# Patient Record
Sex: FEMALE | Race: WHITE | HISPANIC OR LATINO | Employment: OTHER | URBAN - METROPOLITAN AREA
[De-identification: names, ages, dates, MRNs, and addresses within clinical notes are randomized per-mention and may not be internally consistent; named-entity substitution may affect disease eponyms.]

---

## 2018-05-06 ENCOUNTER — HOSPITAL ENCOUNTER (EMERGENCY)
Facility: HOSPITAL | Age: 83
Discharge: HOME | End: 2018-05-06
Attending: EMERGENCY MEDICINE
Payer: MEDICARE

## 2018-05-06 ENCOUNTER — APPOINTMENT (EMERGENCY)
Dept: RADIOLOGY | Facility: HOSPITAL | Age: 83
End: 2018-05-06
Payer: MEDICARE

## 2018-05-06 VITALS
TEMPERATURE: 97.2 F | RESPIRATION RATE: 16 BRPM | HEART RATE: 66 BPM | SYSTOLIC BLOOD PRESSURE: 176 MMHG | WEIGHT: 134 LBS | DIASTOLIC BLOOD PRESSURE: 84 MMHG | OXYGEN SATURATION: 98 %

## 2018-05-06 DIAGNOSIS — J40 BRONCHITIS: Primary | ICD-10-CM

## 2018-05-06 PROCEDURE — 71045 X-RAY EXAM CHEST 1 VIEW: CPT

## 2018-05-06 PROCEDURE — 99283 EMERGENCY DEPT VISIT LOW MDM: CPT

## 2018-05-06 RX ORDER — AZITHROMYCIN 250 MG/1
500 TABLET, FILM COATED ORAL ONCE
Status: COMPLETED | OUTPATIENT
Start: 2018-05-06 | End: 2018-05-06

## 2018-05-06 RX ORDER — GUAIFENESIN/DEXTROMETHORPHAN 100-10MG/5
5 SYRUP ORAL ONCE
Status: COMPLETED | OUTPATIENT
Start: 2018-05-06 | End: 2018-05-06

## 2018-05-06 RX ORDER — AZITHROMYCIN 250 MG/1
250 TABLET, FILM COATED ORAL DAILY
Qty: 4 TABLET | Refills: 0 | Status: SHIPPED | OUTPATIENT
Start: 2018-05-06 | End: 2018-05-10

## 2018-05-06 RX ORDER — GUAIFENESIN/DEXTROMETHORPHAN 100-10MG/5
5 SYRUP ORAL 3 TIMES DAILY PRN
Qty: 118 ML | Refills: 0 | Status: SHIPPED | OUTPATIENT
Start: 2018-05-06 | End: 2019-05-20

## 2018-05-06 RX ADMIN — GUAIFENESIN AND DEXTROMETHORPHAN 5 ML: 100; 10 SYRUP ORAL at 20:28

## 2018-05-06 RX ADMIN — AZITHROMYCIN 500 MG: 250 TABLET, FILM COATED ORAL at 20:28

## 2018-05-06 NOTE — ED PROVIDER NOTES
History  Chief Complaint   Patient presents with    Cough     daughter c/o produtive cough for 5 days, has had clear phlegm, daughter noticed coughing inbetween every other sentence     81 yo female with worsening cough x 5 days  + white phlegm production  No fever, no chest pain  Mild sob  No vomiting or diarrhea  Has not tried any over the counter meds  History provided by:  Patient and relative   used: Yes        None       Past Medical History:   Diagnosis Date    CAD (coronary artery disease)     Disease of thyroid gland     hypo    GERD (gastroesophageal reflux disease)     Hyperlipidemia     Hypertension     Osteoarthritis (arthritis due to wear and tear of joints)        Past Surgical History:   Procedure Laterality Date    CORONARY ARTERY BYPASS GRAFT      HIATAL HERNIA REPAIR      JOINT REPLACEMENT Left     knee 2001    ROTATOR CUFF REPAIR         History reviewed  No pertinent family history  I have reviewed and agree with the history as documented  Social History   Substance Use Topics    Smoking status: Never Smoker    Smokeless tobacco: Never Used    Alcohol use No        Review of Systems   Constitutional: Negative  Negative for fever  HENT: Negative  Eyes: Negative  Respiratory: Positive for cough  Negative for shortness of breath  Cardiovascular: Negative  Negative for chest pain  Gastrointestinal: Negative  Negative for abdominal pain, diarrhea, nausea and vomiting  Genitourinary: Negative  Negative for dysuria and flank pain  Musculoskeletal: Negative  Negative for back pain and myalgias  Skin: Negative  Negative for rash and wound  Neurological: Negative  Negative for dizziness and headaches  Hematological: Does not bruise/bleed easily  Psychiatric/Behavioral: Negative  All other systems reviewed and are negative        Physical Exam  ED Triage Vitals [05/06/18 1925]   Temperature Pulse Respirations Blood Pressure SpO2   (!) 95 9 °F (35 5 °C) 69 18 164/96 98 %      Temp Source Heart Rate Source Patient Position - Orthostatic VS BP Location FiO2 (%)   Tympanic Monitor Sitting Right arm --      Pain Score       No Pain           Orthostatic Vital Signs  Vitals:    05/06/18 1925   BP: 164/96   Pulse: 69   Patient Position - Orthostatic VS: Sitting       Physical Exam   Constitutional: She appears well-developed and well-nourished  No distress  Not ill or toxic appearing   HENT:   Head: Normocephalic and atraumatic  Right Ear: Tympanic membrane normal    Left Ear: Tympanic membrane normal    Mouth/Throat: Oropharynx is clear and moist    Eyes: Conjunctivae are normal  Pupils are equal, round, and reactive to light  Neck: Normal range of motion  Neck supple  Cardiovascular: Normal rate, regular rhythm and normal heart sounds  No murmur heard  Pulmonary/Chest: Effort normal and breath sounds normal  No respiratory distress  Abdominal: Soft  Bowel sounds are normal  She exhibits no distension  There is no tenderness  Musculoskeletal: Normal range of motion  She exhibits no edema or deformity  Neurological: She is alert  No cranial nerve deficit  She exhibits normal muscle tone  Skin: Skin is warm and dry  No rash noted  She is not diaphoretic  No pallor  Psychiatric: She has a normal mood and affect  Her behavior is normal    Nursing note and vitals reviewed  ED Medications  Medications   azithromycin (ZITHROMAX) tablet 500 mg (not administered)   dextromethorphan-guaiFENesin (ROBITUSSIN DM)  mg/5 mL oral syrup 5 mL (not administered)       Diagnostic Studies  Results Reviewed     None                 XR chest 2 views    (Results Pending)              Procedures  Procedures       Phone Contacts  ED Phone Contact    ED Course                               MDM  Number of Diagnoses or Management Options  Bronchitis:   Diagnosis management comments: Exam normal and pt  Looks good/comfortable  No coughing while I was in the room during exam   Will tx for possible bronchitis and prescribed cough medicine  Advised follow up if any worsening or distress  Pt  And daughter seem agreeable with plan  CritCare Time    Disposition  Final diagnoses:   Bronchitis     Time reflects when diagnosis was documented in both MDM as applicable and the Disposition within this note     Time User Action Codes Description Comment    4/9/5479  1:51 PM Beto, Jose Boateng Bronchitis       ED Disposition     ED Disposition Condition Comment    Discharge  Storm Reasoner discharge to home/self care  Condition at discharge: Stable        Follow-up Information     Follow up With Specialties Details Why Anat Whitney MD  In 3 days for [facility] [address]  [phone]          Patient's Medications   Discharge Prescriptions    AZITHROMYCIN (ZITHROMAX) 250 MG TABLET    Take 1 tablet (250 mg total) by mouth daily for 4 days       Start Date: 5/6/2018  End Date: 5/10/2018       Order Dose: 250 mg       Quantity: 4 tablet    Refills: 0    DEXTROMETHORPHAN-GUAIFENESIN (ROBITUSSIN DM)  MG/5 ML SYRUP    Take 5 mL by mouth 3 (three) times a day as needed for cough       Start Date: 5/6/2018  End Date: --       Order Dose: 5 mL       Quantity: 118 mL    Refills: 0     No discharge procedures on file      ED Provider  Electronically Signed by           Nupur Ashby MD  12/89/64 8661

## 2018-05-07 NOTE — DISCHARGE INSTRUCTIONS
Bronquitis aguda   LO QUE NECESITA SABER:   La bronquitis aguda es la inflamación e irritación de bhumika vías respiratorias  Esta irritación puede provocar que tosa o que tenga otros problemas de respiración  La bronquitis aguda suele comenzar debido a otra enfermedad, edwige un resfriado o la gripe  La enfermedad se propaga de cerda nariz y garganta a cerda tráquea y Kasey Hidden  La bronquitis a menudo es conocida edwige resfriado de pecho  La bronquitis aguda generalmente dura alrededor de 3 a 6 semanas y no es haley enfermedad grave  La tos podría durar por varias semanas  INSTRUCCIONES SOBRE EL HAO HOSPITALARIA:   Regrese a la juan de emergencias si:   · Usted expectora deon  · Bhumika labios o uñas de los dedos se ponen azules  · Usted siente que no está recibiendo suficiente aire cuando respira  Pregúntele a cerda Lea Punch vitaminas y minerales son adecuados para usted  · Usted tiene fiebre  · Bhumika problemas respiratorios no desaparecen o empeoran  · Cerda tos no mejora dentro de 4 semanas  · Usted tiene preguntas o inquietudes acerca de cerda condición o cuidado  Cuidados personales:   · Descanse más  El descanso ayuda a cerda cuerpo a sanar  Empiece a hacer un poco más día a día  Descanse cuando usted sienta que es necesario  · Evite irritantes en el aire  Evite productos químicos, gases y polvo  Use haley mascarilla si tiene que trabajar alrededor de polvo o gases  Permanezca dentro cuando los niveles de contaminación tiffany altos  Si tiene alergias, permanezca adentro cuando el conteo de polen sea CROSSCANONBY  No use productos en aerosol, edwige desodorante, aerosol contra los insectos y aerosol para el aria  · No fume o esté alrededor de personas que fuman  La nicotina y otros químicos en los cigarrillos y cigarros dañan la cilia que saca la mucosidad de bhumika pulmones  Pida información a cerda médico si usted actualmente fuma y necesita ayuda para dejar de fumar   Los cigarrillos electrónicos o tabaco sin humo todavía contienen nicotina  Consulte con cerda médico antes de [employer]  · [address] se le haya indicado  Los líquidos ayudan a mantener humectadas christel vías respiratorias y ayudarle a eliminar las flemas por medio de la tos  Es posible que usted necesite kahlil más líquidos si tiene bronquitis [place]  Pregunte cuánto líquido debe kahlil cada día y cuáles líquidos son los más adecuados para usted  · Use un humidificador o vaporizador  Use un humidificador de stacey frío o un vaporizador para elevar la humedad en cerda casa  [address] podría ayudarle a respirar más fácilmente y al mismo tiempo disminuir la tos  Disminuya cerda riesgo para la bronquitis aguda:   · Vaya a que le pongan las vacunas necesarias  Pregúntele a cerda médico si usted debería ser vacunado contra la gripe o la neumonía  · Evite la propagación de gérmenes  Usted puede disminuir cerda riesgo de bronquitis [place] y otras enfermedades de la siguiente manera:     ¨ Bishop frecuentemente con agua y Pat Kiah  Lleve haley loción o gel antiséptico para las coco  Usted puede usar la loción o el gel para limpiar christel coco cuando no haya agua disponible  ¨ No se toque los ojos, la nariz o la boca a menos que se haya lavado las coco mary jo  ¨ Siempre cubra cerda boca al toser para evitar la propagación de gérmenes  Lo mejor es toser en un pañuelo desechable o en la manga de cerda camisa, en lugar de en cerda mano  P[place]dale a los que le rodean que cubran christel bocas al toser  ¨ Trate de evitar a las personas que están resfriadas o tienen gripe  Si usted está enfermo, manténgase alejado de otras personas lo más que pueda  Medicamentos:  Cerda médico podría  darle cualquiera de lo siguiente:  · El ibuprofeno o el acetaminofeno  son medicamentos que pueden ayudar a bajar cerda fiebre  Están disponibles sin receta médica  Consulte con cerda médico cuál medicamento es el adecuado para usted  Pregunte la cantidad y la frecuencia con que debe tomarlos  [address]  Estos medicamentos pueden provocar sangrado estomacal si no se dano correctamente  El ibuprofeno puede provocar daño al Mordecai Herder  No tome ibuprofeno si usted tiene enfermedad de los riñones, Giovanna o si es alérgico a la aspirina  El acetaminofeno puede dañar el hígado  No tome más de 4,000 miligramos en 24 horas  · Los descongestionantes  ayudan a despejar la mucosidad en christel pulmones y que sea más fácil expectorarla  [place] puede ayudarle a respirar mejor  · Los jarabes para la tos  disminuyen cerda necesidad de toser  Si cerda tos produce mucosidad, no tome un supresor de la tos a menos que cerda médico se lo indique  Cerda médico podría sugerirle que tome un supresor de la tos en la noche para que pueda descansar  · [place] christel medicamentos edwige se le haya indicado  Consulte con cerda médico si usted micha que cerda medicamento no le está ayudando o si presenta efectos secundarios  Infórmele si es alérgico a algún medicamento  Mantenga haley lista actualizada de los [place], las vitaminas y los productos herbales que cindy  Incluya los siguientes datos de los medicamentos: cantidad, frecuencia y motivo de administración  Traiga con usted la lista o los envases de la píldoras a christel citas de seguimiento  Lleve la lista de los medicamentos con usted en girma de haley emergencia  Acuda a christel consultas de control con cerda médico según le indicaron  Escriba las preguntas que tenga para que recuerde hacerlas patricia christel citas de seguimiento  © 2017 [address] [employer] Information is for End User's use only and may not be sold, redistributed or otherwise used for commercial purposes  All illustrations and images included in CareNotes® are the copyrighted property of A D A M , Inc  or Olivier Susanne  Esta información es sólo para uso en educación  Cerda intención no es darle un consejo médico sobre enfermedades o tratamientos   Colsulte con cerda Ladean Artist farmacéutico antes de seguir cualquier régimen médico para saber si es seguro y efectivo para usted

## 2019-01-06 ENCOUNTER — APPOINTMENT (EMERGENCY)
Dept: RADIOLOGY | Facility: HOSPITAL | Age: 84
End: 2019-01-06
Payer: MEDICARE

## 2019-01-06 ENCOUNTER — HOSPITAL ENCOUNTER (EMERGENCY)
Facility: HOSPITAL | Age: 84
Discharge: HOME | End: 2019-01-06
Attending: EMERGENCY MEDICINE
Payer: MEDICARE

## 2019-01-06 VITALS
RESPIRATION RATE: 20 BRPM | SYSTOLIC BLOOD PRESSURE: 172 MMHG | TEMPERATURE: 96.9 F | OXYGEN SATURATION: 98 % | DIASTOLIC BLOOD PRESSURE: 82 MMHG | HEART RATE: 66 BPM

## 2019-01-06 DIAGNOSIS — J32.9 SINUSITIS: Primary | ICD-10-CM

## 2019-01-06 PROCEDURE — 71046 X-RAY EXAM CHEST 2 VIEWS: CPT

## 2019-01-06 PROCEDURE — 99283 EMERGENCY DEPT VISIT LOW MDM: CPT

## 2019-01-06 RX ORDER — AMOXICILLIN AND CLAVULANATE POTASSIUM 875; 125 MG/1; MG/1
1 TABLET, FILM COATED ORAL EVERY 12 HOURS SCHEDULED
Qty: 20 TABLET | Refills: 0 | Status: SHIPPED | OUTPATIENT
Start: 2019-01-06 | End: 2019-01-13

## 2019-01-06 RX ORDER — DOXYCYCLINE HYCLATE 100 MG/1
100 CAPSULE ORAL 2 TIMES DAILY
Qty: 14 CAPSULE | Refills: 0 | Status: SHIPPED | OUTPATIENT
Start: 2019-01-06 | End: 2019-01-13

## 2019-01-06 RX ORDER — ONDANSETRON 4 MG/1
4 TABLET, ORALLY DISINTEGRATING ORAL EVERY 6 HOURS PRN
Qty: 20 TABLET | Refills: 0 | Status: SHIPPED | OUTPATIENT
Start: 2019-01-06 | End: 2019-05-20

## 2019-01-06 RX ORDER — FLUTICASONE PROPIONATE 50 MCG
2 SPRAY, SUSPENSION (ML) NASAL 2 TIMES DAILY
Qty: 16 G | Refills: 0 | Status: SHIPPED | OUTPATIENT
Start: 2019-01-06 | End: 2019-05-20

## 2019-01-06 RX ORDER — AMOXICILLIN AND CLAVULANATE POTASSIUM 875; 125 MG/1; MG/1
1 TABLET, FILM COATED ORAL ONCE
Status: COMPLETED | OUTPATIENT
Start: 2019-01-06 | End: 2019-01-06

## 2019-01-06 RX ADMIN — AMOXICILLIN AND CLAVULANATE POTASSIUM 1 TABLET: 875; 125 TABLET, FILM COATED ORAL at 17:50

## 2019-01-06 NOTE — ED PROVIDER NOTES
History  Chief Complaint   Patient presents with    Nasal Congestion     per daughter patient has had cold symptoms x one week that she states are getting progressively worse  79-year-old female presenting today with a mild nonproductive cough over the past week accompanied with nasal congestion and sinus pain and tenderness  Patient has not had any fevers  No difficulty breathing  Main concern at this point is sinus congestion  Pain worsens with leaning forward as well as with looking at the light  Otherwise offers no complaints at this point time  Eating and drinking well going to the bathroom regularly  Denies fevers, nausea, vomiting, shortness of breath, chest pain, abdominal pain or rash  Prior to Admission Medications   Prescriptions Last Dose Informant Patient Reported? Taking?   dextromethorphan-guaiFENesin (ROBITUSSIN DM)  mg/5 mL syrup   No No   Sig: Take 5 mL by mouth 3 (three) times a day as needed for cough      Facility-Administered Medications: None       Past Medical History:   Diagnosis Date    CAD (coronary artery disease)     Disease of thyroid gland     hypo    GERD (gastroesophageal reflux disease)     Hyperlipidemia     Hypertension     Osteoarthritis (arthritis due to wear and tear of joints)        Past Surgical History:   Procedure Laterality Date    CORONARY ARTERY BYPASS GRAFT      HIATAL HERNIA REPAIR      JOINT REPLACEMENT Left     knee 2001    ROTATOR CUFF REPAIR         History reviewed  No pertinent family history  I have reviewed and agree with the history as documented  Social History   Substance Use Topics    Smoking status: Never Smoker    Smokeless tobacco: Never Used    Alcohol use No        Review of Systems   Constitutional: Negative  Negative for appetite change, chills and fever  HENT: Positive for congestion, sinus pain and sinus pressure   Negative for dental problem, drooling, ear discharge, ear pain, facial swelling, hearing loss, mouth sores, nosebleeds, postnasal drip, rhinorrhea, sneezing, sore throat, tinnitus, trouble swallowing and voice change  Eyes: Negative  Respiratory: Positive for cough  Negative for chest tightness, shortness of breath and wheezing  Cardiovascular: Negative  Negative for chest pain  Gastrointestinal: Negative  Negative for abdominal distention, abdominal pain, blood in stool, constipation, diarrhea, nausea and vomiting  Genitourinary: Negative  Musculoskeletal: Negative  Negative for arthralgias, neck pain and neck stiffness  Skin: Negative  Negative for rash  Neurological: Negative  Negative for facial asymmetry and headaches  All other systems reviewed and are negative  Physical Exam  Physical Exam   Constitutional: She is oriented to person, place, and time  She appears well-developed and well-nourished  No distress  HENT:   Head: Normocephalic and atraumatic  Right Ear: External ear normal    Left Ear: External ear normal    Nose: Nose normal    Mouth/Throat: Oropharynx is clear and moist  No oropharyngeal exudate  No erythema noted  No swelling, exudate, or uvula deviation noted of mouth  No discoloration, asymmetries or swelling of the face  No ulcerations, fluctuance, induration or drainage noted  No petechiae noted on palate  Able to swallow without difficulty  Full ROM of jaw  Eyes: Pupils are equal, round, and reactive to light  Conjunctivae are normal  Right eye exhibits no discharge  Left eye exhibits no discharge  No scleral icterus  Neck: Normal range of motion  Neck supple  No tracheal deviation present  Cardiovascular: Normal rate, regular rhythm, normal heart sounds and intact distal pulses  Exam reveals no friction rub  No murmur heard  Pulmonary/Chest: Effort normal and breath sounds normal  No stridor  No respiratory distress  She has no wheezes  She has no rales  She exhibits no tenderness     SpO2 is 98%, within normal limits, resting comfortably  No cyanosis or pallor  Abdominal: Soft  Bowel sounds are normal  She exhibits no distension  There is no tenderness  There is no rebound and no guarding  Lymphadenopathy:     She has no cervical adenopathy  Neurological: She is alert and oriented to person, place, and time  Skin: Skin is warm and dry  Capillary refill takes less than 2 seconds  No rash noted  She is not diaphoretic  No erythema  No pallor  No sandpaper rash noted  No other rashes noted  Good coloration of skin  Nursing note and vitals reviewed  Vital Signs  ED Triage Vitals [01/06/19 1716]   Temperature Pulse Respirations Blood Pressure SpO2   (!) 96 9 °F (36 1 °C) 66 20 (!) 172/82 98 %      Temp Source Heart Rate Source Patient Position - Orthostatic VS BP Location FiO2 (%)   Tympanic Monitor Sitting Left arm --      Pain Score       No Pain           Vitals:    01/06/19 1716   BP: (!) 172/82   Pulse: 66   Patient Position - Orthostatic VS: Sitting       Visual Acuity      ED Medications  Medications   amoxicillin-clavulanate (AUGMENTIN) 875-125 mg per tablet 1 tablet (1 tablet Oral Given 1/6/19 1750)       Diagnostic Studies  Results Reviewed     None                 XR chest 2 views    (Results Pending)              Procedures  Procedures       Phone Contacts  ED Phone Contact    ED Course                               MDM  Number of Diagnoses or Management Options  Sinusitis:   Diagnosis management comments: Patient appears very well  Will treat for sinusitis and have patient follow up with her family doctor for re-evaluation with strict return precautions for any worsening  Patient and daughter verbalized understanding and agree with the above assessment and plan         Amount and/or Complexity of Data Reviewed  Tests in the radiology section of CPT®: reviewed and ordered  Review and summarize past medical records: yes  Independent visualization of images, tracings, or specimens: yes      CritCare Time    Disposition  Final diagnoses:   Sinusitis     Time reflects when diagnosis was documented in both MDM as applicable and the Disposition within this note     Time User Action Codes Description Comment    1/6/2019  5:45 PM Blayne Mccoy Add [J32 9] Sinusitis       ED Disposition     ED Disposition Condition Comment    Discharge  Ryan Thakur discharge to home/self care  Condition at discharge: Good        Follow-up Information     Follow up With Specialties Details Why [address] Emergency Department Emergency Medicine Go to If symptoms worsen [facility]  [phone] [facility] ED, [facility], [address], MD  Schedule an appointment as soon as possible for a visit As needed [address]  [address]             Discharge Medication List as of 1/6/2019  5:46 PM      START taking these medications    Details   amoxicillin-clavulanate (AUGMENTIN) 875-125 mg per tablet Take 1 tablet by mouth every 12 (twelve) hours for 7 days, Starting Sun 1/6/2019, Until Sun 1/13/2019, Print      fluticasone (FLONASE) 50 mcg/act nasal spray 2 sprays into each nostril 2 (two) times a day for 3 days, Starting Sun 1/6/2019, Until Wed 1/9/2019, Print         CONTINUE these medications which have NOT CHANGED    Details   dextromethorphan-guaiFENesin (ROBITUSSIN DM)  mg/5 mL syrup Take 5 mL by mouth 3 (three) times a day as needed for cough, Starting Sun 5/6/2018, Print           No discharge procedures on file      ED Provider  Electronically Signed by           Daisy Barrera PA-C  01/06/19 5279

## 2019-01-06 NOTE — DISCHARGE INSTRUCTIONS
Sinusitis, cuidados ambulatorios   INFORMACIÓN GENERAL:   La sinusitis  es haley inflamación o infección de los senos paranasales  Sujata siempre es causada por un virus  La sinusitis aguda podría durar hasta 12 semanas  La sinusitis crónica dura más de 12 semanas  La sinusitis es recurrente cuando la persona tiene 3 o más episodios de sinusitis en 1 año  Los siguientes son los síntomas más comunes:   · Deatra Christians     · Dolor, presión, enrojecimiento o inflamación alrededor de la frente, las mejillas o los ojos     · Secreción espesa de color amarillo o sherry que sale de la nariz     · Sensibilidad al tocarse el herman encima de los senos paranasales     · Tos seca que ocurre mayormente por la noche o al [employer]     · Dolor de trupti y en el herman que empeora al inclinarse hacia adelante     · Dolor en los dientes o al masticar  Busque atención médica de inmediato al presentar los siguientes síntomas:   · Cambios en la visión, edwige visión doble    · Está confundido o no puede pensar con claridad    · Dolor de trupti o tiene el jennifer rígido    · Tiene dificultad para respirar  Tratamiento para la sinusitis  puede incluir medicamentos para aliviar la congestión nasal o de los senos paranasales o para disminuir el dolor y la fiebre  Consulte con cerda proveedor de [employer] cuáles son los medicamentos y cuál es la cantidad ramos para kahlil  Controle la sinusitis:   · Consuma líquidos según le indiquen  Pregunte a cerda proveedor de sarah cuánto líquido debe kahlil a diario y qué líquidos le recomienda  Los líquidos Leticia Moses a aflojar y a drenar la mucosidad de christel senos paranasales  · Realice kirstin de vapor  Egan agua en haley vasija hasta que suelte vapor  Inclínese sobre el tazón y forme haley especie de techo sobre cerda trupti con haley toalla kvng  Respire profundo patricia unos 20 minutos  Cuide de no acercarse demasiado al agua y [place]  Repita 3 veces al día   También puede respirar hondo cuando cindy Renettaal & [place] caliente  · Enjuáguese los senos paranasales  Use un aparato especial para enjuagar christel vías nasales con haley solución de agua salina  [place] ayudará a aflojar la mucosidad y limpiar el polen y la suciedad de lee nariz  También contribuirá a reducir la inflamación para que pueda respirar normalmente  Pregunte con qué frecuencia debe hacerlo  · Use haley compresa de calor en los senos paranasales  para aliviar el dolor  Aplíquese haley compresa de calor patricia 15 a 20 minutos cada hora patricia la cantidad de [employer] indiquen  · Duerma con la Alexia Sluder  Coloque haley almohada adicional debajo de lee trupti antes de acostarse a dormir para ayudar a que christel senos paranasales drenen  · No fume y evite el humo de [place]  Si usted fuma, nunca es demasiado tarde para dejar de hacerlo  Solicite información si necesita ayuda para dejar de fumar  Evite el contagio de gérmenes que causan la sinusitis:  [employer] las coco frecuentemente con agua y Sean  [employer] las coco después de usar el baño, cambiar el pañal de un bebé o estornudar  Lávese las coco antes de preparar o consumir alimentos  Programe haley timo con lee proveedor de [employer] se le haya indicado: Anote christel preguntas para que se acuerde de hacerlas patricia christel visitas  ACUERDOS SOBRE LEE CUIDADO:   Usted tiene el derecho de participar en la planificación de lee cuidado  Aprenda todo lo que pueda sobre lee condición y edwige darle tratamiento  Discuta con christel médicos christel opciones de tratamiento para juntos decidir el cuidado que usted quiere recibir  Usted siempre tiene el derecho a rechazar lee tratamiento  Esta información es sólo para uso en educación  Lee intención no es darle un consejo médico sobre enfermedades o tratamientos  Colsulte con lee [place] farmacéutico antes de seguir cualquier régimen médico para saber si es seguro y efectivo para usted    © 2014 [address] is for End User's use only and may not be sold, redistributed or otherwise used for commercial purposes  All illustrations and images included in CareNotes® are the copyrighted property of A D A M , Inc  or Olivier Skinner

## 2019-04-17 ENCOUNTER — APPOINTMENT (EMERGENCY)
Dept: RADIOLOGY | Facility: HOSPITAL | Age: 84
End: 2019-04-17
Payer: MEDICARE

## 2019-04-17 ENCOUNTER — HOSPITAL ENCOUNTER (EMERGENCY)
Facility: HOSPITAL | Age: 84
Discharge: HOME | End: 2019-04-17
Attending: EMERGENCY MEDICINE
Payer: MEDICARE

## 2019-04-17 VITALS
HEART RATE: 69 BPM | DIASTOLIC BLOOD PRESSURE: 95 MMHG | TEMPERATURE: 99.4 F | SYSTOLIC BLOOD PRESSURE: 198 MMHG | OXYGEN SATURATION: 97 % | RESPIRATION RATE: 18 BRPM | WEIGHT: 135 LBS

## 2019-04-17 DIAGNOSIS — M25.511 RIGHT SHOULDER PAIN: Primary | ICD-10-CM

## 2019-04-17 DIAGNOSIS — M11.219: ICD-10-CM

## 2019-04-17 LAB
ANION GAP SERPL CALCULATED.3IONS-SCNC: 10 MMOL/L (ref 4–13)
ATRIAL RATE: 78 BPM
BASOPHILS # BLD AUTO: 0.02 THOUSANDS/ΜL (ref 0–0.1)
BASOPHILS NFR BLD AUTO: 0 % (ref 0–1)
BUN SERPL-MCNC: 16 MG/DL (ref 5–25)
CALCIUM SERPL-MCNC: 10 MG/DL (ref 8.3–10.1)
CHLORIDE SERPL-SCNC: 98 MMOL/L (ref 100–108)
CO2 SERPL-SCNC: 26 MMOL/L (ref 21–32)
CREAT SERPL-MCNC: 1.14 MG/DL (ref 0.6–1.3)
EOSINOPHIL # BLD AUTO: 0.04 THOUSAND/ΜL (ref 0–0.61)
EOSINOPHIL NFR BLD AUTO: 1 % (ref 0–6)
ERYTHROCYTE [DISTWIDTH] IN BLOOD BY AUTOMATED COUNT: 16.9 % (ref 11.6–15.1)
GFR SERPL CREATININE-BSD FRML MDRD: 41 ML/MIN/1.73SQ M
GLUCOSE SERPL-MCNC: 98 MG/DL (ref 65–140)
HCT VFR BLD AUTO: 38.2 % (ref 34.8–46.1)
HGB BLD-MCNC: 12.2 G/DL (ref 11.5–15.4)
IMM GRANULOCYTES # BLD AUTO: 0.03 THOUSAND/UL (ref 0–0.2)
IMM GRANULOCYTES NFR BLD AUTO: 0 % (ref 0–2)
LYMPHOCYTES # BLD AUTO: 1.18 THOUSANDS/ΜL (ref 0.6–4.47)
LYMPHOCYTES NFR BLD AUTO: 13 % (ref 14–44)
MCH RBC QN AUTO: 25.2 PG (ref 26.8–34.3)
MCHC RBC AUTO-ENTMCNC: 31.9 G/DL (ref 31.4–37.4)
MCV RBC AUTO: 79 FL (ref 82–98)
MONOCYTES # BLD AUTO: 0.92 THOUSAND/ΜL (ref 0.17–1.22)
MONOCYTES NFR BLD AUTO: 11 % (ref 4–12)
NEUTROPHILS # BLD AUTO: 6.59 THOUSANDS/ΜL (ref 1.85–7.62)
NEUTS SEG NFR BLD AUTO: 75 % (ref 43–75)
NRBC BLD AUTO-RTO: 0 /100 WBCS
P AXIS: 65 DEGREES
PLATELET # BLD AUTO: 191 THOUSANDS/UL (ref 149–390)
PMV BLD AUTO: 10.1 FL (ref 8.9–12.7)
POTASSIUM SERPL-SCNC: 3.8 MMOL/L (ref 3.5–5.3)
PR INTERVAL: 156 MS
QRS AXIS: -39 DEGREES
QRSD INTERVAL: 82 MS
QT INTERVAL: 374 MS
QTC INTERVAL: 426 MS
RBC # BLD AUTO: 4.85 MILLION/UL (ref 3.81–5.12)
SODIUM SERPL-SCNC: 134 MMOL/L (ref 136–145)
T WAVE AXIS: 97 DEGREES
TROPONIN I SERPL-MCNC: <0.02 NG/ML
VENTRICULAR RATE: 78 BPM
WBC # BLD AUTO: 8.78 THOUSAND/UL (ref 4.31–10.16)

## 2019-04-17 PROCEDURE — 84484 ASSAY OF TROPONIN QUANT: CPT | Performed by: EMERGENCY MEDICINE

## 2019-04-17 PROCEDURE — 93010 ELECTROCARDIOGRAM REPORT: CPT | Performed by: INTERNAL MEDICINE

## 2019-04-17 PROCEDURE — 96374 THER/PROPH/DIAG INJ IV PUSH: CPT

## 2019-04-17 PROCEDURE — 99284 EMERGENCY DEPT VISIT MOD MDM: CPT

## 2019-04-17 PROCEDURE — 85025 COMPLETE CBC W/AUTO DIFF WBC: CPT | Performed by: EMERGENCY MEDICINE

## 2019-04-17 PROCEDURE — 93005 ELECTROCARDIOGRAM TRACING: CPT

## 2019-04-17 PROCEDURE — 80048 BASIC METABOLIC PNL TOTAL CA: CPT | Performed by: EMERGENCY MEDICINE

## 2019-04-17 PROCEDURE — 73030 X-RAY EXAM OF SHOULDER: CPT

## 2019-04-17 PROCEDURE — 36415 COLL VENOUS BLD VENIPUNCTURE: CPT | Performed by: EMERGENCY MEDICINE

## 2019-04-17 PROCEDURE — 73060 X-RAY EXAM OF HUMERUS: CPT

## 2019-04-17 PROCEDURE — 73200 CT UPPER EXTREMITY W/O DYE: CPT

## 2019-04-17 RX ORDER — PREDNISONE 20 MG/1
40 TABLET ORAL ONCE
Status: COMPLETED | OUTPATIENT
Start: 2019-04-17 | End: 2019-04-17

## 2019-04-17 RX ORDER — PREDNISONE 20 MG/1
40 TABLET ORAL DAILY
Qty: 10 TABLET | Refills: 0 | Status: SHIPPED | OUTPATIENT
Start: 2019-04-17 | End: 2019-04-22

## 2019-04-17 RX ORDER — CELECOXIB 200 MG/1
200 CAPSULE ORAL DAILY
COMMUNITY
End: 2022-06-30

## 2019-04-17 RX ORDER — LEVOTHYROXINE SODIUM 0.07 MG/1
100 TABLET ORAL DAILY
COMMUNITY

## 2019-04-17 RX ORDER — COLCHICINE 0.6 MG/1
1.2 TABLET ORAL ONCE
Status: DISCONTINUED | OUTPATIENT
Start: 2019-04-17 | End: 2019-04-17

## 2019-04-17 RX ORDER — OXYCODONE HYDROCHLORIDE AND ACETAMINOPHEN 5; 325 MG/1; MG/1
1 TABLET ORAL EVERY 4 HOURS PRN
Qty: 10 TABLET | Refills: 0 | Status: SHIPPED | OUTPATIENT
Start: 2019-04-17 | End: 2019-05-21 | Stop reason: HOSPADM

## 2019-04-17 RX ORDER — OXYCODONE HYDROCHLORIDE AND ACETAMINOPHEN 5; 325 MG/1; MG/1
1 TABLET ORAL ONCE
Status: COMPLETED | OUTPATIENT
Start: 2019-04-17 | End: 2019-04-17

## 2019-04-17 RX ORDER — COLCHICINE 0.6 MG/1
1.2 TABLET ORAL ONCE
Status: COMPLETED | OUTPATIENT
Start: 2019-04-17 | End: 2019-04-17

## 2019-04-17 RX ORDER — PREGABALIN 75 MG/1
75 CAPSULE ORAL DAILY
COMMUNITY
End: 2022-06-30

## 2019-04-17 RX ORDER — ATORVASTATIN CALCIUM 10 MG/1
10 TABLET, FILM COATED ORAL DAILY
COMMUNITY

## 2019-04-17 RX ORDER — AMLODIPINE BESYLATE 5 MG/1
10 TABLET ORAL DAILY
COMMUNITY

## 2019-04-17 RX ORDER — COLCHICINE 0.6 MG/1
0.6 TABLET ORAL DAILY
Qty: 3 TABLET | Refills: 0 | Status: SHIPPED | OUTPATIENT
Start: 2019-04-17 | End: 2019-07-14

## 2019-04-17 RX ORDER — MORPHINE SULFATE 4 MG/ML
4 INJECTION, SOLUTION INTRAMUSCULAR; INTRAVENOUS ONCE
Status: COMPLETED | OUTPATIENT
Start: 2019-04-17 | End: 2019-04-17

## 2019-04-17 RX ADMIN — PREDNISONE 40 MG: 20 TABLET ORAL at 15:41

## 2019-04-17 RX ADMIN — COLCHICINE 1.2 MG: 0.6 TABLET, FILM COATED ORAL at 15:41

## 2019-04-17 RX ADMIN — MORPHINE SULFATE 4 MG: 4 INJECTION INTRAVENOUS at 13:57

## 2019-04-17 RX ADMIN — OXYCODONE HYDROCHLORIDE AND ACETAMINOPHEN 1 TABLET: 5; 325 TABLET ORAL at 13:02

## 2019-04-29 ENCOUNTER — APPOINTMENT (EMERGENCY)
Dept: RADIOLOGY | Facility: HOSPITAL | Age: 84
End: 2019-04-29
Payer: MEDICARE

## 2019-04-29 ENCOUNTER — HOSPITAL ENCOUNTER (EMERGENCY)
Facility: HOSPITAL | Age: 84
Discharge: HOME | End: 2019-04-29
Attending: EMERGENCY MEDICINE | Admitting: EMERGENCY MEDICINE
Payer: MEDICARE

## 2019-04-29 VITALS
SYSTOLIC BLOOD PRESSURE: 172 MMHG | DIASTOLIC BLOOD PRESSURE: 89 MMHG | BODY MASS INDEX: 31.24 KG/M2 | RESPIRATION RATE: 16 BRPM | OXYGEN SATURATION: 96 % | TEMPERATURE: 98.1 F | HEIGHT: 55 IN | WEIGHT: 135 LBS | HEART RATE: 76 BPM

## 2019-04-29 DIAGNOSIS — R10.9 ABDOMINAL PAIN: Primary | ICD-10-CM

## 2019-04-29 LAB
ALBUMIN SERPL BCP-MCNC: 3 G/DL (ref 3.5–5)
ALP SERPL-CCNC: 101 U/L (ref 46–116)
ALT SERPL W P-5'-P-CCNC: 18 U/L (ref 12–78)
ANION GAP SERPL CALCULATED.3IONS-SCNC: 8 MMOL/L (ref 4–13)
AST SERPL W P-5'-P-CCNC: 23 U/L (ref 5–45)
BASOPHILS # BLD AUTO: 0.02 THOUSANDS/ΜL (ref 0–0.1)
BASOPHILS NFR BLD AUTO: 0 % (ref 0–1)
BILIRUB SERPL-MCNC: 0.3 MG/DL (ref 0.2–1)
BILIRUB UR QL STRIP: NEGATIVE
BUN SERPL-MCNC: 14 MG/DL (ref 5–25)
CALCIUM SERPL-MCNC: 9.1 MG/DL (ref 8.3–10.1)
CHLORIDE SERPL-SCNC: 98 MMOL/L (ref 100–108)
CLARITY UR: CLEAR
CO2 SERPL-SCNC: 29 MMOL/L (ref 21–32)
COLOR UR: NORMAL
CREAT SERPL-MCNC: 1.24 MG/DL (ref 0.6–1.3)
EOSINOPHIL # BLD AUTO: 0.07 THOUSAND/ΜL (ref 0–0.61)
EOSINOPHIL NFR BLD AUTO: 1 % (ref 0–6)
ERYTHROCYTE [DISTWIDTH] IN BLOOD BY AUTOMATED COUNT: 16.8 % (ref 11.6–15.1)
GFR SERPL CREATININE-BSD FRML MDRD: 37 ML/MIN/1.73SQ M
GLUCOSE SERPL-MCNC: 107 MG/DL (ref 65–140)
GLUCOSE UR STRIP-MCNC: NEGATIVE MG/DL
HCT VFR BLD AUTO: 39.6 % (ref 34.8–46.1)
HGB BLD-MCNC: 12.5 G/DL (ref 11.5–15.4)
HGB UR QL STRIP.AUTO: NEGATIVE
IMM GRANULOCYTES # BLD AUTO: 0.05 THOUSAND/UL (ref 0–0.2)
IMM GRANULOCYTES NFR BLD AUTO: 0 % (ref 0–2)
KETONES UR STRIP-MCNC: NEGATIVE MG/DL
LACTATE SERPL-SCNC: 1.4 MMOL/L (ref 0.5–2)
LEUKOCYTE ESTERASE UR QL STRIP: NEGATIVE
LIPASE SERPL-CCNC: 131 U/L (ref 73–393)
LYMPHOCYTES # BLD AUTO: 1.31 THOUSANDS/ΜL (ref 0.6–4.47)
LYMPHOCYTES NFR BLD AUTO: 11 % (ref 14–44)
MCH RBC QN AUTO: 25 PG (ref 26.8–34.3)
MCHC RBC AUTO-ENTMCNC: 31.6 G/DL (ref 31.4–37.4)
MCV RBC AUTO: 79 FL (ref 82–98)
MONOCYTES # BLD AUTO: 0.64 THOUSAND/ΜL (ref 0.17–1.22)
MONOCYTES NFR BLD AUTO: 5 % (ref 4–12)
NEUTROPHILS # BLD AUTO: 9.77 THOUSANDS/ΜL (ref 1.85–7.62)
NEUTS SEG NFR BLD AUTO: 83 % (ref 43–75)
NITRITE UR QL STRIP: NEGATIVE
NRBC BLD AUTO-RTO: 0 /100 WBCS
PH UR STRIP.AUTO: 7.5 [PH]
PLATELET # BLD AUTO: 260 THOUSANDS/UL (ref 149–390)
PMV BLD AUTO: 10.5 FL (ref 8.9–12.7)
POTASSIUM SERPL-SCNC: 4.1 MMOL/L (ref 3.5–5.3)
PROT SERPL-MCNC: 6.9 G/DL (ref 6.4–8.2)
PROT UR STRIP-MCNC: NEGATIVE MG/DL
RBC # BLD AUTO: 5 MILLION/UL (ref 3.81–5.12)
SODIUM SERPL-SCNC: 135 MMOL/L (ref 136–145)
SP GR UR STRIP.AUTO: 1.01 (ref 1–1.03)
UROBILINOGEN UR QL STRIP.AUTO: 0.2 E.U./DL
WBC # BLD AUTO: 11.86 THOUSAND/UL (ref 4.31–10.16)

## 2019-04-29 PROCEDURE — 74176 CT ABD & PELVIS W/O CONTRAST: CPT

## 2019-04-29 PROCEDURE — 80053 COMPREHEN METABOLIC PANEL: CPT | Performed by: EMERGENCY MEDICINE

## 2019-04-29 PROCEDURE — 96374 THER/PROPH/DIAG INJ IV PUSH: CPT

## 2019-04-29 PROCEDURE — 96361 HYDRATE IV INFUSION ADD-ON: CPT

## 2019-04-29 PROCEDURE — 99284 EMERGENCY DEPT VISIT MOD MDM: CPT

## 2019-04-29 PROCEDURE — 81003 URINALYSIS AUTO W/O SCOPE: CPT | Performed by: EMERGENCY MEDICINE

## 2019-04-29 PROCEDURE — 85025 COMPLETE CBC W/AUTO DIFF WBC: CPT | Performed by: EMERGENCY MEDICINE

## 2019-04-29 PROCEDURE — 83605 ASSAY OF LACTIC ACID: CPT | Performed by: EMERGENCY MEDICINE

## 2019-04-29 PROCEDURE — 36415 COLL VENOUS BLD VENIPUNCTURE: CPT | Performed by: EMERGENCY MEDICINE

## 2019-04-29 PROCEDURE — 83690 ASSAY OF LIPASE: CPT | Performed by: EMERGENCY MEDICINE

## 2019-04-29 RX ORDER — ONDANSETRON 2 MG/ML
4 INJECTION INTRAMUSCULAR; INTRAVENOUS ONCE
Status: COMPLETED | OUTPATIENT
Start: 2019-04-29 | End: 2019-04-29

## 2019-04-29 RX ADMIN — SODIUM CHLORIDE 1000 ML: 0.9 INJECTION, SOLUTION INTRAVENOUS at 16:46

## 2019-04-29 RX ADMIN — ONDANSETRON 4 MG: 2 INJECTION INTRAMUSCULAR; INTRAVENOUS at 16:46

## 2019-05-20 ENCOUNTER — HOSPITAL ENCOUNTER (OUTPATIENT)
Facility: HOSPITAL | Age: 84
Setting detail: OBSERVATION
Discharge: HOME HEALTH | End: 2019-05-21
Attending: EMERGENCY MEDICINE | Admitting: FAMILY MEDICINE
Payer: MEDICARE

## 2019-05-20 ENCOUNTER — APPOINTMENT (EMERGENCY)
Dept: RADIOLOGY | Facility: HOSPITAL | Age: 84
End: 2019-05-20
Payer: MEDICARE

## 2019-05-20 DIAGNOSIS — Z74.09 NEED FOR ASSISTANCE DUE TO REDUCED MOBILITY: ICD-10-CM

## 2019-05-20 DIAGNOSIS — R10.32 ABDOMINAL PAIN, ACUTE, LEFT LOWER QUADRANT: ICD-10-CM

## 2019-05-20 DIAGNOSIS — K56.7 ILEUS (HCC): ICD-10-CM

## 2019-05-20 DIAGNOSIS — R10.9 ABDOMINAL PAIN: Primary | ICD-10-CM

## 2019-05-20 LAB
ALBUMIN SERPL BCP-MCNC: 3.4 G/DL (ref 3.5–5)
ALP SERPL-CCNC: 110 U/L (ref 46–116)
ALT SERPL W P-5'-P-CCNC: 13 U/L (ref 12–78)
ANION GAP SERPL CALCULATED.3IONS-SCNC: 8 MMOL/L (ref 4–13)
AST SERPL W P-5'-P-CCNC: 14 U/L (ref 5–45)
BASOPHILS # BLD AUTO: 0.02 THOUSANDS/ΜL (ref 0–0.1)
BASOPHILS NFR BLD AUTO: 0 % (ref 0–1)
BILIRUB SERPL-MCNC: 0.6 MG/DL (ref 0.2–1)
BILIRUB UR QL STRIP: NEGATIVE
BUN SERPL-MCNC: 19 MG/DL (ref 5–25)
CALCIUM SERPL-MCNC: 9.2 MG/DL (ref 8.3–10.1)
CHLORIDE SERPL-SCNC: 101 MMOL/L (ref 100–108)
CLARITY UR: CLEAR
CO2 SERPL-SCNC: 26 MMOL/L (ref 21–32)
COLOR UR: YELLOW
CREAT SERPL-MCNC: 1.07 MG/DL (ref 0.6–1.3)
EOSINOPHIL # BLD AUTO: 0.07 THOUSAND/ΜL (ref 0–0.61)
EOSINOPHIL NFR BLD AUTO: 1 % (ref 0–6)
ERYTHROCYTE [DISTWIDTH] IN BLOOD BY AUTOMATED COUNT: 16.7 % (ref 11.6–15.1)
GFR SERPL CREATININE-BSD FRML MDRD: 44 ML/MIN/1.73SQ M
GLUCOSE SERPL-MCNC: 112 MG/DL (ref 65–140)
GLUCOSE UR STRIP-MCNC: NEGATIVE MG/DL
HCT VFR BLD AUTO: 35.9 % (ref 34.8–46.1)
HGB BLD-MCNC: 12.3 G/DL (ref 11.5–15.4)
HGB UR QL STRIP.AUTO: NEGATIVE
KETONES UR STRIP-MCNC: NEGATIVE MG/DL
LEUKOCYTE ESTERASE UR QL STRIP: NEGATIVE
LYMPHOCYTES # BLD AUTO: 1.24 THOUSANDS/ΜL (ref 0.6–4.47)
LYMPHOCYTES NFR BLD AUTO: 16 % (ref 14–44)
MCH RBC QN AUTO: 25.8 PG (ref 26.8–34.3)
MCHC RBC AUTO-ENTMCNC: 34.3 G/DL (ref 31.4–37.4)
MCV RBC AUTO: 75 FL (ref 82–98)
MONOCYTES # BLD AUTO: 1.07 THOUSAND/ΜL (ref 0.17–1.22)
MONOCYTES NFR BLD AUTO: 14 % (ref 4–12)
NEUTROPHILS # BLD AUTO: 5.32 THOUSANDS/ΜL (ref 1.85–7.62)
NEUTS SEG NFR BLD AUTO: 69 % (ref 43–75)
NITRITE UR QL STRIP: NEGATIVE
PH UR STRIP.AUTO: 7 [PH]
PLATELET # BLD AUTO: 203 THOUSANDS/UL (ref 149–390)
PMV BLD AUTO: 9.2 FL (ref 8.9–12.7)
POTASSIUM SERPL-SCNC: 3.9 MMOL/L (ref 3.5–5.3)
PROT SERPL-MCNC: 7 G/DL (ref 6.4–8.2)
PROT UR STRIP-MCNC: NEGATIVE MG/DL
RBC # BLD AUTO: 4.77 MILLION/UL (ref 3.81–5.12)
SODIUM SERPL-SCNC: 135 MMOL/L (ref 136–145)
SP GR UR STRIP.AUTO: <=1.005 (ref 1–1.03)
UROBILINOGEN UR QL STRIP.AUTO: 0.2 E.U./DL
WBC # BLD AUTO: 7.72 THOUSAND/UL (ref 4.31–10.16)

## 2019-05-20 PROCEDURE — 74177 CT ABD & PELVIS W/CONTRAST: CPT

## 2019-05-20 PROCEDURE — 36415 COLL VENOUS BLD VENIPUNCTURE: CPT | Performed by: EMERGENCY MEDICINE

## 2019-05-20 PROCEDURE — 99285 EMERGENCY DEPT VISIT HI MDM: CPT

## 2019-05-20 PROCEDURE — 80053 COMPREHEN METABOLIC PANEL: CPT | Performed by: EMERGENCY MEDICINE

## 2019-05-20 PROCEDURE — 85025 COMPLETE CBC W/AUTO DIFF WBC: CPT | Performed by: EMERGENCY MEDICINE

## 2019-05-20 PROCEDURE — 81003 URINALYSIS AUTO W/O SCOPE: CPT | Performed by: EMERGENCY MEDICINE

## 2019-05-20 PROCEDURE — 1123F ACP DISCUSS/DSCN MKR DOCD: CPT | Performed by: FAMILY MEDICINE

## 2019-05-20 RX ORDER — KETOROLAC TROMETHAMINE 30 MG/ML
15 INJECTION, SOLUTION INTRAMUSCULAR; INTRAVENOUS ONCE
Status: COMPLETED | OUTPATIENT
Start: 2019-05-20 | End: 2019-05-21

## 2019-05-20 RX ORDER — MELATONIN
1000 DAILY
COMMUNITY
End: 2022-06-30

## 2019-05-20 RX ORDER — HYDROCODONE BITARTRATE AND ACETAMINOPHEN 5; 325 MG/1; MG/1
1 TABLET ORAL ONCE
Status: COMPLETED | OUTPATIENT
Start: 2019-05-20 | End: 2019-05-21

## 2019-05-20 RX ORDER — HYDROCODONE BITARTRATE AND ACETAMINOPHEN 5; 325 MG/1; MG/1
1 TABLET ORAL ONCE
Status: COMPLETED | OUTPATIENT
Start: 2019-05-20 | End: 2019-05-20

## 2019-05-20 RX ADMIN — IODIXANOL 100 ML: 320 INJECTION, SOLUTION INTRAVASCULAR at 22:40

## 2019-05-20 RX ADMIN — HYDROCODONE BITARTRATE AND ACETAMINOPHEN 1 TABLET: 5; 325 TABLET ORAL at 21:24

## 2019-05-21 VITALS
TEMPERATURE: 98 F | BODY MASS INDEX: 31.28 KG/M2 | WEIGHT: 135.14 LBS | OXYGEN SATURATION: 95 % | RESPIRATION RATE: 16 BRPM | HEART RATE: 54 BPM | HEIGHT: 55 IN | DIASTOLIC BLOOD PRESSURE: 57 MMHG | SYSTOLIC BLOOD PRESSURE: 115 MMHG

## 2019-05-21 PROBLEM — E03.9 ACQUIRED HYPOTHYROIDISM: Status: ACTIVE | Noted: 2019-05-21

## 2019-05-21 PROBLEM — I10 HYPERTENSION: Status: ACTIVE | Noted: 2019-05-21

## 2019-05-21 PROBLEM — R10.32 ABDOMINAL PAIN, ACUTE, LEFT LOWER QUADRANT: Status: ACTIVE | Noted: 2019-05-21

## 2019-05-21 PROBLEM — E78.5 HYPERLIPIDEMIA: Status: ACTIVE | Noted: 2019-05-21

## 2019-05-21 PROBLEM — M19.90 OSTEOARTHRITIS (ARTHRITIS DUE TO WEAR AND TEAR OF JOINTS): Status: ACTIVE | Noted: 2019-05-21

## 2019-05-21 LAB
ANION GAP SERPL CALCULATED.3IONS-SCNC: 7 MMOL/L (ref 4–13)
BUN SERPL-MCNC: 20 MG/DL (ref 5–25)
CALCIUM SERPL-MCNC: 8.5 MG/DL (ref 8.3–10.1)
CHLORIDE SERPL-SCNC: 102 MMOL/L (ref 100–108)
CO2 SERPL-SCNC: 27 MMOL/L (ref 21–32)
CREAT SERPL-MCNC: 1.07 MG/DL (ref 0.6–1.3)
ERYTHROCYTE [DISTWIDTH] IN BLOOD BY AUTOMATED COUNT: 16.6 % (ref 11.6–15.1)
GFR SERPL CREATININE-BSD FRML MDRD: 44 ML/MIN/1.73SQ M
GLUCOSE P FAST SERPL-MCNC: 81 MG/DL (ref 65–99)
GLUCOSE SERPL-MCNC: 81 MG/DL (ref 65–140)
HCT VFR BLD AUTO: 35.6 % (ref 34.8–46.1)
HGB BLD-MCNC: 11.1 G/DL (ref 11.5–15.4)
MAGNESIUM SERPL-MCNC: 1.7 MG/DL (ref 1.6–2.6)
MCH RBC QN AUTO: 25.3 PG (ref 26.8–34.3)
MCHC RBC AUTO-ENTMCNC: 31.2 G/DL (ref 31.4–37.4)
MCV RBC AUTO: 81 FL (ref 82–98)
PHOSPHATE SERPL-MCNC: 3.5 MG/DL (ref 2.3–4.1)
PLATELET # BLD AUTO: 185 THOUSANDS/UL (ref 149–390)
PMV BLD AUTO: 10.7 FL (ref 8.9–12.7)
POTASSIUM SERPL-SCNC: 3.9 MMOL/L (ref 3.5–5.3)
RBC # BLD AUTO: 4.39 MILLION/UL (ref 3.81–5.12)
SODIUM SERPL-SCNC: 136 MMOL/L (ref 136–145)
WBC # BLD AUTO: 6.35 THOUSAND/UL (ref 4.31–10.16)

## 2019-05-21 PROCEDURE — 97163 PT EVAL HIGH COMPLEX 45 MIN: CPT

## 2019-05-21 PROCEDURE — 84100 ASSAY OF PHOSPHORUS: CPT | Performed by: STUDENT IN AN ORGANIZED HEALTH CARE EDUCATION/TRAINING PROGRAM

## 2019-05-21 PROCEDURE — G8978 MOBILITY CURRENT STATUS: HCPCS

## 2019-05-21 PROCEDURE — G8979 MOBILITY GOAL STATUS: HCPCS

## 2019-05-21 PROCEDURE — 85027 COMPLETE CBC AUTOMATED: CPT | Performed by: STUDENT IN AN ORGANIZED HEALTH CARE EDUCATION/TRAINING PROGRAM

## 2019-05-21 PROCEDURE — 99220 PR INITIAL OBSERVATION CARE/DAY 70 MINUTES: CPT | Performed by: FAMILY MEDICINE

## 2019-05-21 PROCEDURE — 80048 BASIC METABOLIC PNL TOTAL CA: CPT | Performed by: STUDENT IN AN ORGANIZED HEALTH CARE EDUCATION/TRAINING PROGRAM

## 2019-05-21 PROCEDURE — 83735 ASSAY OF MAGNESIUM: CPT | Performed by: STUDENT IN AN ORGANIZED HEALTH CARE EDUCATION/TRAINING PROGRAM

## 2019-05-21 RX ORDER — SODIUM CHLORIDE 9 MG/ML
75 INJECTION, SOLUTION INTRAVENOUS CONTINUOUS
Status: DISCONTINUED | OUTPATIENT
Start: 2019-05-21 | End: 2019-05-21 | Stop reason: HOSPADM

## 2019-05-21 RX ORDER — ONDANSETRON 2 MG/ML
4 INJECTION INTRAMUSCULAR; INTRAVENOUS EVERY 6 HOURS PRN
Status: DISCONTINUED | OUTPATIENT
Start: 2019-05-21 | End: 2019-05-21 | Stop reason: HOSPADM

## 2019-05-21 RX ORDER — PREGABALIN 50 MG/1
50 CAPSULE ORAL DAILY
Status: DISCONTINUED | OUTPATIENT
Start: 2019-05-21 | End: 2019-05-21 | Stop reason: HOSPADM

## 2019-05-21 RX ORDER — LEVOTHYROXINE SODIUM 0.07 MG/1
75 TABLET ORAL
Status: DISCONTINUED | OUTPATIENT
Start: 2019-05-21 | End: 2019-05-21 | Stop reason: HOSPADM

## 2019-05-21 RX ORDER — DOCUSATE SODIUM 100 MG/1
100 CAPSULE, LIQUID FILLED ORAL 2 TIMES DAILY
Qty: 10 CAPSULE | Refills: 0 | Status: SHIPPED | OUTPATIENT
Start: 2019-05-21

## 2019-05-21 RX ORDER — ATORVASTATIN CALCIUM 10 MG/1
10 TABLET, FILM COATED ORAL DAILY
Status: DISCONTINUED | OUTPATIENT
Start: 2019-05-21 | End: 2019-05-21 | Stop reason: HOSPADM

## 2019-05-21 RX ORDER — CELECOXIB 100 MG/1
200 CAPSULE ORAL DAILY
Status: DISCONTINUED | OUTPATIENT
Start: 2019-05-21 | End: 2019-05-21 | Stop reason: HOSPADM

## 2019-05-21 RX ORDER — DOCUSATE SODIUM 100 MG/1
100 CAPSULE, LIQUID FILLED ORAL 2 TIMES DAILY
Status: DISCONTINUED | OUTPATIENT
Start: 2019-05-21 | End: 2019-05-21 | Stop reason: HOSPADM

## 2019-05-21 RX ORDER — MAGNESIUM SULFATE HEPTAHYDRATE 40 MG/ML
2 INJECTION, SOLUTION INTRAVENOUS ONCE
Status: COMPLETED | OUTPATIENT
Start: 2019-05-21 | End: 2019-05-21

## 2019-05-21 RX ORDER — MELATONIN
1000 DAILY
Status: DISCONTINUED | OUTPATIENT
Start: 2019-05-21 | End: 2019-05-21 | Stop reason: HOSPADM

## 2019-05-21 RX ORDER — AMLODIPINE BESYLATE 5 MG/1
5 TABLET ORAL DAILY
Status: DISCONTINUED | OUTPATIENT
Start: 2019-05-21 | End: 2019-05-21 | Stop reason: HOSPADM

## 2019-05-21 RX ADMIN — ATORVASTATIN CALCIUM 10 MG: 10 TABLET, FILM COATED ORAL at 08:36

## 2019-05-21 RX ADMIN — DOCUSATE SODIUM 100 MG: 100 CAPSULE, LIQUID FILLED ORAL at 08:36

## 2019-05-21 RX ADMIN — PREGABALIN 50 MG: 50 CAPSULE ORAL at 08:36

## 2019-05-21 RX ADMIN — VITAMIN D, TAB 1000IU (100/BT) 1000 UNITS: 25 TAB at 08:36

## 2019-05-21 RX ADMIN — ENOXAPARIN SODIUM 30 MG: 30 INJECTION SUBCUTANEOUS at 08:36

## 2019-05-21 RX ADMIN — KETOROLAC TROMETHAMINE 15 MG: 30 INJECTION, SOLUTION INTRAMUSCULAR at 00:09

## 2019-05-21 RX ADMIN — CELECOXIB 200 MG: 100 CAPSULE ORAL at 08:40

## 2019-05-21 RX ADMIN — LEVOTHYROXINE SODIUM 75 MCG: 75 TABLET ORAL at 06:14

## 2019-05-21 RX ADMIN — SODIUM CHLORIDE 75 ML/HR: 0.9 INJECTION, SOLUTION INTRAVENOUS at 01:46

## 2019-05-21 RX ADMIN — HYDROCODONE BITARTRATE AND ACETAMINOPHEN 1 TABLET: 5; 325 TABLET ORAL at 00:09

## 2019-05-21 RX ADMIN — MAGNESIUM SULFATE HEPTAHYDRATE 2 G: 40 INJECTION, SOLUTION INTRAVENOUS at 08:54

## 2019-05-21 RX ADMIN — AMLODIPINE BESYLATE 5 MG: 5 TABLET ORAL at 08:36

## 2019-05-29 ENCOUNTER — TELEPHONE (OUTPATIENT)
Dept: FAMILY MEDICINE CLINIC | Facility: CLINIC | Age: 84
End: 2019-05-29

## 2019-07-14 ENCOUNTER — HOSPITAL ENCOUNTER (INPATIENT)
Facility: HOSPITAL | Age: 84
LOS: 2 days | Discharge: HOME HEALTH | DRG: 153 | End: 2019-07-17
Attending: EMERGENCY MEDICINE | Admitting: FAMILY MEDICINE
Payer: MEDICARE

## 2019-07-14 ENCOUNTER — APPOINTMENT (EMERGENCY)
Dept: RADIOLOGY | Facility: HOSPITAL | Age: 84
DRG: 153 | End: 2019-07-14
Attending: EMERGENCY MEDICINE
Payer: MEDICARE

## 2019-07-14 DIAGNOSIS — J06.9 URI (UPPER RESPIRATORY INFECTION): ICD-10-CM

## 2019-07-14 DIAGNOSIS — E53.8 VITAMIN B12 DEFICIENCY: ICD-10-CM

## 2019-07-14 DIAGNOSIS — E87.1 HYPONATREMIA: ICD-10-CM

## 2019-07-14 DIAGNOSIS — R53.1 GENERALIZED WEAKNESS: ICD-10-CM

## 2019-07-14 DIAGNOSIS — I25.10 CAD (CORONARY ARTERY DISEASE): ICD-10-CM

## 2019-07-14 DIAGNOSIS — E78.5 HYPERLIPIDEMIA: ICD-10-CM

## 2019-07-14 DIAGNOSIS — R53.1 WEAKNESS: ICD-10-CM

## 2019-07-14 DIAGNOSIS — R05.9 COUGH: ICD-10-CM

## 2019-07-14 DIAGNOSIS — D72.829 LEUKOCYTOSIS: Primary | ICD-10-CM

## 2019-07-14 LAB
ALBUMIN SERPL BCP-MCNC: 3.2 G/DL (ref 3.5–5)
ALP SERPL-CCNC: 124 U/L (ref 46–116)
ALT SERPL W P-5'-P-CCNC: 20 U/L (ref 12–78)
ANION GAP SERPL CALCULATED.3IONS-SCNC: 5 MMOL/L (ref 4–13)
APTT PPP: 54 SECONDS (ref 23–37)
AST SERPL W P-5'-P-CCNC: 25 U/L (ref 5–45)
BASOPHILS # BLD AUTO: 0.04 THOUSANDS/ΜL (ref 0–0.1)
BASOPHILS NFR BLD AUTO: 0 % (ref 0–1)
BILIRUB SERPL-MCNC: 1.2 MG/DL (ref 0.2–1)
BUN SERPL-MCNC: 16 MG/DL (ref 5–25)
CALCIUM SERPL-MCNC: 8.9 MG/DL (ref 8.3–10.1)
CHLORIDE SERPL-SCNC: 101 MMOL/L (ref 100–108)
CO2 SERPL-SCNC: 29 MMOL/L (ref 21–32)
CREAT SERPL-MCNC: 0.92 MG/DL (ref 0.6–1.3)
EOSINOPHIL # BLD AUTO: 0.09 THOUSAND/ΜL (ref 0–0.61)
EOSINOPHIL NFR BLD AUTO: 1 % (ref 0–6)
ERYTHROCYTE [DISTWIDTH] IN BLOOD BY AUTOMATED COUNT: 16.8 % (ref 11.6–15.1)
GFR SERPL CREATININE-BSD FRML MDRD: 53 ML/MIN/1.73SQ M
GLUCOSE SERPL-MCNC: 95 MG/DL (ref 65–140)
HCT VFR BLD AUTO: 38.2 % (ref 34.8–46.1)
HGB BLD-MCNC: 12 G/DL (ref 11.5–15.4)
IMM GRANULOCYTES # BLD AUTO: 0.04 THOUSAND/UL (ref 0–0.2)
IMM GRANULOCYTES NFR BLD AUTO: 0 % (ref 0–2)
INR PPP: 1.05 (ref 0.86–1.16)
LACTATE SERPL-SCNC: 1.2 MMOL/L (ref 0.5–2)
LYMPHOCYTES # BLD AUTO: 1.64 THOUSANDS/ΜL (ref 0.6–4.47)
LYMPHOCYTES NFR BLD AUTO: 13 % (ref 14–44)
MCH RBC QN AUTO: 24.7 PG (ref 26.8–34.3)
MCHC RBC AUTO-ENTMCNC: 31.4 G/DL (ref 31.4–37.4)
MCV RBC AUTO: 79 FL (ref 82–98)
MONOCYTES # BLD AUTO: 1 THOUSAND/ΜL (ref 0.17–1.22)
MONOCYTES NFR BLD AUTO: 8 % (ref 4–12)
NEUTROPHILS # BLD AUTO: 10.2 THOUSANDS/ΜL (ref 1.85–7.62)
NEUTS SEG NFR BLD AUTO: 78 % (ref 43–75)
NRBC BLD AUTO-RTO: 0 /100 WBCS
PLATELET # BLD AUTO: 237 THOUSANDS/UL (ref 149–390)
PMV BLD AUTO: 10.5 FL (ref 8.9–12.7)
POTASSIUM SERPL-SCNC: 5.1 MMOL/L (ref 3.5–5.3)
PROT SERPL-MCNC: 6.9 G/DL (ref 6.4–8.2)
PROTHROMBIN TIME: 11 SECONDS (ref 9.4–11.7)
RBC # BLD AUTO: 4.86 MILLION/UL (ref 3.81–5.12)
SODIUM SERPL-SCNC: 135 MMOL/L (ref 136–145)
WBC # BLD AUTO: 13.01 THOUSAND/UL (ref 4.31–10.16)

## 2019-07-14 PROCEDURE — 85610 PROTHROMBIN TIME: CPT | Performed by: EMERGENCY MEDICINE

## 2019-07-14 PROCEDURE — 84145 PROCALCITONIN (PCT): CPT | Performed by: EMERGENCY MEDICINE

## 2019-07-14 PROCEDURE — 99285 EMERGENCY DEPT VISIT HI MDM: CPT

## 2019-07-14 PROCEDURE — 93005 ELECTROCARDIOGRAM TRACING: CPT

## 2019-07-14 PROCEDURE — 87147 CULTURE TYPE IMMUNOLOGIC: CPT | Performed by: EMERGENCY MEDICINE

## 2019-07-14 PROCEDURE — 71045 X-RAY EXAM CHEST 1 VIEW: CPT

## 2019-07-14 PROCEDURE — 83605 ASSAY OF LACTIC ACID: CPT | Performed by: EMERGENCY MEDICINE

## 2019-07-14 PROCEDURE — 87040 BLOOD CULTURE FOR BACTERIA: CPT | Performed by: EMERGENCY MEDICINE

## 2019-07-14 PROCEDURE — 85730 THROMBOPLASTIN TIME PARTIAL: CPT | Performed by: EMERGENCY MEDICINE

## 2019-07-14 PROCEDURE — 80053 COMPREHEN METABOLIC PANEL: CPT | Performed by: EMERGENCY MEDICINE

## 2019-07-14 PROCEDURE — 85025 COMPLETE CBC W/AUTO DIFF WBC: CPT | Performed by: EMERGENCY MEDICINE

## 2019-07-14 PROCEDURE — 96361 HYDRATE IV INFUSION ADD-ON: CPT

## 2019-07-14 PROCEDURE — 96360 HYDRATION IV INFUSION INIT: CPT

## 2019-07-14 PROCEDURE — 36415 COLL VENOUS BLD VENIPUNCTURE: CPT | Performed by: EMERGENCY MEDICINE

## 2019-07-14 RX ORDER — ACETAMINOPHEN 325 MG/1
975 TABLET ORAL ONCE
Status: COMPLETED | OUTPATIENT
Start: 2019-07-14 | End: 2019-07-14

## 2019-07-14 RX ADMIN — SODIUM CHLORIDE 1000 ML: 0.9 INJECTION, SOLUTION INTRAVENOUS at 21:29

## 2019-07-14 RX ADMIN — SODIUM CHLORIDE 500 ML: 0.9 INJECTION, SOLUTION INTRAVENOUS at 23:03

## 2019-07-14 RX ADMIN — ACETAMINOPHEN 975 MG: 325 TABLET, FILM COATED ORAL at 21:28

## 2019-07-15 PROBLEM — R53.1 GENERALIZED WEAKNESS: Status: ACTIVE | Noted: 2019-07-15

## 2019-07-15 PROBLEM — D72.829 LEUKOCYTOSIS: Status: ACTIVE | Noted: 2019-07-15

## 2019-07-15 PROBLEM — J06.9 URI (UPPER RESPIRATORY INFECTION): Status: ACTIVE | Noted: 2019-07-15

## 2019-07-15 PROBLEM — I25.10 CAD (CORONARY ARTERY DISEASE): Status: ACTIVE | Noted: 2019-07-15

## 2019-07-15 PROBLEM — E03.9 HYPOTHYROIDISM: Status: ACTIVE | Noted: 2019-07-15

## 2019-07-15 PROBLEM — E87.1 HYPONATREMIA: Status: ACTIVE | Noted: 2019-07-15

## 2019-07-15 PROBLEM — R17 SERUM TOTAL BILIRUBIN ELEVATED: Status: ACTIVE | Noted: 2019-07-15

## 2019-07-15 LAB
ALBUMIN SERPL BCP-MCNC: 2.7 G/DL (ref 3.5–5)
ALP SERPL-CCNC: 108 U/L (ref 46–116)
ALT SERPL W P-5'-P-CCNC: 14 U/L (ref 12–78)
ANION GAP SERPL CALCULATED.3IONS-SCNC: 9 MMOL/L (ref 4–13)
AST SERPL W P-5'-P-CCNC: 10 U/L (ref 5–45)
ATRIAL RATE: 71 BPM
BASOPHILS # BLD AUTO: 0.04 THOUSANDS/ΜL (ref 0–0.1)
BASOPHILS NFR BLD AUTO: 0 % (ref 0–1)
BILIRUB SERPL-MCNC: 1 MG/DL (ref 0.2–1)
BILIRUB UR QL STRIP: NEGATIVE
BUN SERPL-MCNC: 15 MG/DL (ref 5–25)
CALCIUM SERPL-MCNC: 8.7 MG/DL (ref 8.3–10.1)
CHLORIDE SERPL-SCNC: 105 MMOL/L (ref 100–108)
CLARITY UR: CLEAR
CO2 SERPL-SCNC: 26 MMOL/L (ref 21–32)
COLOR UR: YELLOW
CREAT SERPL-MCNC: 0.89 MG/DL (ref 0.6–1.3)
EOSINOPHIL # BLD AUTO: 0.1 THOUSAND/ΜL (ref 0–0.61)
EOSINOPHIL NFR BLD AUTO: 1 % (ref 0–6)
ERYTHROCYTE [DISTWIDTH] IN BLOOD BY AUTOMATED COUNT: 16.9 % (ref 11.6–15.1)
GFR SERPL CREATININE-BSD FRML MDRD: 55 ML/MIN/1.73SQ M
GLUCOSE SERPL-MCNC: 82 MG/DL (ref 65–140)
GLUCOSE UR STRIP-MCNC: NEGATIVE MG/DL
HCT VFR BLD AUTO: 34.7 % (ref 34.8–46.1)
HGB BLD-MCNC: 10.9 G/DL (ref 11.5–15.4)
HGB UR QL STRIP.AUTO: NEGATIVE
IMM GRANULOCYTES # BLD AUTO: 0.06 THOUSAND/UL (ref 0–0.2)
IMM GRANULOCYTES NFR BLD AUTO: 1 % (ref 0–2)
KETONES UR STRIP-MCNC: NEGATIVE MG/DL
LEUKOCYTE ESTERASE UR QL STRIP: NEGATIVE
LYMPHOCYTES # BLD AUTO: 1.6 THOUSANDS/ΜL (ref 0.6–4.47)
LYMPHOCYTES NFR BLD AUTO: 15 % (ref 14–44)
MCH RBC QN AUTO: 24.8 PG (ref 26.8–34.3)
MCHC RBC AUTO-ENTMCNC: 31.4 G/DL (ref 31.4–37.4)
MCV RBC AUTO: 79 FL (ref 82–98)
MONOCYTES # BLD AUTO: 0.95 THOUSAND/ΜL (ref 0.17–1.22)
MONOCYTES NFR BLD AUTO: 9 % (ref 4–12)
NEUTROPHILS # BLD AUTO: 7.96 THOUSANDS/ΜL (ref 1.85–7.62)
NEUTS SEG NFR BLD AUTO: 74 % (ref 43–75)
NITRITE UR QL STRIP: NEGATIVE
NRBC BLD AUTO-RTO: 0 /100 WBCS
P AXIS: 51 DEGREES
PH UR STRIP.AUTO: 7.5 [PH]
PLATELET # BLD AUTO: 203 THOUSANDS/UL (ref 149–390)
PMV BLD AUTO: 11 FL (ref 8.9–12.7)
POTASSIUM SERPL-SCNC: 3.7 MMOL/L (ref 3.5–5.3)
PR INTERVAL: 152 MS
PROCALCITONIN SERPL-MCNC: <0.05 NG/ML
PROT SERPL-MCNC: 6 G/DL (ref 6.4–8.2)
PROT UR STRIP-MCNC: NEGATIVE MG/DL
QRS AXIS: -33 DEGREES
QRSD INTERVAL: 76 MS
QT INTERVAL: 370 MS
QTC INTERVAL: 402 MS
RBC # BLD AUTO: 4.4 MILLION/UL (ref 3.81–5.12)
SODIUM SERPL-SCNC: 140 MMOL/L (ref 136–145)
SP GR UR STRIP.AUTO: 1.01 (ref 1–1.03)
T WAVE AXIS: 86 DEGREES
UROBILINOGEN UR QL STRIP.AUTO: 0.2 E.U./DL
VENTRICULAR RATE: 71 BPM
WBC # BLD AUTO: 10.71 THOUSAND/UL (ref 4.31–10.16)

## 2019-07-15 PROCEDURE — 97110 THERAPEUTIC EXERCISES: CPT

## 2019-07-15 PROCEDURE — G8987 SELF CARE CURRENT STATUS: HCPCS

## 2019-07-15 PROCEDURE — G8978 MOBILITY CURRENT STATUS: HCPCS

## 2019-07-15 PROCEDURE — 80053 COMPREHEN METABOLIC PANEL: CPT | Performed by: STUDENT IN AN ORGANIZED HEALTH CARE EDUCATION/TRAINING PROGRAM

## 2019-07-15 PROCEDURE — G8988 SELF CARE GOAL STATUS: HCPCS

## 2019-07-15 PROCEDURE — 85025 COMPLETE CBC W/AUTO DIFF WBC: CPT | Performed by: STUDENT IN AN ORGANIZED HEALTH CARE EDUCATION/TRAINING PROGRAM

## 2019-07-15 PROCEDURE — 93010 ELECTROCARDIOGRAM REPORT: CPT | Performed by: INTERNAL MEDICINE

## 2019-07-15 PROCEDURE — 99222 1ST HOSP IP/OBS MODERATE 55: CPT | Performed by: STUDENT IN AN ORGANIZED HEALTH CARE EDUCATION/TRAINING PROGRAM

## 2019-07-15 PROCEDURE — 97167 OT EVAL HIGH COMPLEX 60 MIN: CPT

## 2019-07-15 PROCEDURE — G8979 MOBILITY GOAL STATUS: HCPCS

## 2019-07-15 PROCEDURE — 97162 PT EVAL MOD COMPLEX 30 MIN: CPT

## 2019-07-15 PROCEDURE — 87081 CULTURE SCREEN ONLY: CPT | Performed by: STUDENT IN AN ORGANIZED HEALTH CARE EDUCATION/TRAINING PROGRAM

## 2019-07-15 PROCEDURE — 81003 URINALYSIS AUTO W/O SCOPE: CPT | Performed by: EMERGENCY MEDICINE

## 2019-07-15 RX ORDER — HEPARIN SODIUM 5000 [USP'U]/ML
5000 INJECTION, SOLUTION INTRAVENOUS; SUBCUTANEOUS EVERY 8 HOURS SCHEDULED
Status: DISCONTINUED | OUTPATIENT
Start: 2019-07-15 | End: 2019-07-17 | Stop reason: HOSPADM

## 2019-07-15 RX ORDER — SODIUM CHLORIDE 9 MG/ML
50 INJECTION, SOLUTION INTRAVENOUS CONTINUOUS
Status: DISCONTINUED | OUTPATIENT
Start: 2019-07-15 | End: 2019-07-16

## 2019-07-15 RX ORDER — ACETAMINOPHEN 325 MG/1
650 TABLET ORAL EVERY 6 HOURS PRN
Status: DISCONTINUED | OUTPATIENT
Start: 2019-07-15 | End: 2019-07-17 | Stop reason: HOSPADM

## 2019-07-15 RX ORDER — CEFTRIAXONE 1 G/50ML
1000 INJECTION, SOLUTION INTRAVENOUS EVERY 24 HOURS
Status: DISCONTINUED | OUTPATIENT
Start: 2019-07-16 | End: 2019-07-15

## 2019-07-15 RX ORDER — CEFTRIAXONE 1 G/50ML
1000 INJECTION, SOLUTION INTRAVENOUS ONCE
Status: COMPLETED | OUTPATIENT
Start: 2019-07-15 | End: 2019-07-15

## 2019-07-15 RX ADMIN — HEPARIN SODIUM 5000 UNITS: 5000 INJECTION INTRAVENOUS; SUBCUTANEOUS at 13:47

## 2019-07-15 RX ADMIN — CEFTRIAXONE 1000 MG: 1 INJECTION, SOLUTION INTRAVENOUS at 00:59

## 2019-07-15 RX ADMIN — SODIUM CHLORIDE 50 ML/HR: 0.9 INJECTION, SOLUTION INTRAVENOUS at 11:11

## 2019-07-15 RX ADMIN — HEPARIN SODIUM 5000 UNITS: 5000 INJECTION INTRAVENOUS; SUBCUTANEOUS at 21:58

## 2019-07-15 RX ADMIN — HEPARIN SODIUM 5000 UNITS: 5000 INJECTION INTRAVENOUS; SUBCUTANEOUS at 05:43

## 2019-07-15 NOTE — ASSESSMENT & PLAN NOTE
Likely secondary to chronic deconditioning, acute illness and decreased po intake  Weakness did improve while here   PT/OT eval and treatment while here

## 2019-07-15 NOTE — ASSESSMENT & PLAN NOTE
Likely secondary to URI  U/A with no evidence of UTI  LA is WNL  Leukocytosis resolved on repeat lab work   Ceftriaxone was discontinued

## 2019-07-15 NOTE — PROGRESS NOTES
07/15/19 1634   Referral Data   Obs Notice Signed 07/15/19   [facility]   Patient Information   Mental Status Alert   Primary Caregiver Self   Accompanied by/Relationship dtr   Support System Immediate family   Legal Information   Advance Directives Living will;Power of  for health care   Advance Directives Status Second request   Activities of Daily Living Prior to Admission   Functional Status Minimum assistance   Assistive Device Walker   Living Arrangement Apartment   Ambulation Minimum assistance   Means of Transportation   Means of Transport to Appts: Family     LOS/Readmit/MBP: LOS is 2 days today is not a readmit or a MBP pt  Lives alone  in 1 level sr community home and has no EVY  BR on same floor  Pt has a cane and walker in the home for DME and has no oxygen and no neb  Pt has a private pay HHA 2 days a week and the dtrs alternate days to care for her  Enrique Spears for therapy was recommended, will refer out to  [facility]  PCP: denise Neumann  Pt has  advanced directives, requested copy to the chart, dtr will bring next time she comes in  Pt does not drive, family drives her  Pharmacy is [facility] on [address] in [place], [place], pt dtr notes no issues with prescription plan  Per Dtr  Pt was initiated thru the [employer] program and is still in the application approval phase, once active they will be able to provide more in home services  Referral out to  [facility] for service in the home for gait strengthening

## 2019-07-15 NOTE — OCCUPATIONAL THERAPY NOTE
OT EVALUATION       07/15/19 6630   Note Type   Note type Eval only   Restrictions/Precautions   Other Precautions Chair Alarm; Bed Alarm; Fall Risk   Pain Assessment   Pain Assessment Cisse-Baker FACES   Cisse-Baker FACES Pain Rating 4   Pain Location Knee; Shoulder   Pain Orientation Right   Home Living   Type of Home Apartment   Home Layout One level  (no steps)   Bathroom Shower/Tub Tub/shower unit   Bathroom Equipment Shower chair   Home Equipment Walker;Cane   Additional Comments 2 daughters assist pateint daily    Prior Function   Level of [place] Independent with ADLs and functional mobility; Needs assistance with IADLs   Lives With Alone   Receives Help From Family   ADL Assistance Independent   IADLs Needs assistance   ADL   Eating Assistance 5  Supervision/Setup   Grooming Assistance 5  Supervision/Setup   UB Bathing Assistance 5  Supervision/Setup   LB Bathing Assistance 4  Minimal Assistance   UB Dressing Assistance 5  Supervision/Setup   LB Dressing Assistance 4  Minimal Assistance   Toileting Assistance  4  Minimal Assistance   Bed Mobility   Supine to Sit 5  Supervision   Transfers   Sit to Stand 4  Minimal assistance   Additional items Assist x 1   Stand to Sit 4  Minimal assistance   Additional items Assist x 1   Toilet transfer 4  Minimal assistance   Additional items Assist x 1   Functional Mobility   Functional Mobility 4  Minimal assistance   Additional Comments 15 feet    Additional items Rolling walker   Balance   Static Sitting Fair +   Dynamic Sitting Fair   Static Standing Fair   Dynamic Standing Fair -   Activity Tolerance   Activity Tolerance Patient limited by fatigue   RUE Assessment   RUE Assessment WFL  (grossly 3+/4-/5, appears to have L 3rd digit trigger finger)   LUE Assessment   LUE Assessment WFL  (grossly 3+/4-/5)   Cognition   Overall Cognitive Status WFL   Arousal/Participation Cooperative   Attention Within functional limits   Orientation Level Oriented to person;Oriented to place   Following Commands Follows one step commands with increased time or repetition   Assessment   Limitation Decreased ADL status; Decreased UE strength;Decreased Safe judgement during ADL;Decreased endurance;Decreased self-care trans;Decreased high-level ADLs  (decreased balance and mobility )   Prognosis Good   Assessment Patient evaluated by Occupational Therapy  Patient admitted with Generalized weakness  The patients occupational profile, medical and therapy history includes a extensive additional review of physical, cognitive, or psychosocial history related to current functional performance  Comorbidities affecting functional mobility and ADLS include: CABG, arthritis, CAD, gout and hypertension  Prior to admission, patient was independent with functional mobility with walker, independent with ADLS and requiring assist for IADLS  The evaluation identifies the following performance deficits: weakness, impaired balance, decreased endurance, increased fall risk, new onset of impairment of functional mobility, decreased ADLS, decreased IADLS, decreased activity tolerance, decreased safety awareness, impaired judgement and decreased strength, that result in activity limitations and/or participation restrictions  This evaluation requires clinical decision making of high complexity, because the patient presents with comorbidites that affect occupational performance and required significant modification of tasks or assistance with consideration of multiple treatment options  The Barthel Index was used as a functional outcome tool presenting with a score of 50, indicating marked limitations of functional mobility and ADLS  Patient will benefit from skilled Occupational Therapy services to address above deficits and facilitate a safe return to prior level of function     Goals   Patient Goals go home   STG Time Frame   (1-7 days)   Short Term Goal  Goals established to promote patient goal of going home: Patient will increase standing tolerance to 3 minutes during ADL task to decrease assistance level and decrease fall risk; Patient will increase bed mobility to independent in preparation for ADLS and transfers; Patient will increase functional mobility to and from bathroom with rolling walker with supervision to increase performance with ADLS and to use a toilet; Patient will tolerate 10 minutes of UE ROM/strengthening to increase general activity tolerance and performance in ADLS/IADLS; Patient will improve functional activity tolerance to 10 minutes of sustained functional tasks to increase participation in basic self-care and decrease assistance level;   Patient will increase dynamic sitting balance to fair+ to improve the ability to sit at edge of bed or on a chair for ADLS;  Patient will increase dynamic standing balance to fair to improve postural stability and decrease fall risk during standing ADLS and transfers  LTG Time Frame   (8-14days)   Long Term Goal Goals established to promote patient goal of going home:  Patient will increase standing tolerance to 6 minutes during ADL task to decrease assistance level and decrease fall risk;  Patient will increase functional mobility to and from bathroom with rolling walker independently to increase performance with ADLS and to use a toilet; Patient will tolerate 20 minutes of UE ROM/strengthening to increase general activity tolerance and performance in ADLS/IADLS; Patient will improve functional activity tolerance to 20 minutes of sustained functional tasks to increase participation in basic self-care and decrease assistance level;   Patient will increase dynamic sitting balance to good to improve the ability to sit at edge of bed or on a chair for ADLS;  Patient will increase dynamic standing balance to fair+ to improve postural stability and decrease fall risk during standing ADLS and transfers       Functional Transfer Goals   Pt Will Perform All Functional Transfers   (STG supervision LTG independent )   ADL Goals   Pt Will Perform Grooming Standing at sink  (STG supervision LTG independent )   Pt Will Perform Bathing   (STG supervision LTG independent )   Pt Will Perform LE Dressing   (STG supervision LTG independent )   Pt Will Perform Toileting   (STG supervision LTG independent )   Plan   Treatment Interventions ADL retraining;Functional transfer training;UE strengthening/ROM; Endurance training;Patient/family training;Equipment evaluation/education; Activityengagement; Compensatory technique education   Goal Expiration Date 07/29/19   OT Frequency 3-5x/wk   Recommendation   OT Discharge Recommendation Home OT   Barthel Index   Feeding 10   Bathing 0   Grooming Score 0   Dressing Score 5   Bladder Score 10   Bowels Score 10   Toilet Use Score 5   Transfers (Bed/Chair) Score 10   Mobility (Level Surface) Score 0   Stairs Score 0   Barthel Index Score 50   Licensure   [place] License Number  Rodney Kohler OTR/L [id number]

## 2019-07-15 NOTE — PHYSICAL THERAPY NOTE
PT EVALUATION       07/15/19 0850   Note Type   Note type Eval/Treat   Pain Assessment   Pain Assessment Cisse-Baker FACES   Cisse-Baker FACES Pain Rating 4   Pain Location   (R knee, shoulder)   Home Living   Type of Home Apartment   Home Layout One level  (no steps)   Bathroom Shower/Tub Tub/shower unit   Bathroom Equipment Shower chair   Home Equipment Walker;Cane   Prior Function   Level of [place] Independent with ADLs and functional mobility  (ambulates with a rolling walker)   Lives With Alone   Receives Help From Family  (2 daughters help daily)   ADL Assistance Independent   Restrictions/Precautions   Other Precautions Chair Alarm; Bed Alarm; Fall Risk   General   Additional Pertinent History Pt admitted with generalized weakness, now feeling better  Family/Caregiver Present No   Cognition   Overall Cognitive Status WFL   Arousal/Participation Cooperative   Attention Within functional limits   Orientation Level Oriented to person;Oriented to place   Following Commands Follows one step commands without difficulty   RLE Assessment   RLE Assessment   (R knee painful/WFL, MMT 4/5)   LLE Assessment   LLE Assessment   (ROM WFL, MMT 4/5)   Bed Mobility   Supine to Sit 5  Supervision   Transfers   Sit to Stand 4  Minimal assistance   Stand to Sit 4  Minimal assistance   Ambulation/Elevation   Gait pattern   (slow martina)   Gait Assistance 4  Minimal assist   Assistive Device Rolling walker   Distance 20 feet   Balance   Static Sitting Fair +   Dynamic Sitting Fair   Static Standing Fair   Dynamic Standing Fair -   Activity Tolerance   Activity Tolerance Patient tolerated treatment well;Patient limited by fatigue   Nurse Made Aware Pt OOB in chair   Assessment   Prognosis Good   Problem List Decreased strength;Decreased endurance; Impaired balance;Decreased mobility   Assessment Patient seen for Physical Therapy evaluation  Patient admitted with Generalized weakness    Comorbidities affecting patient's physical performance include: thn, CAD, OA  Personal factors affecting patient at time of initial evaluation include: ambulating with assistive device, inability to navigate community distances and inability to navigate level surfaces without external assistance  Prior to admission, patient was independent with functional mobility with walker  Please find objective findings from Physical Therapy assessment regarding body systems outlined above with impairments and limitations including weakness, impaired balance, decreased endurance, decreased activity tolerance, decreased functional mobility tolerance and fall risk  The Barthel Index was used as a functional outcome tool presenting with a score of 50 today indicating marked limitations of functional mobility and ADLS  Patient's clinical presentation is currently evolving as seen in patient's presentation of increased fall risk, new onset of impairment of functional mobility and decreased endurance  Pt would benefit from continued Physical Therapy treatment to address deficits as defined above and maximize level of functional mobility  As demonstrated by objective findings, the assigned level of complexity for this evaluation is moderate  Goals   Patient Goals go home   STG Expiration Date 07/22/19   Short Term Goal #1 supervision transfers , supervision ambulation with walker indoor surfaces 100 feet   LTG Expiration Date 07/29/19   Long Term Goal #1 independent ambulation with walker 150 feet, no falls, pt will tolerate 10 minutes of standing activity so pt can prepare a snack at the kitchen counter  Plan   Treatment/Interventions ADL retraining;Functional transfer training;LE strengthening/ROM; Therapeutic exercise; Endurance training;Gait training;Bed mobility; Equipment eval/education;Patient/family training   PT Frequency 5x/wk   Recommendation   Recommendation Home PT; Home with family support   Equipment Recommended   (pt has a cane and a walker)   Barthel Index Feeding 10   Bathing 0   Grooming Score 0   Dressing Score 5   Bladder Score 10   Bowels Score 10   Toilet Use Score 5   Transfers (Bed/Chair) Score 10   Mobility (Level Surface) Score 0   Stairs Score 0   Barthel Index Score 48   Licensure   [place] License Number  [place] PT [id number]     Time AM:5460  Time SDP:5492  Total Time: 10      S:  Agreeable to activity  O:  X 10 each ankle pumps, LAQ on L, hip flexion;  LAQ x4 only on R due to pain  Pt ambulated with a walker 100 feet with min assist/supervision  A:  Pt tolerated activity well    P:  Continue PT    Edwardo Benoit, PT

## 2019-07-15 NOTE — ED NOTES
Patient was just incontinent of urine  Patient refusing straight cath at this time  Would like to try and urinate again on her own        Mark Munoz RN  07/14/19 2263

## 2019-07-15 NOTE — ASSESSMENT & PLAN NOTE
CXR with no obvious infiltrate noted   off IV Ceftriaxone  LA is WNL  Procalcitonin level X 2 negative  The sputum culture is pending at time of discharge and will be followed up on  Informed daughter that if sputum culture shows normal respiratory suhas or another organism that does not normally require treatment, she will not be called Back but if sputum culture does grow something that needs treatment, she would get a call from me and antibiotics will be prescribed    Daughter showed understanding and was in agreement with the plan

## 2019-07-15 NOTE — ED PROVIDER NOTES
History  Chief Complaint   Patient presents with    Fever - 76 years or older     Per daughter states that she started with " Laryngitis" 3 days ago but it hasn't gotten better  Started with a fever either yesterday or this am    Increase in weakness  History provided by:  Patient and relative   used: No    Fatigue   Severity:  Moderate  Onset quality:  Gradual  Duration:  3 days  Timing:  Constant  Progression:  Worsening  Chronicity:  New  Context: dehydration    Relieved by:  None tried  Worsened by:  Nothing  Ineffective treatments:  None tried  Associated symptoms: cough and fever    Associated symptoms: no abdominal pain, no diarrhea, no dizziness, no dysuria, no frequency, no nausea, no shortness of breath and no vomiting        Prior to Admission Medications   Prescriptions Last Dose Informant Patient Reported? Taking?    amLODIPine (NORVASC) 5 mg tablet   Yes No   Sig: Take 5 mg by mouth daily   atorvastatin (LIPITOR) 10 mg tablet   Yes No   Sig: Take 10 mg by mouth daily   celecoxib (CeleBREX) 200 mg capsule   Yes No   Sig: Take 200 mg by mouth daily   cholecalciferol (VITAMIN D3) 1,000 units tablet  Self Yes No   Sig: Take 1,000 Units by mouth daily   docusate sodium (COLACE) 100 mg capsule   No No   Sig: Take 1 capsule (100 mg total) by mouth 2 (two) times a day   levothyroxine 75 mcg tablet   Yes No   Sig: Take 75 mcg by mouth daily   pregabalin (LYRICA) 75 mg capsule   Yes No   Sig: Take 75 mg by mouth daily       Facility-Administered Medications: None       Past Medical History:   Diagnosis Date    CAD (coronary artery disease)     Disease of thyroid gland     hypo    GERD (gastroesophageal reflux disease)     Gout     right shoulder    Hyperlipidemia     Hypertension     Osteoarthritis (arthritis due to wear and tear of joints)        Past Surgical History:   Procedure Laterality Date    CORONARY ARTERY BYPASS GRAFT      ENDOSCOPIC STENT PLACEMENT W/ MLB Left 2012    HIATAL HERNIA REPAIR      JOINT REPLACEMENT Left     knee 2001    ROTATOR CUFF REPAIR      TUBAL LIGATION  2013       History reviewed  No pertinent family history  I have reviewed and agree with the history as documented  Social History     Tobacco Use    Smoking status: Never Smoker    Smokeless tobacco: Never Used   Substance Use Topics    Alcohol use: Never     Frequency: Never    Drug use: Never        Review of Systems   Constitutional: Positive for activity change, appetite change, fatigue and fever  Negative for chills and diaphoresis  HENT: Positive for congestion  Negative for dental problem, ear discharge, facial swelling, rhinorrhea, sinus pressure and trouble swallowing  Eyes: Negative for photophobia, discharge, itching and visual disturbance  Respiratory: Positive for cough  Negative for choking, chest tightness and shortness of breath  Cardiovascular: Negative for palpitations and leg swelling  Gastrointestinal: Negative for abdominal distention, abdominal pain, constipation, diarrhea, nausea and vomiting  Endocrine: Negative for polydipsia and polyphagia  Genitourinary: Negative for decreased urine volume, difficulty urinating, dysuria, flank pain, frequency and hematuria  Musculoskeletal: Positive for gait problem  Negative for back pain, joint swelling, neck pain and neck stiffness  Skin: Negative for color change and rash  Neurological: Negative for dizziness, facial asymmetry, speech difficulty, weakness and light-headedness  Psychiatric/Behavioral: Negative for agitation and behavioral problems  The patient is not nervous/anxious and is not hyperactive  All other systems reviewed and are negative  Physical Exam  Physical Exam   Constitutional:   Elderly female, no acute distress   HENT:   Head: Normocephalic and atraumatic  Eyes: Pupils are equal, round, and reactive to light  EOM are normal    Neck: Normal range of motion     Cardiovascular: Normal rate and regular rhythm  Pulmonary/Chest: Effort normal    Abdominal: Soft  Bowel sounds are normal  She exhibits no distension  There is no tenderness  Abdomen soft, nontender, nondistended   Musculoskeletal: Normal range of motion  Neurological: She is alert  Somnolent but arou   Skin: Capillary refill takes 2 to 3 seconds  Psychiatric: She has a normal mood and affect  Nursing note and vitals reviewed        Vital Signs  ED Triage Vitals   Temperature Pulse Respirations Blood Pressure SpO2   07/14/19 2053 07/14/19 2049 07/14/19 2049 07/14/19 2049 07/14/19 2049   100 3 °F (37 9 °C) 79 18 (!) 186/82 98 %      Temp Source Heart Rate Source Patient Position - Orthostatic VS BP Location FiO2 (%)   07/14/19 2053 07/14/19 2120 07/14/19 2120 07/14/19 2120 --   Oral Monitor Lying Left arm       Pain Score       07/14/19 2049       5           Vitals:    07/14/19 2120 07/14/19 2303 07/14/19 2354 07/15/19 0035   BP: (!) 178/84 (!) 148/116 117/57 126/60   Pulse: 82 76 65 65   Patient Position - Orthostatic VS: Lying Lying Lying Lying         Visual Acuity      ED Medications  Medications   cefTRIAXone (ROCEPHIN) IVPB (premix) 1,000 mg (1,000 mg Intravenous New Bag 7/15/19 0059)   cefTRIAXone (ROCEPHIN) IVPB (premix) 1,000 mg (has no administration in time range)   sodium chloride 0 9 % bolus 1,000 mL (0 mL Intravenous Stopped 7/14/19 2303)   acetaminophen (TYLENOL) tablet 975 mg (975 mg Oral Given 7/14/19 2128)   sodium chloride 0 9 % bolus 500 mL (0 mL Intravenous Stopped 7/15/19 0059)       Diagnostic Studies  Results Reviewed     Procedure Component Value Units Date/Time    UA w Reflex to Microscopic w Reflex to Culture [[id number]] Collected:  07/15/19 0013    Lab Status:  Final result Specimen:  Urine, Straight Cath Updated:  07/15/19 0019     Color, UA Yellow     Clarity, UA Clear     Specific Gravity, UA 1 010     pH, UA 7 5     Leukocytes, UA Negative     Nitrite, UA Negative     Protein, UA Negative mg/dl      Glucose, UA Negative mg/dl      Ketones, UA Negative mg/dl      Urobilinogen, UA 0 2 E U /dl      Bilirubin, UA Negative     Blood, UA Negative    Procalcitonin [[id number]]  (Normal) Collected:  07/14/19 2131    Lab Status:  Final result Specimen:  Blood from Arm, Right Updated:  07/15/19 0008     Procalcitonin <0 05 ng/ml     Comprehensive metabolic panel [[id number]]  (Abnormal) Collected:  07/14/19 2110    Lab Status:  Final result Specimen:  Blood from Arm, Left Updated:  07/14/19 2201     Sodium 135 mmol/L      Potassium 5 1 mmol/L      Chloride 101 mmol/L      CO2 29 mmol/L      ANION GAP 5 mmol/L      BUN 16 mg/dL      Creatinine 0 92 mg/dL      Glucose 95 mg/dL      Calcium 8 9 mg/dL      AST 25 U/L      ALT 20 U/L      Alkaline Phosphatase 124 U/L      Total Protein 6 9 g/dL      Albumin 3 2 g/dL      Total Bilirubin 1 20 mg/dL      eGFR 53 ml/min/1 73sq m     Narrative:       [facility] guidelines for Chronic Kidney Disease (CKD):     Stage 1 with normal or high GFR (GFR > 90 mL/min/1 73 square meters)    Stage 2 Mild CKD (GFR = 60-89 mL/min/1 73 square meters)    Stage 3A Moderate CKD (GFR = 45-59 mL/min/1 73 square meters)    Stage 3B Moderate CKD (GFR = 30-44 mL/min/1 73 square meters)    Stage 4 Severe CKD (GFR = 15-29 mL/min/1 73 square meters)    Stage 5 End Stage CKD (GFR <15 mL/min/1 73 square meters)  Note: GFR calculation is accurate only with a steady state creatinine    Blood culture #2 [[id number]] Collected:  07/14/19 2100    Lab Status: In process Specimen:  Blood from Arm, Right Updated:  07/14/19 2144    Lactic acid x2 [[id number]]  (Normal) Collected:  07/14/19 2110    Lab Status:  Final result Specimen:  Blood from Arm, Left Updated:  07/14/19 2139     LACTIC ACID 1 2 mmol/L     Narrative:       Result may be elevated if tourniquet was used during collection      APTT [[id number]]  (Abnormal) Collected:  07/14/19 2110    Lab Status:  Final result Specimen:  Blood from Arm, Left Updated:  07/14/19 2134     PTT 54 seconds     Protime-INR [[id number]]  (Normal) Collected:  07/14/19 2110    Lab Status:  Final result Specimen:  Blood from Arm, Left Updated:  07/14/19 2134     Protime 11 0 seconds      INR 1 05    CBC and differential [[id number]]  (Abnormal) Collected:  07/14/19 2110    Lab Status:  Final result Specimen:  Blood from Arm, Left Updated:  07/14/19 2119     WBC 13 01 Thousand/uL      RBC 4 86 Million/uL      Hemoglobin 12 0 g/dL      Hematocrit 38 2 %      MCV 79 fL      MCH 24 7 pg      MCHC 31 4 g/dL      RDW 16 8 %      MPV 10 5 fL      Platelets 643 Thousands/uL      nRBC 0 /100 WBCs      Neutrophils Relative 78 %      Immat GRANS % 0 %      Lymphocytes Relative 13 %      Monocytes Relative 8 %      Eosinophils Relative 1 %      Basophils Relative 0 %      Neutrophils Absolute 10 20 Thousands/µL      Immature Grans Absolute 0 04 Thousand/uL      Lymphocytes Absolute 1 64 Thousands/µL      Monocytes Absolute 1 00 Thousand/µL      Eosinophils Absolute 0 09 Thousand/µL      Basophils Absolute 0 04 Thousands/µL     Blood culture #1 [[id number]] Collected:  07/14/19 2110    Lab Status:   In process Specimen:  Blood from Arm, Left Updated:  07/14/19 2115                 XR chest portable    (Results Pending)              Procedures  ECG 12 Lead Documentation Only  Date/Time: 7/14/2019 9:16 PM  Performed by: Jim Garcia MD  Authorized by: Jim Garcia MD     Indications / Diagnosis:  General Weakness  Patient location:  ED  Interpretation:     Interpretation: non-specific    Rate:     ECG rate:  71    ECG rate assessment: normal    Rhythm:     Rhythm: sinus rhythm    Ectopy:     Ectopy: none    QRS:     QRS axis:  Left    QRS intervals:  Normal  Conduction:     Conduction: normal    ST segments:     ST segments:  Normal  T waves:     T waves: normal             ED Course                               MDM  Number of Diagnoses or Management Options  Cough: new and requires workup  Leukocytosis: new and requires workup  Weakness: new and requires workup  Diagnosis management comments:   Patient with cough, fever  Worsening over the past 3 days  Also with decreased appetite, no oral intake over the past 24 hours  Lives in assisted living facility  febrile here  Not tachycardic  X-ray reviewed by myself, no clear signs of pneumonia  White blood cell count elevated, prolactin normal   Patient with generalized weakness  No clear signs of dehydration  Will give Rocephin and admit to hospitalist for  Monitoring in setting of cough, fever, poor oral intake, leukocytosis  Discussed case with hospitalist, Dr Mirtha Ruff who agreed to admit patient  Amount and/or Complexity of Data Reviewed  Clinical lab tests: ordered and reviewed  Tests in the radiology section of CPT®: ordered and reviewed  Tests in the medicine section of CPT®: reviewed and ordered  Discuss the patient with other providers: yes  Independent visualization of images, tracings, or specimens: yes    Risk of Complications, Morbidity, and/or Mortality  Presenting problems: moderate  Diagnostic procedures: moderate  Management options: moderate    Patient Progress  Patient progress: stable      Disposition  Final diagnoses:   Leukocytosis   Cough   Weakness     Time reflects when diagnosis was documented in both MDM as applicable and the Disposition within this note     Time User Action Codes Description Comment    7/15/2019 12:54 AM Thomas Mata [D72 829] Leukocytosis     7/15/2019 12:54 AM Thomas Mata [R05] Cough     7/15/2019 12:54 AM Thomas Mata [R53 1] Weakness       ED Disposition     ED Disposition Condition Date/Time Comment    Admit Stable Mon Jul 15, 2019 12:54 AM Case was discussed with Dr Mirtha Ruff and the patient's admission status was agreed to be Admission Status: inpatient status to the service of Dr Mirtha Ruff           Follow-up Information    None Patient's Medications   Discharge Prescriptions    No medications on file     No discharge procedures on file      ED Provider  Electronically Signed by           Rosette Johnson MD  07/15/19 6569

## 2019-07-15 NOTE — PHYSICIAN ADVISOR
Current patient class: Observation  The patient is currently on Hospital Day: 2 at [address] [place] [address]        The patient was admitted to the hospital  on 7/15/19 at [phone] for the following diagnosis:  Cough [R05]  Leukocytosis [D72 829]  Weakness [R53 1]  Fever [R50 9]     After review of the relevant documentation, labs, vital signs and test results, the patient is most appropriate for OBSERVATION STATUS- see below  Rationale is as follows: The patient is a 19-year-old female who presented to the hospital on July 15, 2019 at 8:42 p m  Reji Cruz She had a clear cough for two days  There was some associated fatigue and shortness of breath with exertion  The H&P was done on July 15th  The primary diagnosis was generalized weakness  Secondary was upper respiratory infection, chest x-ray was without obvious infiltrate  She received a dose of ceftriaxone  The plan was to check procalcitonin  Procalcitonin was normal   She is afebrile and not hypoxic  She was evaluated by PT  The recommendation was home physical therapy and home with family support once cleared medically  At present the patient remains appropriate for observation  I will follow up later today for any clinical updates that would warrant change inpatient class  6:40 pm- no updates, remains stable      The patients vitals on arrival were ED Triage Vitals   Temperature Pulse Respirations Blood Pressure SpO2   07/14/19 2053 07/14/19 2049 07/14/19 2049 07/14/19 2049 07/14/19 2049   100 3 °F (37 9 °C) 79 18 (!) 186/82 98 %      Temp Source Heart Rate Source Patient Position - Orthostatic VS BP Location FiO2 (%)   07/14/19 2053 07/14/19 2120 07/14/19 2120 07/14/19 2120 --   Oral Monitor Lying Left arm       Pain Score       07/14/19 2049       5           Past Medical History:   Diagnosis Date    CAD (coronary artery disease)     Disease of thyroid gland     hypo    GERD (gastroesophageal reflux disease)     Gout     right shoulder    Hyperlipidemia     Hypertension     Osteoarthritis (arthritis due to wear and tear of joints)      Past Surgical History:   Procedure Laterality Date    CORONARY ARTERY BYPASS GRAFT      ENDOSCOPIC STENT PLACEMENT W/ MLB Left 2012    HIATAL HERNIA REPAIR      JOINT REPLACEMENT Left     knee 2001    ROTATOR CUFF REPAIR      TUBAL LIGATION  2013           Consults have been placed to:   None    Vitals:    07/15/19 0739 07/15/19 0740 07/15/19 1344 07/15/19 1345   BP:       BP Location:       Pulse: 57  63    Resp:  18  18   Temp:  98 9 °F (37 2 °C)  98 9 °F (37 2 °C)   TempSrc:  Oral  Oral   SpO2: 96%  99%    Weight:       Height:           Most recent labs:    Recent Labs     07/14/19  2110 07/15/19  0520   WBC 13 01* 10 71*   HGB 12 0 10 9*   HCT 38 2 34 7*    203   K 5 1 3 7   CALCIUM 8 9 8 7   BUN 16 15   CREATININE 0 92 0 89   INR 1 05  --    AST 25 10   ALT 20 14   ALKPHOS 124* 108       Scheduled Meds:  Current Facility-Administered Medications:  acetaminophen 650 mg Oral Q6H PRN Inga Madera MD    heparin (porcine) 5,000 Units Subcutaneous Q8H [address] Inga Madera MD    sodium chloride 50 mL/hr Intravenous Continuous Triston Cantu MD Last Rate: 50 mL/hr (07/15/19 1111)     Continuous Infusions:  sodium chloride 50 mL/hr Last Rate: 50 mL/hr (07/15/19 1111)     PRN Meds:   acetaminophen

## 2019-07-15 NOTE — UTILIZATION REVIEW
Initial Clinical Review    Admission: Date/Time/Statement:   Admission Orders (From admission, onward)    Ordered        07/15/19 0105  Place in Observation  Once          Orders Placed This Encounter   Procedures    Place in Observation     Standing Status:   Standing     Number of Occurrences:   1     Order Specific Question:   Admitting Physician     Answer:   Shanique Spangler [[id number]]     Order Specific Question:   Level of Care     Answer:   Med Surg [[id number]]     ED Arrival Information     Expected Arrival Acuity Means of Arrival Escorted By Service Admission Type    - 7/14/2019 20:39 Emergent [facility] Medicine Emergency    Arrival Complaint    -        Chief Complaint   Patient presents with    Fever - 76 years or older     Per daughter states that she started with " Laryngitis" 3 days ago but it hasn't gotten better  Started with a fever either yesterday or this am    Increase in weakness  Assessment/Plan:   80 y o  female with a PMH of HTN, HLD, CAD and Hypothyroidism who presents with cough and fever  She is here with her daughter who provides the history  The pt was in her usual state of health until 2 days ago when she began to have a clear productive cough  She was also noted to have subjective fevers and shortness of breath with exertion  As time progressed she was noted to be very fatigued  She has also not been eating much but there has been no report of abdominal pain or vomiting  Currently the pt complains of generalized weakness  She denies any chest pain, shortness of breath or abdominal pain  No other complaints or concerns  Generalized weakness  Assessment & Plan  Likely secondary to chronic deconditioning, acute illness and decreased po intake   Will admit for Observation, treat URI, check Vitamin D and Vitamin B 12 level and have PT/OT evaluate the pt   URI (upper respiratory infection)  Assessment & Plan  CXR with no obvious infiltrate noted (official read pending)  Family report a 2 day hx of productive cough and shortness of breath  She was given a dose of Ceftriaxone in the ED  LA is WNL  Will check Procalcitonin level and resume Ceftriaxone   Serum total bilirubin elevated  Assessment & Plan  Pt denies any abdominal or nausea/vomiting  Will repeat CMP tomorrow   Leukocytosis  Assessment & Plan  Likely secondary to URI  U/A with no evidence of UTI  LA is WNL  Procalcitonin level ordered    Will resume Ceftriaxone and repeat CBC later today     ED Triage Vitals   Temperature Pulse Respirations Blood Pressure SpO2   07/14/19 2053 07/14/19 2049 07/14/19 2049 07/14/19 2049 07/14/19 2049   100 3 °F (37 9 °C) 79 18 (!) 186/82 98 %      Temp Source Heart Rate Source Patient Position - Orthostatic VS BP Location FiO2 (%)   07/14/19 2053 07/14/19 2120 07/14/19 2120 07/14/19 2120 --   Oral Monitor Lying Left arm       Pain Score       07/14/19 2049       5        Wt Readings from Last 1 Encounters:   07/15/19 59 7 kg (131 lb 9 8 oz)     Additional Vital Signs:   07/14/19 2303  99 1 °F (37 3 °C)  76  16  148/116Abnormal   94 %  None (Room air)  Lying   07/14/19 2133  --  --  --  --  --  None (Room air)  --   07/14/19 2120  --  82  16  178/84Abnormal   96 %  None (Room air)  Lying   07/14/19 2053  100 3 °F (37 9 °C)  --  --  --  --  --  --   07/14/19 2049  --  79  18  186/82Abnormal   98 %  --  --   Pertinent Labs/Diagnostic Test Results:   Results from last 7 days   Lab Units 07/14/19 2110   WBC Thousand/uL 13 01*   HEMOGLOBIN g/dL 12 0   HEMATOCRIT % 38 2   PLATELETS Thousands/uL 237   NEUTROS ABS Thousands/µL 10 20*     Results from last 7 days   Lab Units 07/14/19 2110   SODIUM mmol/L 135*   POTASSIUM mmol/L 5 1   CHLORIDE mmol/L 101   CO2 mmol/L 29   ANION GAP mmol/L 5   BUN mg/dL 16   CREATININE mg/dL 0 92   EGFR ml/min/1 73sq m 53   CALCIUM mg/dL 8 9     Results from last 7 days   Lab Units 07/14/19  2110   AST U/L 25   ALT U/L 20   ALK PHOS U/L 124*   TOTAL PROTEIN g/dL 6 9   ALBUMIN g/dL 3 2*   TOTAL BILIRUBIN mg/dL 1 20*     Results from last 7 days   Lab Units 07/14/19  2110   GLUCOSE RANDOM mg/dL 95     Results from last 7 days   Lab Units 07/14/19  2110   PROTIME seconds 11 0   INR  1 05   PTT seconds 54*     Results from last 7 days   Lab Units 07/14/19  2131   PROCALCITONIN ng/ml <0 05     Results from last 7 days   Lab Units 07/14/19 2110   LACTIC ACID mmol/L 1 2     Results from last 7 days   Lab Units 07/15/19  0013   CLARITY UA  Clear   COLOR UA  Yellow   SPEC GRAV UA  1 010   PH UA  7 5   GLUCOSE UA mg/dl Negative   KETONES UA mg/dl Negative   BLOOD UA  Negative   PROTEIN UA mg/dl Negative   NITRITE UA  Negative   BILIRUBIN UA  Negative   UROBILINOGEN UA E U /dl 0 2   LEUKOCYTES UA  Negative     EKG=Rhythm: sinus rhythm    Ectopy:     Ectopy: none    QRS:     QRS axis:  Left    QRS intervals:  Normal  Conduction:     Conduction: normal    ST segments:     ST segments:  Normal  T waves:     T waves: normal    CXR=No acute cardiopulmonary disease    ED Treatment:   Medication Administration from 07/14/2019 2039 to 07/15/2019 0149       Date/Time Order Dose Route     07/14/2019 2303 sodium chloride 0 9 % bolus 1,000 mL 0 mL Intravenous     07/14/2019 2129 sodium chloride 0 9 % bolus 1,000 mL 1,000 mL Intravenous     07/14/2019 2128 acetaminophen (TYLENOL) tablet 975 mg 975 mg Oral     07/15/2019 0059 sodium chloride 0 9 % bolus 500 mL 0 mL Intravenous     07/14/2019 2303 sodium chloride 0 9 % bolus 500 mL 500 mL Intravenous     07/15/2019 0140 cefTRIAXone (ROCEPHIN) IVPB (premix) 1,000 mg 0 mg Intravenous     07/15/2019 0059 cefTRIAXone (ROCEPHIN) IVPB (premix) 1,000 mg 1,000 mg Intravenous        Past Medical History:   Diagnosis Date    CAD (coronary artery disease)     Disease of thyroid gland     hypo    GERD (gastroesophageal reflux disease)     Gout     right shoulder    Hyperlipidemia     Hypertension     Osteoarthritis (arthritis due to wear and tear of joints)      Present on Admission:   Hyperlipidemia   Hypertension  Admitting Diagnosis: Cough [R05]  Leukocytosis [D72 829]  Weakness [R53 1]  Fever [R50 9]  Age/Sex: 80 y o  female  Admission Orders:  MED SURG  PT/OT EVAL & TX  Current Facility-Administered Medications:  acetaminophen 650 mg Oral Q6H PRN   [START ON 7/16/2019] cefTRIAXone 1,000 mg Intravenous Q24H   heparin (porcine) 5,000 Units Subcutaneous [address] Utilization Review Department  Phone: [phone]; Fax [phone]  [email or web address]  ATTENTION: Please call with any questions or concerns to [phone]  and carefully listen to the prompts so that you are directed to the right person  Send all requests for admission clinical reviews, approved or denied determinations and any other requests to fax [phone]   All voicemails are confidential

## 2019-07-15 NOTE — H&P
History and Physical - [facility] Internal Medicine    Patient Information: Amber Hardy 80 y o  female MRN: [id number]  Unit/Bed#: ED 09 Encounter: [id number]  Admitting Physician: Ana Paula Hawkins MD  PCP: Demian Pack MD  Date of Admission:  07/15/19    Chief Complaint:     Cough, fever   of Present Illness:    Amber Hardy is a 80 y o  female with a PMH of HTN, HLD, CAD and Hypothyroidism who presents with cough and fever  She is here with her daughter who provides the history  The pt was in her usual state of health until 2 days ago when she began to have a clear productive cough  She was also noted to have subjective fevers and shortness of breath with exertion  As time progressed she was noted to be very fatigued  She has also not been eating much but there has been no report of abdominal pain or vomiting  Currently the pt complains of generalized weakness  She denies any chest pain, shortness of breath or abdominal pain  No other complaints or concerns  Review of Systems:    Review of Systems   Constitutional: Positive for chills, fatigue and fever  HENT: Positive for congestion and sore throat  Respiratory: Positive for cough  Cardiovascular: Negative for chest pain and leg swelling  Gastrointestinal: Negative for abdominal pain, blood in stool, nausea and vomiting  Genitourinary: Negative for dysuria and hematuria  Musculoskeletal: Positive for arthralgias  Neurological: Positive for weakness  Negative for syncope  Psychiatric/Behavioral: Negative for confusion         Past Medical and Surgical History:     Past Medical History:   Diagnosis Date    CAD (coronary artery disease)     Disease of thyroid gland     hypo    GERD (gastroesophageal reflux disease)     Gout     right shoulder    Hyperlipidemia     Hypertension     Osteoarthritis (arthritis due to wear and tear of joints)        Past Surgical History:   Procedure Laterality Date    CORONARY ARTERY BYPASS GRAFT      ENDOSCOPIC STENT PLACEMENT W/ MLB Left 2012    HIATAL HERNIA REPAIR      JOINT REPLACEMENT Left     knee 2001    ROTATOR CUFF REPAIR      TUBAL LIGATION  2013       Meds/Allergies:    PTA meds:   Prior to Admission Medications   Prescriptions Last Dose Informant Patient Reported? Taking? amLODIPine (NORVASC) 5 mg tablet   Yes No   Sig: Take 5 mg by mouth daily   atorvastatin (LIPITOR) 10 mg tablet   Yes No   Sig: Take 10 mg by mouth daily   celecoxib (CeleBREX) 200 mg capsule   Yes No   Sig: Take 200 mg by mouth daily   cholecalciferol (VITAMIN D3) 1,000 units tablet  Self Yes No   Sig: Take 1,000 Units by mouth daily   docusate sodium (COLACE) 100 mg capsule   No No   Sig: Take 1 capsule (100 mg total) by mouth 2 (two) times a day   levothyroxine 75 mcg tablet   Yes No   Sig: Take 75 mcg by mouth daily   pregabalin (LYRICA) 75 mg capsule   Yes No   Sig: Take 75 mg by mouth daily       Facility-Administered Medications: None       Allergies: Allergies   Allergen Reactions    Codeine Hives    Percocet [Oxycodone-Acetaminophen] Vomiting     History:     Marital Status:    Social History     Substance and Sexual Activity   Alcohol Use Never    Frequency: Never     Social History     Tobacco Use   Smoking Status Never Smoker   Smokeless Tobacco Never Used     Social History     Substance and Sexual Activity   Drug Use Never       Family History:    History reviewed  No pertinent family history        Physical Exam:     Vitals:   Blood Pressure: 126/60 (07/15/19 0035)  Pulse: 65 (07/15/19 0035)  Temperature: 99 1 °F (37 3 °C) (07/14/19 2303)  Temp Source: Oral (07/14/19 2053)  Respirations: 16 (07/15/19 0035)  Weight - Scale: 60 8 kg (134 lb) (07/14/19 2049)  SpO2: 94 % (07/15/19 0035)    Physical Exam:   General: in no acute distress, fatigued  HEENT: atraumatic, normocephalic  Skin: no jaundice, not diaphoretic  CVS: RRR, no murmurs appreciated  Lungs: CTAL, no wheezing or rales appreciated  Abdomen: soft, nondistended, bowel sounds normal, nontender upon palpation, no guarding or rebound tenderness  Extremities: no edema, no calf swelling or tenderness  Neuro: alert and oriented x3, normal handgrip strength bilaterally, sensation intact in all extremities, equal motor strength in all extremities  Psych: calm, cooperative      Lab Results: I have personally reviewed pertinent reports  Results from last 7 days   Lab Units 07/14/19  2110   WBC Thousand/uL 13 01*   HEMOGLOBIN g/dL 12 0   HEMATOCRIT % 38 2   PLATELETS Thousands/uL 237   NEUTROS PCT % 78*   LYMPHS PCT % 13*   MONOS PCT % 8   EOS PCT % 1     Results from last 7 days   Lab Units 07/14/19  2110   POTASSIUM mmol/L 5 1   CHLORIDE mmol/L 101   CO2 mmol/L 29   BUN mg/dL 16   CREATININE mg/dL 0 92   CALCIUM mg/dL 8 9   ALK PHOS U/L 124*   ALT U/L 20   AST U/L 25     Results from last 7 days   Lab Units 07/14/19  2110   INR  1 05       Imaging: I have personally reviewed pertinent reports  No results found  Assessment/Plan    * Generalized weakness  Assessment & Plan  Likely secondary to chronic deconditioning, acute illness and decreased po intake  Will admit for Observation, treat URI, check Vitamin D and Vitamin B 12 level and have PT/OT evaluate the pt     URI (upper respiratory infection)  Assessment & Plan  CXR with no obvious infiltrate noted (official read pending)  Family report a 2 day hx of productive cough and shortness of breath  She was given a dose of Ceftriaxone in the ED  LA is WNL  Will check Procalcitonin level and resume Ceftriaxone     Serum total bilirubin elevated  Assessment & Plan  Pt denies any abdominal or nausea/vomiting  Will repeat CMP tomorrow     Leukocytosis  Assessment & Plan  Likely secondary to URI  U/A with no evidence of UTI  LA is WNL  Procalcitonin level ordered    Will resume Ceftriaxone and repeat CBC later today     CAD (coronary artery disease)  Assessment & Plan  Continue Norvasc and Statin Hyponatremia  Assessment & Plan  Very mild and chronic in nature  Will follow BMP    Hypothyroidism  Assessment & Plan  Continue Synthroid    Hyperlipidemia  Assessment & Plan  Continue Statin     Hypertension  Assessment & Plan  Continue [facility] Problem List:     Principal Problem:    Generalized weakness  Active Problems:    URI (upper respiratory infection)    Serum total bilirubin elevated    Hypertension    Hyperlipidemia    Hypothyroidism    Hyponatremia    CAD (coronary artery disease)    Leukocytosis        VTE Prophylaxis: Heparin   Code Status: Prior    Anticipated Length of Stay:  Patient will be admitted on an Observation basis with an anticipated length of stay of atleast 1 midnights  Total Time for Visit, including Counseling / Coordination of Care: 30 minutes  Greater than 50% of this total time spent on direct patient counseling and coordination of care

## 2019-07-16 PROBLEM — R78.81 BACTEREMIA: Status: ACTIVE | Noted: 2019-07-16

## 2019-07-16 LAB
ANION GAP SERPL CALCULATED.3IONS-SCNC: 10 MMOL/L (ref 4–13)
BUN SERPL-MCNC: 12 MG/DL (ref 5–25)
CALCIUM SERPL-MCNC: 8.9 MG/DL (ref 8.3–10.1)
CHLORIDE SERPL-SCNC: 104 MMOL/L (ref 100–108)
CO2 SERPL-SCNC: 25 MMOL/L (ref 21–32)
CREAT SERPL-MCNC: 0.89 MG/DL (ref 0.6–1.3)
ERYTHROCYTE [DISTWIDTH] IN BLOOD BY AUTOMATED COUNT: 16.8 % (ref 11.6–15.1)
GFR SERPL CREATININE-BSD FRML MDRD: 55 ML/MIN/1.73SQ M
GLUCOSE P FAST SERPL-MCNC: 94 MG/DL (ref 65–99)
GLUCOSE SERPL-MCNC: 94 MG/DL (ref 65–140)
HCT VFR BLD AUTO: 34.5 % (ref 34.8–46.1)
HGB BLD-MCNC: 10.8 G/DL (ref 11.5–15.4)
MAGNESIUM SERPL-MCNC: 1.7 MG/DL (ref 1.6–2.6)
MCH RBC QN AUTO: 24.5 PG (ref 26.8–34.3)
MCHC RBC AUTO-ENTMCNC: 31.3 G/DL (ref 31.4–37.4)
MCV RBC AUTO: 78 FL (ref 82–98)
MRSA NOSE QL CULT: NORMAL
PLATELET # BLD AUTO: 226 THOUSANDS/UL (ref 149–390)
PMV BLD AUTO: 10.5 FL (ref 8.9–12.7)
POTASSIUM SERPL-SCNC: 3.9 MMOL/L (ref 3.5–5.3)
RBC # BLD AUTO: 4.4 MILLION/UL (ref 3.81–5.12)
SODIUM SERPL-SCNC: 139 MMOL/L (ref 136–145)
VIT B12 SERPL-MCNC: 276 PG/ML (ref 100–900)
WBC # BLD AUTO: 10.39 THOUSAND/UL (ref 4.31–10.16)

## 2019-07-16 PROCEDURE — 80048 BASIC METABOLIC PNL TOTAL CA: CPT | Performed by: STUDENT IN AN ORGANIZED HEALTH CARE EDUCATION/TRAINING PROGRAM

## 2019-07-16 PROCEDURE — 82607 VITAMIN B-12: CPT | Performed by: STUDENT IN AN ORGANIZED HEALTH CARE EDUCATION/TRAINING PROGRAM

## 2019-07-16 PROCEDURE — 87070 CULTURE OTHR SPECIMN AEROBIC: CPT | Performed by: FAMILY MEDICINE

## 2019-07-16 PROCEDURE — 85027 COMPLETE CBC AUTOMATED: CPT | Performed by: STUDENT IN AN ORGANIZED HEALTH CARE EDUCATION/TRAINING PROGRAM

## 2019-07-16 PROCEDURE — 87205 SMEAR GRAM STAIN: CPT | Performed by: FAMILY MEDICINE

## 2019-07-16 PROCEDURE — 82652 VIT D 1 25-DIHYDROXY: CPT | Performed by: STUDENT IN AN ORGANIZED HEALTH CARE EDUCATION/TRAINING PROGRAM

## 2019-07-16 PROCEDURE — 83735 ASSAY OF MAGNESIUM: CPT | Performed by: STUDENT IN AN ORGANIZED HEALTH CARE EDUCATION/TRAINING PROGRAM

## 2019-07-16 PROCEDURE — 99232 SBSQ HOSP IP/OBS MODERATE 35: CPT | Performed by: FAMILY MEDICINE

## 2019-07-16 RX ORDER — MAGNESIUM SULFATE HEPTAHYDRATE 40 MG/ML
2 INJECTION, SOLUTION INTRAVENOUS ONCE
Status: COMPLETED | OUTPATIENT
Start: 2019-07-16 | End: 2019-07-16

## 2019-07-16 RX ORDER — AMLODIPINE BESYLATE 5 MG/1
5 TABLET ORAL DAILY
Status: DISCONTINUED | OUTPATIENT
Start: 2019-07-16 | End: 2019-07-17 | Stop reason: HOSPADM

## 2019-07-16 RX ORDER — VANCOMYCIN HYDROCHLORIDE 1 G/200ML
15 INJECTION, SOLUTION INTRAVENOUS ONCE
Status: COMPLETED | OUTPATIENT
Start: 2019-07-16 | End: 2019-07-16

## 2019-07-16 RX ORDER — ATORVASTATIN CALCIUM 10 MG/1
10 TABLET, FILM COATED ORAL
Status: DISCONTINUED | OUTPATIENT
Start: 2019-07-16 | End: 2019-07-17 | Stop reason: HOSPADM

## 2019-07-16 RX ORDER — PREGABALIN 75 MG/1
75 CAPSULE ORAL DAILY
Status: DISCONTINUED | OUTPATIENT
Start: 2019-07-16 | End: 2019-07-17 | Stop reason: HOSPADM

## 2019-07-16 RX ORDER — MELATONIN
1000 DAILY
Status: DISCONTINUED | OUTPATIENT
Start: 2019-07-16 | End: 2019-07-17 | Stop reason: HOSPADM

## 2019-07-16 RX ORDER — LEVOTHYROXINE SODIUM 0.07 MG/1
75 TABLET ORAL
Status: DISCONTINUED | OUTPATIENT
Start: 2019-07-16 | End: 2019-07-17 | Stop reason: HOSPADM

## 2019-07-16 RX ORDER — PANTOPRAZOLE SODIUM 20 MG/1
20 TABLET, DELAYED RELEASE ORAL
Status: DISCONTINUED | OUTPATIENT
Start: 2019-07-16 | End: 2019-07-17 | Stop reason: HOSPADM

## 2019-07-16 RX ORDER — DOCUSATE SODIUM 100 MG/1
100 CAPSULE, LIQUID FILLED ORAL 2 TIMES DAILY
Status: DISCONTINUED | OUTPATIENT
Start: 2019-07-16 | End: 2019-07-17 | Stop reason: HOSPADM

## 2019-07-16 RX ADMIN — ACETAMINOPHEN 650 MG: 325 TABLET, FILM COATED ORAL at 22:16

## 2019-07-16 RX ADMIN — ATORVASTATIN CALCIUM 10 MG: 10 TABLET, FILM COATED ORAL at 15:48

## 2019-07-16 RX ADMIN — VANCOMYCIN HYDROCHLORIDE 1000 MG: 1 INJECTION, SOLUTION INTRAVENOUS at 17:23

## 2019-07-16 RX ADMIN — AMLODIPINE BESYLATE 5 MG: 5 TABLET ORAL at 08:13

## 2019-07-16 RX ADMIN — VITAMIN D, TAB 1000IU (100/BT) 1000 UNITS: 25 TAB at 08:13

## 2019-07-16 RX ADMIN — HEPARIN SODIUM 5000 UNITS: 5000 INJECTION INTRAVENOUS; SUBCUTANEOUS at 22:16

## 2019-07-16 RX ADMIN — PREGABALIN 75 MG: 75 CAPSULE ORAL at 08:13

## 2019-07-16 RX ADMIN — PANTOPRAZOLE SODIUM 20 MG: 20 TABLET, DELAYED RELEASE ORAL at 11:25

## 2019-07-16 RX ADMIN — DOCUSATE SODIUM 100 MG: 100 CAPSULE, LIQUID FILLED ORAL at 08:13

## 2019-07-16 RX ADMIN — MAGNESIUM SULFATE HEPTAHYDRATE 2 G: 40 INJECTION, SOLUTION INTRAVENOUS at 10:39

## 2019-07-16 RX ADMIN — SODIUM CHLORIDE 50 ML/HR: 0.9 INJECTION, SOLUTION INTRAVENOUS at 05:36

## 2019-07-16 RX ADMIN — HEPARIN SODIUM 5000 UNITS: 5000 INJECTION INTRAVENOUS; SUBCUTANEOUS at 05:37

## 2019-07-16 RX ADMIN — ACETAMINOPHEN 650 MG: 325 TABLET, FILM COATED ORAL at 05:49

## 2019-07-16 RX ADMIN — HEPARIN SODIUM 5000 UNITS: 5000 INJECTION INTRAVENOUS; SUBCUTANEOUS at 13:21

## 2019-07-16 RX ADMIN — LEVOTHYROXINE SODIUM 75 MCG: 75 TABLET ORAL at 08:13

## 2019-07-16 NOTE — PHYSICIAN ADVISOR
Current patient class: Observation  The patient is currently on Hospital Day: 3 at [address] [place] [address]      This patient was originally admitted to the hospital under observation status  After admission the patient was reevaluated and determined to require further hospitalization  The patient is now documented to require at least a 2nd midnight in the hospital  As such the patient is now expected to satisfy the 2 midnight benchmark and is therefore eligible for inpatient admission  After review of the relevant documentation, labs, vital signs and test results, the patient is appropriate for INPATIENT ADMISSION  Admission to the hospital as an inpatient is a complex decision making process which requires the practitioner to consider the patients presenting complaint, history and physical examination and all relevant testing  With this in mind, in this case, the patient was deemed appropriate for INPATIENT ADMISSION  After review of the documentation and testing available at the time of the admission I concur with this clinical determination of medical necessity  Rationale is as follows: The patient is a 72-year-old female who has been hospitalized under observation status for almost 48 hours  Please refer to my initial physician advisor note  Follow-up progress notes from the provider are pending  UR contacted the provider earlier today  The patient is expected to remain in the hospital to verify that follow-up blood cultures are negative  1/2 cultures grew gram-positive cocci  She has very mild leukocytosis  Initial temperature was low grade  She received a dose of vancomycin today pending additional cultures  The patient remains on intravenous fluids at 50 cc/hour  Additional medical necessity needs to be documented in today's provider note  The patient was not considered stable for discharge and required at least a two midnight hospitalization    At this point it is reasonable to change to inpatient class      The patients vitals on arrival were ED Triage Vitals   Temperature Pulse Respirations Blood Pressure SpO2   07/14/19 2053 07/14/19 2049 07/14/19 2049 07/14/19 2049 07/14/19 2049   100 3 °F (37 9 °C) 79 18 (!) 186/82 98 %      Temp Source Heart Rate Source Patient Position - Orthostatic VS BP Location FiO2 (%)   07/14/19 2053 07/14/19 2120 07/14/19 2120 07/14/19 2120 --   Oral Monitor Lying Left arm       Pain Score       07/14/19 2049       5           Past Medical History:   Diagnosis Date    CAD (coronary artery disease)     Disease of thyroid gland     hypo    GERD (gastroesophageal reflux disease)     Gout     right shoulder    Hyperlipidemia     Hypertension     Osteoarthritis (arthritis due to wear and tear of joints)      Past Surgical History:   Procedure Laterality Date    CORONARY ARTERY BYPASS GRAFT      ENDOSCOPIC STENT PLACEMENT W/ MLB Left 2012    HIATAL HERNIA REPAIR      JOINT REPLACEMENT Left     knee 2001    ROTATOR CUFF REPAIR      TUBAL LIGATION  2013       The patient was admitted to the hospital at 0055 on 7/15/19 for the following diagnosis:  Cough [R05]  Leukocytosis [D72 829]  Weakness [R53 1]  Fever [R50 9]    Consults have been placed to:   None    Vitals:    07/16/19 0551 07/16/19 0554 07/16/19 0740 07/16/19 1333   BP: 167/78 167/78 136/68 147/68   BP Location:   Left arm Right arm   Pulse: 66 73 63 64   Resp: 17  16 16   Temp: 97 5 °F (36 4 °C) 97 5 °F (36 4 °C) 98 6 °F (37 °C) 98 °F (36 7 °C)   TempSrc:   Oral Oral   SpO2: 98% 98% 97% 96%   Weight:       Height:           Most recent labs:    Recent Labs     07/14/19  2110 07/15/19  0520 07/16/19  0543   WBC 13 01* 10 71* 10 39*   HGB 12 0 10 9* 10 8*   HCT 38 2 34 7* 34 5*    203 226   K 5 1 3 7 3 9   CALCIUM 8 9 8 7 8 9   BUN 16 15 12   CREATININE 0 92 0 89 0 89   INR 1 05  --   --    AST 25 10  --    ALT 20 14  --    ALKPHOS 124* 108  --        Scheduled Meds:  Current Facility-Administered Medications:  acetaminophen 650 mg Oral Q6H PRN Anusha Cabrera MD    amLODIPine 5 mg Oral Daily Anusha Cabrera MD    atorvastatin 10 mg Oral Daily With Clarice Garcia MD    cholecalciferol 1,000 Units Oral Daily Anusha Cabrera MD    docusate sodium 100 mg Oral BID Anusha Cabrera MD    heparin (porcine) 5,000 Units Subcutaneous Q8H [facility] Anusha Cabrera MD    levothyroxine 75 mcg Oral Early Morning Anusha Cabrera MD    pantoprazole 20 mg Oral Early Morning Landy Vora DO    pregabalin 75 mg Oral Daily Anusha Carbera MD    sodium chloride 50 mL/hr Intravenous Continuous Clovis Whitney MD Last Rate: 50 mL/hr (07/16/19 0536)     Continuous Infusions:  sodium chloride 50 mL/hr Last Rate: 50 mL/hr (07/16/19 0536)     PRN Meds:   acetaminophen    Surgical procedures (if appropriate):

## 2019-07-16 NOTE — UTILIZATION REVIEW
Continued Stay Review    Date: 7-16-19                           Current Patient Class observation 7-15-19 0055  Changed to inpatient 7-16-19 1940 for follow up on blood cultures  Admitting Physician JOAQUIM Tobin    Level of Care Med Surg    Estimated length of stay More than 2 Midnights    Certification I certify that inpatient services are medically necessary for this patient for a duration of greater than two midnights  See H&P and MD Progress Notes for additional information about the patient's course of treatment  Current Level of Care: medical     HPI:89 y o  female initially admitted on 7-14-19     Assessment/Plan:    First of 2 blood cultures are positive   Question if contaminant          Pertinent Labs/Diagnostic Results:   Results from last 7 days   Lab Units 07/16/19 0543 07/15/19  0520 07/14/19  2110   WBC Thousand/uL 10 39* 10 71* 13 01*   HEMOGLOBIN g/dL 10 8* 10 9* 12 0   HEMATOCRIT % 34 5* 34 7* 38 2   PLATELETS Thousands/uL 226 203 237   NEUTROS ABS Thousands/µL  --  7 96* 10 20*         Results from last 7 days   Lab Units 07/16/19 0543 07/15/19  0520 07/14/19  2110   SODIUM mmol/L 139 140 135*   POTASSIUM mmol/L 3 9 3 7 5 1   CHLORIDE mmol/L 104 105 101   CO2 mmol/L 25 26 29   ANION GAP mmol/L 10 9 5   BUN mg/dL 12 15 16   CREATININE mg/dL 0 89 0 89 0 92   EGFR ml/min/1 73sq m 55 55 53   CALCIUM mg/dL 8 9 8 7 8 9   MAGNESIUM mg/dL 1 7  --   --      Results from last 7 days   Lab Units 07/15/19  0520 07/14/19  2110   AST U/L 10 25   ALT U/L 14 20   ALK PHOS U/L 108 124*   TOTAL PROTEIN g/dL 6 0* 6 9   ALBUMIN g/dL 2 7* 3 2*   TOTAL BILIRUBIN mg/dL 1 00 1 20*         Results from last 7 days   Lab Units 07/16/19  0543 07/15/19  0520 07/14/19  2110   GLUCOSE RANDOM mg/dL 94 82 95     Results from last 7 days   Lab Units 07/14/19  2110   PROTIME seconds 11 0   INR  1 05   PTT seconds 54*     Results from last 7 days   Lab Units 07/14/19  2131   PROCALCITONIN ng/ml <0 05 Results from last 7 days   Lab Units 07/14/19  2110   LACTIC ACID mmol/L 1 2       Results from last 7 days   Lab Units 07/15/19  0013   CLARITY UA  Clear   COLOR UA  Yellow   SPEC GRAV UA  1 010   PH UA  7 5   GLUCOSE UA mg/dl Negative   KETONES UA mg/dl Negative   BLOOD UA  Negative   PROTEIN UA mg/dl Negative   NITRITE UA  Negative   BILIRUBIN UA  Negative   UROBILINOGEN UA E U /dl 0 2   LEUKOCYTES UA  Negative     Results from last 7 days   Lab Units 07/14/19  2110 07/14/19  2100   BLOOD CULTURE  No Growth at 24 hrs   --    GRAM STAIN RESULT   --  Gram positive cocci in clusters*       Vital Signs:    Date/Time  Temp  Pulse  Resp  BP  SpO2  O2 Device  Patient Position - Orthostatic VS   07/16/19 1333  98 °F (36 7 °C)  64  16  147/68  96 %  None (Room air)  Lying   07/16/19 0740  98 6 °F (37 °C)  63  16  136/68  97 %  None (Room air)  Lying   07/16/19 0554  97 5 °F (36 4 °C)  73  --  167/78  98 %  --  --   07/16/19 0551  97 5 °F (36 4 °C)  66  17  167/78  98 %  --           Medications:   Scheduled Meds:   Current Facility-Administered Medications:  acetaminophen 650 mg Oral Q6H PRN    amLODIPine 5 mg Oral Daily    atorvastatin 10 mg Oral Daily With Dinner    cholecalciferol 1,000 Units Oral Daily    docusate sodium 100 mg Oral BID    heparin (porcine) 5,000 Units Subcutaneous Q8H [address]    levothyroxine 75 mcg Oral Early Morning    pantoprazole 20 mg Oral Early Morning    pregabalin 75 mg Oral Daily    sodium chloride 50 mL/hr Intravenous Continuous Last Rate: 50 mL/hr (07/16/19 0536)   vancomycin 15 mg/kg Intravenous Once        Discharge Plan: to be determined     Network Utilization Review Department  Phone: [phone]; Fax [phone]  [email or web address]  ATTENTION: Please call with any questions or concerns to [phone]  and carefully listen to the prompts so that you are directed to the right person     Send all requests for admission clinical reviews, approved or denied determinations and any other requests to fax [phone]   All voicemails are confidential

## 2019-07-17 VITALS
HEIGHT: 55 IN | TEMPERATURE: 97.9 F | DIASTOLIC BLOOD PRESSURE: 68 MMHG | RESPIRATION RATE: 18 BRPM | BODY MASS INDEX: 30.82 KG/M2 | WEIGHT: 133.16 LBS | OXYGEN SATURATION: 96 % | SYSTOLIC BLOOD PRESSURE: 125 MMHG | HEART RATE: 65 BPM

## 2019-07-17 PROBLEM — D72.829 LEUKOCYTOSIS: Status: RESOLVED | Noted: 2019-07-15 | Resolved: 2019-07-17

## 2019-07-17 PROBLEM — R78.81 BACTEREMIA: Status: RESOLVED | Noted: 2019-07-16 | Resolved: 2019-07-17

## 2019-07-17 PROBLEM — E87.1 HYPONATREMIA: Status: RESOLVED | Noted: 2019-07-15 | Resolved: 2019-07-17

## 2019-07-17 PROBLEM — R17 SERUM TOTAL BILIRUBIN ELEVATED: Status: RESOLVED | Noted: 2019-07-15 | Resolved: 2019-07-17

## 2019-07-17 LAB
1,25(OH)2D3 SERPL-MCNC: 61.2 PG/ML (ref 19.9–79.3)
ERYTHROCYTE [DISTWIDTH] IN BLOOD BY AUTOMATED COUNT: 16.7 % (ref 11.6–15.1)
HCT VFR BLD AUTO: 33.8 % (ref 34.8–46.1)
HGB BLD-MCNC: 10.6 G/DL (ref 11.5–15.4)
MCH RBC QN AUTO: 24.5 PG (ref 26.8–34.3)
MCHC RBC AUTO-ENTMCNC: 31.4 G/DL (ref 31.4–37.4)
MCV RBC AUTO: 78 FL (ref 82–98)
PLATELET # BLD AUTO: 235 THOUSANDS/UL (ref 149–390)
PMV BLD AUTO: 10.7 FL (ref 8.9–12.7)
PROCALCITONIN SERPL-MCNC: 0.13 NG/ML
RBC # BLD AUTO: 4.32 MILLION/UL (ref 3.81–5.12)
WBC # BLD AUTO: 7.57 THOUSAND/UL (ref 4.31–10.16)

## 2019-07-17 PROCEDURE — 85027 COMPLETE CBC AUTOMATED: CPT | Performed by: FAMILY MEDICINE

## 2019-07-17 PROCEDURE — 84145 PROCALCITONIN (PCT): CPT | Performed by: FAMILY MEDICINE

## 2019-07-17 PROCEDURE — 99239 HOSP IP/OBS DSCHRG MGMT >30: CPT | Performed by: FAMILY MEDICINE

## 2019-07-17 RX ORDER — LANOLIN ALCOHOL/MO/W.PET/CERES
1000 CREAM (GRAM) TOPICAL DAILY
Qty: 30 TABLET | Refills: 0 | Status: SHIPPED | OUTPATIENT
Start: 2019-07-17 | End: 2021-06-25 | Stop reason: HOSPADM

## 2019-07-17 RX ADMIN — AMLODIPINE BESYLATE 5 MG: 5 TABLET ORAL at 09:05

## 2019-07-17 RX ADMIN — HEPARIN SODIUM 5000 UNITS: 5000 INJECTION INTRAVENOUS; SUBCUTANEOUS at 06:41

## 2019-07-17 RX ADMIN — PANTOPRAZOLE SODIUM 20 MG: 20 TABLET, DELAYED RELEASE ORAL at 06:41

## 2019-07-17 RX ADMIN — PREGABALIN 75 MG: 75 CAPSULE ORAL at 09:04

## 2019-07-17 RX ADMIN — LEVOTHYROXINE SODIUM 75 MCG: 75 TABLET ORAL at 06:41

## 2019-07-17 RX ADMIN — VITAMIN D, TAB 1000IU (100/BT) 1000 UNITS: 25 TAB at 09:04

## 2019-07-17 NOTE — PLAN OF CARE
Problem: Prexisting or High Potential for Compromised Skin Integrity  Goal: Skin integrity is maintained or improved  Description  INTERVENTIONS:  - Identify patients at risk for skin breakdown  - Assess and monitor skin integrity  - Assess and monitor nutrition and hydration status  - Monitor labs (i e  albumin)  - Assess for incontinence   - Turn and reposition patient  - Assist with mobility/ambulation  - Relieve pressure over bony prominences  - Avoid friction and shearing  - Provide appropriate hygiene as needed including keeping skin clean and dry  - Evaluate need for skin moisturizer/barrier cream  - Collaborate with interdisciplinary team (i e  Nutrition, Rehabilitation, etc )   - Patient/family teaching  Outcome: Progressing     Problem: Potential for Falls  Goal: Patient will remain free of falls  Description  INTERVENTIONS:  - Assess patient frequently for physical needs  -  Identify cognitive and physical deficits and behaviors that affect risk of falls    -  [place] fall precautions as indicated by assessment   - Educate patient/family on patient safety including physical limitations  - Instruct patient to call for assistance with activity based on assessment  - Modify environment to reduce risk of injury  - Consider OT/PT consult to assist with strengthening/mobility  Outcome: Progressing     Problem: SAFETY ADULT  Goal: Maintain or return to baseline ADL function  Description  INTERVENTIONS:  -  Assess patient's ability to carry out ADLs; assess patient's baseline for ADL function and identify physical deficits which impact ability to perform ADLs (bathing, care of mouth/teeth, toileting, grooming, dressing, etc )  - Assess/evaluate cause of self-care deficits   - Assess range of motion  - Assess patient's mobility; develop plan if impaired  - Assess patient's need for assistive devices and provide as appropriate  - Encourage maximum independence but intervene and supervise when necessary  ¯ Involve family in performance of ADLs  ¯ Assess for home care needs following discharge   ¯ Request OT consult to assist with ADL evaluation and planning for discharge  ¯ Provide patient education as appropriate  Outcome: Progressing  Goal: Maintain or return mobility status to optimal level  Description  INTERVENTIONS:  - Assess patient's baseline mobility status (ambulation, transfers, stairs, etc )    - Identify cognitive and physical deficits and behaviors that affect mobility  - Identify mobility aids required to assist with transfers and/or ambulation (gait belt, sit-to-stand, lift, walker, cane, etc )  - [place] fall precautions as indicated by assessment  - Record patient progress and toleration of activity level on Mobility SBAR; progress patient to next Phase/Stage  - Instruct patient to call for assistance with activity based on assessment  - Request Rehabilitation consult to assist with strengthening/weightbearing, etc   Outcome: Progressing

## 2019-07-17 NOTE — NURSING NOTE
Discharge instructions and med recon reviewed with pt and her daughter  Pt given a prescription for Vitamin B12 at home  Pt to have Home Health and this was explained  Pt's vaccines were up to date and/or patient refused  Pt took all belongings home with her  No further needs  Pt taken to the discharge are by Renaldo Quinones in a wheelchair

## 2019-07-17 NOTE — DISCHARGE SUMMARY
Discharge Summary - [facility]    Patient Information: Fani Castillo 80 y o  female MRN: [id number]  Unit/Bed#: [address] Encounter: [id number]    Discharging Physician / Practitioner: Jorge L Galaviz DO  PCP: Henrik Suarez MD  Admission Date: 7/14/2019  Discharge Date: 07/17/19    Reason for Admission: Fever - 76 years or older (Per daughter states that she started with " Laryngitis" 3 days ago but it hasn't gotten better  Started with a fever either yesterday or this am    Increase in weakness  )      Discharge Diagnoses:     Principal Problem:    Generalized weakness  Active Problems:    URI (upper respiratory infection)    Hypertension    Hyperlipidemia    Hypothyroidism    CAD (coronary artery disease)  Resolved Problems:    Bacteremia    Hyponatremia    Serum total bilirubin elevated    Leukocytosis        * Generalized weakness  Assessment & Plan  Likely secondary to chronic deconditioning, acute illness and decreased po intake  Weakness did improve while here  PT/OT eval and treatment while here    Bacteremia-resolved as of 7/17/2019  Assessment & Plan  One of 2 showed gram-positive cocci in clusters - coag-negative staph which is most likely contamination  One time dose of IV vancomycin was given  URI (upper respiratory infection)  Assessment & Plan  CXR with no obvious infiltrate noted   off IV Ceftriaxone  LA is WNL  Procalcitonin level X 2 negative  The sputum culture is pending at time of discharge and will be followed up on  Informed daughter that if sputum culture shows normal respiratory suhsa or another organism that does not normally require treatment, she will not be called Back but if sputum culture does grow something that needs treatment, she would get a call from me and antibiotics will be prescribed    Daughter showed understanding and was in agreement with the plan    CAD (coronary artery disease)  Assessment & Plan  Continue Statin Hypothyroidism  Assessment & Plan  Continue Synthroid    Hyperlipidemia  Assessment & Plan  Continue Statin    Hypertension  Assessment & Plan  Continue Norvasc    Leukocytosis-resolved as of 7/17/2019  Assessment & Plan  Likely secondary to URI  U/A with no evidence of UTI  LA is WNL  Leukocytosis resolved on repeat lab work  Ceftriaxone was discontinued     Serum total bilirubin elevated-resolved as of 7/17/2019  Assessment & Plan  Pt denies any abdominal or nausea/vomiting    Hyponatremia-resolved as of 7/17/2019  Assessment & Plan  Resolved on repeat lab work      Consultations During Hospital Stay:  None    Procedures Performed:     · none    Significant Findings:     · See hospital course and above    Imaging while in hospital:    Xr Chest Portable    Result Date: 7/15/2019  Narrative: CHEST INDICATION:   Cough, weakness  COMPARISON:  01/06/2019 EXAM PERFORMED/VIEWS:  XR CHEST PORTABLE 1 image FINDINGS: Cardiomediastinal silhouette appears unremarkable  A median sternotomy has been performed  The pulmonary vessels are normal   There is mild elevation of the left hemidiaphragm  The lungs are clear  No pneumothorax or pleural effusion  Osseous structures appear within normal limits for patient age  Impression: No acute cardiopulmonary disease  Workstation performed: [id number]       Incidental Findings:   · none    Test Results Pending at Discharge (will require follow up):   · As per After Visit Summary     Outpatient Tests Requested:  · none    Complications:  See hospital course and above    Hospital Course:     Fazal Espinosa is a 80 y o  female patient who originally presented to the hospital on 7/14/2019 due to cough, fever  Patient was also noted to be very fatigued and had not been eating much  Patient was admitted initially started on IV antibiotics which was later discontinued    One of 2 blood culture came back growing gram-positive cocci and she did receive a dose of IV vancomycin until final cultures available  Symptoms improved and patient was discharged home  Discharge plan was discussed with patient and her daughter who was present at bedside    Please see above list of diagnoses and related plan for additional information  Condition at Discharge: stable     Discharge Day Visit / Exam:     Subjective:  Patient states that she feels much better  Daughter also reports that patient looks much better than admission  States appetite is improved    Vitals: Blood Pressure: 125/68 (07/17/19 0710)  Pulse: 65 (07/17/19 0710)  Temperature: 97 9 °F (36 6 °C) (07/17/19 0710)  Temp Source: Oral (07/17/19 0710)  Respirations: 18 (07/17/19 0710)  Height: 4' 7" (139 7 cm) (07/15/19 0230)  Weight - Scale: 60 4 kg (133 lb 2 5 oz) (07/17/19 0551)  SpO2: 96 % (07/17/19 0710)  Exam:   Physical Exam   Constitutional: No distress  HENT:   Head: Normocephalic and atraumatic  Mouth/Throat: Oropharynx is clear and moist    Eyes: EOM are normal  Right eye exhibits no discharge  Left eye exhibits no discharge  No scleral icterus  Neck: Neck supple  Cardiovascular: Normal rate and regular rhythm  Pulmonary/Chest: Effort normal and breath sounds normal  No respiratory distress  She has no wheezes  She has no rales  Abdominal: Soft  Bowel sounds are normal  She exhibits no distension  There is no tenderness  Musculoskeletal: She exhibits no edema  Neurological: She is alert  No cranial nerve deficit  Skin: Skin is dry  She is not diaphoretic  Discharge instructions/Information to patient and family:(Discharge Medications and Follow up):   See after visit summary for information provided to patient and family  Provisions for Follow-Up Care:  See after visit summary for information related to follow-up care and any pertinent home health orders  Disposition: Home    Planned Readmission:  No     Discharge Statement:  I spent > 30 minutes discharging the patient   This time was spent on the day of discharge  I had direct contact with the patient on the day of discharge  Greater than 50% of the total time was spent examining patient, answering all patient questions, arranging and discussing plan of care with patient as well as directly providing post-discharge instructions  Additional time then spent on discharge activities  Discharge Medications:  See after visit summary for reconciled discharge medications provided to patient and family  ** Please Note: "This note has been constructed using a voice recognition system  Therefore there may be syntax, spelling, and/or grammatical errors   Please call if you have any questions  "**

## 2019-07-17 NOTE — PROGRESS NOTES
[facility] Progress Note  Patient: Tyrone Ray 80 y o  female   MRN: [id number]  PCP: Michael Clancy MD  Unit/Bed#: [address] [place] [address] Encounter: [id number]  Date Of Visit: 07/16/19    Problem List:    Principal Problem:    Generalized weakness  Active Problems:    Bacteremia    URI (upper respiratory infection)    Hypertension    Hyperlipidemia    Hypothyroidism    Hyponatremia    Serum total bilirubin elevated    CAD (coronary artery disease)    Leukocytosis      Assessment & Plan:    * Generalized weakness  Assessment & Plan  Likely secondary to chronic deconditioning, acute illness and decreased po intake  PT/OT eval and treatment while here    Bacteremia  Assessment & Plan  One of 2 showing gram-positive cocci in clusters  Questionable contamination  One time dose of IV vancomycin given  Follow-up on final cultures    URI (upper respiratory infection)  Assessment & Plan  CXR with no obvious infiltrate noted   off IV Ceftriaxone     LA is WNL  Procalcitonin level negative x1  Due to bacteremia, check procalcitonin level in a m  Leukocytosis  Assessment & Plan  Likely secondary to URI  U/A with no evidence of UTI  LA is WNL  WBC trending down and is only mildly elevated  Procalcitonin level X 1 negative  Since patient reports productive cough, sputum culture ordered  ordered  Ceftriaxone was discontinued     CAD (coronary artery disease)  Assessment & Plan  Continue Norvasc, Statin     Serum total bilirubin elevated  Assessment & Plan  Pt denies any abdominal or nausea/vomiting  Hyponatremia  Assessment & Plan  Resolved on repeat lab work    Hypothyroidism  Assessment & Plan  Continue Synthroid  Hyperlipidemia  Assessment & Plan  Continue Statin  Hypertension  Assessment & Plan  Continue Norvasc          VTE Pharmacologic Prophylaxis:   Pharmacologic: Heparin  Mechanical VTE Prophylaxis in Place: No    Patient Centered Rounds: I have performed bedside rounds with nursing staff today  Discussions with Specialists or Other Care Team Provider: yes     Education and Discussions with Family / Patient: yes    Time Spent for Care: 30 minutes  More than 50% of total time spent on counseling and coordination of care as described above  Current Length of Stay: 1 day(s)    Current Patient Status: Inpatient   Certification Statement: The patient will continue to require additional inpatient hospital stay due to Bacteremia, generalized weakness, URI    Discharge Plan: home    Code Status: Level 1 - Full Code      Subjective:   Reports productive cough, indigestion    Objective:     Vitals:   Temp (24hrs), Av 2 °F (36 8 °C), Min:97 5 °F (36 4 °C), Max:99 3 °F (37 4 °C)    Temp:  [97 5 °F (36 4 °C)-99 3 °F (37 4 °C)] 99 3 °F (37 4 °C)  HR:  [63-77] 77  Resp:  [16-18] 18  BP: (136-174)/(68-84) 174/84  SpO2:  [95 %-98 %] 95 %  Body mass index is 31 03 kg/m²  Input and Output Summary (last 24 hours): Intake/Output Summary (Last 24 hours) at 2019 2323  Last data filed at 2019 1018  Gross per 24 hour   Intake 920 83 ml   Output 500 ml   Net 420 83 ml       Physical Exam:     Physical Exam   Constitutional: She is oriented to person, place, and time  No distress  HENT:   Head: Normocephalic and atraumatic  Mouth/Throat: Oropharynx is clear and moist    Eyes: EOM are normal  Right eye exhibits no discharge  Left eye exhibits no discharge  No scleral icterus  Neck: Neck supple  Cardiovascular: Normal rate and regular rhythm  Pulmonary/Chest: Effort normal  No respiratory distress  She has no wheezes  She has no rales  Decreased breath sounds bilaterally   Abdominal: Soft  Bowel sounds are normal  She exhibits no distension  There is no tenderness  Musculoskeletal: She exhibits no edema  Neurological: She is alert and oriented to person, place, and time  No cranial nerve deficit  Skin: Skin is dry  She is not diaphoretic         Additional Data: Labs:    Results from last 7 days   Lab Units 07/16/19  0543 07/15/19  0520   WBC Thousand/uL 10 39* 10 71*   HEMOGLOBIN g/dL 10 8* 10 9*   HEMATOCRIT % 34 5* 34 7*   PLATELETS Thousands/uL 226 203   NEUTROS PCT %  --  74   LYMPHS PCT %  --  15   MONOS PCT %  --  9   EOS PCT %  --  1     Results from last 7 days   Lab Units 07/16/19  0543 07/15/19  0520   POTASSIUM mmol/L 3 9 3 7   CHLORIDE mmol/L 104 105   CO2 mmol/L 25 26   BUN mg/dL 12 15   CREATININE mg/dL 0 89 0 89   CALCIUM mg/dL 8 9 8 7   ALK PHOS U/L  --  108   ALT U/L  --  14   AST U/L  --  10     Results from last 7 days   Lab Units 07/14/19  2110   INR  1 05       * I Have Reviewed All Lab Data Listed Above  * Additional Pertinent Lab Tests Reviewed:  [facility] Admission Reviewed      Imaging:  Imaging Reports Reviewed Today Include: CXR  Imaging Personally Reviewed by Myself Includes:  none    Recent Cultures (last 7 days):     Results from last 7 days   Lab Units 07/14/19 2110 07/14/19  2100   BLOOD CULTURE  No Growth at 24 hrs   --    GRAM STAIN RESULT   --  Gram positive cocci in clusters*       Last 24 Hours Medication List:     Current Facility-Administered Medications:  acetaminophen 650 mg Oral Q6H PRN Slick Youngblood MD    amLODIPine 5 mg Oral Daily Slick Youngblood MD    atorvastatin 10 mg Oral Daily With Hadassah Nissen, MD    cholecalciferol 1,000 Units Oral Daily Slick Youngblood MD    docusate sodium 100 mg Oral BID Slick Youngblood MD    heparin (porcine) 5,000 Units Subcutaneous Q8H [address] Slick Youngblood MD    levothyroxine 75 mcg Oral Early Morning Slick Youngblood MD    pantoprazole 20 mg Oral Early Morning Landy Vora DO    pregabalin 75 mg Oral Daily Slick Youngblood MD    sodium chloride 50 mL/hr Intravenous Continuous Khoi Rosales MD Last Rate: 50 mL/hr (07/16/19 0536)          Today, Patient Was Seen By: Buster Blake DO    ** Please Note: "This note has been constructed using a voice recognition system  Therefore there may be syntax, spelling, and/or grammatical errors   Please call if you have any questions  "**

## 2019-07-17 NOTE — SOCIAL WORK
Per PCP, pt stable for DC  Spoke with pt's Dtr, aware of her DC right, IMM provided  Dtr aware that [facility]NA services were referred and will follow up with them for appointment to be scheduled

## 2019-07-17 NOTE — ASSESSMENT & PLAN NOTE
One of 2 showed gram-positive cocci in clusters - coag-negative staph which is most likely contamination  One time dose of IV vancomycin was given

## 2019-07-18 LAB
BACTERIA BLD CULT: ABNORMAL
GRAM STN SPEC: ABNORMAL

## 2019-07-18 NOTE — RESULT ENCOUNTER NOTE
Pt was an inpatient, issue was addressed while inpatient  Likely a contaminant as one BC came back positive  Was given Vancomycin

## 2019-07-19 LAB
BACTERIA SPT RESP CULT: ABNORMAL
GRAM STN SPEC: ABNORMAL

## 2019-07-20 LAB — BACTERIA BLD CULT: NORMAL

## 2019-07-30 ENCOUNTER — APPOINTMENT (EMERGENCY)
Dept: RADIOLOGY | Facility: HOSPITAL | Age: 84
DRG: 537 | End: 2019-07-30
Payer: MEDICARE

## 2019-07-30 ENCOUNTER — APPOINTMENT (INPATIENT)
Dept: RADIOLOGY | Facility: HOSPITAL | Age: 84
DRG: 537 | End: 2019-07-30
Payer: MEDICARE

## 2019-07-30 ENCOUNTER — HOSPITAL ENCOUNTER (INPATIENT)
Facility: HOSPITAL | Age: 84
LOS: 3 days | Discharge: NURSING, REHAB OR LONG-TERM CARE | DRG: 537 | End: 2019-08-02
Attending: EMERGENCY MEDICINE | Admitting: STUDENT IN AN ORGANIZED HEALTH CARE EDUCATION/TRAINING PROGRAM
Payer: MEDICARE
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
## 2019-07-30 DIAGNOSIS — Z01.810 PREOP CARDIOVASCULAR EXAM: ICD-10-CM

## 2019-07-30 DIAGNOSIS — S72.009A FEMORAL NECK FRACTURE (HCC): ICD-10-CM

## 2019-07-30 DIAGNOSIS — T14.8XXA TENDON TEAR: ICD-10-CM

## 2019-07-30 DIAGNOSIS — K21.9 GERD (GASTROESOPHAGEAL REFLUX DISEASE): ICD-10-CM

## 2019-07-30 DIAGNOSIS — S72.001A CLOSED FRACTURE OF RIGHT HIP, INITIAL ENCOUNTER (HCC): Primary | ICD-10-CM

## 2019-07-30 PROBLEM — E87.1 HYPONATREMIA: Status: ACTIVE | Noted: 2019-07-30

## 2019-07-30 LAB
ANION GAP SERPL CALCULATED.3IONS-SCNC: 8 MMOL/L (ref 4–13)
BASOPHILS # BLD AUTO: 0.02 THOUSANDS/ΜL (ref 0–0.1)
BASOPHILS NFR BLD AUTO: 0 % (ref 0–1)
BUN SERPL-MCNC: 21 MG/DL (ref 5–25)
CALCIUM SERPL-MCNC: 9 MG/DL (ref 8.3–10.1)
CHLORIDE SERPL-SCNC: 101 MMOL/L (ref 100–108)
CO2 SERPL-SCNC: 26 MMOL/L (ref 21–32)
CREAT SERPL-MCNC: 0.82 MG/DL (ref 0.6–1.3)
EOSINOPHIL # BLD AUTO: 0.13 THOUSAND/ΜL (ref 0–0.61)
EOSINOPHIL NFR BLD AUTO: 2 % (ref 0–6)
ERYTHROCYTE [DISTWIDTH] IN BLOOD BY AUTOMATED COUNT: 17.2 % (ref 11.6–15.1)
GFR SERPL CREATININE-BSD FRML MDRD: 61 ML/MIN/1.73SQ M
GLUCOSE SERPL-MCNC: 88 MG/DL (ref 65–140)
HCT VFR BLD AUTO: 35.7 % (ref 34.8–46.1)
HGB BLD-MCNC: 11.1 G/DL (ref 11.5–15.4)
IMM GRANULOCYTES # BLD AUTO: 0.02 THOUSAND/UL (ref 0–0.2)
IMM GRANULOCYTES NFR BLD AUTO: 0 % (ref 0–2)
LYMPHOCYTES # BLD AUTO: 1.93 THOUSANDS/ΜL (ref 0.6–4.47)
LYMPHOCYTES NFR BLD AUTO: 25 % (ref 14–44)
MCH RBC QN AUTO: 24.6 PG (ref 26.8–34.3)
MCHC RBC AUTO-ENTMCNC: 31.1 G/DL (ref 31.4–37.4)
MCV RBC AUTO: 79 FL (ref 82–98)
MONOCYTES # BLD AUTO: 0.68 THOUSAND/ΜL (ref 0.17–1.22)
MONOCYTES NFR BLD AUTO: 9 % (ref 4–12)
NEUTROPHILS # BLD AUTO: 4.92 THOUSANDS/ΜL (ref 1.85–7.62)
NEUTS SEG NFR BLD AUTO: 64 % (ref 43–75)
NRBC BLD AUTO-RTO: 0 /100 WBCS
PLATELET # BLD AUTO: 319 THOUSANDS/UL (ref 149–390)
PMV BLD AUTO: 10.3 FL (ref 8.9–12.7)
POTASSIUM SERPL-SCNC: 5.3 MMOL/L (ref 3.5–5.3)
RBC # BLD AUTO: 4.52 MILLION/UL (ref 3.81–5.12)
SODIUM SERPL-SCNC: 135 MMOL/L (ref 136–145)
TROPONIN I SERPL-MCNC: <0.02 NG/ML
WBC # BLD AUTO: 7.7 THOUSAND/UL (ref 4.31–10.16)

## 2019-07-30 PROCEDURE — 73502 X-RAY EXAM HIP UNI 2-3 VIEWS: CPT

## 2019-07-30 PROCEDURE — 84484 ASSAY OF TROPONIN QUANT: CPT | Performed by: STUDENT IN AN ORGANIZED HEALTH CARE EDUCATION/TRAINING PROGRAM

## 2019-07-30 PROCEDURE — 73700 CT LOWER EXTREMITY W/O DYE: CPT

## 2019-07-30 PROCEDURE — 36415 COLL VENOUS BLD VENIPUNCTURE: CPT | Performed by: EMERGENCY MEDICINE

## 2019-07-30 PROCEDURE — 93005 ELECTROCARDIOGRAM TRACING: CPT

## 2019-07-30 PROCEDURE — 80048 BASIC METABOLIC PNL TOTAL CA: CPT | Performed by: EMERGENCY MEDICINE

## 2019-07-30 PROCEDURE — 99223 1ST HOSP IP/OBS HIGH 75: CPT | Performed by: STUDENT IN AN ORGANIZED HEALTH CARE EDUCATION/TRAINING PROGRAM

## 2019-07-30 PROCEDURE — 70450 CT HEAD/BRAIN W/O DYE: CPT

## 2019-07-30 PROCEDURE — 85025 COMPLETE CBC W/AUTO DIFF WBC: CPT | Performed by: EMERGENCY MEDICINE

## 2019-07-30 PROCEDURE — 73130 X-RAY EXAM OF HAND: CPT

## 2019-07-30 PROCEDURE — 99285 EMERGENCY DEPT VISIT HI MDM: CPT

## 2019-07-30 RX ORDER — SODIUM CHLORIDE 9 MG/ML
50 INJECTION, SOLUTION INTRAVENOUS CONTINUOUS
Status: DISCONTINUED | OUTPATIENT
Start: 2019-07-31 | End: 2019-07-31

## 2019-07-30 RX ORDER — ATORVASTATIN CALCIUM 10 MG/1
10 TABLET, FILM COATED ORAL
Status: DISCONTINUED | OUTPATIENT
Start: 2019-07-31 | End: 2019-08-02 | Stop reason: HOSPADM

## 2019-07-30 RX ORDER — AMLODIPINE BESYLATE 5 MG/1
5 TABLET ORAL DAILY
Status: DISCONTINUED | OUTPATIENT
Start: 2019-07-31 | End: 2019-08-02 | Stop reason: HOSPADM

## 2019-07-30 RX ORDER — ACETAMINOPHEN 325 MG/1
650 TABLET ORAL ONCE
Status: COMPLETED | OUTPATIENT
Start: 2019-07-30 | End: 2019-07-30

## 2019-07-30 RX ORDER — HEPARIN SODIUM 5000 [USP'U]/ML
5000 INJECTION, SOLUTION INTRAVENOUS; SUBCUTANEOUS EVERY 8 HOURS SCHEDULED
Status: DISCONTINUED | OUTPATIENT
Start: 2019-07-30 | End: 2019-08-02 | Stop reason: HOSPADM

## 2019-07-30 RX ORDER — ONDANSETRON 2 MG/ML
4 INJECTION INTRAMUSCULAR; INTRAVENOUS EVERY 6 HOURS PRN
Status: DISCONTINUED | OUTPATIENT
Start: 2019-07-30 | End: 2019-08-02 | Stop reason: HOSPADM

## 2019-07-30 RX ORDER — ONDANSETRON 2 MG/ML
4 INJECTION INTRAMUSCULAR; INTRAVENOUS ONCE
Status: COMPLETED | OUTPATIENT
Start: 2019-07-30 | End: 2019-07-30

## 2019-07-30 RX ORDER — DOCUSATE SODIUM 100 MG/1
100 CAPSULE, LIQUID FILLED ORAL 2 TIMES DAILY
Status: DISCONTINUED | OUTPATIENT
Start: 2019-07-31 | End: 2019-08-02 | Stop reason: HOSPADM

## 2019-07-30 RX ORDER — CHOLECALCIFEROL (VITAMIN D3) 125 MCG
1000 CAPSULE ORAL DAILY
Status: DISCONTINUED | OUTPATIENT
Start: 2019-07-31 | End: 2019-08-02 | Stop reason: HOSPADM

## 2019-07-30 RX ORDER — MELATONIN
1000 DAILY
Status: DISCONTINUED | OUTPATIENT
Start: 2019-07-31 | End: 2019-08-02 | Stop reason: HOSPADM

## 2019-07-30 RX ORDER — PREGABALIN 75 MG/1
75 CAPSULE ORAL DAILY
Status: DISCONTINUED | OUTPATIENT
Start: 2019-07-31 | End: 2019-08-02 | Stop reason: HOSPADM

## 2019-07-30 RX ORDER — LEVOTHYROXINE SODIUM 0.07 MG/1
75 TABLET ORAL
Status: DISCONTINUED | OUTPATIENT
Start: 2019-07-31 | End: 2019-08-02 | Stop reason: HOSPADM

## 2019-07-30 RX ADMIN — ACETAMINOPHEN 650 MG: 325 TABLET, FILM COATED ORAL at 20:45

## 2019-07-30 RX ADMIN — ONDANSETRON 4 MG: 2 INJECTION INTRAMUSCULAR; INTRAVENOUS at 21:58

## 2019-07-30 RX ADMIN — MORPHINE SULFATE 2 MG: 2 INJECTION, SOLUTION INTRAMUSCULAR; INTRAVENOUS at 21:58

## 2019-07-31 ENCOUNTER — APPOINTMENT (INPATIENT)
Dept: NON INVASIVE DIAGNOSTICS | Facility: HOSPITAL | Age: 84
DRG: 537 | End: 2019-07-31
Payer: MEDICARE

## 2019-07-31 ENCOUNTER — APPOINTMENT (INPATIENT)
Dept: RADIOLOGY | Facility: HOSPITAL | Age: 84
DRG: 537 | End: 2019-07-31
Payer: MEDICARE

## 2019-07-31 PROBLEM — Z91.81 STATUS POST FALL: Status: ACTIVE | Noted: 2019-07-31

## 2019-07-31 PROBLEM — D64.9 ANEMIA: Status: ACTIVE | Noted: 2019-07-31

## 2019-07-31 PROBLEM — T14.8XXA TENDON TEAR: Status: ACTIVE | Noted: 2019-07-30

## 2019-07-31 LAB
ABO GROUP BLD: NORMAL
ABO GROUP BLD: NORMAL
ANION GAP SERPL CALCULATED.3IONS-SCNC: 7 MMOL/L (ref 4–13)
APTT PPP: 51 SECONDS (ref 23–37)
BASOPHILS # BLD AUTO: 0.04 THOUSANDS/ΜL (ref 0–0.1)
BASOPHILS NFR BLD AUTO: 1 % (ref 0–1)
BLD GP AB SCN SERPL QL: NEGATIVE
BUN SERPL-MCNC: 20 MG/DL (ref 5–25)
CALCIUM SERPL-MCNC: 8.8 MG/DL (ref 8.3–10.1)
CHLORIDE SERPL-SCNC: 104 MMOL/L (ref 100–108)
CK SERPL-CCNC: 119 U/L (ref 26–192)
CO2 SERPL-SCNC: 27 MMOL/L (ref 21–32)
CREAT SERPL-MCNC: 1.05 MG/DL (ref 0.6–1.3)
EOSINOPHIL # BLD AUTO: 0.12 THOUSAND/ΜL (ref 0–0.61)
EOSINOPHIL NFR BLD AUTO: 2 % (ref 0–6)
ERYTHROCYTE [DISTWIDTH] IN BLOOD BY AUTOMATED COUNT: 17.2 % (ref 11.6–15.1)
FOLATE SERPL-MCNC: 8.6 NG/ML (ref 3.1–17.5)
GFR SERPL CREATININE-BSD FRML MDRD: 45 ML/MIN/1.73SQ M
GLUCOSE SERPL-MCNC: 81 MG/DL (ref 65–140)
HCT VFR BLD AUTO: 34.2 % (ref 34.8–46.1)
HGB BLD-MCNC: 10.7 G/DL (ref 11.5–15.4)
IMM GRANULOCYTES # BLD AUTO: 0.02 THOUSAND/UL (ref 0–0.2)
IMM GRANULOCYTES NFR BLD AUTO: 0 % (ref 0–2)
INR PPP: 1.06 (ref 0.86–1.16)
LYMPHOCYTES # BLD AUTO: 1.99 THOUSANDS/ΜL (ref 0.6–4.47)
LYMPHOCYTES NFR BLD AUTO: 31 % (ref 14–44)
MCH RBC QN AUTO: 24.9 PG (ref 26.8–34.3)
MCHC RBC AUTO-ENTMCNC: 31.3 G/DL (ref 31.4–37.4)
MCV RBC AUTO: 80 FL (ref 82–98)
MONOCYTES # BLD AUTO: 0.63 THOUSAND/ΜL (ref 0.17–1.22)
MONOCYTES NFR BLD AUTO: 10 % (ref 4–12)
NEUTROPHILS # BLD AUTO: 3.6 THOUSANDS/ΜL (ref 1.85–7.62)
NEUTS SEG NFR BLD AUTO: 56 % (ref 43–75)
NRBC BLD AUTO-RTO: 0 /100 WBCS
PLATELET # BLD AUTO: 272 THOUSANDS/UL (ref 149–390)
PLATELET # BLD AUTO: 295 THOUSANDS/UL (ref 149–390)
PMV BLD AUTO: 10.2 FL (ref 8.9–12.7)
PMV BLD AUTO: 9.6 FL (ref 8.9–12.7)
POTASSIUM SERPL-SCNC: 4.3 MMOL/L (ref 3.5–5.3)
PROTHROMBIN TIME: 11.1 SECONDS (ref 9.4–11.7)
RBC # BLD AUTO: 4.29 MILLION/UL (ref 3.81–5.12)
RH BLD: POSITIVE
RH BLD: POSITIVE
SODIUM SERPL-SCNC: 138 MMOL/L (ref 136–145)
SPECIMEN EXPIRATION DATE: NORMAL
VIT B12 SERPL-MCNC: 471 PG/ML (ref 100–900)
WBC # BLD AUTO: 6.4 THOUSAND/UL (ref 4.31–10.16)

## 2019-07-31 PROCEDURE — 86850 RBC ANTIBODY SCREEN: CPT | Performed by: ORTHOPAEDIC SURGERY

## 2019-07-31 PROCEDURE — 82607 VITAMIN B-12: CPT | Performed by: NURSE PRACTITIONER

## 2019-07-31 PROCEDURE — 80048 BASIC METABOLIC PNL TOTAL CA: CPT | Performed by: STUDENT IN AN ORGANIZED HEALTH CARE EDUCATION/TRAINING PROGRAM

## 2019-07-31 PROCEDURE — 93306 TTE W/DOPPLER COMPLETE: CPT

## 2019-07-31 PROCEDURE — 86900 BLOOD TYPING SEROLOGIC ABO: CPT | Performed by: ORTHOPAEDIC SURGERY

## 2019-07-31 PROCEDURE — 87081 CULTURE SCREEN ONLY: CPT | Performed by: STUDENT IN AN ORGANIZED HEALTH CARE EDUCATION/TRAINING PROGRAM

## 2019-07-31 PROCEDURE — 99222 1ST HOSP IP/OBS MODERATE 55: CPT | Performed by: ORTHOPAEDIC SURGERY

## 2019-07-31 PROCEDURE — 86901 BLOOD TYPING SEROLOGIC RH(D): CPT | Performed by: STUDENT IN AN ORGANIZED HEALTH CARE EDUCATION/TRAINING PROGRAM

## 2019-07-31 PROCEDURE — 85610 PROTHROMBIN TIME: CPT | Performed by: ORTHOPAEDIC SURGERY

## 2019-07-31 PROCEDURE — 86901 BLOOD TYPING SEROLOGIC RH(D): CPT | Performed by: ORTHOPAEDIC SURGERY

## 2019-07-31 PROCEDURE — 82746 ASSAY OF FOLIC ACID SERUM: CPT | Performed by: NURSE PRACTITIONER

## 2019-07-31 PROCEDURE — 73721 MRI JNT OF LWR EXTRE W/O DYE: CPT

## 2019-07-31 PROCEDURE — NC001 PR NO CHARGE: Performed by: PHYSICIAN ASSISTANT

## 2019-07-31 PROCEDURE — 85025 COMPLETE CBC W/AUTO DIFF WBC: CPT | Performed by: STUDENT IN AN ORGANIZED HEALTH CARE EDUCATION/TRAINING PROGRAM

## 2019-07-31 PROCEDURE — 82550 ASSAY OF CK (CPK): CPT | Performed by: NURSE PRACTITIONER

## 2019-07-31 PROCEDURE — 99223 1ST HOSP IP/OBS HIGH 75: CPT | Performed by: INTERNAL MEDICINE

## 2019-07-31 PROCEDURE — 99232 SBSQ HOSP IP/OBS MODERATE 35: CPT | Performed by: NURSE PRACTITIONER

## 2019-07-31 PROCEDURE — 93306 TTE W/DOPPLER COMPLETE: CPT | Performed by: INTERNAL MEDICINE

## 2019-07-31 PROCEDURE — 85049 AUTOMATED PLATELET COUNT: CPT | Performed by: STUDENT IN AN ORGANIZED HEALTH CARE EDUCATION/TRAINING PROGRAM

## 2019-07-31 PROCEDURE — 86900 BLOOD TYPING SEROLOGIC ABO: CPT | Performed by: STUDENT IN AN ORGANIZED HEALTH CARE EDUCATION/TRAINING PROGRAM

## 2019-07-31 PROCEDURE — 85730 THROMBOPLASTIN TIME PARTIAL: CPT | Performed by: ORTHOPAEDIC SURGERY

## 2019-07-31 RX ORDER — ACETAMINOPHEN 325 MG/1
975 TABLET ORAL EVERY 8 HOURS SCHEDULED
Status: DISCONTINUED | OUTPATIENT
Start: 2019-07-31 | End: 2019-08-02 | Stop reason: HOSPADM

## 2019-07-31 RX ORDER — PANTOPRAZOLE SODIUM 40 MG/1
40 TABLET, DELAYED RELEASE ORAL
Status: DISCONTINUED | OUTPATIENT
Start: 2019-07-31 | End: 2019-08-02 | Stop reason: HOSPADM

## 2019-07-31 RX ADMIN — PANTOPRAZOLE SODIUM 40 MG: 40 TABLET, DELAYED RELEASE ORAL at 14:57

## 2019-07-31 RX ADMIN — AMLODIPINE BESYLATE 5 MG: 5 TABLET ORAL at 08:49

## 2019-07-31 RX ADMIN — PREGABALIN 75 MG: 75 CAPSULE ORAL at 08:50

## 2019-07-31 RX ADMIN — DOCUSATE SODIUM 100 MG: 100 CAPSULE, LIQUID FILLED ORAL at 08:50

## 2019-07-31 RX ADMIN — DOCUSATE SODIUM 100 MG: 100 CAPSULE, LIQUID FILLED ORAL at 17:58

## 2019-07-31 RX ADMIN — ACETAMINOPHEN 975 MG: 325 TABLET, FILM COATED ORAL at 21:52

## 2019-07-31 RX ADMIN — HEPARIN SODIUM 5000 UNITS: 5000 INJECTION INTRAVENOUS; SUBCUTANEOUS at 21:52

## 2019-07-31 RX ADMIN — LEVOTHYROXINE SODIUM 75 MCG: 75 TABLET ORAL at 05:06

## 2019-07-31 RX ADMIN — SODIUM CHLORIDE 50 ML/HR: 0.9 INJECTION, SOLUTION INTRAVENOUS at 19:21

## 2019-07-31 RX ADMIN — HEPARIN SODIUM 5000 UNITS: 5000 INJECTION INTRAVENOUS; SUBCUTANEOUS at 00:14

## 2019-07-31 RX ADMIN — SODIUM CHLORIDE 50 ML/HR: 0.9 INJECTION, SOLUTION INTRAVENOUS at 00:14

## 2019-07-31 RX ADMIN — VITAMIN D, TAB 1000IU (100/BT) 1000 UNITS: 25 TAB at 08:49

## 2019-07-31 RX ADMIN — ACETAMINOPHEN 975 MG: 325 TABLET, FILM COATED ORAL at 13:39

## 2019-07-31 RX ADMIN — HEPARIN SODIUM 5000 UNITS: 5000 INJECTION INTRAVENOUS; SUBCUTANEOUS at 13:40

## 2019-07-31 RX ADMIN — ATORVASTATIN CALCIUM 10 MG: 10 TABLET, FILM COATED ORAL at 17:57

## 2019-07-31 RX ADMIN — CYANOCOBALAMIN TAB 500 MCG 1000 MCG: 500 TAB at 08:49

## 2019-07-31 NOTE — PROGRESS NOTES
Progress Note - Letha Ortega 5/5/1924, 80 y o  female MRN: [id number]    Unit/Bed#: [address] [place] [address] Encounter: [id number]    Primary Care Provider: Yamlie Villagran MD   Date and time admitted to hospital: 7/30/2019  8:19 PM        * Tendon tear  Assessment & Plan  MRI right hip showed complete tear of the right iliopsoas tendon exhibiting tendon retraction to the level of the right femoral head neck junction  Remaining musculature appears intact  Pain control  Orthopedic following, appreciate input  Status post fall  Assessment & Plan  Patient and family report it was a  fall with no LOC or dizziness  Patient did not use her walker as she was supposed to  Patient sustained right hand and right groin pain after the fall  CT head no acute process  X-ray right hand no acute findings  X-ray right hip showed right intertrochanteric fracture with mild displacement  CT right hip no evidence of displaced acute fracture of the right hip  MRI right hip showed complete tear of the right iliopsoas tendon  Pain control  Orthopedic following, appreciate input  CAD (coronary artery disease)  Assessment & Plan  Status post CABG x5  Resume Norvasc and Statin  Patient is in optimal condition for surgical procedure if needed  Patient is at intermediate risk due to multiple comorbidities and advanced age  Anemia  Assessment & Plan  · Baseline hemoglobin 10-12's  Microcytic hypochromic  · Hb 10 7 today  · Check B12,folate, TSH  · Monitor  Hyponatremia  Assessment & Plan  Mild  Resolved with IV hydration  Acquired hypothyroidism  Assessment & Plan  Continue Synthroid    Hypertension  Assessment & Plan  Resume Norvasc  BP acceptable  VTE Pharmacologic Prophylaxis:   Pharmacologic: Heparin  Mechanical VTE Prophylaxis in Place: No    Patient Centered Rounds: I have performed bedside rounds with nursing staff today      Discussions with Specialists or Other Care Team Provider: Ortho    Education and Discussions with Family / Patient: yes    Time Spent for Care: 20 minutes  More than 50% of total time spent on counseling and coordination of care as described above  Current Length of Stay: 1 day(s)    Current Patient Status: Inpatient   Certification Statement: The patient will continue to require additional inpatient hospital stay due to iliopsoac tendon tear  Discharge Plan: pending progess    Code Status: Level 3 - DNAR and DNI      Subjective:   Patient reports right groin and right lateral hand pain  Denies chest pain, headaches, dizziness, SOB, nausea, vomiting, diarrhea, constipation, fever chills  Reports hungry  Objective:     Vitals:   Temp (24hrs), Av 6 °F (36 4 °C), Min:97 5 °F (36 4 °C), Max:97 8 °F (36 6 °C)    Temp:  [97 5 °F (36 4 °C)-97 8 °F (36 6 °C)] 97 5 °F (36 4 °C)  HR:  [55-71] 55  Resp:  [18] 18  BP: (115-181)/(56-84) 134/62  SpO2:  [94 %-98 %] 95 %  Body mass index is 29 87 kg/m²  Input and Output Summary (last 24 hours): Intake/Output Summary (Last 24 hours) at 2019 1307  Last data filed at 2019 1101  Gross per 24 hour   Intake 1200 ml   Output 1350 ml   Net -150 ml       Physical Exam:     Physical Exam   Constitutional: She is oriented to person, place, and time  She appears well-developed and well-nourished  HENT:   Head: Normocephalic and atraumatic  Neck: Normal range of motion  Neck supple  No JVD present  No tracheal deviation present  No thyromegaly present  Cardiovascular: Regular rhythm and intact distal pulses  Murmur heard  Bradycardic  Pulmonary/Chest: Effort normal and breath sounds normal  No respiratory distress  She has no wheezes  She has no rales  Abdominal: Soft  Bowel sounds are normal  She exhibits no distension  There is no tenderness  There is no guarding  Musculoskeletal: She exhibits tenderness  She exhibits no edema or deformity  Right lateral hand, right groin tender     Neurological: She is alert and oriented to person, place, and time  Skin: Skin is warm and dry  Psychiatric: She has a normal mood and affect  Judgment normal    Nursing note and vitals reviewed  Additional Data:     Labs:    Results from last 7 days   Lab Units 07/31/19  0512   WBC Thousand/uL 6 40   HEMOGLOBIN g/dL 10 7*   HEMATOCRIT % 34 2*   PLATELETS Thousands/uL 272   NEUTROS PCT % 56   LYMPHS PCT % 31   MONOS PCT % 10   EOS PCT % 2     Results from last 7 days   Lab Units 07/31/19  0512   POTASSIUM mmol/L 4 3   CHLORIDE mmol/L 104   CO2 mmol/L 27   BUN mg/dL 20   CREATININE mg/dL 1 05   CALCIUM mg/dL 8 8     Results from last 7 days   Lab Units 07/31/19  0029   INR  1 06       * I Have Reviewed All Lab Data Listed Above  * Additional Pertinent Lab Tests Reviewed:  [facility] Admission Reviewed    Imaging:    Imaging Reports Reviewed Today Include:  MRI, CT, x-ray right hip, chest x-ray, right hand x-ray  Imaging Personally Reviewed by Myself Includes:  None    Recent Cultures (last 7 days):           Last 24 Hours Medication List:     Current Facility-Administered Medications:  acetaminophen 975 mg Oral Q8H [facility] VARSHA Yanez    amLODIPine 5 mg Oral Daily Nikole Hinojosa MD    atorvastatin 10 mg Oral Daily With Siena Germain MD    cholecalciferol 1,000 Units Oral Daily Nikole Hinojosa MD    cyanocobalamin 1,000 mcg Oral Daily Nikole Hinojosa MD    docusate sodium 100 mg Oral BID Nikole Hinojosa MD    heparin (porcine) 5,000 Units Subcutaneous Q8H [facility] Nikole Hinojosa MD    levothyroxine 75 mcg Oral Early Morning Nikole Hinojosa MD    morphine injection 2 mg Intravenous Q3H PRN Nikole Hinojosa MD    ondansetron 4 mg Intravenous Q6H PRN Nikole Hinojosa MD    pregabalin 75 mg Oral Daily Nikole Hinojosa MD    sodium chloride 50 mL/hr Intravenous Continuous Nikole Hinoojsa MD Last Rate: 50 mL/hr (07/31/19 0014)        Today, Patient Was Seen By: VARSHA Prince    ** Please Note: Dragon 360 Dictation voice to text software may have been used in the creation of this document   **

## 2019-07-31 NOTE — PHYSICIAN ADVISOR
Current patient class: Inpatient  The patient is currently on Hospital Day: 2 at [address] [place] [address]      The patient was admitted to the hospital at 2154 on 7/30/19 for the following diagnosis:  Femoral neck fracture ([facility] [address] ) [S72 009A]  Hip pain [M25 559]  Preop cardiovascular exam [J28 367]  Fall, initial encounter [W19  XXXA]  Closed fracture of right hip, initial encounter ([facility] ) [S72 001A]       There is documentation in the medical record of an expected length of stay of at least 2 midnights  The patient is therefore expected to satisfy the 2 midnight benchmark and given the 2 midnight presumption is appropriate for INPATIENT ADMISSION  Given this expectation of a satisfying stay, CMS instructs us that the patient is most often appropriate for inpatient admission under part A provided medical necessity is documented in the chart  After review of the relevant documentation, labs, vital signs and test results, the patient is appropriate for INPATIENT ADMISSION  Admission to the hospital as an inpatient is a complex decision making process which requires the practitioner to consider the patients presenting complaint, history and physical examination and all relevant testing  With this in mind, in this case, the patient was deemed appropriate for INPATIENT ADMISSION  After review of the documentation and testing available at the time of the admission I concur with this clinical determination of medical necessity  Rationale is as follows: The patient is a 80 yrs old Female who presented to the ED at 7/30/2019  8:19 PM with a chief complaint of Fall (Pt arrives at ER with daughter after pt reportedly fell at home and spent 1 hour trying to get to a phone  Pt reports pain in right hip and right hand after fall  Pt deniesd any LOC or dizziness prior to the fall  Pt noted with reddened area to anterior upper right thigh  ); Hip Pain; and Hand Pain      The patient is a 80year-old female who presented to the ED with the complaint of right hip pain after experiencing a mechanical fall at home  An MRI of the right hip was completed on 7/31/2019 with the following results/impression:  IMPRESSION:  Complete tear of the right iliopsoas tendon exhibiting tendon retraction to the level of the right femoral head neck junction  Remaining musculature appears intact  The patient was seen in consultation by Orthopedic Surgery  The patient has a non-operative injury, and physical therapy was consulted to evaluate the patient  The patient will require either acute rehabilitation placement versus short-term rehabilitation SNF placement upon discharge  The patient was also diagnosed with hyponatremia and is being treated with continuous normal saline IV fluids for presumed hypovolemic hyponatremia  The patient requires serial laboratory testing to monitor her sodium level  The patient is appropriate for an INPATIENT ADMISSION      The patients vitals on arrival were ED Triage Vitals   Temperature Pulse Respirations Blood Pressure SpO2   07/30/19 2031 07/30/19 2031 07/30/19 2031 07/30/19 2031 07/30/19 2031   97 8 °F (36 6 °C) 64 18 (!) 181/84 98 %      Temp Source Heart Rate Source Patient Position - Orthostatic VS BP Location FiO2 (%)   07/30/19 2031 07/30/19 2031 07/30/19 2031 07/30/19 2031 --   Oral Monitor Lying Right arm       Pain Score       07/30/19 2045       7           Past Medical History:   Diagnosis Date    CAD (coronary artery disease)     Disease of thyroid gland     hypo    GERD (gastroesophageal reflux disease)     Gout     right shoulder    Hyperlipidemia     Hypertension     Osteoarthritis (arthritis due to wear and tear of joints)      Past Surgical History:   Procedure Laterality Date    CORONARY ARTERY BYPASS GRAFT      ENDOSCOPIC STENT PLACEMENT W/ MLB Left 2012    HIATAL HERNIA REPAIR      JOINT REPLACEMENT Left     knee 2001    ROTATOR CUFF REPAIR      TUBAL LIGATION 2013           Consults have been placed to:   IP CONSULT TO CARDIOLOGY  IP CONSULT TO ORTHOPEDIC SURGERY    Vitals:    07/30/19 2336 07/31/19 0646 07/31/19 0752 07/31/19 1538   BP: 155/69 168/77 134/62 145/73   BP Location: Left arm Left arm Left arm Right arm   Pulse: 59 58 55 57   Resp: 18 18 18 18   Temp: 97 6 °F (36 4 °C) 97 5 °F (36 4 °C) 97 5 °F (36 4 °C) 97 5 °F (36 4 °C)   TempSrc: Tympanic Tympanic Oral Oral   SpO2: 94% 95% 95% 94%   Weight:       Height:           Most recent labs:    Recent Labs     07/30/19  2239 07/31/19  0029 07/31/19  0512   WBC  --   --  6 40   HGB  --   --  10 7*   HCT  --   --  34 2*   PLT  --  295 272   K  --   --  4 3   CALCIUM  --   --  8 8   BUN  --   --  20   CREATININE  --   --  1 05   INR  --  1 06  --    TROPONINI <0 02  --   --    CKTOTAL  --   --  119       Scheduled Meds:  Current Facility-Administered Medications:  acetaminophen 975 mg Oral Q8H [address] VARSHA Yanez    amLODIPine 5 mg Oral Daily Francis Polanco MD    atorvastatin 10 mg Oral Daily With Giles Machuca MD    cholecalciferol 1,000 Units Oral Daily Francis Polanco MD    cyanocobalamin 1,000 mcg Oral Daily Francis Polanco MD    docusate sodium 100 mg Oral BID Francis Polanco MD    heparin (porcine) 5,000 Units Subcutaneous Q8H [address] Francis Polanco MD    levothyroxine 75 mcg Oral Early Morning Manolometine MD Collin    morphine injection 2 mg Intravenous Q3H PRN Francis Polanco MD    ondansetron 4 mg Intravenous Q6H PRN Francis Polanco MD    pantoprazole 40 mg Oral Early Morning VARSHA Yanez    pregabalin 75 mg Oral Daily Francis Polanco MD    sodium chloride 50 mL/hr Intravenous Continuous Manolometine Leader, MD Last Rate: 50 mL/hr (07/31/19 1921)     Continuous Infusions:  sodium chloride 50 mL/hr Last Rate: 50 mL/hr (07/31/19 1921)     PRN Meds: morphine injection    ondansetron    Surgical procedures (if appropriate):  Procedure(s):  OPEN REDUCTION W/ INTERNAL FIXATION (ORIF) HIP WITH CANNUALATED SCREWS

## 2019-07-31 NOTE — ASSESSMENT & PLAN NOTE
Status post CABG x5  Resume Norvasc and Statin  Patient is in optimal condition for surgical procedure if needed  Patient is at intermediate risk due to multiple comorbidities and advanced age

## 2019-07-31 NOTE — ASSESSMENT & PLAN NOTE
MRI right hip showed complete tear of the right iliopsoas tendon exhibiting tendon retraction to the level of the right femoral head neck junction  Remaining musculature appears intact  Pain control  Orthopedic following, appreciate input

## 2019-07-31 NOTE — CONSULTS
Consultation - Cardiology   Samuel Singh 80 y o  female MRN: [id number]  Unit/Bed#: [address] [place], [place] [id number] Encounter: [id number]    Assessment/Plan     Assessment:  1  Mechanical fall with question of right hip fracture  2  Hypertension  3  Osteoarthritis  4  Dyslipidemia  5  History of coronary artery disease with recent normal dobutamine stress echo    Plan:  Patient has been admitted to the hospitalist service  1  Continue her medications as she was taking pre-hospital   Patient is not on beta-blocker due to heart rates in the 50s to 60s  Will monitor patient on telemetry for 48 hours and if no arrhythmia can discontinue  2  Recent cardiac testing did not demonstrate ischemia, patient has been active without change in her abilities  No further cardiac testing is needed at this time  3  MRI of the hip as ordered per primary/orthopedic team for further evaluation of possible fracture  Care per orthopedic team      Patient is at low risk for perioperative complications  Patient has not experienced angina or signs of heart failure  Patient is stable on her current medications  May proceed with necessary surgery and we will follow  History of Present Illness   Physician Requesting Consult: Delaney Gilliam MD  Reason for Consult / Principal Problem:  Pre-surgical screening    HPI: Samuel Singh is a 80y o  year old female who presented to the emergency room after she experienced a mechanical fall in her kitchen  Patient states she was preparing her coffee, when she turned and her foot slipped on the floor causing her to fall on her right side  She denied any dizziness or lightheadedness at time of incident  Patient lives by herself with the assistance of family and friends checking on her frequently  Patient estimates she was on the floor for approximately 1 hour as she attempted to crawl to the telephone to call for help  She was brought to the emergency room for evaluation    Initial x-ray of right hip was concerning for a right intratrochanteric fracture with mild displacement  Patient then had a CT scan of the hip which did not demonstrate a fracture  Patient is scheduled for MRI for further evaluation  Her troponins and 12 lead EKG on admission were within normal limits  Patient has a documented history of gout, hypertension, hypothyroidism, dyslipidemia, osteoarthritis, coronary artery disease, gastric ulcers, renal artery stenosis, and restless leg syndrome  Patient in March of 2018 had cardiac testing performed at [facility]:  Echocardiogram demonstrated ejection fraction of 60% with grade 1 diastolic dysfunction  Patient also had a dobutamine stress echo which did not demonstrate ischemia or regional wall motion abnormality  Patient denies any chest pain, pressure or palpitations  She ambulates with a walker  She denies any change in her health issues  She does light housework in her home, but has someone from her [employer] who does heavy cleaning and cooking for her  She does not drive, she does get out of the house to [employer] and other activities with the assistance of family and friends  Her daughter does her grocery shopping for her  Inpatient consult to Cardiology  Consult performed by: VARSHA Lowery  Consult ordered by: Miranda Severin, MD          Review of Systems   Constitutional: Negative  Negative for activity change, chills and fatigue  HENT: Negative  Negative for congestion, ear pain, postnasal drip and tinnitus  Eyes: Negative  Negative for photophobia and visual disturbance  Respiratory: Negative  Negative for chest tightness, shortness of breath and wheezing  Cardiovascular: Negative  Negative for chest pain, palpitations and leg swelling  Gastrointestinal: Negative  Negative for abdominal distention, diarrhea, nausea and vomiting  Endocrine: Negative  Negative for polydipsia, polyphagia and polyuria  Genitourinary: Negative  Musculoskeletal: Positive for gait problem  Ambulates with walker   Skin: Negative  Allergic/Immunologic: Negative  Neurological: Negative for dizziness, syncope, speech difficulty, weakness and light-headedness  Hematological: Negative  Psychiatric/Behavioral: Negative  Historical Information   Past Medical History:   Diagnosis Date    CAD (coronary artery disease)     Disease of thyroid gland     hypo    GERD (gastroesophageal reflux disease)     Gout     right shoulder    Hyperlipidemia     Hypertension     Osteoarthritis (arthritis due to wear and tear of joints)      Past Surgical History:   Procedure Laterality Date    CORONARY ARTERY BYPASS GRAFT      ENDOSCOPIC STENT PLACEMENT W/ MLB Left 2012    HIATAL HERNIA REPAIR      JOINT REPLACEMENT Left     knee 2001    ROTATOR CUFF REPAIR      TUBAL LIGATION  2013     Social History     Substance and Sexual Activity   Alcohol Use Never    Frequency: Never     Social History     Substance and Sexual Activity   Drug Use Never     Social History     Tobacco Use   Smoking Status Never Smoker   Smokeless Tobacco Never Used     Family History: History reviewed  No pertinent family history      Meds/Allergies   all current active meds have been reviewed, current meds:   Current Facility-Administered Medications   Medication Dose Route Frequency    amLODIPine (NORVASC) tablet 5 mg  5 mg Oral Daily    atorvastatin (LIPITOR) tablet 10 mg  10 mg Oral Daily With Dinner    cholecalciferol (VITAMIN D3) tablet 1,000 Units  1,000 Units Oral Daily    cyanocobalamin (VITAMIN B-12) tablet 1,000 mcg  1,000 mcg Oral Daily    docusate sodium (COLACE) capsule 100 mg  100 mg Oral BID    heparin (porcine) subcutaneous injection 5,000 Units  5,000 Units Subcutaneous Q8H [address]    levothyroxine tablet 75 mcg  75 mcg Oral Early Morning    morphine injection 2 mg  2 mg Intravenous Q3H PRN    ondansetron (ZOFRAN) injection 4 mg  4 mg Intravenous Q6H PRN    pregabalin (LYRICA) capsule 75 mg  75 mg Oral Daily    sodium chloride 0 9 % infusion  50 mL/hr Intravenous Continuous    and PTA meds:   Prior to Admission Medications   Prescriptions Last Dose Informant Patient Reported? Taking? amLODIPine (NORVASC) 5 mg tablet   Yes Yes   Sig: Take 5 mg by mouth daily   atorvastatin (LIPITOR) 10 mg tablet   Yes Yes   Sig: Take 10 mg by mouth daily   celecoxib (CeleBREX) 200 mg capsule   Yes Yes   Sig: Take 200 mg by mouth daily   cholecalciferol (VITAMIN D3) 1,000 units tablet  Self Yes Yes   Sig: Take 1,000 Units by mouth daily   cyanocobalamin (VITAMIN B-12) 1,000 mcg tablet   No Yes   Sig: Take 1 tablet (1,000 mcg total) by mouth daily   docusate sodium (COLACE) 100 mg capsule   No Yes   Sig: Take 1 capsule (100 mg total) by mouth 2 (two) times a day   levothyroxine 75 mcg tablet   Yes Yes   Sig: Take 75 mcg by mouth daily   pregabalin (LYRICA) 75 mg capsule   Yes Yes   Sig: Take 75 mg by mouth daily       Facility-Administered Medications: None     Allergies   Allergen Reactions    Codeine Hives    Percocet [Oxycodone-Acetaminophen] Vomiting       Objective   Vitals: Blood pressure 134/62, pulse 55, temperature 97 5 °F (36 4 °C), temperature source Oral, resp  rate 18, height 4' 7" (1 397 m), weight 58 3 kg (128 lb 8 5 oz), SpO2 95 %, not currently breastfeeding  Orthostatic Blood Pressures      Most Recent Value   Blood Pressure  134/62 filed at 07/31/2019 8684   Patient Position - Orthostatic VS  Lying filed at 07/31/2019 0309            Intake/Output Summary (Last 24 hours) at 7/31/2019 1240  Last data filed at 7/31/2019 0701  Gross per 24 hour   Intake 1200 ml   Output 850 ml   Net 350 ml       Invasive Devices     Peripheral Intravenous Line            Peripheral IV 07/30/19 Right Hand less than 1 day                Physical Exam   Constitutional: She is oriented to person, place, and time  She appears well-developed and well-nourished  No distress     HENT: Head: Normocephalic and atraumatic  Right Ear: External ear normal    Left Ear: External ear normal    Eyes: Pupils are equal, round, and reactive to light  Conjunctivae are normal  Right eye exhibits no discharge  Left eye exhibits no discharge  No scleral icterus  Neck: Normal range of motion  Neck supple  No JVD present  No thyromegaly present  Cardiovascular: Normal rate, regular rhythm, normal heart sounds and intact distal pulses  No murmur heard  Pulmonary/Chest: Effort normal and breath sounds normal  No accessory muscle usage  No respiratory distress  She has no wheezes  She has no rales  Abdominal: Soft  Bowel sounds are normal  She exhibits no distension  Musculoskeletal: She exhibits no edema  Neurological: She is alert and oriented to person, place, and time  Skin: Skin is warm  Capillary refill takes less than 2 seconds  She is not diaphoretic  Psychiatric: She has a normal mood and affect  Nursing note and vitals reviewed  Lab Results:   I have personally reviewed pertinent lab results      CBC with diff:   Results from last 7 days   Lab Units 07/31/19  0029 07/30/19  2135   WBC Thousand/uL  --  7 70   RBC Million/uL  --  4 52   HEMOGLOBIN g/dL  --  11 1*   HEMATOCRIT %  --  35 7   MCV fL  --  79*   MCH pg  --  24 6*   MCHC g/dL  --  31 1*   RDW %  --  17 2*   MPV fL 9 6 10 3   PLATELETS Thousands/uL 295 319     CMP:   Results from last 7 days   Lab Units 07/31/19  0512   SODIUM mmol/L 138   POTASSIUM mmol/L 4 3   CHLORIDE mmol/L 104   CO2 mmol/L 27   BUN mg/dL 20   CREATININE mg/dL 1 05   CALCIUM mg/dL 8 8   EGFR ml/min/1 73sq m 45     Troponin:   0   Lab Value Date/Time    TROPONINI <0 02 07/30/2019 2239    TROPONINI <0 02 04/17/2019 1351     BNP:   Results from last 7 days   Lab Units 07/31/19  0512   POTASSIUM mmol/L 4 3   CHLORIDE mmol/L 104   CO2 mmol/L 27   BUN mg/dL 20   CREATININE mg/dL 1 05   CALCIUM mg/dL 8 8   EGFR ml/min/1 73sq m 45     Coags:   Results from last 7 days   Lab Units 07/31/19  0029   PTT seconds 51*   INR  1 06     Imaging: I have personally reviewed pertinent reports      EKG:  Sinus rhythm with left axis deviation, no acute changes  VTE Prophylaxis: Heparin    Code Status: Level 3 - DNAR and DNI  Advance Directive and Living Will:      Power of :    POLST:      Domi Stahl, [address] [facility]

## 2019-07-31 NOTE — CONSULTS
Consultation - Orthopedics   Nasim Kuhn 80 y o  female MRN: [id number]  Unit/Bed#: [address] [place] [address] Encounter: [id number]      Assessment/Plan     Assessment:  Right hip pain: s/p mechanical fall on 07/30/2019  R hip XR revealed:Right intertrochanteric fracture with mild displacement  CT of Right hip was negative  MRI revealed R iliopsoas tendon tear  Plan:   No acute Surgical intervention for Iliopsoas tendon tear  PRN analgesics  PT/OT  History of Present Illness   Physician Requesting Consult: Kassandra Alfaro MD  Reason for Consult / Principal Problem: Right Hip pain  HPI: Nasim Kuhn is a 80y o  year old female past medical history of CAD, GERD, Gout, HLD, HTN, Osteoarthritis  Patient was heating milk in the kitchen and loss her balance  Patient reports she tried to catch her self on the counter but fell onto her back  Pain is located in right groin  Pain is made worse with ambulation  Patient denies any numbness, tingling, loss of sensation  Inpatient consult to Orthopedic Surgery  Consult performed by: Mono Landeros PA-C  Consult ordered by: Slick Youngblood MD          Review of Systems   Constitutional: Negative for appetite change  Respiratory: Negative for chest tightness and shortness of breath  Cardiovascular: Negative for chest pain, palpitations and leg swelling  Gastrointestinal: Negative for abdominal pain  Musculoskeletal:        Right hand pain          Historical Information   Past Medical History:   Diagnosis Date    CAD (coronary artery disease)     Disease of thyroid gland     hypo    GERD (gastroesophageal reflux disease)     Gout     right shoulder    Hyperlipidemia     Hypertension     Osteoarthritis (arthritis due to wear and tear of joints)      Past Surgical History:   Procedure Laterality Date    CORONARY ARTERY BYPASS GRAFT      ENDOSCOPIC STENT PLACEMENT W/ MLB Left 2012    HIATAL HERNIA REPAIR      JOINT REPLACEMENT Left     knee 2001    ROTATOR CUFF REPAIR      TUBAL LIGATION  2013     Social History   Social History     Substance and Sexual Activity   Alcohol Use Never    Frequency: Never     Social History     Substance and Sexual Activity   Drug Use Never     Social History     Tobacco Use   Smoking Status Never Smoker   Smokeless Tobacco Never Used     Family History: non-contributory    Meds/Allergies   all current active meds have been reviewed  Allergies   Allergen Reactions    Codeine Hives    Percocet [Oxycodone-Acetaminophen] Vomiting       Objective   Vitals: Blood pressure 134/62, pulse 55, temperature 97 5 °F (36 4 °C), temperature source Oral, resp  rate 18, height 4' 7" (1 397 m), weight 58 3 kg (128 lb 8 5 oz), SpO2 95 %, not currently breastfeeding  ,Body mass index is 29 87 kg/m²  Intake/Output Summary (Last 24 hours) at 7/31/2019 0941  Last data filed at 7/31/2019 0701  Gross per 24 hour   Intake 1200 ml   Output 850 ml   Net 350 ml     I/O last 24 hours: In: 1200 [P O :200; I V :1000]  Out: 850 [Urine:850]    Invasive Devices     Peripheral Intravenous Line            Peripheral IV 07/30/19 Right Hand less than 1 day                Physical Exam   Constitutional: She appears well-developed and well-nourished  No distress  Cardiovascular: Normal rate, regular rhythm, normal heart sounds and intact distal pulses  Pulmonary/Chest: Effort normal and breath sounds normal  No stridor  No respiratory distress  She has no wheezes  Abdominal: Soft  Musculoskeletal:        Legs:  Neurological: She is alert  Skin: Skin is warm  Capillary refill takes 2 to 3 seconds  She is not diaphoretic  No erythema  Psychiatric: She has a normal mood and affect  Right Hip Exam     Tenderness   Right hip tenderness location: R groin, pubic tubercle  Muscle Strength   Right hip normal muscle strength: Decreased strength exam limited due to pain      Other   Erythema: absent  Scars: absent  Sensation: normal  Pulse: present    Comments:  Negative log roll  Femoral pulse intact, Mildly shortened and externally rotated  Left Hip Exam     Range of Motion   The patient has normal left hip ROM  Lab Results: I have personally reviewed pertinent lab results  Imaging Studies: I have personally reviewed pertinent reports  EKG, Pathology, and Other Studies: I have personally reviewed pertinent reports  VTE Prophylaxis: Sequential compression device (Venodyne)     Counseling / Coordination of Care  Total floor / unit time spent today 30minutes  Greater than 50% of total time was spent with the patient and / or family counseling and / or coordination of care  A description of the counseling / coordination of care: discussing case with attending, obtain history, performing physical exam, reviewing labs and imaging

## 2019-07-31 NOTE — ASSESSMENT & PLAN NOTE
Patient and family report it was a  fall with no LOC or dizziness  Patient did not use her walker as she was supposed to  Patient sustained right hand and right groin pain after the fall  CT head no acute process  X-ray right hand no acute findings  X-ray right hip showed right intertrochanteric fracture with mild displacement  CT right hip no evidence of displaced acute fracture of the right hip  MRI right hip showed complete tear of the right iliopsoas tendon  Pain control  Orthopedic following, appreciate input

## 2019-07-31 NOTE — SOCIAL WORK
LOS 1  GLOS 2 5  Pt is not a bundle  Pt is a 30 day readmission  Pt admitted last for Leokocytosis which was resolved and pt discharged home with homecare services  Pt readmitted this time after a fall at home  Per notes, pt did not use a walker which caused her fall  MRI showed complete tear of the right iliopsoas tendon  Non- operative injury  Met with pt's daughters Ela Rasmussen and Adore Veliz who provided information for initial assessment  Pt lives alone in 1-level senior community home  No stairs to enter  Prior to admission, pt ambulated with a walker and independent with ADLs  Pt currently home with [facility] for nursing and home PT/OT services  PCP- is Ryder Day  Preferred pharmacy- [facility] on [address] in [place], [place]  Daughter drives pt to her medical appointments and denies any barriers to obtaining meds  Pt does have an advanced directive  Requested for daughter to bring in a copy  Discussed with daughters the possible need for STR (therapy to assess) when pt is seen by therapy  Daughters do agree STR will be needed upon discharge  List of facilities provided to daughter and daughter requested for a referral to be made to [facility]  Referral made  CM reviewed discharge planning process including the following: identifying caregivers at home, preference for d/c planning needs, availability of treatment team to discuss questions or concerns patient and/or family may have regarding plan of care and discharge planning   CM will continue to follow for care coordination and update assessment as appropriate

## 2019-07-31 NOTE — ASSESSMENT & PLAN NOTE
· Baseline hemoglobin 10-12's  Microcytic hypochromic  · Hb 10 7 today  · Check B12,folate, TSH  · Monitor

## 2019-07-31 NOTE — PLAN OF CARE
Problem: Potential for Falls  Goal: Patient will remain free of falls  Description  INTERVENTIONS:  - Assess patient frequently for physical needs  -  Identify cognitive and physical deficits and behaviors that affect risk of falls    -  [place] fall precautions as indicated by assessment   - Educate patient/family on patient safety including physical limitations  - Instruct patient to call for assistance with activity based on assessment  - Modify environment to reduce risk of injury  - Consider OT/PT consult to assist with strengthening/mobility  Outcome: Progressing     Problem: Prexisting or High Potential for Compromised Skin Integrity  Goal: Skin integrity is maintained or improved  Description  INTERVENTIONS:  - Identify patients at risk for skin breakdown  - Assess and monitor skin integrity  - Assess and monitor nutrition and hydration status  - Monitor labs (i e  albumin)  - Assess for incontinence   - Turn and reposition patient  - Assist with mobility/ambulation  - Relieve pressure over bony prominences  - Avoid friction and shearing  - Provide appropriate hygiene as needed including keeping skin clean and dry  - Evaluate need for skin moisturizer/barrier cream  - Collaborate with interdisciplinary team (i e  Nutrition, Rehabilitation, etc )   - Patient/family teaching  Outcome: Progressing     Problem: MUSCULOSKELETAL - ADULT  Goal: Maintain or return mobility to safest level of function  Description  INTERVENTIONS:  - Assess patient's ability to carry out ADLs; assess patient's baseline for ADL function and identify physical deficits which impact ability to perform ADLs (bathing, care of mouth/teeth, toileting, grooming, dressing, etc )  - Assess/evaluate cause of self-care deficits   - Assess range of motion  - Assess patient's mobility; develop plan if impaired  - Assess patient's need for assistive devices and provide as appropriate  - Encourage maximum independence but intervene and supervise when necessary  - Involve family in performance of ADLs  - Assess for home care needs following discharge   - Request OT consult to assist with ADL evaluation and planning for discharge  - Provide patient education as appropriate  Outcome: Progressing  Goal: Maintain proper alignment of affected body part  Description  INTERVENTIONS:  - Support, maintain and protect limb and body alignment  - Provide pt/fam with appropriate education  Outcome: Progressing     Problem: PAIN - ADULT  Goal: Verbalizes/displays adequate comfort level or baseline comfort level  Description  Interventions:  - Encourage patient to monitor pain and request assistance  - Assess pain using appropriate pain scale  - Administer analgesics based on type and severity of pain and evaluate response  - Implement non-pharmacological measures as appropriate and evaluate response  - Consider cultural and social influences on pain and pain management  - Notify physician/advanced practitioner if interventions unsuccessful or patient reports new pain  Outcome: Progressing

## 2019-07-31 NOTE — ED PROVIDER NOTES
History  Chief Complaint   Patient presents with    Fall     Pt arrives at ER with daughter after pt reportedly fell at home and spent 1 hour trying to get to a phone  Pt reports pain in right hip and right hand after fall  Pt deniesd any LOC or dizziness prior to the fall  Pt noted with reddened area to anterior upper right thigh   Hip Pain    Hand Pain     80year-old female presents with right hip pain after a fall today  She had a mechanical fall while using her walker today  Denies head injury no loss of consciousness not anticoagulation  No chest pain abdominal pain back pain or any other injuries or complaints  She does report right hand pain  Unable to ambulate so called the ambulance  History provided by:  Patient   used: No        Prior to Admission Medications   Prescriptions Last Dose Informant Patient Reported? Taking?    amLODIPine (NORVASC) 5 mg tablet   Yes Yes   Sig: Take 5 mg by mouth daily   atorvastatin (LIPITOR) 10 mg tablet   Yes Yes   Sig: Take 10 mg by mouth daily   celecoxib (CeleBREX) 200 mg capsule   Yes Yes   Sig: Take 200 mg by mouth daily   cholecalciferol (VITAMIN D3) 1,000 units tablet  Self Yes Yes   Sig: Take 1,000 Units by mouth daily   cyanocobalamin (VITAMIN B-12) 1,000 mcg tablet   No Yes   Sig: Take 1 tablet (1,000 mcg total) by mouth daily   docusate sodium (COLACE) 100 mg capsule   No Yes   Sig: Take 1 capsule (100 mg total) by mouth 2 (two) times a day   levothyroxine 75 mcg tablet   Yes Yes   Sig: Take 75 mcg by mouth daily   pregabalin (LYRICA) 75 mg capsule   Yes Yes   Sig: Take 75 mg by mouth daily       Facility-Administered Medications: None       Past Medical History:   Diagnosis Date    CAD (coronary artery disease)     Disease of thyroid gland     hypo    GERD (gastroesophageal reflux disease)     Gout     right shoulder    Hyperlipidemia     Hypertension     Osteoarthritis (arthritis due to wear and tear of joints) Past Surgical History:   Procedure Laterality Date    CORONARY ARTERY BYPASS GRAFT      ENDOSCOPIC STENT PLACEMENT W/ MLB Left 2012    HIATAL HERNIA REPAIR      JOINT REPLACEMENT Left     knee 2001    ROTATOR CUFF REPAIR      TUBAL LIGATION  2013       History reviewed  No pertinent family history  I have reviewed and agree with the history as documented  Social History     Tobacco Use    Smoking status: Never Smoker    Smokeless tobacco: Never Used   Substance Use Topics    Alcohol use: Never     Frequency: Never    Drug use: Never        Review of Systems   All other systems reviewed and are negative  Physical Exam  Physical Exam   Constitutional: She is oriented to person, place, and time  She appears well-developed and well-nourished  HENT:   Head: Normocephalic and atraumatic  Eyes: Pupils are equal, round, and reactive to light  EOM are normal    Neck: Normal range of motion  Neck supple  Cardiovascular: Normal rate and regular rhythm  Pulmonary/Chest: Effort normal and breath sounds normal    Abdominal: Soft  Bowel sounds are normal    Musculoskeletal: Normal range of motion  Right lower extremity is mildly externally rotated and shortened  Tenderness noted with axial loading of the her leg  Tenderness noted at the groin area  Femoral pulses are intact  No open wounds  Neurological: She is alert and oriented to person, place, and time  Skin: Skin is warm and dry  Psychiatric: She has a normal mood and affect  Nursing note and vitals reviewed        Vital Signs  ED Triage Vitals   Temperature Pulse Respirations Blood Pressure SpO2   07/30/19 2031 07/30/19 2031 07/30/19 2031 07/30/19 2031 07/30/19 2031   97 8 °F (36 6 °C) 64 18 (!) 181/84 98 %      Temp Source Heart Rate Source Patient Position - Orthostatic VS BP Location FiO2 (%)   07/30/19 2031 07/30/19 2031 07/30/19 2031 07/30/19 2031 --   Oral Monitor Lying Right arm       Pain Score       07/30/19 2045       7 Vitals:    07/30/19 2031 07/30/19 2213 07/30/19 2315   BP: (!) 181/84 162/71 115/56   Pulse: 64 71 58   Patient Position - Orthostatic VS: Lying Lying          Visual Acuity      ED Medications  Medications   morphine injection 2 mg (has no administration in time range)   acetaminophen (TYLENOL) tablet 650 mg (650 mg Oral Given 7/30/19 2045)   morphine injection 2 mg (2 mg Intravenous Given 7/30/19 2158)   ondansetron (ZOFRAN) injection 4 mg (4 mg Intravenous Given 7/30/19 2158)       Diagnostic Studies  Results Reviewed     Procedure Component Value Units Date/Time    Troponin I [[id number]]  (Normal) Collected:  07/30/19 2239    Lab Status:  Final result Specimen:  Blood from Arm, Right Updated:  07/30/19 2304     Troponin I <0 02 ng/mL     Protime-INR [[id number]]     Lab Status:  No result Specimen:  Blood     APTT [[id number]]     Lab Status:  No result Specimen:  Blood     Basic metabolic panel [[id number]]  (Abnormal) Collected:  07/30/19 2135    Lab Status:  Final result Specimen:  Blood from Arm, Left Updated:  07/30/19 2216     Sodium 135 mmol/L      Potassium 5 3 mmol/L      Chloride 101 mmol/L      CO2 26 mmol/L      ANION GAP 8 mmol/L      BUN 21 mg/dL      Creatinine 0 82 mg/dL      Glucose 88 mg/dL      Calcium 9 0 mg/dL      eGFR 61 ml/min/1 73sq m     Narrative:       [place] guidelines for Chronic Kidney Disease (CKD):     Stage 1 with normal or high GFR (GFR > 90 mL/min/1 73 square meters)    Stage 2 Mild CKD (GFR = 60-89 mL/min/1 73 square meters)    Stage 3A Moderate CKD (GFR = 45-59 mL/min/1 73 square meters)    Stage 3B Moderate CKD (GFR = 30-44 mL/min/1 73 square meters)    Stage 4 Severe CKD (GFR = 15-29 mL/min/1 73 square meters)    Stage 5 End Stage CKD (GFR <15 mL/min/1 73 square meters)  Note: GFR calculation is accurate only with a steady state creatinine    CBC and differential [[id number]]  (Abnormal) Collected:  07/30/19 2135    Lab Status: Final result Specimen:  Blood from Arm, Left Updated:  07/30/19 2205     WBC 7 70 Thousand/uL      RBC 4 52 Million/uL      Hemoglobin 11 1 g/dL      Hematocrit 35 7 %      MCV 79 fL      MCH 24 6 pg      MCHC 31 1 g/dL      RDW 17 2 %      MPV 10 3 fL      Platelets 516 Thousands/uL      nRBC 0 /100 WBCs      Neutrophils Relative 64 %      Immat GRANS % 0 %      Lymphocytes Relative 25 %      Monocytes Relative 9 %      Eosinophils Relative 2 %      Basophils Relative 0 %      Neutrophils Absolute 4 92 Thousands/µL      Immature Grans Absolute 0 02 Thousand/uL      Lymphocytes Absolute 1 93 Thousands/µL      Monocytes Absolute 0 68 Thousand/µL      Eosinophils Absolute 0 13 Thousand/µL      Basophils Absolute 0 02 Thousands/µL                  CT hip right without contrast   Final Result by Santiago Parisi DO (07/30 2255)      No evidence of displaced acute fracture of the right hip  If symptoms persist MRI can be obtained  Workstation performed: [id number]         XR hip/pelv 2-3 vws right if performed   Final Result by Nazia Young MD (07/30 2206)         Right intertrochanteric fracture with mild displacement  Workstation performed: [id number]         XR hand 3+ views RIGHT    (Results Pending)   CT head without contrast    (Results Pending)   MRI inpatient order    (Results Pending)              Procedures  Procedures       ED Course                               MDM  Number of Diagnoses or Management Options  Fall, initial encounter:   Femoral neck fracture [facility]):   Diagnosis management comments: Patient initially evaluated with x-rays of the hip and hand  Discussed the case with Dr Violette Rosado who wanted a CT of the hip  CT of the hip did not show any fracture however the radiologist recommended an MRI if her pain persisted and she could not ambulate  Patient did have pain  Admitted the hospitalist for further evaluation and management    CT of the head also done which was reviewed and negative  Amount and/or Complexity of Data Reviewed  Clinical lab tests: ordered and reviewed  Tests in the radiology section of CPT®: ordered and reviewed  Tests in the medicine section of CPT®: ordered and reviewed    Patient Progress  Patient progress: stable      Disposition  Final diagnoses:   Fall, initial encounter   Femoral neck fracture ([facility] )     Time reflects when diagnosis was documented in both MDM as applicable and the Disposition within this note     Time User Action Codes Description Comment    7/30/2019  9:50 PM Sudarshan Lawton Add [S72 001A] Closed fracture of right hip, initial encounter ([facility] )     7/30/2019  9:54 PM Anepu, Yadira Bush Add [R56  NDME] Fall, initial encounter     7/30/2019  9:54 PM Anepu, Yadira Mckayish Add [S72 009A] Femoral neck fracture ([facility] )     7/30/2019 10:00 PM Dulce VILLALPANDO Add [E74 174] Preop cardiovascular exam       ED Disposition     ED Disposition Condition Date/Time Comment    Admit Stable Tue Jul 30, 2019  9:54 PM          Follow-up Information    None         Patient's Medications   Discharge Prescriptions    No medications on file     No discharge procedures on file      ED Provider  Electronically Signed by           Alejandra Holbrook DO  07/30/19 3636

## 2019-07-31 NOTE — UTILIZATION REVIEW
Initial Clinical Review    Admission: Date/Time/Statement: 7/30/19 @ 2154     Orders Placed This Encounter   Procedures    Inpatient Admission (expected length of stay for this patient Order details is greater than two midnights)     Standing Status:   Standing     Number of Occurrences:   1     Order Specific Question:   Admitting Physician     Answer:   Hollis Snow [[id number]]     Order Specific Question:   Level of Care     Answer:   Med Surg [[id number]]     Order Specific Question:   Estimated length of stay     Answer:   More than 2 Midnights     Order Specific Question:   Certification     Answer:   I certify that inpatient services are medically necessary for this patient for a duration of greater than two midnights  See H&P and MD Progress Notes for additional information about the patient's course of treatment  ED Arrival Information     Expected Arrival Acuity Means of Arrival Escorted By Service Admission Type    - 7/30/2019 20:12 Urgent Wheelchair Family Member Hospitalist Urgent    Arrival Complaint    fall left hip and hand injury        Chief Complaint   Patient presents with    Fall     Pt arrives at ER with daughter after pt reportedly fell at home and spent 1 hour trying to get to a phone  Pt reports pain in right hip and right hand after fall  Pt deniesd any LOC or dizziness prior to the fall  Pt noted with reddened area to anterior upper right thigh   Hip Pain    Hand Pain     Assessment/Plan: Valdemar Montiel is a 80 y o  female with a PMH of HTN, HLD, CAD and Hypothyroidism who presents with right hip pain after a fall  She is here with her family who also provides some history  She was in her usual state of health until today when she fell in the kitchen and experienced right hip pain  She states that she was turning around when her foot slipped causing her to fall  She denied any dizziness prior to falling  She denied any head injury or LOC afterwards    She denies any headaches since the fall   She came to the ER because of severe right hip pain  Currently she rates it 8/10 on the pain scale  The pain radiates to her right groin and is exacerbated with movement  She denies any other pain or discomfort  No other complaints or concerns     Closed fracture of right hip [facility])  Assessment & Plan  Pt and family report this was a mechanical fall with no LOC or dizziness  ER physician has discussed case with Orthopedic Surgeon on call who recommends checking a CT scan (ordered)  Will admit to Medicine, check stat CT head given age and fall, consult Cardiology for preop evaluation and keep NPO at midnight  Will have PT/OT evaluate the pt for discharge needs      Hyponatremia  Assessment & Plan  Very mild  Will repeat BMP tomorrow      CAD (coronary artery disease)  Assessment & Plan  Resume Norvasc and Statin  Cardiology consulted as above      Acquired hypothyroidism  Assessment & Plan  Continue Synthroid     Hypertension  Assessment & Plan  Resume Norvasc  Anticipated Length of Stay:  Patient will be admitted on an Inpatient basis with an anticipated length of stay of atleast 2 midnights       Per  Cardiology   Consult:     1  Mechanical fall with question of right hip fracture  X-ray and CT scan report noted      2  Essential hypertension acceptable      3  Coronary artery disease status post CABG with 5 bypass till about 10 years ago at [facility] with follow-up stress test done in [facility] in March of 2018 shows no ischemia it was dobutamine stress echo  Patient has normal LV systolic function  Current echo shows mild pulmonary hypertension      4  Cardiac murmur  Most likely thickened aortic valve  No other significant valvular disease 1 to 2+ TR with PA pressure around 45 mmHg on current echo     5  Dyslipidemia      6  Preoperative clearance  Patient is in optimal condition for the surgical procedure as planned if surgery is needed for her hip fracture    She is at intermediate risk due to her multiple for comorbidity and advanced age  This was discussed with patient's daughter and they agree with the plan  Continue blood pressure medications  Continue statins    Concur with other Rx provided          ED Triage Vitals   Temperature Pulse Respirations Blood Pressure SpO2   07/30/19 2031 07/30/19 2031 07/30/19 2031 07/30/19 2031 07/30/19 2031   97 8 °F (36 6 °C) 64 18 (!) 181/84 98 %      Temp Source Heart Rate Source Patient Position - Orthostatic VS BP Location FiO2 (%)   07/30/19 2031 07/30/19 2031 07/30/19 2031 07/30/19 2031 --   Oral Monitor Lying Right arm       Pain Score       07/30/19 2045       7        Wt Readings from Last 1 Encounters:   07/30/19 58 3 kg (128 lb 8 5 oz)     Additional Vital Signs:   07/31/19 0752  97 5 °F (36 4 °C)  55  18  134/62  95 %  None (Room air)  Lying   07/31/19 0646  97 5 °F (36 4 °C)  58  18  168/77  95 %  None (Room air)  Lying   07/31/19 0010  --  --  --  --  --  None (Room air)  --   07/30/19 2336  97 6 °F (36 4 °C)  59  18  155/69  94 %  None (Room air)  Lying   07/30/19 2315  --  58  18  115/56  94 %  --  --   07/30/19 2213  --  71  18  162/71  94 %  None (Room air)  Lying   07/30/19 2031  97 8 °F (36 6 °C)  64  18  181/84Abnormal   98 %  None (Room air)  Lying         Pertinent Labs/Diagnostic Test Results:   Results from last 7 days   Lab Units 07/31/19  0512 07/31/19  0029 07/30/19  2135   WBC Thousand/uL 6 40  --  7 70   HEMOGLOBIN g/dL 10 7*  --  11 1*   HEMATOCRIT % 34 2*  --  35 7   PLATELETS Thousands/uL 272 295 319   NEUTROS ABS Thousands/µL 3 60  --  4 92         Results from last 7 days   Lab Units 07/31/19  0512 07/30/19  2135   SODIUM mmol/L 138 135*   POTASSIUM mmol/L 4 3 5 3   CHLORIDE mmol/L 104 101   CO2 mmol/L 27 26   ANION GAP mmol/L 7 8   BUN mg/dL 20 21   CREATININE mg/dL 1 05 0 82   EGFR ml/min/1 73sq m 45 61   CALCIUM mg/dL 8 8 9 0             Results from last 7 days   Lab Units 07/31/19  0512 07/30/19  9985 GLUCOSE RANDOM mg/dL 81 88                             Results from last 7 days   Lab Units 07/30/19  2239   TROPONIN I ng/mL <0 02         Results from last 7 days   Lab Units 07/31/19  0029   PROTIME seconds 11 1   INR  1 06   PTT seconds 51*       X ray  R  Hip:     Right intertrochanteric fracture with mild displacement  Initial xray report mentions a right intertrochanteric fracture however CT reveals no evidence of an acute fracture  Will order MRI of the right hip per Orthopedic recommendation  EKG:   NSR    HR  61    L axis  Deviation   No significant   ST changes    MRI  R  Hip:     Complete tear of the right iliopsoas tendon exhibiting tendon retraction to the level of the right femoral head neck junction   Remaining musculature appears intact  ED Treatment:   Medication Administration from 07/30/2019 2012 to 07/30/2019 2626       Date/Time Order Dose Route Action Action by Comments     07/30/2019 2045 acetaminophen (TYLENOL) tablet 650 mg 650 mg Oral Given Ronn Chowdhury RN      07/30/2019 2158 morphine injection 2 mg 2 mg Intravenous Given Ronn Chowdhury RN      07/30/2019 2158 ondansetron (ZOFRAN) injection 4 mg 4 mg Intravenous Given Ronn Chowdhury RN         Past Medical History:   Diagnosis Date    CAD (coronary artery disease)     Disease of thyroid gland     hypo    GERD (gastroesophageal reflux disease)     Gout     right shoulder    Hyperlipidemia     Hypertension     Osteoarthritis (arthritis due to wear and tear of joints)      Present on Admission:   Acquired hypothyroidism   CAD (coronary artery disease)   Hypertension      Admitting Diagnosis: Femoral neck fracture ([facility] ) [S72 009A]  Hip pain [M25 559]  Preop cardiovascular exam [Z01 810]  Fall, initial encounter [W19  XXXA]  Closed fracture of right hip, initial encounter ([facility] ) [S72 001A]  Age/Sex: 80 y o  female  Admission Orders:    Current Facility-Administered Medications:  amLODIPine 5 mg Oral Daily Maddy Hartman MD    atorvastatin 10 mg Oral Daily With Rosa Maria Hoyt MD    cholecalciferol 1,000 Units Oral Daily Maddy Hartman MD    cyanocobalamin 1,000 mcg Oral Daily Maddy Hartman MD    docusate sodium 100 mg Oral BID Maddy Hartman MD    heparin (porcine) 5,000 Units Subcutaneous Q8H [address] Maddy Hartman MD    levothyroxine 75 mcg Oral Early Morning Maddy Hartman MD    morphine injection 2 mg Intravenous Q3H PRN Maddy Hartman MD    ondansetron 4 mg Intravenous Q6H PRN Maddy Hartman MD    pregabalin 75 mg Oral Daily Maddy Hartman MD    sodium chloride 50 mL/hr Intravenous Continuous Maddy Hartman MD Last Rate: 50 mL/hr (07/31/19 0014)       IP CONSULT TO CARDIOLOGY  IP CONSULT TO ORTHOPEDIC SURGERY     NPO  Tele  Fall precautions    Network Utilization Review Department  Phone: [phone]; Fax [phone]  [email or web address]  ATTENTION: Please call with any questions or concerns to [phone]  and carefully listen to the prompts so that you are directed to the right person  Send all requests for admission clinical reviews, approved or denied determinations and any other requests to fax [phone]   All voicemails are confidential

## 2019-07-31 NOTE — PLAN OF CARE
Problem: DISCHARGE PLANNING - CARE MANAGEMENT  Goal: Discharge to post-acute care or home with appropriate resources  Description  INTERVENTIONS:  - Conduct assessment to determine patient/family and health care team treatment goals, and need for post-acute services based on payer coverage, community resources, and patient preferences, and barriers to discharge  - Address psychosocial, clinical, and financial barriers to discharge as identified in assessment in conjunction with the patient/family and health care team  - Arrange appropriate level of post-acute services according to patients   needs and preference and payer coverage in collaboration with the physician and health care team  - Communicate with and update the patient/family, physician, and health care team regarding progress on the discharge plan  - Arrange appropriate transportation to post-acute venues  Note:   -Pt will need to be seen by therapy when ordered to assess for recommendations but more than likely will need rehab  Discussed with daughter and list of facilities provided to daughter  CM to follow for discharge planning needs

## 2019-07-31 NOTE — PROGRESS NOTES
Initial xray report mentions a right intertrochanteric fracture however CT reveals no evidence of an acute fracture  Will order MRI of the right hip per Orthopedic recommendation

## 2019-07-31 NOTE — H&P
History and Physical - [facility] Internal Medicine    Patient Information: Karla Avila 80 y o  female MRN: [id number]  Unit/Bed#: ED 08 Encounter: [id number]  Admitting Physician: Miranda Severin, MD  PCP: Andreia Echavarria MD  Date of Admission:  07/30/19    Chief Complaint:     Fall, right hip pain  of Present Illness:    Karla Avila is a 80 y o  female with a PMH of HTN, HLD, CAD and Hypothyroidism who presents with right hip pain after a fall  She is here with her family who also provides some history  She was in her usual state of health until today when she fell in the kitchen and experienced right hip pain  She states that she was turning around when her foot slipped causing her to fall  She denied any dizziness prior to falling  She denied any head injury or LOC afterwards  She denies any headaches since the fall  She came to the ER because of severe right hip pain  Currently she rates it 8/10 on the pain scale  The pain radiates to her right groin and is exacerbated with movement  She denies any other pain or discomfort  No other complaints or concerns  Review of Systems:    Review of Systems   Constitutional: Negative for chills and fever  Respiratory: Negative for cough and shortness of breath  Cardiovascular: Negative for chest pain and leg swelling  Gastrointestinal: Positive for nausea  Negative for abdominal pain, blood in stool and vomiting  Genitourinary: Negative for hematuria  Musculoskeletal: Positive for arthralgias and back pain  Neurological: Negative for dizziness, syncope and headaches  Psychiatric/Behavioral: Negative for confusion         Past Medical and Surgical History:     Past Medical History:   Diagnosis Date    CAD (coronary artery disease)     Disease of thyroid gland     hypo    GERD (gastroesophageal reflux disease)     Gout     right shoulder    Hyperlipidemia     Hypertension     Osteoarthritis (arthritis due to wear and tear of joints)        Past Surgical History:   Procedure Laterality Date    CORONARY ARTERY BYPASS GRAFT      ENDOSCOPIC STENT PLACEMENT W/ MLB Left 2012    HIATAL HERNIA REPAIR      JOINT REPLACEMENT Left     knee 2001    ROTATOR CUFF REPAIR      TUBAL LIGATION  2013       Meds/Allergies:    PTA meds:   Prior to Admission Medications   Prescriptions Last Dose Informant Patient Reported? Taking? amLODIPine (NORVASC) 5 mg tablet   Yes Yes   Sig: Take 5 mg by mouth daily   atorvastatin (LIPITOR) 10 mg tablet   Yes Yes   Sig: Take 10 mg by mouth daily   celecoxib (CeleBREX) 200 mg capsule   Yes Yes   Sig: Take 200 mg by mouth daily   cholecalciferol (VITAMIN D3) 1,000 units tablet  Self Yes Yes   Sig: Take 1,000 Units by mouth daily   cyanocobalamin (VITAMIN B-12) 1,000 mcg tablet   No Yes   Sig: Take 1 tablet (1,000 mcg total) by mouth daily   docusate sodium (COLACE) 100 mg capsule   No Yes   Sig: Take 1 capsule (100 mg total) by mouth 2 (two) times a day   levothyroxine 75 mcg tablet   Yes Yes   Sig: Take 75 mcg by mouth daily   pregabalin (LYRICA) 75 mg capsule   Yes Yes   Sig: Take 75 mg by mouth daily       Facility-Administered Medications: None       Allergies: Allergies   Allergen Reactions    Codeine Hives    Percocet [Oxycodone-Acetaminophen] Vomiting     History:     Marital Status:    Social History     Substance and Sexual Activity   Alcohol Use Never    Frequency: Never     Social History     Tobacco Use   Smoking Status Never Smoker   Smokeless Tobacco Never Used     Social History     Substance and Sexual Activity   Drug Use Never       Family History:    History reviewed  No pertinent family history        Physical Exam:     Vitals:   Blood Pressure: 162/71 (07/30/19 2213)  Pulse: 71 (07/30/19 2213)  Temperature: 97 8 °F (36 6 °C) (07/30/19 2031)  Temp Source: Oral (07/30/19 2031)  Respirations: 18 (07/30/19 2213)  Height: 4' 7" (139 7 cm) (07/30/19 2031)  Weight - Scale: 60 8 kg (134 lb) (07/30/19 2031)  SpO2: 94 % (07/30/19 2213)    Physical Exam:   General: in no acute distress  HEENT: atraumatic, normocephalic  Skin: no jaundice, not diaphoretic  CVS: RRR, no murmurs appreciated  Lungs: CTAL, no wheezing or rales appreciated  Abdomen: soft, nondistended, bowel sounds normal, nontender upon palpation, no guarding or rebound tenderness  Musc; moderate tenderness upon palpation of right hip with limited ROM due to pain  Extremities: no edema, no calf swelling or tenderness  Neuro: alert and oriented x3  Psych: calm, cooperative      Lab Results: I have personally reviewed pertinent reports  Results from last 7 days   Lab Units 07/30/19  2135   WBC Thousand/uL 7 70   HEMOGLOBIN g/dL 11 1*   HEMATOCRIT % 35 7   PLATELETS Thousands/uL 319   NEUTROS PCT % 64   LYMPHS PCT % 25   MONOS PCT % 9   EOS PCT % 2     Results from last 7 days   Lab Units 07/30/19  2135   POTASSIUM mmol/L 5 3   CHLORIDE mmol/L 101   CO2 mmol/L 26   BUN mg/dL 21   CREATININE mg/dL 0 82   CALCIUM mg/dL 9 0           Imaging: I have personally reviewed pertinent films in PACS and official report  Xr Chest Portable    Result Date: 7/15/2019  Narrative: CHEST INDICATION:   Cough, weakness  COMPARISON:  01/06/2019 EXAM PERFORMED/VIEWS:  XR CHEST PORTABLE 1 image FINDINGS: Cardiomediastinal silhouette appears unremarkable  A median sternotomy has been performed  The pulmonary vessels are normal   There is mild elevation of the left hemidiaphragm  The lungs are clear  No pneumothorax or pleural effusion  Osseous structures appear within normal limits for patient age  Impression: No acute cardiopulmonary disease  Workstation performed: [id number]     Xr Hip/pelv 2-3 Vws Right If Performed    Result Date: 7/30/2019  Narrative: RIGHT HIP INDICATION:   Right hip pain and trauma   COMPARISON:  5/20/2019 VIEWS:  XR HIP/PELV 2-3 VWS RIGHT W PELVIS IF PERFORMED FINDINGS: Lucency in the intertrochanteric region of the right hip consistent with an acute fracture  No significant displacement  Femoral head remains in articulation with the acetabulum  Diffuse osteopenia  Bowel gas and stool partially obscure the iliac wings and sacrum  Lumbar levoscoliosis and severe disc degenerative change  Phleboliths noted in the pelvis  Diffuse vascular calcifications  Impression: Right intertrochanteric fracture with mild displacement  Workstation performed: [id number]         Assessment/Plan    * Closed fracture of right hip [facility])  Assessment & Plan  Pt and family report this was a mechanical fall with no LOC or dizziness  ER physician has discussed case with Orthopedic Surgeon on call who recommends checking a CT scan (ordered)  Will admit to Medicine, check stat CT head given age and fall, consult Cardiology for preop evaluation and keep NPO at midnight  Will have PT/OT evaluate the pt for discharge needs     Hyponatremia  Assessment & Plan  Very mild  Will repeat BMP tomorrow     CAD (coronary artery disease)  Assessment & Plan  Resume [facility] and Statin  Cardiology consulted as above     Acquired hypothyroidism  Assessment & Plan  Continue Synthroid    Hypertension  Assessment & Plan  Resume [facility] Problem List:     Principal Problem:    Closed fracture of right hip ([address] )  Active Problems:    Hypertension    Acquired hypothyroidism    CAD (coronary artery disease)    Hyponatremia        VTE Prophylaxis: Heparin   Code Status: Prior    Anticipated Length of Stay:  Patient will be admitted on an Inpatient basis with an anticipated length of stay of atleast 2 midnights  Total Time for Visit, including Counseling / Coordination of Care: 30 minutes  Greater than 50% of this total time spent on direct patient counseling and coordination of care

## 2019-07-31 NOTE — CONSULTS
H&P Exam - Orthopedics   Roselee Merlin 80 y o  female MRN: [id number]  Unit/Bed#: [address] [place] [address] Encounter: [id number]    Assessment/Plan     Assessment:  Right hip iliopsoas tendon avulsion  Plan:  Fortunately, this is a non operative injury  It should do well with conservative care with physical therapy  The MRI and the CT scan both did not demonstrate any sign of fracture but rather it did demonstrate an iliopsoas tendon avulsion  I did speak with the nurse as well as her family member as well as to the patient in [address] and stated to them what the diagnosis was and that the treatment is typical with physical therapy  She can eat at this point and there will be no surgery at this point for the hip as there is no fracture found  History of Present Illness   HPI:  Roselee Merlin is a 80 y o  female who presents with the right hip pain after suffering a fall  She had a suspicion of fracture in the emergency room but a CT scan and a follow-up MRI were done today demonstrating no fracture about the right hip  She states that her pain is sharp and severe and worse with movement of the hip and somewhat better with rest   The pain radiates from her groin down the thigh       Review of Systems   Constitutional: Negative  HENT: Negative  Eyes: Negative  Respiratory: Negative  Cardiovascular: Negative  Gastrointestinal: Negative  Endocrine: Negative  Genitourinary: Negative  Musculoskeletal: Positive for arthralgias, gait problem and myalgias  Per HPI   Skin: Negative  Allergic/Immunologic: Negative  Hematological: Negative  Psychiatric/Behavioral: Negative          Historical Information   Past Medical History:   Diagnosis Date    CAD (coronary artery disease)     Disease of thyroid gland     hypo    GERD (gastroesophageal reflux disease)     Gout     right shoulder    Hyperlipidemia     Hypertension     Osteoarthritis (arthritis due to wear and tear of joints)      Past Surgical History:   Procedure Laterality Date    CORONARY ARTERY BYPASS GRAFT      ENDOSCOPIC STENT PLACEMENT W/ MLB Left 2012    HIATAL HERNIA REPAIR      JOINT REPLACEMENT Left     knee 2001    ROTATOR CUFF REPAIR      TUBAL LIGATION  2013     Social History   Social History     Substance and Sexual Activity   Alcohol Use Never    Frequency: Never     Social History     Substance and Sexual Activity   Drug Use Never     Social History     Tobacco Use   Smoking Status Never Smoker   Smokeless Tobacco Never Used     Family History: History reviewed  No pertinent family history  Meds/Allergies   PTA meds:   Prior to Admission Medications   Prescriptions Last Dose Informant Patient Reported? Taking? amLODIPine (NORVASC) 5 mg tablet   Yes Yes   Sig: Take 5 mg by mouth daily   atorvastatin (LIPITOR) 10 mg tablet   Yes Yes   Sig: Take 10 mg by mouth daily   celecoxib (CeleBREX) 200 mg capsule   Yes Yes   Sig: Take 200 mg by mouth daily   cholecalciferol (VITAMIN D3) 1,000 units tablet  Self Yes Yes   Sig: Take 1,000 Units by mouth daily   cyanocobalamin (VITAMIN B-12) 1,000 mcg tablet   No Yes   Sig: Take 1 tablet (1,000 mcg total) by mouth daily   docusate sodium (COLACE) 100 mg capsule   No Yes   Sig: Take 1 capsule (100 mg total) by mouth 2 (two) times a day   levothyroxine 75 mcg tablet   Yes Yes   Sig: Take 75 mcg by mouth daily   pregabalin (LYRICA) 75 mg capsule   Yes Yes   Sig: Take 75 mg by mouth daily       Facility-Administered Medications: None     Allergies   Allergen Reactions    Codeine Hives    Percocet [Oxycodone-Acetaminophen] Vomiting       Objective   Vitals: Blood pressure 134/62, pulse 55, temperature 97 5 °F (36 4 °C), temperature source Oral, resp  rate 18, height 4' 7" (1 397 m), weight 58 3 kg (128 lb 8 5 oz), SpO2 95 %, not currently breastfeeding  ,Body mass index is 29 87 kg/m²        Intake/Output Summary (Last 24 hours) at 7/31/2019 1303  Last data filed at 7/31/2019 1101  Gross per 24 hour   Intake 1200 ml   Output 1350 ml   Net -150 ml       I/O last 24 hours: In: 1200 [P O :200; I V :1000]  Out: 1350 [Urine:1350]    Invasive Devices     Peripheral Intravenous Line            Peripheral IV 07/30/19 Right Hand less than 1 day                Physical Exam   Constitutional: She is oriented to person, place, and time  She appears well-developed and well-nourished  HENT:   Head: Normocephalic and atraumatic  Right Ear: External ear normal    Left Ear: External ear normal    Eyes: Conjunctivae and EOM are normal    Neck: Normal range of motion  Neck supple  Cardiovascular: Normal rate and intact distal pulses  Pulmonary/Chest: Effort normal  No respiratory distress  Neurological: She is alert and oriented to person, place, and time  Skin: Skin is warm  Capillary refill takes less than 2 seconds  Psychiatric: She has a normal mood and affect  Her behavior is normal    Vitals reviewed  Right Hip Exam     Tenderness   The patient is experiencing tenderness in the anterior  Range of Motion   Abduction: abnormal   Extension: abnormal   Flexion: abnormal   External rotation: abnormal   Internal rotation: abnormal     Muscle Strength   Abduction: 3/5   Adduction: 3/5   Flexion: 0/5     Other   Erythema: absent  Sensation: normal  Pulse: present            Lab Results:   CBC:   Lab Results   Component Value Date    WBC 6 40 07/31/2019    HGB 10 7 (L) 07/31/2019    HCT 34 2 (L) 07/31/2019    MCV 80 (L) 07/31/2019     07/31/2019    MCH 24 9 (L) 07/31/2019    MCHC 31 3 (L) 07/31/2019    RDW 17 2 (H) 07/31/2019    MPV 10 2 07/31/2019    NRBC 0 07/31/2019     Imaging: I have personally reviewed pertinent films in PACS x-rays and CT scan and MRI of the right hip are reviewed demonstrate no signs of fracture  There is an of the psoas tendon avulsion about the right hip however    EKG, Pathology, and Other Studies: I have personally reviewed pertinent films in PACS    Code Status: Level 3 - DNAR and DNI  Advance Directive and Living Will:      Power of :    POLST:

## 2019-08-01 PROBLEM — E87.1 HYPONATREMIA: Status: RESOLVED | Noted: 2019-07-30 | Resolved: 2019-08-01

## 2019-08-01 LAB
ATRIAL RATE: 61 BPM
MRSA NOSE QL CULT: NORMAL
P AXIS: 63 DEGREES
PR INTERVAL: 178 MS
QRS AXIS: -32 DEGREES
QRSD INTERVAL: 82 MS
QT INTERVAL: 414 MS
QTC INTERVAL: 416 MS
T WAVE AXIS: 66 DEGREES
T4 FREE SERPL-MCNC: 1.22 NG/DL (ref 0.76–1.46)
TSH SERPL DL<=0.05 MIU/L-ACNC: 0.59 UIU/ML (ref 0.36–3.74)
VENTRICULAR RATE: 61 BPM

## 2019-08-01 PROCEDURE — 99232 SBSQ HOSP IP/OBS MODERATE 35: CPT | Performed by: NURSE PRACTITIONER

## 2019-08-01 PROCEDURE — 93010 ELECTROCARDIOGRAM REPORT: CPT | Performed by: INTERNAL MEDICINE

## 2019-08-01 PROCEDURE — G8978 MOBILITY CURRENT STATUS: HCPCS

## 2019-08-01 PROCEDURE — G8979 MOBILITY GOAL STATUS: HCPCS

## 2019-08-01 PROCEDURE — 84443 ASSAY THYROID STIM HORMONE: CPT | Performed by: NURSE PRACTITIONER

## 2019-08-01 PROCEDURE — 97162 PT EVAL MOD COMPLEX 30 MIN: CPT

## 2019-08-01 PROCEDURE — 97530 THERAPEUTIC ACTIVITIES: CPT

## 2019-08-01 PROCEDURE — 84439 ASSAY OF FREE THYROXINE: CPT | Performed by: NURSE PRACTITIONER

## 2019-08-01 RX ADMIN — LEVOTHYROXINE SODIUM 75 MCG: 75 TABLET ORAL at 05:25

## 2019-08-01 RX ADMIN — DOCUSATE SODIUM 100 MG: 100 CAPSULE, LIQUID FILLED ORAL at 18:14

## 2019-08-01 RX ADMIN — HEPARIN SODIUM 5000 UNITS: 5000 INJECTION INTRAVENOUS; SUBCUTANEOUS at 05:26

## 2019-08-01 RX ADMIN — PREGABALIN 75 MG: 75 CAPSULE ORAL at 08:21

## 2019-08-01 RX ADMIN — CYANOCOBALAMIN TAB 500 MCG 1000 MCG: 500 TAB at 08:21

## 2019-08-01 RX ADMIN — ATORVASTATIN CALCIUM 10 MG: 10 TABLET, FILM COATED ORAL at 18:14

## 2019-08-01 RX ADMIN — VITAMIN D, TAB 1000IU (100/BT) 1000 UNITS: 25 TAB at 08:21

## 2019-08-01 RX ADMIN — ACETAMINOPHEN 975 MG: 325 TABLET, FILM COATED ORAL at 05:26

## 2019-08-01 RX ADMIN — DOCUSATE SODIUM 100 MG: 100 CAPSULE, LIQUID FILLED ORAL at 08:21

## 2019-08-01 RX ADMIN — AMLODIPINE BESYLATE 5 MG: 5 TABLET ORAL at 08:21

## 2019-08-01 RX ADMIN — PANTOPRAZOLE SODIUM 40 MG: 40 TABLET, DELAYED RELEASE ORAL at 05:25

## 2019-08-01 RX ADMIN — HEPARIN SODIUM 5000 UNITS: 5000 INJECTION INTRAVENOUS; SUBCUTANEOUS at 21:45

## 2019-08-01 RX ADMIN — HEPARIN SODIUM 5000 UNITS: 5000 INJECTION INTRAVENOUS; SUBCUTANEOUS at 13:23

## 2019-08-01 NOTE — SOCIAL WORK
Met with pt and pt's daughter to confirm d/c snf options  First choice is [facility]  Was willing to give second choice of [place]  Will add to referral  Anticipated d/c tomorrow

## 2019-08-01 NOTE — ASSESSMENT & PLAN NOTE
MRI right hip showed complete tear of the right iliopsoas tendon exhibiting tendon retraction to the level of the right femoral head neck junction  Remaining musculature appears intact  Patient seen by Orthopedic surgery  No surgical intervention indicated  Conservative treatment with physical therapy  PT OT eval and treat  Recommended short-term rehab  CM aware  Pain control

## 2019-08-01 NOTE — ASSESSMENT & PLAN NOTE
· Baseline hemoglobin 10-12's  Microcytic hypochromic  · Hb stable  · B12,folate, TSH normal   · Monitor

## 2019-08-01 NOTE — ASSESSMENT & PLAN NOTE
Patient and family report it was a  fall with no LOC or dizziness  Patient did not use her walker as she was supposed to  Patient sustained right hand and right groin pain after the fall  CT head no acute process  X-ray right hand no acute findings  X-ray right hip showed right intertrochanteric fracture with mild displacement  CT right hip no evidence of displaced acute fracture of the right hip  MRI right hip showed complete tear of the right iliopsoas tendon  Pain control  Orthopedic following, appreciate input  PT OT eval treat  Recommended short-term rehab  CM aware

## 2019-08-01 NOTE — PROGRESS NOTES
Progress Note - Raven Dailey 5/5/1924, 80 y o  female MRN: [id number]    Unit/Bed#: [address] [place] [address] Encounter: [id number]    Primary Care Provider: Carlene Rao MD   Date and time admitted to hospital: 7/30/2019  8:19 PM        * Tendon tear  Assessment & Plan  MRI right hip showed complete tear of the right iliopsoas tendon exhibiting tendon retraction to the level of the right femoral head neck junction  Remaining musculature appears intact  Patient seen by Orthopedic surgery  No surgical intervention indicated  Conservative treatment with physical therapy  PT OT eval and treat  Recommended short-term rehab  CM aware  Pain control  Status post fall  Assessment & Plan  Patient and family report it was a  fall with no LOC or dizziness  Patient did not use her walker as she was supposed to  Patient sustained right hand and right groin pain after the fall  CT head no acute process  X-ray right hand no acute findings  X-ray right hip showed right intertrochanteric fracture with mild displacement  CT right hip no evidence of displaced acute fracture of the right hip  MRI right hip showed complete tear of the right iliopsoas tendon  Pain control  Orthopedic following, appreciate input  PT OT eval treat  Recommended short-term rehab  CM aware  CAD (coronary artery disease)  Assessment & Plan  Status post CABG x5  Resume Norvasc and Statin  Patient is in optimal condition for surgical procedure if needed  Patient is at intermediate risk due to multiple comorbidities and advanced age per Cardiology  Anemia  Assessment & Plan  · Baseline hemoglobin 10-12's  Microcytic hypochromic  · Hb stable  · B12,folate, TSH normal   · Monitor  Acquired hypothyroidism  Assessment & Plan  Continue Synthroid    Hypertension  Assessment & Plan  Resume Norvasc  BP acceptable  Hyponatremia-resolved as of 8/1/2019  Assessment & Plan  Mild  Resolved with IV hydration            VTE Pharmacologic Prophylaxis:   Pharmacologic: Heparin  Mechanical VTE Prophylaxis in Place: No    Patient Centered Rounds: I have performed bedside rounds with nursing staff today  Discussions with Specialists or Other Care Team Provider: yes    Education and Discussions with Family / Patient: yes    Time Spent for Care: 20 minutes  More than 50% of total time spent on counseling and coordination of care as described above  Current Length of Stay: 2 day(s)    Current Patient Status: Inpatient   Certification Statement: The patient will continue to require additional inpatient hospital stay due to iliopsoas tendon tear    Discharge Plan:  Short-term rehab    Code Status: Level 3 - DNAR and DNI      Subjective:   Patient denies pain in right groin or right hand today  Denies any other complaints  Objective:     Vitals:   Temp (24hrs), Av 7 °F (36 5 °C), Min:97 4 °F (36 3 °C), Max:97 9 °F (36 6 °C)    Temp:  [97 4 °F (36 3 °C)-97 9 °F (36 6 °C)] 97 4 °F (36 3 °C)  HR:  [52-64] 64  Resp:  [18] 18  BP: (121-172)/(58-73) 144/68  SpO2:  [94 %-96 %] 95 %  Body mass index is 30 52 kg/m²  Input and Output Summary (last 24 hours): Intake/Output Summary (Last 24 hours) at 2019 1316  Last data filed at 2019 2971  Gross per 24 hour   Intake 1197 5 ml   Output 1025 ml   Net 172 5 ml       Physical Exam:     Physical Exam   Constitutional: She is oriented to person, place, and time  She appears well-developed and well-nourished  HENT:   Head: Normocephalic and atraumatic  Neck: Normal range of motion  Neck supple  No JVD present  No tracheal deviation present  No thyromegaly present  Cardiovascular: Normal rate, regular rhythm and intact distal pulses  Murmur heard  Pulmonary/Chest: Effort normal and breath sounds normal  No respiratory distress  She has no wheezes  She has no rales  Abdominal: Soft  Bowel sounds are normal  She exhibits no distension  There is no tenderness   There is no guarding  Musculoskeletal: She exhibits deformity  Arthritic changes to joints  Neurological: She is alert and oriented to person, place, and time  Skin: Skin is warm and dry  Psychiatric: She has a normal mood and affect  Judgment normal    Nursing note and vitals reviewed  Additional Data:     Labs:    Results from last 7 days   Lab Units 07/31/19  0512   WBC Thousand/uL 6 40   HEMOGLOBIN g/dL 10 7*   HEMATOCRIT % 34 2*   PLATELETS Thousands/uL 272   NEUTROS PCT % 56   LYMPHS PCT % 31   MONOS PCT % 10   EOS PCT % 2     Results from last 7 days   Lab Units 07/31/19  0512   POTASSIUM mmol/L 4 3   CHLORIDE mmol/L 104   CO2 mmol/L 27   BUN mg/dL 20   CREATININE mg/dL 1 05   CALCIUM mg/dL 8 8     Results from last 7 days   Lab Units 07/31/19  0029   INR  1 06       * I Have Reviewed All Lab Data Listed Above  * Additional Pertinent Lab Tests Reviewed:  [address] Admission Reviewed    Imaging:    Imaging Reports Reviewed Today Include: none  Imaging Personally Reviewed by Myself Includes:  none    Recent Cultures (last 7 days):           Last 24 Hours Medication List:     Current Facility-Administered Medications:  acetaminophen 975 mg Oral Q8H [address] VARSHA Yanez   amLODIPine 5 mg Oral Daily Dc Keith MD   atorvastatin 10 mg Oral Daily With Gris Fatima MD   cholecalciferol 1,000 Units Oral Daily Dc Keith MD   cyanocobalamin 1,000 mcg Oral Daily Dc Keith MD   docusate sodium 100 mg Oral BID Dc Keith MD   heparin (porcine) 5,000 Units Subcutaneous Q8H [address] Dc Keith MD   levothyroxine 75 mcg Oral Early Morning Dc Keith MD   morphine injection 2 mg Intravenous Q3H PRN Dc Keith MD   ondansetron 4 mg Intravenous Q6H PRN Dc Keith MD   pantoprazole 40 mg Oral Early Morning VARSHA Yanez   pregabalin 75 mg Oral Daily Dc Keith MD        Today, Patient Was Seen By: VARSHA Bowser    ** Please Note: Dragon 360 Dictation voice to text software may have been used in the creation of this document   **

## 2019-08-01 NOTE — PHYSICAL THERAPY NOTE
PT EVALUATION       08/01/19 1201   Note Type   Note type Eval/Treat   Pain Assessment   Pain Assessment No/denies pain   Home Living   Type of Home Apartment   Home Layout One level  (no steps to enter)   Home Equipment   (rolling walker)   Prior Function   Level of [place] Independent with ADLs and functional mobility  (ambulates with walker)   Lives With Alone   Receives Help From [facility] in the last 6 months 1 to 4   Restrictions/Precautions   Weight Bearing Precautions Per Order Yes   RLE Weight Bearing Per Order WBAT   Other Precautions Fall Risk;Bed Alarm; Chair Alarm   General   Additional Pertinent History Pt admitted s/p fall with R illiopsoas tendon avulsion now cleared for PT WBAT  Cognition   Overall Cognitive Status WFL   Arousal/Participation Cooperative   Orientation Level Oriented X4   Following Commands Follows all commands and directions without difficulty   RLE Assessment   RLE Assessment   (PROM WFL, MMT hip flex 1-2/5 otherwise 4-/5)   LLE Assessment   LLE Assessment   (ROM WFL, MMT 4/5)   Bed Mobility   Supine to Sit 5  Supervision   Sit to Supine 4  Minimal assistance   Additional items LE management   Transfers   Sit to Stand 4  Minimal assistance   Stand to Sit 4  Minimal assistance   Stand pivot 4  Minimal assistance   Additional items   (with walker)   Ambulation/Elevation   Gait pattern   (guarded, slow martina)   Gait Assistance 4  Minimal assist   Assistive Device Rolling walker   Distance 40 feet   Balance   Static Sitting Fair +   Dynamic Sitting Fair   Static Standing Fair   Dynamic Standing Fair -   Activity Tolerance   Activity Tolerance Patient tolerated treatment well;Patient limited by fatigue   Assessment   Prognosis Good   Problem List Decreased strength;Decreased range of motion;Decreased endurance; Impaired balance;Decreased mobility   Assessment Patient seen for Physical Therapy evaluation  Patient admitted with Tendon tear    Comorbidities affecting patient's physical performance include: falls, htn, CAD, anemia, OA  Personal factors affecting patient at time of initial evaluation include: ambulating with assistive device, inability to navigate level surfaces without external assistance, limited home support, positive fall history and inability to perform ADLS  Prior to admission, patient was independent with functional mobility with walker  Please find objective findings from Physical Therapy assessment regarding body systems outlined above with impairments and limitations including weakness, impaired balance, gait deviations, decreased activity tolerance, decreased functional mobility tolerance and fall risk  The Barthel Index was used as a functional outcome tool presenting with a score of 60 today indicating moderate limitations of functional mobility and ADLS  Patient's clinical presentation is currently evolving as seen in patient's presentation of increased fall risk, new onset of impairment of functional mobility, decreased endurance and new onset of weakness  Pt would benefit from continued Physical Therapy treatment to address deficits as defined above and maximize level of functional mobility  As demonstrated by objective findings, the assigned level of complexity for this evaluation is moderate  Goals   Patient Goals "walk"   STG Expiration Date 08/08/19   Short Term Goal #1 supervision sit to supine, supervision transfers, supervision ambulation with walker 75 feet   LTG Expiration Date 08/15/19   Long Term Goal #1 independent bed mobility, independent transfers, independent ambulation with walker 150 feet, no falls, improve standing static balance to good, improve R hip flexion strength to at least 3+/5   Plan   Treatment/Interventions ADL retraining;Functional transfer training;LE strengthening/ROM; Therapeutic exercise; Endurance training;Gait training;Bed mobility; Equipment eval/education;Patient/family training;Spoke to case management   PT Frequency   (5-7x/week)   Recommendation   Recommendation Short-term skilled PT   Barthel Index   Feeding 10   Bathing 0   Grooming Score 5   Dressing Score 5   Bladder Score 5   Bowels Score 10   Toilet Use Score 5   Transfers (Bed/Chair) Score 10   Mobility (Level Surface) Score 10   Stairs Score 0   Barthel Index Score 61   Licensure   [place] License Number  [employer] PT [id number]     Time In:1145  Time Out:1200  Total Time: 15      S:  "I want to walk"  O:  Min assist to transfer sit <>stand, to ambulate with a walker 60 feet  AAROM R hip flexion ROM x 10  A:  Pt is doing well s/p R illiopsoas avusion  Recommend STR as pt lives alone and has a history of falling    P:  Continue PT    [place] [facility], PT

## 2019-08-01 NOTE — ASSESSMENT & PLAN NOTE
Status post CABG x5  Resume Norvasc and Statin  Patient is in optimal condition for surgical procedure if needed  Patient is at intermediate risk due to multiple comorbidities and advanced age per Cardiology

## 2019-08-01 NOTE — PLAN OF CARE
Problem: Potential for Falls  Goal: Patient will remain free of falls  Description  INTERVENTIONS:  - Assess patient frequently for physical needs  -  Identify cognitive and physical deficits and behaviors that affect risk of falls    -  Cost fall precautions as indicated by assessment   - Educate patient/family on patient safety including physical limitations  - Instruct patient to call for assistance with activity based on assessment  - Modify environment to reduce risk of injury  - Consider OT/PT consult to assist with strengthening/mobility  Outcome: Progressing     Problem: Prexisting or High Potential for Compromised Skin Integrity  Goal: Skin integrity is maintained or improved  Description  INTERVENTIONS:  - Identify patients at risk for skin breakdown  - Assess and monitor skin integrity  - Assess and monitor nutrition and hydration status  - Monitor labs (i e  albumin)  - Assess for incontinence   - Turn and reposition patient  - Assist with mobility/ambulation  - Relieve pressure over bony prominences  - Avoid friction and shearing  - Provide appropriate hygiene as needed including keeping skin clean and dry  - Evaluate need for skin moisturizer/barrier cream  - Collaborate with interdisciplinary team (i e  Nutrition, Rehabilitation, etc )   - Patient/family teaching  Outcome: Progressing     Problem: MUSCULOSKELETAL - ADULT  Goal: Maintain or return mobility to safest level of function  Description  INTERVENTIONS:  - Assess patient's ability to carry out ADLs; assess patient's baseline for ADL function and identify physical deficits which impact ability to perform ADLs (bathing, care of mouth/teeth, toileting, grooming, dressing, etc )  - Assess/evaluate cause of self-care deficits   - Assess range of motion  - Assess patient's mobility; develop plan if impaired  - Assess patient's need for assistive devices and provide as appropriate  - Encourage maximum independence but intervene and supervise when necessary  - Involve family in performance of ADLs  - Assess for home care needs following discharge   - Request OT consult to assist with ADL evaluation and planning for discharge  - Provide patient education as appropriate  Outcome: Progressing  Goal: Maintain proper alignment of affected body part  Description  INTERVENTIONS:  - Support, maintain and protect limb and body alignment  - Provide pt/fam with appropriate education  Outcome: Progressing     Problem: PAIN - ADULT  Goal: Verbalizes/displays adequate comfort level or baseline comfort level  Description  Interventions:  - Encourage patient to monitor pain and request assistance  - Assess pain using appropriate pain scale  - Administer analgesics based on type and severity of pain and evaluate response  - Implement non-pharmacological measures as appropriate and evaluate response  - Consider cultural and social influences on pain and pain management  - Notify physician/advanced practitioner if interventions unsuccessful or patient reports new pain  Outcome: Progressing     Problem: DISCHARGE PLANNING - CARE MANAGEMENT  Goal: Discharge to post-acute care or home with appropriate resources  Description  INTERVENTIONS:  - Conduct assessment to determine patient/family and health care team treatment goals, and need for post-acute services based on payer coverage, community resources, and patient preferences, and barriers to discharge  - Address psychosocial, clinical, and financial barriers to discharge as identified in assessment in conjunction with the patient/family and health care team  - Arrange appropriate level of post-acute services according to patients   needs and preference and payer coverage in collaboration with the physician and health care team  - Communicate with and update the patient/family, physician, and health care team regarding progress on the discharge plan  - Arrange appropriate transportation to post-acute venues  Outcome: Progressing

## 2019-08-02 VITALS
RESPIRATION RATE: 18 BRPM | HEIGHT: 55 IN | SYSTOLIC BLOOD PRESSURE: 155 MMHG | WEIGHT: 132.72 LBS | OXYGEN SATURATION: 97 % | TEMPERATURE: 97.8 F | DIASTOLIC BLOOD PRESSURE: 68 MMHG | HEART RATE: 55 BPM | BODY MASS INDEX: 30.71 KG/M2

## 2019-08-02 PROCEDURE — 97167 OT EVAL HIGH COMPLEX 60 MIN: CPT

## 2019-08-02 PROCEDURE — G8988 SELF CARE GOAL STATUS: HCPCS

## 2019-08-02 PROCEDURE — G8987 SELF CARE CURRENT STATUS: HCPCS

## 2019-08-02 PROCEDURE — 99238 HOSP IP/OBS DSCHRG MGMT 30/<: CPT | Performed by: NURSE PRACTITIONER

## 2019-08-02 RX ORDER — ACETAMINOPHEN 325 MG/1
650 TABLET ORAL EVERY 8 HOURS SCHEDULED
Qty: 30 TABLET | Refills: 0
Start: 2019-08-02 | End: 2019-08-04

## 2019-08-02 RX ORDER — ESOMEPRAZOLE MAGNESIUM 40 MG/1
40 CAPSULE, DELAYED RELEASE ORAL
Status: ON HOLD | COMMUNITY
End: 2021-06-25

## 2019-08-02 RX ORDER — OMEPRAZOLE 20 MG/1
20 CAPSULE, DELAYED RELEASE ORAL DAILY
Refills: 0
Start: 2019-08-02 | End: 2019-08-02 | Stop reason: HOSPADM

## 2019-08-02 RX ORDER — ESOMEPRAZOLE MAGNESIUM 40 MG/1
40 CAPSULE, DELAYED RELEASE ORAL DAILY
Refills: 0
Start: 2019-08-02

## 2019-08-02 RX ADMIN — PANTOPRAZOLE SODIUM 40 MG: 40 TABLET, DELAYED RELEASE ORAL at 05:49

## 2019-08-02 RX ADMIN — DOCUSATE SODIUM 100 MG: 100 CAPSULE, LIQUID FILLED ORAL at 08:55

## 2019-08-02 RX ADMIN — ACETAMINOPHEN 975 MG: 325 TABLET, FILM COATED ORAL at 05:48

## 2019-08-02 RX ADMIN — CYANOCOBALAMIN TAB 500 MCG 1000 MCG: 500 TAB at 08:55

## 2019-08-02 RX ADMIN — LEVOTHYROXINE SODIUM 75 MCG: 75 TABLET ORAL at 05:48

## 2019-08-02 RX ADMIN — PREGABALIN 75 MG: 75 CAPSULE ORAL at 08:55

## 2019-08-02 RX ADMIN — VITAMIN D, TAB 1000IU (100/BT) 1000 UNITS: 25 TAB at 08:55

## 2019-08-02 RX ADMIN — AMLODIPINE BESYLATE 5 MG: 5 TABLET ORAL at 08:55

## 2019-08-02 NOTE — NURSING NOTE
Patient discharged to [facility]  Daughter picked up to transport  IV removed  Vaccines up to date

## 2019-08-02 NOTE — DISCHARGE INSTRUCTIONS
Follow-up orthopedic next week  Tylenol 650 mg po every 8 hours for 2 days, then can have Tylenol p r n  For pain

## 2019-08-02 NOTE — ASSESSMENT & PLAN NOTE
Patient and family report it was a  fall with no LOC or dizziness  Patient did not use her walker as she was supposed to  Patient sustained right hand and right groin pain after the fall  CT head no acute process  X-ray right hand no acute findings  X-ray right hip showed right intertrochanteric fracture with mild displacement  CT right hip no evidence of displaced acute fracture of the right hip  MRI right hip showed complete tear of the right iliopsoas tendon  Pain control  PT OT eval treat  Recommended short-term rehab  Discharge to rehab today

## 2019-08-02 NOTE — OCCUPATIONAL THERAPY NOTE
OT EVALUATION       08/02/19 1110   Note Type   Note type Eval only   Restrictions/Precautions   RLE Weight Bearing Per Order WBAT   Other Precautions Fall Risk; Chair Alarm; Bed Alarm   Pain Assessment   Pain Assessment No/denies pain   Home Living   Type of Home Apartment   Home Layout One level  (no steps to enter )   Home Equipment Walker   Prior Function   Level of [place] Independent with ADLs and functional mobility  (uses RW)   Lives With Alone   Receives Help From Family   ADL Assistance Independent   IADLs Needs assistance   Falls in the last 6 months 1 to 4   ADL   Eating Assistance 5  Supervision/Setup   Grooming Assistance 4  Minimal Assistance   UB Bathing Assistance 4  Minimal Assistance   LB Bathing Assistance 3  Moderate Assistance   UB Dressing Assistance 4  Minimal Assistance   LB Dressing Assistance 3  Moderate Assistance   Toileting Assistance  4  Minimal Assistance   Transfers   Sit to Stand 4  Minimal assistance   Stand to Sit 4  Minimal assistance   Stand pivot 4  Minimal assistance   Functional Mobility   Functional Mobility 4  Minimal assistance   Additional Comments 10 feet   Additional items Rolling walker   Balance   Static Sitting Fair +   Dynamic Sitting Fair   Static Standing Fair   Dynamic Standing Fair -   Activity Tolerance   Activity Tolerance Patient limited by fatigue   RUE Assessment   RUE Assessment WFL  (3+/5 grossly )   LUE Assessment   LUE Assessment WFL  (3+/5 grossly )   Cognition   Overall Cognitive Status WFL   Arousal/Participation Cooperative   Attention Within functional limits   Orientation Level Oriented X4   Following Commands Follows multistep commands with increased time or repetition   Assessment   Limitation Decreased ADL status; Decreased UE strength;Decreased Safe judgement during ADL;Decreased endurance;Decreased self-care trans;Decreased high-level ADLs  (decreased balance and mobility )   Prognosis Good   Assessment Patient evaluated by Occupational Therapy  Patient admitted with Tendon tear, R hip iliopsoas, WBAT  The patients occupational profile, medical and therapy history includes a extensive additional review of physical, cognitive, or psychosocial history related to current functional performance  Comorbidities affecting functional mobility and ADLS include: falls, htn, CAD, anemia, OA  Prior to admission, patient was independent with functional mobility with walker, independent with ADLS and requiring assist for IADLS  The evaluation identifies the following performance deficits: weakness, impaired balance, decreased endurance, increased fall risk, new onset of impairment of functional mobility, decreased ADLS, decreased IADLS, decreased activity tolerance, decreased safety awareness, impaired judgement, ortheopedic restrictions and decreased strength, that result in activity limitations and/or participation restrictions  This evaluation requires clinical decision making of high complexity, because the patient presents with comorbidites that affect occupational performance and required significant modification of tasks or assistance with consideration of multiple treatment options  The Barthel Index was used as a functional outcome tool presenting with a score of 45, indicating marked limitations of functional mobility and ADLS  Patient will benefit from skilled Occupational Therapy services to address above deficits and facilitate a safe return to prior level of function  Goals   Patient Goals go home   STG Time Frame   (1-7 days)   Short Term Goal  Goals established to promote patient goal of to go home:  Patient will increase standing tolerance to 3 minutes during ADL task to decrease assistance level and decrease fall risk; Patient will increase bed mobility to supervision in preparation for ADLS and transfers;  Patient will increase functional mobility to and from bathroom with rolling walker with supervision to increase performance with ADLS and to use a toilet; Patient will tolerate 10 minutes of UE ROM/strengthening to increase general activity tolerance and performance in ADLS/IADLS; Patient will improve functional activity tolerance to 10 minutes of sustained functional tasks to increase participation in basic self-care and decrease assistance level;   Patient will increase dynamic sitting balance to fair+ to improve the ability to sit at edge of bed or on a chair for ADLS;  Patient will increase dynamic standing balance to fair to improve postural stability and decrease fall risk during standing ADLS and transfers  LTG Time Frame   (8-14 days)   Long Term Goal Goals established to promote patient goal of to go home:  Patient will increase standing tolerance to 6 minutes during ADL task to decrease assistance level and decrease fall risk; Patient will increase bed mobility to independent in preparation for ADLS and transfers; Patient will increase functional mobility to and from bathroom with rolling walker independently to increase performance with ADLS and to use a toilet; Patient will tolerate 20 minutes of UE ROM/strengthening to increase general activity tolerance and performance in ADLS/IADLS; Patient will improve functional activity tolerance to 20 minutes of sustained functional tasks to increase participation in basic self-care and decrease assistance level;   Patient will increase dynamic sitting balance to good to improve the ability to sit at edge of bed or on a chair for ADLS;  Patient will increase dynamic standing balance to fair+ to improve postural stability and decrease fall risk during standing ADLS and transfers       Functional Transfer Goals   Pt Will Perform All Functional Transfers   (STG supervision LTG independent )   ADL Goals   Pt Will Perform Eating   (STG independent )   Pt Will Perform Grooming   (STG supervision LTG independent )   Pt Will Perform Bathing   (STG min assist LTG supervision )   Pt Will Perform UE Dressing   (STG supervision LTG independent )   Pt Will Perform LE Dressing   (STG min assist LTG supervision )   Pt Will Perform Toileting   (STG min assist LTG supervision )   Plan   Treatment Interventions ADL retraining;Functional transfer training;UE strengthening/ROM; Endurance training;Patient/family training;Equipment evaluation/education; Activityengagement; Compensatory technique education   Goal Expiration Date 08/16/19   OT Frequency 3-5x/wk   Recommendation   OT Discharge Recommendation Short Term Rehab   Barthel Index   Feeding 10   Bathing 0   Grooming Score 0   Dressing Score 5   Bladder Score 5   Bowels Score 10   Toilet Use Score 5   Transfers (Bed/Chair) Score 10   Mobility (Level Surface) Score 0   Stairs Score 0   Barthel Index Score 45   Licensure   [place] License Number  [employer] [place] Jose Carlos 87 [id number]

## 2019-08-02 NOTE — ASSESSMENT & PLAN NOTE
MRI right hip showed complete tear of the right iliopsoas tendon exhibiting tendon retraction to the level of the right femoral head neck junction  Remaining musculature appears intact  Patient seen by Orthopedic surgery  No surgical intervention indicated  Conservative treatment with physical therapy  PT OT eval and treat  Recommended short-term rehab  Pain control  Discharge patient to short-term rehab today

## 2019-08-02 NOTE — DISCHARGE SUMMARY
Discharge- Letha Hector 5/5/1924, 80 y o  female MRN: [id number]    Unit/Bed#: [address] [place] [address] Encounter: [id number]    Primary Care Provider: Solange Ferris MD   Date and time admitted to hospital: 7/30/2019  8:19 PM        * Tendon tear  Assessment & Plan  MRI right hip showed complete tear of the right iliopsoas tendon exhibiting tendon retraction to the level of the right femoral head neck junction  Remaining musculature appears intact  Patient seen by Orthopedic surgery  No surgical intervention indicated  Conservative treatment with physical therapy  PT OT eval and treat  Recommended short-term rehab  Pain control  Discharge patient to short-term rehab today  Status post fall  Assessment & Plan  Patient and family report it was a  fall with no LOC or dizziness  Patient did not use her walker as she was supposed to  Patient sustained right hand and right groin pain after the fall  CT head no acute process  X-ray right hand no acute findings  X-ray right hip showed right intertrochanteric fracture with mild displacement  CT right hip no evidence of displaced acute fracture of the right hip  MRI right hip showed complete tear of the right iliopsoas tendon  Pain control  PT OT eval treat  Recommended short-term rehab  Discharge to rehab today  CAD (coronary artery disease)  Assessment & Plan  Status post CABG x5  Resume Norvasc and Statin  Patient is in optimal condition for surgical procedure if needed  Patient is at intermediate risk due to multiple comorbidities and advanced age per Cardiology  Anemia  Assessment & Plan  · Baseline hemoglobin 10-12's  Microcytic hypochromic  · Hb stable  · B12,folate, TSH normal   · Monitor  Acquired hypothyroidism  Assessment & Plan  Continue Synthroid    Hypertension  Assessment & Plan  Resume Norvasc  BP acceptable  Monitor          Discharging Physician / Practitioner: VARSHA Tucker  PCP: Solange Ferris MD  Admission Date: 7/30/2019  Discharge Date: 08/02/19    Reason for Admission: Fall (Pt arrives at ER with daughter after pt reportedly fell at home and spent 1 hour trying to get to a phone  Pt reports pain in right hip and right hand after fall  Pt deniesd any LOC or dizziness prior to the fall  Pt noted with reddened area to anterior upper right thigh  ); Hip Pain; and Hand Pain        Resolved Problems  Date Reviewed: 8/2/2019          Resolved    Hyponatremia 8/1/2019     Resolved by  Kurt Shi, [address] [place] [address] Stay:  IP CONSULT TO CARDIOLOGY  IP CONSULT TO ORTHOPEDIC SURGERY    Procedures Performed:     · None    Significant Findings / Test Results:     · As below  Results from last 7 days   Lab Units 07/31/19  0512 07/31/19  0029 07/30/19  2135   WBC Thousand/uL 6 40  --  7 70   HEMOGLOBIN g/dL 10 7*  --  11 1*   PLATELETS Thousands/uL 272 295 319     Results from last 7 days   Lab Units 07/31/19  0512 07/30/19  2135   SODIUM mmol/L 138 135*   POTASSIUM mmol/L 4 3 5 3   CHLORIDE mmol/L 104 101   CO2 mmol/L 27 26   BUN mg/dL 20 21   CREATININE mg/dL 1 05 0 82   CALCIUM mg/dL 8 8 9 0     Results from last 7 days   Lab Units 07/31/19  0029   INR  1 06     Results from last 7 days   Lab Units 07/30/19  2239   TROPONIN I ng/mL <0 02     No results found for: HGBA1C          Blood Culture:   Lab Results   Component Value Date    BLOODCX No Growth After 5 Days  07/14/2019    BLOODCX Staphylococcus coagulase negative (A) 07/14/2019     Urine Culture: No results found for: URINECX  Sputum Culture: No components found for: SPUTUMCX  Wound Culture: No results found for: WOUNDCULT     MRI hip right wo contrast   Final Result by Linda Saenz MD (07/31 1144)   Complete tear of the right iliopsoas tendon exhibiting tendon retraction to the level of the right femoral head neck junction  Remaining musculature appears intact        Workstation performed: [id number]         CT hip right without contrast   Final Result by Tate Hoskins DO (07/30 7265)      No evidence of displaced acute fracture of the right hip  If symptoms persist MRI can be obtained  Workstation performed: [id number]         CT head without contrast   Final Result by Howard Segura DO (07/30 2325)      No calvarial fracture or acute intracranial abnormality is seen  Other findings as above  Workstation performed: [id number]         XR hip/pelv 2-3 vws right if performed   Final Result by Nettie Naranjo MD (07/30 2206)         Right intertrochanteric fracture with mild displacement  Workstation performed: [id number]         XR hand 3+ views RIGHT   Final Result by Az House MD (07/31 0720)      No acute osseous abnormality  Degenerative changes  Workstation performed: [id number]                Incidental Findings:   · none     Test Results Pending at Discharge (will require follow up):   · none     Outpatient Tests Requested:  · none    Complications:  none    Reason for Admission:   Chief Complaint   Patient presents with    Fall     Pt arrives at ER with daughter after pt reportedly fell at home and spent 1 hour trying to get to a phone  Pt reports pain in right hip and right hand after fall  Pt deniesd any LOC or dizziness prior to the fall  Pt noted with reddened area to anterior upper right thigh   Hip Pain    Hand Pain       Hospital Course:     Rigo Hartman is a 80 y o  female patient with a PMH of CAD, hypertension, hyperlipidemia, GERD, hypothyroid, osteoarthritis who originally presented to the hospital on 7/30/2019 due to fall,right groin pain and right hand pain  Workup showed iliopsoas tendon tear  Patient seen by Orthopedic surgery  No surgical intervention indicated  Conservative treatment with physical therapy was recommended  Pain is well controlled with p o  Tylenol  Patient seen by PT OT  Recommend short-term rehab    Patient will be discharged to rehab today  Spoke to patient's daughter, all questions answered  Please see above list of diagnoses and related plan for additional information  Condition at Discharge: good       Discharge Day Visit / Exam:     Subjective:  Patient denies any pain  Offered no complaints  Vitals: Blood Pressure: 155/68 (08/02/19 0851)  Pulse: 55 (08/02/19 0851)  Temperature: 97 8 °F (36 6 °C) (08/02/19 0851)  Temp Source: Oral (08/02/19 0851)  Respirations: 18 (08/02/19 0851)  Height: 4' 7" (139 7 cm) (07/30/19 2031)  Weight - Scale: 60 2 kg (132 lb 11 5 oz) (08/02/19 0600)  SpO2: 97 % (08/02/19 0851)  Exam:   Physical Exam   Constitutional: She is oriented to person, place, and time  She appears well-developed and well-nourished  HENT:   Head: Normocephalic and atraumatic  Neck: Normal range of motion  Neck supple  No JVD present  No tracheal deviation present  No thyromegaly present  Cardiovascular: Regular rhythm and intact distal pulses  Murmur heard  Sinus rhythm to sinus Janine Course  Pulmonary/Chest: Effort normal and breath sounds normal  No respiratory distress  She has no wheezes  She has no rales  Abdominal: Soft  Bowel sounds are normal  She exhibits no distension  There is no tenderness  There is no guarding  Musculoskeletal: She exhibits deformity  She exhibits no edema or tenderness  Arthritic changes to joints  Neurological: She is alert and oriented to person, place, and time  Skin: Skin is warm and dry  Psychiatric: She has a normal mood and affect  Judgment normal    Nursing note and vitals reviewed  Discharge instructions/Information to patient and family:   See after visit summary for information provided to patient and family  Provisions for Follow-Up Care:  See after visit summary for information related to follow-up care and any pertinent home health orders        Disposition:     Edison Gautam at [address] [place] [address] [place]  Planned Readmission:  No     Discharge Statement:  I spent 30 minutes discharging the patient  This time was spent on the day of discharge  I had direct contact with the patient on the day of discharge  Greater than 50% of the total time was spent examining patient, answering all patient questions, arranging and discussing plan of care with patient as well as directly providing post-discharge instructions  Additional time then spent on discharge activities  Discharge Medications:  See after visit summary for reconciled discharge medications provided to patient and family        ** Please Note: This note has been constructed using a voice recognition system **

## 2019-08-02 NOTE — SOCIAL WORK
Pt cleared for discharge  [facility] sent message in [employer] stating they can accept pt today for skilled care  CM reviewed IMM with pt and notified pt of acceptance at [facility], pt agreeable and requested CM contact her daughter Tata Mcgill  CM contacted Alisha who stated she will be in to transport pt to [facility] between 12-1:00pm today  CM notified pt, CHAPO Lange CNP and [facility] via [employer] of same

## 2021-05-02 ENCOUNTER — HOSPITAL ENCOUNTER (EMERGENCY)
Facility: HOSPITAL | Age: 86
Discharge: HOME | End: 2021-05-02
Attending: EMERGENCY MEDICINE | Admitting: EMERGENCY MEDICINE
Payer: MEDICARE

## 2021-05-02 VITALS
TEMPERATURE: 97.1 F | RESPIRATION RATE: 20 BRPM | SYSTOLIC BLOOD PRESSURE: 165 MMHG | OXYGEN SATURATION: 98 % | BODY MASS INDEX: 31.14 KG/M2 | HEART RATE: 70 BPM | DIASTOLIC BLOOD PRESSURE: 99 MMHG | WEIGHT: 134 LBS

## 2021-05-02 DIAGNOSIS — J32.9 SINUSITIS: Primary | ICD-10-CM

## 2021-05-02 DIAGNOSIS — M54.2 NECK PAIN: ICD-10-CM

## 2021-05-02 PROCEDURE — 99283 EMERGENCY DEPT VISIT LOW MDM: CPT

## 2021-05-02 PROCEDURE — 99284 EMERGENCY DEPT VISIT MOD MDM: CPT | Performed by: PHYSICIAN ASSISTANT

## 2021-05-02 RX ORDER — LIDOCAINE 50 MG/G
1 PATCH TOPICAL ONCE
Status: DISCONTINUED | OUTPATIENT
Start: 2021-05-02 | End: 2021-05-02 | Stop reason: HOSPADM

## 2021-05-02 RX ORDER — AZITHROMYCIN 250 MG/1
TABLET, FILM COATED ORAL
Qty: 6 TABLET | Refills: 0 | Status: SHIPPED | OUTPATIENT
Start: 2021-05-02 | End: 2021-05-06

## 2021-05-02 RX ORDER — FLUTICASONE PROPIONATE 50 MCG
2 SPRAY, SUSPENSION (ML) NASAL 2 TIMES DAILY
Qty: 16 G | Refills: 0 | Status: SHIPPED | OUTPATIENT
Start: 2021-05-02 | End: 2022-06-30

## 2021-05-02 RX ORDER — LIDOCAINE 50 MG/G
1 PATCH TOPICAL DAILY
Qty: 5 PATCH | Refills: 0 | Status: ON HOLD | OUTPATIENT
Start: 2021-05-02 | End: 2022-07-07 | Stop reason: CLARIF

## 2021-05-02 RX ADMIN — LIDOCAINE 5% 1 PATCH: 700 PATCH TOPICAL at 16:24

## 2021-05-02 NOTE — ED PROVIDER NOTES
History  Chief Complaint   Patient presents with    URI     watery eyes  hoarse throat, occ headaches for a couple of weeks     79 y/o female presenting today with 3-4 weeks with watery eyes as well as hoarse throat  States that it is not painful however has had runny nose and postnasal drip  Here with daughter who relays that mother will typically get sinus infections frequently during this time of year  Has never been officially diagnosed with seasonal allergies  Has not had any sick contacts  Family is not concern for COVID  Patient has had intermittent left upper back and neck pain over the past month as well, currently does not experience any pain  Denies cough, shortness of breath, chest pain, abdominal pain, changes in vision, weakness, numbness, paresthesias the, difficulty swallowing  Prior to Admission Medications   Prescriptions Last Dose Informant Patient Reported? Taking?    amLODIPine (NORVASC) 5 mg tablet   Yes No   Sig: Take 5 mg by mouth daily   atorvastatin (LIPITOR) 10 mg tablet   Yes No   Sig: Take 10 mg by mouth daily   celecoxib (CeleBREX) 200 mg capsule   Yes No   Sig: Take 200 mg by mouth daily   cholecalciferol (VITAMIN D3) 1,000 units tablet  Self Yes No   Sig: Take 1,000 Units by mouth daily   cyanocobalamin (VITAMIN B-12) 1,000 mcg tablet   No No   Sig: Take 1 tablet (1,000 mcg total) by mouth daily   docusate sodium (COLACE) 100 mg capsule   No No   Sig: Take 1 capsule (100 mg total) by mouth 2 (two) times a day   esomeprazole (NexIUM) 40 MG capsule   No No   Sig: Take 1 capsule (40 mg total) by mouth daily   esomeprazole (NexIUM) 40 MG capsule   Yes No   Sig: Take 40 mg by mouth every morning before breakfast   levothyroxine 75 mcg tablet   Yes No   Sig: Take 75 mcg by mouth daily   pregabalin (LYRICA) 75 mg capsule   Yes No   Sig: Take 75 mg by mouth daily       Facility-Administered Medications: None       Past Medical History:   Diagnosis Date    CAD (coronary artery disease)     Disease of thyroid gland     hypo    GERD (gastroesophageal reflux disease)     Gout     right shoulder    Hyperlipidemia     Hypertension     Osteoarthritis (arthritis due to wear and tear of joints)        Past Surgical History:   Procedure Laterality Date    CORONARY ARTERY BYPASS GRAFT      ENDOSCOPIC STENT PLACEMENT W/ MLB Left 2012    HIATAL HERNIA REPAIR      JOINT REPLACEMENT Left     knee 2001    ROTATOR CUFF REPAIR      TUBAL LIGATION  2013       History reviewed  No pertinent family history  I have reviewed and agree with the history as documented  E-Cigarette/Vaping    E-Cigarette Use Never User      E-Cigarette/Vaping Substances     Social History     Tobacco Use    Smoking status: Never Smoker    Smokeless tobacco: Never Used   Substance Use Topics    Alcohol use: Never     Frequency: Never    Drug use: Never       Review of Systems   Constitutional: Negative  HENT: Positive for rhinorrhea and voice change  Negative for congestion, dental problem, drooling, ear discharge, ear pain, facial swelling, hearing loss, mouth sores, nosebleeds, postnasal drip, sinus pressure, sinus pain, sneezing, sore throat, tinnitus and trouble swallowing  Eyes: Negative  Respiratory: Negative  Cardiovascular: Negative  Gastrointestinal: Negative  Genitourinary: Negative  Musculoskeletal: Negative  Skin: Negative  Neurological: Positive for headaches  Negative for dizziness, tremors, seizures, syncope, facial asymmetry, speech difficulty, weakness, light-headedness and numbness  All other systems reviewed and are negative  Physical Exam  Physical Exam  Vitals signs and nursing note reviewed  Constitutional:       General: She is not in acute distress  Appearance: Normal appearance  She is well-developed and normal weight  She is not diaphoretic  HENT:      Head: Normocephalic and atraumatic        Right Ear: Tympanic membrane, ear canal and external ear normal       Left Ear: External ear normal       Nose: Rhinorrhea present  Comments: Patient actively having eye tearing and rhinorrhea  Posterior oropharynx is without erythema, exudates, swelling or uvular deviation, no asymmetries     Mouth/Throat:      Mouth: Mucous membranes are moist       Pharynx: Oropharynx is clear  No oropharyngeal exudate  Eyes:      General: No scleral icterus  Right eye: No discharge  Left eye: No discharge  Conjunctiva/sclera: Conjunctivae normal       Pupils: Pupils are equal, round, and reactive to light  Neck:      Musculoskeletal: Normal range of motion and neck supple  No neck rigidity or muscular tenderness  Vascular: No carotid bruit  Cardiovascular:      Rate and Rhythm: Normal rate and regular rhythm  Pulses: Normal pulses  Heart sounds: Normal heart sounds  No murmur  No friction rub  No gallop  Pulmonary:      Effort: Pulmonary effort is normal  No respiratory distress  Breath sounds: Normal breath sounds  No stridor  No wheezing, rhonchi or rales  Comments: S PO2 is 98% indicating adequate oxygenation  Chest:      Chest wall: No tenderness  Abdominal:      General: Abdomen is flat  Bowel sounds are normal  There is no distension  Palpations: Abdomen is soft  There is no mass  Tenderness: There is no abdominal tenderness  There is no right CVA tenderness, left CVA tenderness, guarding or rebound  Hernia: No hernia is present  Musculoskeletal:        Arms:    Lymphadenopathy:      Cervical: No cervical adenopathy  Skin:     General: Skin is warm and dry  Capillary Refill: Capillary refill takes less than 2 seconds  Coloration: Skin is not pale  Findings: No erythema or rash  Neurological:      General: No focal deficit present  Mental Status: She is alert and oriented to person, place, and time  Mental status is at baseline  GCS: GCS eye subscore is 4   GCS verbal subscore is 5  GCS motor subscore is 6  Cranial Nerves: Cranial nerves are intact  Sensory: Sensation is intact  Motor: Motor function is intact  Coordination: Coordination is intact  Gait: Gait is intact  Psychiatric:         Mood and Affect: Mood normal          Vital Signs  ED Triage Vitals [05/02/21 1518]   Temperature Pulse Respirations Blood Pressure SpO2   (!) 97 1 °F (36 2 °C) 70 20 165/99 98 %      Temp Source Heart Rate Source Patient Position - Orthostatic VS BP Location FiO2 (%)   Tympanic Monitor Sitting Right arm --      Pain Score       No Pain           Vitals:    05/02/21 1518   BP: 165/99   Pulse: 70   Patient Position - Orthostatic VS: Sitting         Visual Acuity      ED Medications  Medications   lidocaine (LIDODERM) 5 % patch 1 patch (1 patch Topical Medication Applied 5/2/21 1624)       Diagnostic Studies  Results Reviewed     None                 No orders to display              Procedures  Procedures         ED Course                                           MDM  Number of Diagnoses or Management Options  Neck pain:   Sinusitis:   Diagnosis management comments: Patient appears very well  Do believe that there is some element of allergic rhinitis, however family relays that patient typically does get sinusitis quite easily and will be treated with antibiotics and symptoms improved  Given length of time patient has been ill, will try Zithromax and as patient has tolerated this well before in the past   Will prescribe allergy eye drops as well as Flonase, as well as lidocaine patch for when patient experiences neck pain  Neurologically intact  Patient is informed to return to the emergency department for worsening of symptoms such as severe headache, confusion, weakness etcetera and was given proper education regarding their diagnosis and symptoms  Otherwise the patient is informed to follow up with their primary care doctor for re-evaluation   The patient and daughter verbalizes understanding and agrees with above assessment and plan  All questions were answered  Please Note: Fluency Direct voice recognition software may have been used in the creation of this document  Wrong words or sound a like substitutions may have occurred due to the inherent limitations of the voice software  Amount and/or Complexity of Data Reviewed  Review and summarize past medical records: yes  Independent visualization of images, tracings, or specimens: yes        Disposition  Final diagnoses:   Sinusitis   Neck pain     Time reflects when diagnosis was documented in both MDM as applicable and the Disposition within this note     Time User Action Codes Description Comment    5/2/2021  4:18 PM Marla Daughters [J34 89] Rhinorrhea     5/2/2021  4:18 PM Pauline Kawasaki Add [J32 9] Sinusitis     5/2/2021  4:18 PM Pauline Kawasaki Modify [J32 9] Sinusitis     5/2/2021  4:18 PM Evaline Lines [J34 89] Rhinorrhea     5/2/2021  4:20 PM Pauline Kawasaki Add [M54 2] Neck pain       ED Disposition     ED Disposition Condition Date/Time Comment    Discharge Stable Sun May 2, 2021  4:18 PM Sander Born discharge to home/self care              Follow-up Information     Follow up With Specialties Details Why Contact Info Additional [address] Emergency Department Emergency Medicine Go to  If symptoms worsen such as confusion, fevers, shortness of breath, chest pain etc, otherwise please follow up with your family doctor [address] [place] [address] [address] [address] [address] [address] Emergency Department, [facility], [id number]          Patient's Medications   Discharge Prescriptions    AZITHROMYCIN (ZITHROMAX) 250 MG TABLET    Take 2 tablets today then 1 tablet daily x 4 days       Start Date: 5/2/2021  End Date: 5/6/2021       Order Dose: --       Quantity: 6 tablet    Refills: 0 FLUTICASONE (FLONASE) 50 MCG/ACT NASAL SPRAY    2 sprays into each nostril 2 (two) times a day for 3 days       Start Date: 5/2/2021  End Date: 5/5/2021       Order Dose: 2 sprays       Quantity: 16 g    Refills: 0    LIDOCAINE (LIDODERM) 5 %    Apply 1 patch topically daily for 5 days Remove & Discard patch within 12 hours or as directed by MD       Start Date: 5/2/2021  End Date: 5/7/2021       Order Dose: 1 patch       Quantity: 5 patch    Refills: 0    TETRAHYDROZOLINE-ZINC (VISINE-AC) 0 05-0 25 % OPHTHALMIC SOLUTION    Administer 2 drops to both eyes 3 (three) times a day as needed (watery eyes)       Start Date: 5/2/2021  End Date: --       Order Dose: 2 drops       Quantity: 15 mL    Refills: 0     No discharge procedures on file      PDMP Review     None          ED Provider  Electronically Signed by           Radha Esposito PA-C  05/02/21 9315

## 2021-06-23 ENCOUNTER — HOSPITAL ENCOUNTER (INPATIENT)
Facility: HOSPITAL | Age: 86
LOS: 2 days | Discharge: HOME HEALTH | DRG: 690 | End: 2021-06-25
Attending: EMERGENCY MEDICINE | Admitting: INTERNAL MEDICINE
Payer: MEDICARE

## 2021-06-23 ENCOUNTER — APPOINTMENT (EMERGENCY)
Dept: RADIOLOGY | Facility: HOSPITAL | Age: 86
DRG: 690 | End: 2021-06-23
Payer: MEDICARE

## 2021-06-23 DIAGNOSIS — R53.1 WEAKNESS: Primary | ICD-10-CM

## 2021-06-23 DIAGNOSIS — I21.4 NSTEMI (NON-ST ELEVATED MYOCARDIAL INFARCTION) (HCC): ICD-10-CM

## 2021-06-23 DIAGNOSIS — R77.8 ELEVATED TROPONIN: ICD-10-CM

## 2021-06-23 DIAGNOSIS — N39.0 UTI (URINARY TRACT INFECTION): ICD-10-CM

## 2021-06-23 DIAGNOSIS — I25.10 CORONARY ARTERY DISEASE INVOLVING NATIVE HEART WITHOUT ANGINA PECTORIS, UNSPECIFIED VESSEL OR LESION TYPE: ICD-10-CM

## 2021-06-23 PROBLEM — R79.89 ELEVATED TROPONIN: Status: ACTIVE | Noted: 2021-06-23

## 2021-06-23 LAB
ALBUMIN SERPL BCP-MCNC: 3.8 G/DL (ref 3.5–5)
ALP SERPL-CCNC: 80 U/L (ref 46–116)
ALT SERPL W P-5'-P-CCNC: 27 U/L (ref 12–78)
ANION GAP SERPL CALCULATED.3IONS-SCNC: 8 MMOL/L (ref 4–13)
AST SERPL W P-5'-P-CCNC: 28 U/L (ref 5–45)
ATRIAL RATE: 62 BPM
BACTERIA UR QL AUTO: ABNORMAL /HPF
BASOPHILS # BLD AUTO: 0.03 THOUSANDS/ΜL (ref 0–0.1)
BASOPHILS NFR BLD AUTO: 1 % (ref 0–1)
BILIRUB SERPL-MCNC: 0.79 MG/DL (ref 0.2–1)
BILIRUB UR QL STRIP: NEGATIVE
BUN SERPL-MCNC: 19 MG/DL (ref 5–25)
CALCIUM SERPL-MCNC: 9.2 MG/DL (ref 8.3–10.1)
CHLORIDE SERPL-SCNC: 99 MMOL/L (ref 100–108)
CLARITY UR: CLEAR
CO2 SERPL-SCNC: 29 MMOL/L (ref 21–32)
COLOR UR: YELLOW
CREAT SERPL-MCNC: 1.2 MG/DL (ref 0.6–1.3)
EOSINOPHIL # BLD AUTO: 0.02 THOUSAND/ΜL (ref 0–0.61)
EOSINOPHIL NFR BLD AUTO: 0 % (ref 0–6)
ERYTHROCYTE [DISTWIDTH] IN BLOOD BY AUTOMATED COUNT: 16.3 % (ref 11.6–15.1)
GFR SERPL CREATININE-BSD FRML MDRD: 38 ML/MIN/1.73SQ M
GLUCOSE SERPL-MCNC: 107 MG/DL (ref 65–140)
GLUCOSE UR STRIP-MCNC: NEGATIVE MG/DL
HCT VFR BLD AUTO: 43.4 % (ref 34.8–46.1)
HGB BLD-MCNC: 14 G/DL (ref 11.5–15.4)
HGB UR QL STRIP.AUTO: NEGATIVE
IMM GRANULOCYTES # BLD AUTO: 0.02 THOUSAND/UL (ref 0–0.2)
IMM GRANULOCYTES NFR BLD AUTO: 0 % (ref 0–2)
KETONES UR STRIP-MCNC: NEGATIVE MG/DL
LEUKOCYTE ESTERASE UR QL STRIP: ABNORMAL
LYMPHOCYTES # BLD AUTO: 1.13 THOUSANDS/ΜL (ref 0.6–4.47)
LYMPHOCYTES NFR BLD AUTO: 20 % (ref 14–44)
MAGNESIUM SERPL-MCNC: 1.8 MG/DL (ref 1.6–2.6)
MCH RBC QN AUTO: 27.3 PG (ref 26.8–34.3)
MCHC RBC AUTO-ENTMCNC: 32.3 G/DL (ref 31.4–37.4)
MCV RBC AUTO: 85 FL (ref 82–98)
MONOCYTES # BLD AUTO: 0.47 THOUSAND/ΜL (ref 0.17–1.22)
MONOCYTES NFR BLD AUTO: 8 % (ref 4–12)
NEUTROPHILS # BLD AUTO: 3.92 THOUSANDS/ΜL (ref 1.85–7.62)
NEUTS SEG NFR BLD AUTO: 71 % (ref 43–75)
NITRITE UR QL STRIP: POSITIVE
NON-SQ EPI CELLS URNS QL MICRO: ABNORMAL /HPF
NRBC BLD AUTO-RTO: 0 /100 WBCS
NT-PROBNP SERPL-MCNC: 1691 PG/ML
P AXIS: 42 DEGREES
PH UR STRIP.AUTO: 6.5 [PH]
PLATELET # BLD AUTO: 223 THOUSANDS/UL (ref 149–390)
PMV BLD AUTO: 9.6 FL (ref 8.9–12.7)
POTASSIUM SERPL-SCNC: 4.1 MMOL/L (ref 3.5–5.3)
PR INTERVAL: 138 MS
PROT SERPL-MCNC: 7.2 G/DL (ref 6.4–8.2)
PROT UR STRIP-MCNC: NEGATIVE MG/DL
QRS AXIS: -35 DEGREES
QRSD INTERVAL: 84 MS
QT INTERVAL: 404 MS
QTC INTERVAL: 410 MS
RBC # BLD AUTO: 5.12 MILLION/UL (ref 3.81–5.12)
RBC #/AREA URNS AUTO: ABNORMAL /HPF
SODIUM SERPL-SCNC: 136 MMOL/L (ref 136–145)
SP GR UR STRIP.AUTO: 1.02 (ref 1–1.03)
T WAVE AXIS: 87 DEGREES
T4 FREE SERPL-MCNC: 0.83 NG/DL (ref 0.76–1.46)
TROPONIN I SERPL-MCNC: 0.19 NG/ML
TROPONIN I SERPL-MCNC: 0.97 NG/ML
TROPONIN I SERPL-MCNC: 1.57 NG/ML
TROPONIN I SERPL-MCNC: 1.99 NG/ML
TSH SERPL DL<=0.05 MIU/L-ACNC: 27.64 UIU/ML (ref 0.36–3.74)
UROBILINOGEN UR QL STRIP.AUTO: 1 E.U./DL
VENTRICULAR RATE: 62 BPM
WBC # BLD AUTO: 5.59 THOUSAND/UL (ref 4.31–10.16)
WBC #/AREA URNS AUTO: ABNORMAL /HPF
WBC CLUMPS # UR AUTO: ABNORMAL /UL

## 2021-06-23 PROCEDURE — 93005 ELECTROCARDIOGRAM TRACING: CPT

## 2021-06-23 PROCEDURE — 70450 CT HEAD/BRAIN W/O DYE: CPT

## 2021-06-23 PROCEDURE — 99222 1ST HOSP IP/OBS MODERATE 55: CPT | Performed by: INTERNAL MEDICINE

## 2021-06-23 PROCEDURE — 84484 ASSAY OF TROPONIN QUANT: CPT | Performed by: NURSE PRACTITIONER

## 2021-06-23 PROCEDURE — 84439 ASSAY OF FREE THYROXINE: CPT | Performed by: NURSE PRACTITIONER

## 2021-06-23 PROCEDURE — 80053 COMPREHEN METABOLIC PANEL: CPT | Performed by: EMERGENCY MEDICINE

## 2021-06-23 PROCEDURE — 81001 URINALYSIS AUTO W/SCOPE: CPT | Performed by: EMERGENCY MEDICINE

## 2021-06-23 PROCEDURE — 93010 ELECTROCARDIOGRAM REPORT: CPT | Performed by: INTERNAL MEDICINE

## 2021-06-23 PROCEDURE — 87086 URINE CULTURE/COLONY COUNT: CPT | Performed by: EMERGENCY MEDICINE

## 2021-06-23 PROCEDURE — 87077 CULTURE AEROBIC IDENTIFY: CPT | Performed by: EMERGENCY MEDICINE

## 2021-06-23 PROCEDURE — 83735 ASSAY OF MAGNESIUM: CPT | Performed by: EMERGENCY MEDICINE

## 2021-06-23 PROCEDURE — 99223 1ST HOSP IP/OBS HIGH 75: CPT | Performed by: INTERNAL MEDICINE

## 2021-06-23 PROCEDURE — 96361 HYDRATE IV INFUSION ADD-ON: CPT

## 2021-06-23 PROCEDURE — 84484 ASSAY OF TROPONIN QUANT: CPT | Performed by: PHYSICIAN ASSISTANT

## 2021-06-23 PROCEDURE — 83880 ASSAY OF NATRIURETIC PEPTIDE: CPT | Performed by: EMERGENCY MEDICINE

## 2021-06-23 PROCEDURE — 87186 SC STD MICRODIL/AGAR DIL: CPT | Performed by: EMERGENCY MEDICINE

## 2021-06-23 PROCEDURE — 84443 ASSAY THYROID STIM HORMONE: CPT | Performed by: EMERGENCY MEDICINE

## 2021-06-23 PROCEDURE — 71045 X-RAY EXAM CHEST 1 VIEW: CPT

## 2021-06-23 PROCEDURE — 99285 EMERGENCY DEPT VISIT HI MDM: CPT

## 2021-06-23 PROCEDURE — 85025 COMPLETE CBC W/AUTO DIFF WBC: CPT | Performed by: EMERGENCY MEDICINE

## 2021-06-23 PROCEDURE — 36415 COLL VENOUS BLD VENIPUNCTURE: CPT | Performed by: EMERGENCY MEDICINE

## 2021-06-23 PROCEDURE — 99284 EMERGENCY DEPT VISIT MOD MDM: CPT | Performed by: EMERGENCY MEDICINE

## 2021-06-23 PROCEDURE — 96374 THER/PROPH/DIAG INJ IV PUSH: CPT

## 2021-06-23 PROCEDURE — 84484 ASSAY OF TROPONIN QUANT: CPT | Performed by: EMERGENCY MEDICINE

## 2021-06-23 PROCEDURE — 1123F ACP DISCUSS/DSCN MKR DOCD: CPT | Performed by: INTERNAL MEDICINE

## 2021-06-23 RX ORDER — CEFTRIAXONE 1 G/50ML
1000 INJECTION, SOLUTION INTRAVENOUS EVERY 24 HOURS
Status: DISCONTINUED | OUTPATIENT
Start: 2021-06-24 | End: 2021-06-25 | Stop reason: HOSPADM

## 2021-06-23 RX ORDER — PREGABALIN 75 MG/1
75 CAPSULE ORAL DAILY
Status: DISCONTINUED | OUTPATIENT
Start: 2021-06-23 | End: 2021-06-25 | Stop reason: HOSPADM

## 2021-06-23 RX ORDER — ONDANSETRON 2 MG/ML
4 INJECTION INTRAMUSCULAR; INTRAVENOUS EVERY 6 HOURS PRN
Status: DISCONTINUED | OUTPATIENT
Start: 2021-06-23 | End: 2021-06-25 | Stop reason: HOSPADM

## 2021-06-23 RX ORDER — ASPIRIN 81 MG/1
162 TABLET ORAL DAILY
Status: DISCONTINUED | OUTPATIENT
Start: 2021-06-24 | End: 2021-06-25 | Stop reason: HOSPADM

## 2021-06-23 RX ORDER — ATORVASTATIN CALCIUM 10 MG/1
10 TABLET, FILM COATED ORAL
Status: DISCONTINUED | OUTPATIENT
Start: 2021-06-23 | End: 2021-06-23

## 2021-06-23 RX ORDER — SODIUM CHLORIDE, SODIUM GLUCONATE, SODIUM ACETATE, POTASSIUM CHLORIDE, MAGNESIUM CHLORIDE, SODIUM PHOSPHATE, DIBASIC, AND POTASSIUM PHOSPHATE .53; .5; .37; .037; .03; .012; .00082 G/100ML; G/100ML; G/100ML; G/100ML; G/100ML; G/100ML; G/100ML
40 INJECTION, SOLUTION INTRAVENOUS CONTINUOUS
Status: DISCONTINUED | OUTPATIENT
Start: 2021-06-23 | End: 2021-06-24

## 2021-06-23 RX ORDER — AMLODIPINE BESYLATE 5 MG/1
5 TABLET ORAL DAILY
Status: DISCONTINUED | OUTPATIENT
Start: 2021-06-23 | End: 2021-06-23

## 2021-06-23 RX ORDER — CEFTRIAXONE 1 G/50ML
1000 INJECTION, SOLUTION INTRAVENOUS ONCE
Status: COMPLETED | OUTPATIENT
Start: 2021-06-23 | End: 2021-06-23

## 2021-06-23 RX ORDER — POLYETHYLENE GLYCOL 3350 17 G/17G
17 POWDER, FOR SOLUTION ORAL DAILY PRN
Status: DISCONTINUED | OUTPATIENT
Start: 2021-06-23 | End: 2021-06-25 | Stop reason: HOSPADM

## 2021-06-23 RX ORDER — ATORVASTATIN CALCIUM 40 MG/1
40 TABLET, FILM COATED ORAL
Status: DISCONTINUED | OUTPATIENT
Start: 2021-06-24 | End: 2021-06-23

## 2021-06-23 RX ORDER — PREGABALIN 75 MG/1
75 CAPSULE ORAL DAILY
Status: DISCONTINUED | OUTPATIENT
Start: 2021-06-24 | End: 2021-06-23

## 2021-06-23 RX ORDER — MELATONIN
1000 DAILY
Status: DISCONTINUED | OUTPATIENT
Start: 2021-06-23 | End: 2021-06-25 | Stop reason: HOSPADM

## 2021-06-23 RX ORDER — PANTOPRAZOLE SODIUM 40 MG/1
40 TABLET, DELAYED RELEASE ORAL
Status: DISCONTINUED | OUTPATIENT
Start: 2021-06-24 | End: 2021-06-25 | Stop reason: HOSPADM

## 2021-06-23 RX ORDER — DOCUSATE SODIUM 100 MG/1
100 CAPSULE, LIQUID FILLED ORAL 2 TIMES DAILY
Status: DISCONTINUED | OUTPATIENT
Start: 2021-06-23 | End: 2021-06-25 | Stop reason: HOSPADM

## 2021-06-23 RX ORDER — ACETAMINOPHEN 325 MG/1
650 TABLET ORAL EVERY 6 HOURS PRN
Status: DISCONTINUED | OUTPATIENT
Start: 2021-06-23 | End: 2021-06-25 | Stop reason: HOSPADM

## 2021-06-23 RX ORDER — ATORVASTATIN CALCIUM 20 MG/1
20 TABLET, FILM COATED ORAL
Status: DISCONTINUED | OUTPATIENT
Start: 2021-06-24 | End: 2021-06-25 | Stop reason: HOSPADM

## 2021-06-23 RX ORDER — LEVOTHYROXINE SODIUM 0.07 MG/1
75 TABLET ORAL
Status: DISCONTINUED | OUTPATIENT
Start: 2021-06-24 | End: 2021-06-25 | Stop reason: HOSPADM

## 2021-06-23 RX ORDER — CALCIUM CARBONATE 200(500)MG
1000 TABLET,CHEWABLE ORAL DAILY PRN
Status: DISCONTINUED | OUTPATIENT
Start: 2021-06-23 | End: 2021-06-25 | Stop reason: HOSPADM

## 2021-06-23 RX ADMIN — AMLODIPINE BESYLATE 5 MG: 5 TABLET ORAL at 15:37

## 2021-06-23 RX ADMIN — Medication 1000 UNITS: at 15:37

## 2021-06-23 RX ADMIN — PREGABALIN 75 MG: 75 CAPSULE ORAL at 17:49

## 2021-06-23 RX ADMIN — Medication 12.5 MG: at 20:11

## 2021-06-23 RX ADMIN — SODIUM CHLORIDE, SODIUM GLUCONATE, SODIUM ACETATE, POTASSIUM CHLORIDE, MAGNESIUM CHLORIDE, SODIUM PHOSPHATE, DIBASIC, AND POTASSIUM PHOSPHATE 40 ML/HR: .53; .5; .37; .037; .03; .012; .00082 INJECTION, SOLUTION INTRAVENOUS at 15:32

## 2021-06-23 RX ADMIN — DOCUSATE SODIUM 100 MG: 100 CAPSULE, LIQUID FILLED ORAL at 17:33

## 2021-06-23 RX ADMIN — CEFTRIAXONE 1000 MG: 1 INJECTION, SOLUTION INTRAVENOUS at 13:06

## 2021-06-23 RX ADMIN — ATORVASTATIN CALCIUM 10 MG: 10 TABLET, FILM COATED ORAL at 17:33

## 2021-06-23 RX ADMIN — ENOXAPARIN SODIUM 60 MG: 60 INJECTION SUBCUTANEOUS at 15:37

## 2021-06-23 RX ADMIN — SODIUM CHLORIDE 500 ML: 0.9 INJECTION, SOLUTION INTRAVENOUS at 11:27

## 2021-06-23 NOTE — H&P
Stan 45  H&P- Ryan Thakur 5/5/1924, 80 y o  female MRN: [id number]  Unit/Bed#: [address] [place] [address] Encounter: [id number]  Primary Care Provider: Alicia Morejon MD   Date and time admitted to hospital: 6/23/2021 11:09 AM    * UTI (urinary tract infection)  Assessment & Plan  · UA is positive for nitrite and bacteria  · Rocephin x 1 dose given in ED  Will continue Rocephin  · Gentle hydration with Isolyte at 40 ml/hr  · Follow-up urine culture     Elevated troponin  Assessment & Plan  · Non MI elevated troponin level, likely due to UTI   · Troponin elevated at 0 19 and 0 97   · Consult consulted and aware of rising troponin   · Give Lovenox 60 mg x 1 dose  · Monitor on telemetry  · Continue to trend troponin   · Obtain ECHO    Generalized weakness  Assessment & Plan  · 500 ml NSS bolus administered in the ED with positive result  · Start Isolyte 40 ml/hr for gentle hydration  · Monitor I & O  · PT/OT eval     CAD (coronary artery disease)  Assessment & Plan  Hst of CAD s/p CABG about 10 years ago  Patient stopped following with cardiology due to her age  No recent cardiac testing  · Continue statin    Hypothyroidism  Assessment & Plan  · TSH is significantly elevated at 27,644  Ordered T4 for correlation  · Continue home dose of 75mcg of levothyroxine daily; ensure patient is taking first thing in the morning on an empty stomach     Hyperlipidemia  Assessment & Plan  · Patient takes Lipitor 10mg  · Will continue     Hypertension  Assessment & Plan  · BP controlled  · Continue Norvasc 5 mg daily     VTE Prophylaxis: Enoxaparin (Lovenox)  / sequential compression device   Code Status: DNR DNI  POLST: POLST form is not discussed and not completed at this time  Discussion with family: daughter at bedside     Anticipated Length of Stay:  Patient will be admitted on an Inpatient basis with an anticipated length of stay of  Greater than 2 midnights     Justification for Hospital Stay: UTI, weakness, elevated trop    Total Time for Visit, including Counseling / Coordination of Care: 60 minutes  Greater than 50% of this total time spent on direct patient counseling and coordination of care  Chief Complaint:  Generalized Weakness    History of Present Illness:    Lianet Isidro is a 80 y o  female who presents with generalized weakness  Patient's daughter at bedside stated that her mom called her this morning that she is not feeling ok and that she is weak  Daughter stated that patient has had poor oral intake for sometime and that she has not been herself lately  Troponin in the ED is elevated at 0 19, TSH is significantly elevated at 27,644, daughter said mom has not taken her synthroid in a while and ProBNP is 1,691  UA  is positive for Nitrite and innumerable bacteria  Rocephin was given in the ED and 500ml bolus of NSS  Denies nausea, vomiting and SOB  CT Scan of the head showed no acute intracranial abnormality and CXR showed no acute cardiopulmonary disease  Review of Systems:    Review of Systems   Constitutional: Positive for appetite change  Negative for chills and fever  HENT: Negative for congestion  Eyes: Negative for visual disturbance  Respiratory: Negative for cough and shortness of breath  Cardiovascular: Positive for chest pain  Negative for palpitations and leg swelling  Gastrointestinal: Negative for abdominal pain, nausea and vomiting  Genitourinary: Negative for dysuria and hematuria  Musculoskeletal: Negative for arthralgias and back pain  Skin: Negative for color change and rash  Neurological: Positive for weakness  Negative for seizures and syncope  Psychiatric/Behavioral: The patient is not nervous/anxious  All other systems reviewed and are negative        Past Medical and Surgical History:     Past Medical History:   Diagnosis Date    CAD (coronary artery disease)     Disease of thyroid gland     hypo    GERD (gastroesophageal reflux disease)     Gout right shoulder    Hyperlipidemia     Hypertension     Osteoarthritis (arthritis due to wear and tear of joints)        Past Surgical History:   Procedure Laterality Date    CORONARY ARTERY BYPASS GRAFT      ENDOSCOPIC STENT PLACEMENT W/ MLB Left 2012    HIATAL HERNIA REPAIR      JOINT REPLACEMENT Left     knee 2001    ROTATOR CUFF REPAIR      TUBAL LIGATION  2013       Meds/Allergies:    Prior to Admission medications    Medication Sig Start Date End Date Taking?  Authorizing Provider   amLODIPine (NORVASC) 5 mg tablet Take 5 mg by mouth daily   Yes Historical Provider, MD   atorvastatin (LIPITOR) 10 mg tablet Take 10 mg by mouth daily   Yes Historical Provider, MD   celecoxib (CeleBREX) 200 mg capsule Take 200 mg by mouth daily   Yes Historical Provider, MD   cholecalciferol (VITAMIN D3) 1,000 units tablet Take 1,000 Units by mouth daily   Yes Historical Provider, MD   esomeprazole (NexIUM) 40 MG capsule Take 1 capsule (40 mg total) by mouth daily 8/2/19  Yes JanakVARSHA Powell   levothyroxine 75 mcg tablet Take 75 mcg by mouth daily   Yes Historical Provider, MD   pregabalin (LYRICA) 75 mg capsule Take 75 mg by mouth daily    Yes Historical Provider, MD   cyanocobalamin (VITAMIN B-12) 1,000 mcg tablet Take 1 tablet (1,000 mcg total) by mouth daily  Patient not taking: Reported on 6/23/2021 7/17/19   Landy Vora DO   docusate sodium (COLACE) 100 mg capsule Take 1 capsule (100 mg total) by mouth 2 (two) times a day 5/21/19   Rock Gonzales MD   esomeprazole (NexIUM) 40 MG capsule Take 40 mg by mouth every morning before breakfast    Historical Provider, MD   fluticasone (FLONASE) 50 mcg/act nasal spray 2 sprays into each nostril 2 (two) times a day for 3 days 5/2/21 5/5/21  Daisy Barrera PA-C   lidocaine (LIDODERM) 5 % Apply 1 patch topically daily for 5 days Remove & Discard patch within 12 hours or as directed by MD 5/2/21 5/7/21  Daisy Barrera PA-C   tetrahydrozoline-zinc (VISINE-AC) 0 05-0 25 % ophthalmic solution Administer 2 drops to both eyes 3 (three) times a day as needed (watery eyes) 5/2/21   Gilma Nelson PA-C     I have reviewed home medications with patient family member  Allergies: Allergies   Allergen Reactions    Codeine Hives    Percocet [Oxycodone-Acetaminophen] Vomiting       Social History:     Marital Status:    Occupation: retired   Patient Pre-hospital Living Situation: home with aides   Patient Pre-hospital Level of Mobility: using rolling walker   Patient Pre-hospital Diet Restrictions: none   Substance Use History:   Social History     Substance and Sexual Activity   Alcohol Use Never     Social History     Tobacco Use   Smoking Status Never Smoker   Smokeless Tobacco Never Used     Social History     Substance and Sexual Activity   Drug Use Never       Family History:    History reviewed  No pertinent family history  Physical Exam:     Vitals:   Blood Pressure: 143/69 (06/23/21 1525)  Pulse: 56 (06/23/21 1525)  Temperature: 97 6 °F (36 4 °C) (06/23/21 1422)  Temp Source: Oral (06/23/21 1114)  Respirations: 20 (06/23/21 1422)  Height: 4' 7" (139 7 cm) (06/23/21 1114)  Weight - Scale: 59 9 kg (132 lb 1 6 oz) (06/23/21 1525)  SpO2: 97 % (06/23/21 1525)    Physical Exam  Constitutional:       General: She is not in acute distress  Appearance: She is not ill-appearing (appears deconditioned), toxic-appearing or diaphoretic  HENT:      Head: Normocephalic  Nose: No congestion  Mouth/Throat:      Mouth: Mucous membranes are moist    Eyes:      Pupils: Pupils are equal, round, and reactive to light  Cardiovascular:      Rate and Rhythm: Normal rate  Pulses: Normal pulses  Pulmonary:      Effort: Pulmonary effort is normal  No respiratory distress  Breath sounds: Normal breath sounds  No wheezing  Abdominal:      General: Bowel sounds are normal  There is no distension  Palpations: Abdomen is soft  Tenderness: There is no abdominal tenderness  Musculoskeletal:         General: No swelling  Normal range of motion  Cervical back: Normal range of motion  No rigidity  Right lower leg: No edema  Left lower leg: No edema  Skin:     General: Skin is warm  Capillary Refill: Capillary refill takes less than 2 seconds  Coloration: Skin is not jaundiced  Neurological:      Mental Status: She is alert and oriented to person, place, and time  Mental status is at baseline  Motor: Weakness (generalized ) present  Psychiatric:         Mood and Affect: Mood normal          Behavior: Behavior normal          Thought Content: Thought content normal            Additional Data:     Lab Results: I have personally reviewed pertinent reports  Results from last 7 days   Lab Units 06/23/21  1125   WBC Thousand/uL 5 59   HEMOGLOBIN g/dL 14 0   HEMATOCRIT % 43 4   PLATELETS Thousands/uL 223   NEUTROS PCT % 71   LYMPHS PCT % 20   MONOS PCT % 8   EOS PCT % 0     Results from last 7 days   Lab Units 06/23/21  1125   SODIUM mmol/L 136   POTASSIUM mmol/L 4 1   CHLORIDE mmol/L 99*   CO2 mmol/L 29   BUN mg/dL 19   CREATININE mg/dL 1 20   ANION GAP mmol/L 8   CALCIUM mg/dL 9 2   ALBUMIN g/dL 3 8   TOTAL BILIRUBIN mg/dL 0 79   ALK PHOS U/L 80   ALT U/L 27   AST U/L 28   GLUCOSE RANDOM mg/dL 107                       Imaging: I have personally reviewed pertinent reports  XR chest 1 view   Final Result by Kimmy Akbar MD (06/23 1418)      No acute cardiopulmonary disease  Workstation performed: [id number]         CT head without contrast   Final Result by Shaka Hinojosa MD (06/23 1353)      No acute intracranial abnormality                    Workstation performed: [id number]             EKG, Pathology, and Other Studies Reviewed on Admission:   · EKG: Sinus, HR 62    Allscripts / Epic Records Reviewed: Yes     ** Please Note: This note has been constructed using a voice recognition system   **

## 2021-06-23 NOTE — ASSESSMENT & PLAN NOTE
Hst of CAD s/p CABG about 10 years ago  Patient stopped following with cardiology due to her age  No recent cardiac testing    · Continue statin

## 2021-06-23 NOTE — ASSESSMENT & PLAN NOTE
· TSH is significantly elevated at 27,644   Ordered T4 for correlation  · Continue home dose of 75mcg of levothyroxine daily; ensure patient is taking first thing in the morning on an empty stomach

## 2021-06-23 NOTE — ASSESSMENT & PLAN NOTE
· 500 ml NSS bolus administered in the ED with positive result  · Start Isolyte 40 ml/hr for gentle hydration  · Monitor I & O  · PT/OT eval

## 2021-06-23 NOTE — ASSESSMENT & PLAN NOTE
· Non MI elevated troponin level, likely due to UTI   · Troponin elevated at 0 19 and 0 97   · Consult consulted and aware of rising troponin   · Give Lovenox 60 mg x 1 dose  · Monitor on telemetry  · Continue to trend troponin   · Obtain ECHO

## 2021-06-23 NOTE — PLAN OF CARE
Problem: Potential for Falls  Goal: Patient will remain free of falls  Description: INTERVENTIONS:  - Educate patient/family on patient safety including physical limitations  - Instruct patient to call for assistance with activity   - Consult OT/PT to assist with strengthening/mobility   - Keep Call bell within reach  - Keep bed low and locked with side rails adjusted as appropriate  - Keep care items and personal belongings within reach  - Initiate and maintain comfort rounds  - Make Fall Risk Sign visible to staff  - Offer Toileting every 2 Hours, in advance of need  - Initiate/Maintain bed/chair alarm  - Obtain necessary fall risk management equipment:   - Apply yellow socks and bracelet for high fall risk patients  - Consider moving patient to room near nurses station  Outcome: Progressing     Problem: MOBILITY - ADULT  Goal: Maintain or return to baseline ADL function  Description: INTERVENTIONS:  -  Assess patient's ability to carry out ADLs; assess patient's baseline for ADL function and identify physical deficits which impact ability to perform ADLs (bathing, care of mouth/teeth, toileting, grooming, dressing, etc )  - Assess/evaluate cause of self-care deficits   - Assess range of motion  - Assess patient's mobility; develop plan if impaired  - Assess patient's need for assistive devices and provide as appropriate  - Encourage maximum independence but intervene and supervise when necessary  - Involve family in performance of ADLs  - Assess for home care needs following discharge   - Consider OT consult to assist with ADL evaluation and planning for discharge  - Provide patient education as appropriate  Outcome: Progressing  Goal: Maintains/Returns to pre admission functional level  Description: INTERVENTIONS:  - Perform BMAT or MOVE assessment daily    - Set and communicate daily mobility goal to care team and patient/family/caregiver     - Collaborate with rehabilitation services on mobility goals if consulted  - Perform Range of Motion 3 times a day  - Reposition patient every 2 hours  - Dangle patient 3 times a day  - Stand patient 3 times a day  - Ambulate patient 3 times a day  - Out of bed to chair 3 times a day   - Out of bed for meals 3 times a day  - Out of bed for toileting  - Record patient progress and toleration of activity level   Outcome: Progressing     Problem: DISCHARGE PLANNING  Goal: Discharge to home or other facility with appropriate resources  Description: INTERVENTIONS:  - Identify barriers to discharge w/patient and caregiver  - Arrange for needed discharge resources and transportation as appropriate  - Identify discharge learning needs (meds, wound care, etc )  - Arrange for interpretive services to assist at discharge as needed  - Refer to Case Management Department for coordinating discharge planning if the patient needs post-hospital services based on physician/advanced practitioner order or complex needs related to functional status, cognitive ability, or social support system  Outcome: Progressing     Problem: Knowledge Deficit  Goal: Patient/family/caregiver demonstrates understanding of disease process, treatment plan, medications, and discharge instructions  Description: Complete learning assessment and assess knowledge base    Interventions:  - Provide teaching at level of understanding  - Provide teaching via preferred learning methods  Outcome: Progressing     Problem: CARDIOVASCULAR - ADULT  Goal: Maintains optimal cardiac output and hemodynamic stability  Description: INTERVENTIONS:  - Monitor I/O, vital signs and rhythm  - Monitor for S/S and trends of decreased cardiac output  - Administer and titrate ordered vasoactive medications to optimize hemodynamic stability  - Assess quality of pulses, skin color and temperature  - Assess for signs of decreased coronary artery perfusion  - Instruct patient to report change in severity of symptoms  Outcome: Progressing  Goal: Absence of cardiac dysrhythmias or at baseline rhythm  Description: INTERVENTIONS:  - Continuous cardiac monitoring, vital signs, obtain 12 lead EKG if ordered  - Administer antiarrhythmic and heart rate control medications as ordered  - Monitor electrolytes and administer replacement therapy as ordered  Outcome: Progressing

## 2021-06-23 NOTE — ED PROVIDER NOTES
History  Chief Complaint   Patient presents with    Weakness - Generalized     Pt states this morning around 0800 noticed she "has no energy at all", lightheaded, and is SOB on both exertion and at rest  Pt  denies any Nausea, vomiting, diarrhea or CP    Shortness of Breath     26-year-old female presents with generalized weakness where she is extremely fatigued starting today according to granddaughter  Patient is normally independent lives by herself and ambulates without any difficulty  But today she felt very weak  Denies any dysuria urgency frequency abdominal pain nausea vomiting diarrhea cough congestion fevers chills or any other symptoms  No chest pain or shortness of breath  History provided by:  Patient   used: No        Prior to Admission Medications   Prescriptions Last Dose Informant Patient Reported? Taking?    amLODIPine (NORVASC) 5 mg tablet 2021 at Unknown time  Yes Yes   Sig: Take 5 mg by mouth daily   atorvastatin (LIPITOR) 10 mg tablet 2021 at Unknown time  Yes Yes   Sig: Take 10 mg by mouth daily   celecoxib (CeleBREX) 200 mg capsule 2021 at Unknown time  Yes Yes   Sig: Take 200 mg by mouth daily   cholecalciferol (VITAMIN D3) 1,000 units tablet 2021 at Unknown time Self Yes Yes   Sig: Take 1,000 Units by mouth daily   cyanocobalamin (VITAMIN B-12) 1,000 mcg tablet Not Taking at Unknown time  No No   Sig: Take 1 tablet (1,000 mcg total) by mouth daily   Patient not taking: Reported on 2021   docusate sodium (COLACE) 100 mg capsule Unknown at Unknown time  No No   Sig: Take 1 capsule (100 mg total) by mouth 2 (two) times a day   esomeprazole (NexIUM) 40 MG capsule 2021 at Unknown time  No Yes   Sig: Take 1 capsule (40 mg total) by mouth daily   esomeprazole (NexIUM) 40 MG capsule   Yes No   Sig: Take 40 mg by mouth every morning before breakfast   fluticasone (FLONASE) 50 mcg/act nasal spray   No No   Si sprays into each nostril 2 (two) times a day for 3 days   levothyroxine 75 mcg tablet 6/22/2021 at Unknown time  Yes Yes   Sig: Take 75 mcg by mouth daily   lidocaine (LIDODERM) 5 %   No No   Sig: Apply 1 patch topically daily for 5 days Remove & Discard patch within 12 hours or as directed by MD   pregabalin (LYRICA) 75 mg capsule 6/22/2021 at Unknown time  Yes Yes   Sig: Take 75 mg by mouth daily    tetrahydrozoline-zinc (VISINE-AC) 0 05-0 25 % ophthalmic solution Unknown at Unknown time  No No   Sig: Administer 2 drops to both eyes 3 (three) times a day as needed (watery eyes)      Facility-Administered Medications: None       Past Medical History:   Diagnosis Date    CAD (coronary artery disease)     Disease of thyroid gland     hypo    GERD (gastroesophageal reflux disease)     Gout     right shoulder    Hyperlipidemia     Hypertension     Osteoarthritis (arthritis due to wear and tear of joints)        Past Surgical History:   Procedure Laterality Date    CORONARY ARTERY BYPASS GRAFT      ENDOSCOPIC STENT PLACEMENT W/ MLB Left 2012    HIATAL HERNIA REPAIR      JOINT REPLACEMENT Left     knee 2001    ROTATOR CUFF REPAIR      TUBAL LIGATION  2013       History reviewed  No pertinent family history  I have reviewed and agree with the history as documented  E-Cigarette/Vaping    E-Cigarette Use Never User      E-Cigarette/Vaping Substances     Social History     Tobacco Use    Smoking status: Never Smoker    Smokeless tobacco: Never Used   Vaping Use    Vaping Use: Never used   Substance Use Topics    Alcohol use: Never    Drug use: Never       Review of Systems   Constitutional: Positive for fatigue  HENT: Negative  Eyes: Negative  Respiratory: Negative  Cardiovascular: Negative  Gastrointestinal: Negative  Endocrine: Negative  Genitourinary: Negative  Musculoskeletal: Negative  Skin: Negative  Allergic/Immunologic: Negative  Neurological: Positive for weakness     Hematological: Negative  Psychiatric/Behavioral: Negative  All other systems reviewed and are negative  Physical Exam  Physical Exam  Constitutional:       Appearance: Normal appearance  HENT:      Head: Normocephalic and atraumatic  Nose: Nose normal       Mouth/Throat:      Mouth: Mucous membranes are moist    Eyes:      Extraocular Movements: Extraocular movements intact  Pupils: Pupils are equal, round, and reactive to light  Cardiovascular:      Rate and Rhythm: Normal rate and regular rhythm  Pulmonary:      Effort: Pulmonary effort is normal       Breath sounds: Normal breath sounds  Abdominal:      General: Abdomen is flat  Bowel sounds are normal       Palpations: Abdomen is soft  Musculoskeletal:         General: Normal range of motion  Cervical back: Normal range of motion and neck supple  Skin:     General: Skin is warm  Capillary Refill: Capillary refill takes less than 2 seconds  Neurological:      General: No focal deficit present  Mental Status: She is alert and oriented to person, place, and time  Mental status is at baseline  Psychiatric:         Mood and Affect: Mood normal          Thought Content:  Thought content normal          Vital Signs  ED Triage Vitals [06/23/21 1114]   Temperature Pulse Respirations Blood Pressure SpO2   97 6 °F (36 4 °C) 62 20 134/81 100 %      Temp Source Heart Rate Source Patient Position - Orthostatic VS BP Location FiO2 (%)   Oral Monitor Sitting Left arm --      Pain Score       No Pain           Vitals:    06/23/21 1422 06/23/21 1525 06/23/21 1915 06/23/21 2011   BP: 164/81 143/69 136/72    Pulse: 65 56 56 67   Patient Position - Orthostatic VS:             Visual Acuity  Visual Acuity      Most Recent Value   L Pupil Size (mm)  2   R Pupil Size (mm)  2   L Pupil Shape  Round   R Pupil Shape  Round          ED Medications  Medications   cefTRIAXone (ROCEPHIN) IVPB (premix in dextrose) 1,000 mg 50 mL (has no administration in time range)   cholecalciferol (VITAMIN D3) tablet 1,000 Units (1,000 Units Oral Given 6/23/21 1537)   docusate sodium (COLACE) capsule 100 mg (100 mg Oral Given 6/23/21 1733)   pantoprazole (PROTONIX) EC tablet 40 mg (has no administration in time range)   levothyroxine tablet 75 mcg (has no administration in time range)   multi-electrolyte (PLASMALYTE-A/ISOLYTE-S PH 7 4) IV solution (40 mL/hr Intravenous New Bag 6/23/21 1532)   acetaminophen (TYLENOL) tablet 650 mg (has no administration in time range)   polyethylene glycol (MIRALAX) packet 17 g (has no administration in time range)   ondansetron (ZOFRAN) injection 4 mg (has no administration in time range)   calcium carbonate (TUMS) chewable tablet 1,000 mg (has no administration in time range)   pregabalin (LYRICA) capsule 75 mg (75 mg Oral Given 6/23/21 1749)   aspirin (ECOTRIN LOW STRENGTH) EC tablet 162 mg (has no administration in time range)   metoprolol tartrate (LOPRESSOR) partial tablet 12 5 mg (12 5 mg Oral Given 6/23/21 2011)   atorvastatin (LIPITOR) tablet 20 mg (has no administration in time range)   sodium chloride 0 9 % bolus 500 mL (0 mL Intravenous Stopped 6/23/21 1233)   cefTRIAXone (ROCEPHIN) IVPB (premix in dextrose) 1,000 mg 50 mL (0 mg Intravenous Stopped 6/23/21 1350)   enoxaparin (LOVENOX) subcutaneous injection 60 mg (60 mg Subcutaneous Given 6/23/21 1537)       Diagnostic Studies  Results Reviewed     Procedure Component Value Units Date/Time    Troponin I [[id number]]  (Abnormal) Collected: 06/23/21 1734    Lab Status: Final result Specimen: Blood from Hand, Right Updated: 06/23/21 1757     Troponin I 1 57 ng/mL     T4, free [[id number]]  (Normal) Collected: 06/23/21 1125    Lab Status: Final result Specimen: Blood from Arm, Left Updated: 06/23/21 1642     Free T4 0 83 ng/dL     Troponin I [[id number]]  (Abnormal) Collected: 06/23/21 1446    Lab Status: Final result Specimen: Blood from Arm, Right Updated: 06/23/21 1514     Troponin I 0 97 ng/mL     Urine Microscopic [[id number]]  (Abnormal) Collected: 06/23/21 1228    Lab Status: Final result Specimen: Urine, Clean Catch Updated: 06/23/21 1242     RBC, UA None Seen /hpf      WBC, UA 20-30 /hpf      Epithelial Cells Occasional /hpf      Bacteria, UA Innumerable /hpf      WBC Clumps occ    NT-BNP PRO [[id number]]  (Abnormal) Collected: 06/23/21 1125    Lab Status: Final result Specimen: Blood from Arm, Left Updated: 06/23/21 1235     NT-proBNP 1,691 pg/mL     UA (URINE) with reflex to Scope [[id number]]  (Abnormal) Collected: 06/23/21 1228    Lab Status: Final result Specimen: Urine, Clean Catch Updated: 06/23/21 1235     Color, UA Yellow     Clarity, UA Clear     Specific [place], UA 1 020     pH, UA 6 5     Leukocytes, UA Small     Nitrite, UA Positive     Protein, UA Negative mg/dl      Glucose, UA Negative mg/dl      Ketones, UA Negative mg/dl      Urobilinogen, UA 1 0 E U /dl      Bilirubin, UA Negative     Blood, UA Negative    Urine culture [[id number]] Collected: 06/23/21 1228    Lab Status: In process Specimen: Urine, Clean Catch Updated: 06/23/21 1232    Magnesium [[id number]]  (Normal) Collected: 06/23/21 1125    Lab Status: Final result Specimen: Blood from Arm, Left Updated: 06/23/21 1156     Magnesium 1 8 mg/dL     TSH [[id number]]  (Abnormal) Collected: 06/23/21 1125    Lab Status: Final result Specimen: Blood from Arm, Left Updated: 06/23/21 1156     TSH 3RD GENERATON 27 644 uIU/mL     Narrative:      Patients undergoing fluorescein dye angiography may retain small amounts of fluorescein in the body for 48-72 hours post procedure  Samples containing fluorescein can produce falsely depressed TSH values  If the patient had this procedure,a specimen should be resubmitted post fluorescein clearance        Troponin I [[id number]]  (Abnormal) Collected: 06/23/21 1125    Lab Status: Final result Specimen: Blood from Arm, Left Updated: 06/23/21 1152     Troponin I 0 19 ng/mL     Comprehensive metabolic panel [[id number]]  (Abnormal) Collected: 06/23/21 1125    Lab Status: Final result Specimen: Blood from Arm, Left Updated: 06/23/21 1148     Sodium 136 mmol/L      Potassium 4 1 mmol/L      Chloride 99 mmol/L      CO2 29 mmol/L      ANION GAP 8 mmol/L      BUN 19 mg/dL      Creatinine 1 20 mg/dL      Glucose 107 mg/dL      Calcium 9 2 mg/dL      AST 28 U/L      ALT 27 U/L      Alkaline Phosphatase 80 U/L      Total Protein 7 2 g/dL      Albumin 3 8 g/dL      Total Bilirubin 0 79 mg/dL      eGFR 38 ml/min/1 73sq m     Narrative:      National Kid[employer] guidelines for Chronic Kidney Disease (CKD):     Stage 1 with normal or high GFR (GFR > 90 mL/min/1 73 square meters)    Stage 2 Mild CKD (GFR = 60-89 mL/min/1 73 square meters)    Stage 3A Moderate CKD (GFR = 45-59 mL/min/1 73 square meters)    Stage 3B Moderate CKD (GFR = 30-44 mL/min/1 73 square meters)    Stage 4 Severe CKD (GFR = 15-29 mL/min/1 73 square meters)    Stage 5 End Stage CKD (GFR <15 mL/min/1 73 square meters)  Note: GFR calculation is accurate only with a steady state creatinine    CBC and differential [[id number]]  (Abnormal) Collected: 06/23/21 1125    Lab Status: Final result Specimen: Blood from Arm, Left Updated: 06/23/21 1131     WBC 5 59 Thousand/uL      RBC 5 12 Million/uL      Hemoglobin 14 0 g/dL      Hematocrit 43 4 %      MCV 85 fL      MCH 27 3 pg      MCHC 32 3 g/dL      RDW 16 3 %      MPV 9 6 fL      Platelets 622 Thousands/uL      nRBC 0 /100 WBCs      Neutrophils Relative 71 %      Immat GRANS % 0 %      Lymphocytes Relative 20 %      Monocytes Relative 8 %      Eosinophils Relative 0 %      Basophils Relative 1 %      Neutrophils Absolute 3 92 Thousands/µL      Immature Grans Absolute 0 02 Thousand/uL      Lymphocytes Absolute 1 13 Thousands/µL      Monocytes Absolute 0 47 Thousand/µL      Eosinophils Absolute 0 02 Thousand/µL      Basophils Absolute 0 03 Thousands/µL                  XR chest 1 view   Final Result by Ismael Bearden MD (06/23 8768)      No acute cardiopulmonary disease  Workstation performed: [id number]         CT head without contrast   Final Result by Kishor Roach MD (06/23 9618)      No acute intracranial abnormality  Workstation performed: [id number]                    Procedures  ECG 12 Lead Documentation Only    Date/Time: 6/23/2021 11:35 AM  Performed by: Jewels Mcguire DO  Authorized by: Jewels Mcguire DO     ECG reviewed by me, the ED Provider: yes    Patient location:  ED  Previous ECG:     Previous ECG:  Unavailable    Comparison to cardiac monitor: Yes    Interpretation:     Interpretation: non-specific    Rate:     ECG rate assessment: normal    Rhythm:     Rhythm: sinus rhythm    Ectopy:     Ectopy: none    QRS:     QRS axis:  Normal  Conduction:     Conduction: normal    ST segments:     ST segments:  Non-specific  T waves:     T waves: non-specific               ED Course                             SBIRT 20yo+      Most Recent Value   SBIRT (22 yo +)   In order to provide better care to our patients, we are screening all of our patients for alcohol and drug use  Would it be okay to ask you these screening questions?   No Filed at: 06/23/2021 1117                    [facility]  Number of Diagnoses or Management Options     Amount and/or Complexity of Data Reviewed  Clinical lab tests: ordered and reviewed  Tests in the radiology section of CPT®: ordered and reviewed  Tests in the medicine section of CPT®: ordered and reviewed    Patient Progress  Patient progress: stable      Disposition  Final diagnoses:   Weakness   UTI (urinary tract infection)   NSTEMI (non-ST elevated myocardial infarction) ([facility] [address] )     Time reflects when diagnosis was documented in both MDM as applicable and the Disposition within this note     Time User Action Codes Description Comment    6/23/2021  1:13 PM Daysi Babin [R53 1] Weakness     6/23/2021  1:13 PM Daysi Babin [N39 0] UTI (urinary tract infection)     6/23/2021  1:13 PM Daysi Babin Add [I21 4] NSTEMI (non-ST elevated myocardial infarction) ([facility] )     6/23/2021  2:51 PM Haroon Argueta Add [R77 8] Elevated troponin       ED Disposition     ED Disposition Condition Date/Time Comment    Admit Stable Wed Jun 23, 2021  1:13 PM          Follow-up Information    None         Current Discharge Medication List      CONTINUE these medications which have NOT CHANGED    Details   amLODIPine (NORVASC) 5 mg tablet Take 5 mg by mouth daily      atorvastatin (LIPITOR) 10 mg tablet Take 10 mg by mouth daily      celecoxib (CeleBREX) 200 mg capsule Take 200 mg by mouth daily      cholecalciferol (VITAMIN D3) 1,000 units tablet Take 1,000 Units by mouth daily      !! esomeprazole (NexIUM) 40 MG capsule Take 1 capsule (40 mg total) by mouth daily  Refills: 0    Associated Diagnoses: GERD (gastroesophageal reflux disease)      levothyroxine 75 mcg tablet Take 75 mcg by mouth daily      pregabalin (LYRICA) 75 mg capsule Take 75 mg by mouth daily       cyanocobalamin (VITAMIN B-12) 1,000 mcg tablet Take 1 tablet (1,000 mcg total) by mouth daily  Qty: 30 tablet, Refills: 0    Associated Diagnoses: Vitamin B12 deficiency      docusate sodium (COLACE) 100 mg capsule Take 1 capsule (100 mg total) by mouth 2 (two) times a day  Qty: 10 capsule, Refills: 0    Associated Diagnoses: Abdominal pain; Ileus ([facility] );  Abdominal pain, acute, left lower quadrant      !! esomeprazole (NexIUM) 40 MG capsule Take 40 mg by mouth every morning before breakfast      fluticasone (FLONASE) 50 mcg/act nasal spray 2 sprays into each nostril 2 (two) times a day for 3 days  Qty: 16 g, Refills: 0    Associated Diagnoses: Sinusitis      lidocaine (LIDODERM) 5 % Apply 1 patch topically daily for 5 days Remove & Discard patch within 12 hours or as directed by MD  Qty: 5 patch, Refills: 0    Associated Diagnoses: Sinusitis      tetrahydrozoline-zinc (VISINE-AC) 0 05-0 25 % ophthalmic solution Administer 2 drops to both eyes 3 (three) times a day as needed (watery eyes)  Qty: 15 mL, Refills: 0    Associated Diagnoses: Sinusitis       ! ! - Potential duplicate medications found  Please discuss with provider  No discharge procedures on file      PDMP Review     None          ED Provider  Electronically Signed by           Reece Sanchez DO  06/23/21 0860

## 2021-06-23 NOTE — CONSULTS
Consultation - Cardiology   Thomas Richey 80 y o  female MRN: [id number]  Unit/Bed#: 44281 [facility] 411-01 Encounter: [id number]    Assessment/Plan     Assessment:  1  Abnormal troponins concerning for non MI troponin elevation in the setting abnormal TSH and urinary tract infection  2  History of coronary artery disease and CABG  3  Hypertension  4  Dyslipidemia  5  Weakness concerning for possible UTI  6  Hypothyroidism with elevated TSH       Plan:  Patient has been admitted to the hospitalist service  1  She started on IV Rocephin for possible UTI    2  Will obtain 2D echocardiogram to evaluate cardiac function, structure wall motion    3  Will initiate weight based Lovenox 1 milligram/kilogram q 12 hours    4  Continue trend troponins and delayed peak    5  Discussed with daughter at bedside, at this time they would like conservative workup with echocardiogram and possible nuclear stress testing if echo is abnormal     6  Beta-blocker currently held due to bradycardia    7  Continue low intensity statin therapy as she was taking pre-hospital    History of Present Illness   Physician Requesting Consult: Juliocesar Orellana MD  Reason for Consult / Principal Problem:  Abnormal troponins      HPI: Thomas Richey is a 80y o  year old female who presented to the emergency room for evaluation of complaints of lightheadedness, weakness and shortness of breath  On presentation 1st troponin was noted to be 0 19, and 2nd troponin was 0 97  Initial 12 lead EKG was unremarkable  Her NT proBNP was 1691  She was also noted to have a TSH of 27 644  There was some concern whether mom is taking her medications correctly  UA was also positive for nitrates and innumerable bacteria concerning for possible UTI  Patient has been admitted for further evaluation  Patient has history of coronary artery disease with history of CABG approximately 10 years ago, GERD, hypertension, dyslipidemia and osteoarthritis    She had her bypass surgery performed at [facility] and up until approximately 3 years ago was following with   [facility]  Patient lives independently with a caregiver during the week and goes to her daughter's house on the weekend  She does light housework, though daughter does note over the last 6 months they have noted a decline in both her activity and appetite  Patient denies any chest pain shoulder pain abdomen pain nausea or vomiting  Inpatient consult to Cardiology  Consult performed by: VARSHA Arteaga  Consult ordered by: VARSHA Schwartz          Review of Systems   Constitutional: Negative  Negative for activity change and fever  HENT: Negative  Negative for congestion, facial swelling, postnasal drip and sore throat  Eyes: Negative  Negative for photophobia and visual disturbance  Respiratory: Positive for shortness of breath  Negative for chest tightness  Cardiovascular: Negative  Negative for chest pain, palpitations and leg swelling  Gastrointestinal: Negative for abdominal distention, constipation, diarrhea, nausea and vomiting  Endocrine: Negative for polydipsia, polyphagia and polyuria  Genitourinary: Negative  Negative for difficulty urinating  Musculoskeletal: Negative  Skin: Negative  Neurological: Positive for dizziness, weakness and light-headedness  Negative for speech difficulty  Hematological: Negative  Psychiatric/Behavioral: Negative          Historical Information   Past Medical History:   Diagnosis Date    CAD (coronary artery disease)     Disease of thyroid gland     hypo    GERD (gastroesophageal reflux disease)     Gout     right shoulder    Hyperlipidemia     Hypertension     Osteoarthritis (arthritis due to wear and tear of joints)      Past Surgical History:   Procedure Laterality Date    CORONARY ARTERY BYPASS GRAFT      ENDOSCOPIC STENT PLACEMENT W/ MLB Left 2012    HIATAL HERNIA REPAIR      JOINT REPLACEMENT Left     knee 2001    ROTATOR CUFF REPAIR      TUBAL LIGATION  2013     Social History     Substance and Sexual Activity   Alcohol Use Never     Social History     Substance and Sexual Activity   Drug Use Never     E-Cigarette/Vaping    E-Cigarette Use Never User      E-Cigarette/Vaping Substances     Social History     Tobacco Use   Smoking Status Never Smoker   Smokeless Tobacco Never Used     Family History: History reviewed  No pertinent family history  Meds/Allergies   all current active meds have been reviewed, current meds:   Current Facility-Administered Medications   Medication Dose Route Frequency    acetaminophen (TYLENOL) tablet 650 mg  650 mg Oral Q6H PRN    amLODIPine (NORVASC) tablet 5 mg  5 mg Oral Daily    atorvastatin (LIPITOR) tablet 10 mg  10 mg Oral Daily With Dinner    calcium carbonate (TUMS) chewable tablet 1,000 mg  1,000 mg Oral Daily PRN    [START ON 6/24/2021] cefTRIAXone (ROCEPHIN) IVPB (premix in dextrose) 1,000 mg 50 mL  1,000 mg Intravenous Q24H    cholecalciferol (VITAMIN D3) tablet 1,000 Units  1,000 Units Oral Daily    docusate sodium (COLACE) capsule 100 mg  100 mg Oral BID    [START ON 6/24/2021] levothyroxine tablet 75 mcg  75 mcg Oral Early Morning    multi-electrolyte (PLASMALYTE-A/ISOLYTE-S PH 7 4) IV solution  40 mL/hr Intravenous Continuous    ondansetron (ZOFRAN) injection 4 mg  4 mg Intravenous Q6H PRN    [START ON 6/24/2021] pantoprazole (PROTONIX) EC tablet 40 mg  40 mg Oral Early Morning    polyethylene glycol (MIRALAX) packet 17 g  17 g Oral Daily PRN    [START ON 6/24/2021] pregabalin (LYRICA) capsule 75 mg  75 mg Oral Daily    and PTA meds:   Prior to Admission Medications   Prescriptions Last Dose Informant Patient Reported? Taking?    amLODIPine (NORVASC) 5 mg tablet 6/22/2021 at Unknown time  Yes Yes   Sig: Take 5 mg by mouth daily   atorvastatin (LIPITOR) 10 mg tablet 6/22/2021 at Unknown time  Yes Yes   Sig: Take 10 mg by mouth daily   celecoxib (CeleBREX) 200 mg capsule 2021 at Unknown time  Yes Yes   Sig: Take 200 mg by mouth daily   cholecalciferol (VITAMIN D3) 1,000 units tablet 2021 at Unknown time Self Yes Yes   Sig: Take 1,000 Units by mouth daily   cyanocobalamin (VITAMIN B-12) 1,000 mcg tablet Not Taking at Unknown time  No No   Sig: Take 1 tablet (1,000 mcg total) by mouth daily   Patient not taking: Reported on 2021   docusate sodium (COLACE) 100 mg capsule Unknown at Unknown time  No No   Sig: Take 1 capsule (100 mg total) by mouth 2 (two) times a day   esomeprazole (NexIUM) 40 MG capsule 2021 at Unknown time  No Yes   Sig: Take 1 capsule (40 mg total) by mouth daily   esomeprazole (NexIUM) 40 MG capsule   Yes No   Sig: Take 40 mg by mouth every morning before breakfast   fluticasone (FLONASE) 50 mcg/act nasal spray   No No   Si sprays into each nostril 2 (two) times a day for 3 days   levothyroxine 75 mcg tablet 2021 at Unknown time  Yes Yes   Sig: Take 75 mcg by mouth daily   lidocaine (LIDODERM) 5 %   No No   Sig: Apply 1 patch topically daily for 5 days Remove & Discard patch within 12 hours or as directed by MD   pregabalin (LYRICA) 75 mg capsule 2021 at Unknown time  Yes Yes   Sig: Take 75 mg by mouth daily    tetrahydrozoline-zinc (VISINE-AC) 0 05-0 25 % ophthalmic solution Unknown at Unknown time  No No   Sig: Administer 2 drops to both eyes 3 (three) times a day as needed (watery eyes)      Facility-Administered Medications: None     Allergies   Allergen Reactions    Codeine Hives    Percocet [Oxycodone-Acetaminophen] Vomiting       Objective   Vitals: Blood pressure 143/69, pulse 56, temperature 97 6 °F (36 4 °C), resp  rate 20, height 4' 7" (1 397 m), weight 59 9 kg (132 lb 1 6 oz), SpO2 97 %, not currently breastfeeding    Orthostatic Blood Pressures      Most Recent Value   Blood Pressure  143/69 filed at 2021 1525   Patient Position - Orthostatic VS  Sitting filed at 06/23/2021 1114            Intake/Output Summary (Last 24 hours) at 6/23/2021 1544  Last data filed at 6/23/2021 1233  Gross per 24 hour   Intake 500 ml   Output --   Net 500 ml       Invasive Devices     Peripheral Intravenous Line            Peripheral IV 06/23/21 Left Forearm <1 day                Physical Exam  Vitals and nursing note reviewed  Constitutional:       General: She is not in acute distress  Appearance: Normal appearance  HENT:      Right Ear: External ear normal       Left Ear: External ear normal       Nose: Nose normal    Eyes:      General: No scleral icterus  Right eye: No discharge  Left eye: No discharge  Cardiovascular:      Rate and Rhythm: Normal rate  Pulses: Normal pulses  Heart sounds: Murmur heard  Pulmonary:      Effort: Pulmonary effort is normal  No respiratory distress  Breath sounds: No wheezing or rales  Abdominal:      General: Bowel sounds are normal  There is no distension  Palpations: Abdomen is soft  Musculoskeletal:      Cervical back: Normal range of motion and neck supple  Right lower leg: No edema  Left lower leg: No edema  Skin:     General: Skin is warm and dry  Capillary Refill: Capillary refill takes less than 2 seconds  Neurological:      General: No focal deficit present  Mental Status: She is alert and oriented to person, place, and time  Mental status is at baseline  Psychiatric:         Mood and Affect: Mood normal          Behavior: Behavior normal          Thought Content: Thought content normal          Judgment: Judgment normal          Lab Results:   I have personally reviewed pertinent lab results      CBC with diff:   Results from last 7 days   Lab Units 06/23/21  1125   WBC Thousand/uL 5 59   RBC Million/uL 5 12   HEMOGLOBIN g/dL 14 0   HEMATOCRIT % 43 4   MCV fL 85   MCH pg 27 3   MCHC g/dL 32 3   RDW % 16 3*   MPV fL 9 6   PLATELETS Thousands/uL 223     CMP:   Results from last 7 days   Lab Units 06/23/21  1125   SODIUM mmol/L 136   POTASSIUM mmol/L 4 1   CHLORIDE mmol/L 99*   CO2 mmol/L 29   BUN mg/dL 19   CREATININE mg/dL 1 20   CALCIUM mg/dL 9 2   AST U/L 28   ALT U/L 27   ALK PHOS U/L 80   EGFR ml/min/1 73sq m 38     Troponin:   0   Lab Value Date/Time    TROPONINI 0 97 (H) 06/23/2021 1446    TROPONINI 0 19 (H) 06/23/2021 1125    TROPONINI <0 02 07/30/2019 2239    TROPONINI <0 02 04/17/2019 1351     BNP:   Results from last 7 days   Lab Units 06/23/21  1125   POTASSIUM mmol/L 4 1   CHLORIDE mmol/L 99*   CO2 mmol/L 29   BUN mg/dL 19   CREATININE mg/dL 1 20   CALCIUM mg/dL 9 2   EGFR ml/min/1 73sq m 38     Coags:     TSH:   Results from last 7 days   Lab Units 06/23/21  1125   TSH 3RD GENERATON uIU/mL 27 644*     Magnesium:   Results from last 7 days   Lab Units 06/23/21  1125   MAGNESIUM mg/dL 1 8     Lipid Profile:     Imaging: I have personally reviewed pertinent reports      EKG:  Sinus rhythm without acute changes  VTE Prophylaxis: Sequential compression device Cassie Hayes)     Code Status: Level 3 - DNAR and DNI  Advance Directive and Living Will:      Power of :    POLST:      Aaliyah Lloyd, [facility]

## 2021-06-24 ENCOUNTER — APPOINTMENT (INPATIENT)
Dept: NON INVASIVE DIAGNOSTICS | Facility: HOSPITAL | Age: 86
DRG: 690 | End: 2021-06-24
Payer: MEDICARE

## 2021-06-24 LAB
ALBUMIN SERPL BCP-MCNC: 3.1 G/DL (ref 3.5–5)
ALP SERPL-CCNC: 69 U/L (ref 46–116)
ALT SERPL W P-5'-P-CCNC: 19 U/L (ref 12–78)
ANION GAP SERPL CALCULATED.3IONS-SCNC: 9 MMOL/L (ref 4–13)
AST SERPL W P-5'-P-CCNC: 31 U/L (ref 5–45)
BASOPHILS # BLD AUTO: 0.04 THOUSANDS/ΜL (ref 0–0.1)
BASOPHILS NFR BLD AUTO: 1 % (ref 0–1)
BILIRUB SERPL-MCNC: 0.6 MG/DL (ref 0.2–1)
BUN SERPL-MCNC: 20 MG/DL (ref 5–25)
CALCIUM ALBUM COR SERPL-MCNC: 9.2 MG/DL (ref 8.3–10.1)
CALCIUM SERPL-MCNC: 8.5 MG/DL (ref 8.3–10.1)
CHLORIDE SERPL-SCNC: 102 MMOL/L (ref 100–108)
CHOLEST SERPL-MCNC: 148 MG/DL (ref 50–200)
CO2 SERPL-SCNC: 27 MMOL/L (ref 21–32)
CREAT SERPL-MCNC: 1.14 MG/DL (ref 0.6–1.3)
EOSINOPHIL # BLD AUTO: 0.07 THOUSAND/ΜL (ref 0–0.61)
EOSINOPHIL NFR BLD AUTO: 1 % (ref 0–6)
ERYTHROCYTE [DISTWIDTH] IN BLOOD BY AUTOMATED COUNT: 16.4 % (ref 11.6–15.1)
GFR SERPL CREATININE-BSD FRML MDRD: 40 ML/MIN/1.73SQ M
GLUCOSE SERPL-MCNC: 85 MG/DL (ref 65–140)
HCT VFR BLD AUTO: 36.3 % (ref 34.8–46.1)
HDLC SERPL-MCNC: 42 MG/DL
HGB BLD-MCNC: 11.7 G/DL (ref 11.5–15.4)
IMM GRANULOCYTES # BLD AUTO: 0.02 THOUSAND/UL (ref 0–0.2)
IMM GRANULOCYTES NFR BLD AUTO: 0 % (ref 0–2)
LDLC SERPL CALC-MCNC: 91 MG/DL (ref 0–100)
LYMPHOCYTES # BLD AUTO: 1.68 THOUSANDS/ΜL (ref 0.6–4.47)
LYMPHOCYTES NFR BLD AUTO: 33 % (ref 14–44)
MAGNESIUM SERPL-MCNC: 1.9 MG/DL (ref 1.6–2.6)
MCH RBC QN AUTO: 27.4 PG (ref 26.8–34.3)
MCHC RBC AUTO-ENTMCNC: 32.2 G/DL (ref 31.4–37.4)
MCV RBC AUTO: 85 FL (ref 82–98)
MONOCYTES # BLD AUTO: 0.42 THOUSAND/ΜL (ref 0.17–1.22)
MONOCYTES NFR BLD AUTO: 8 % (ref 4–12)
NEUTROPHILS # BLD AUTO: 2.93 THOUSANDS/ΜL (ref 1.85–7.62)
NEUTS SEG NFR BLD AUTO: 57 % (ref 43–75)
NONHDLC SERPL-MCNC: 106 MG/DL
NRBC BLD AUTO-RTO: 0 /100 WBCS
PHOSPHATE SERPL-MCNC: 2.9 MG/DL (ref 2.3–4.1)
PLATELET # BLD AUTO: 197 THOUSANDS/UL (ref 149–390)
PMV BLD AUTO: 10.3 FL (ref 8.9–12.7)
POTASSIUM SERPL-SCNC: 4.2 MMOL/L (ref 3.5–5.3)
PROT SERPL-MCNC: 6.1 G/DL (ref 6.4–8.2)
RBC # BLD AUTO: 4.27 MILLION/UL (ref 3.81–5.12)
SODIUM SERPL-SCNC: 138 MMOL/L (ref 136–145)
TRIGL SERPL-MCNC: 75 MG/DL
TROPONIN I SERPL-MCNC: 1.82 NG/ML
TROPONIN I SERPL-MCNC: 2 NG/ML
WBC # BLD AUTO: 5.16 THOUSAND/UL (ref 4.31–10.16)

## 2021-06-24 PROCEDURE — 84484 ASSAY OF TROPONIN QUANT: CPT | Performed by: PHYSICIAN ASSISTANT

## 2021-06-24 PROCEDURE — 99232 SBSQ HOSP IP/OBS MODERATE 35: CPT | Performed by: INTERNAL MEDICINE

## 2021-06-24 PROCEDURE — 97167 OT EVAL HIGH COMPLEX 60 MIN: CPT

## 2021-06-24 PROCEDURE — 84100 ASSAY OF PHOSPHORUS: CPT | Performed by: NURSE PRACTITIONER

## 2021-06-24 PROCEDURE — 80053 COMPREHEN METABOLIC PANEL: CPT | Performed by: NURSE PRACTITIONER

## 2021-06-24 PROCEDURE — 97535 SELF CARE MNGMENT TRAINING: CPT

## 2021-06-24 PROCEDURE — 93306 TTE W/DOPPLER COMPLETE: CPT

## 2021-06-24 PROCEDURE — 93306 TTE W/DOPPLER COMPLETE: CPT | Performed by: INTERNAL MEDICINE

## 2021-06-24 PROCEDURE — 85025 COMPLETE CBC W/AUTO DIFF WBC: CPT | Performed by: NURSE PRACTITIONER

## 2021-06-24 PROCEDURE — 83735 ASSAY OF MAGNESIUM: CPT | Performed by: NURSE PRACTITIONER

## 2021-06-24 PROCEDURE — 80061 LIPID PANEL: CPT | Performed by: INTERNAL MEDICINE

## 2021-06-24 RX ADMIN — Medication 12.5 MG: at 21:40

## 2021-06-24 RX ADMIN — PANTOPRAZOLE SODIUM 40 MG: 40 TABLET, DELAYED RELEASE ORAL at 06:27

## 2021-06-24 RX ADMIN — CEFTRIAXONE 1000 MG: 1 INJECTION, SOLUTION INTRAVENOUS at 12:47

## 2021-06-24 RX ADMIN — ENOXAPARIN SODIUM 30 MG: 30 INJECTION SUBCUTANEOUS at 12:48

## 2021-06-24 RX ADMIN — PREGABALIN 75 MG: 75 CAPSULE ORAL at 08:39

## 2021-06-24 RX ADMIN — Medication 12.5 MG: at 08:38

## 2021-06-24 RX ADMIN — ATORVASTATIN CALCIUM 20 MG: 20 TABLET, FILM COATED ORAL at 17:29

## 2021-06-24 RX ADMIN — LEVOTHYROXINE SODIUM 75 MCG: 75 TABLET ORAL at 06:27

## 2021-06-24 RX ADMIN — ASPIRIN 162 MG: 81 TABLET, COATED ORAL at 08:37

## 2021-06-24 RX ADMIN — Medication 1000 UNITS: at 08:38

## 2021-06-24 RX ADMIN — DOCUSATE SODIUM 100 MG: 100 CAPSULE, LIQUID FILLED ORAL at 17:29

## 2021-06-24 RX ADMIN — DOCUSATE SODIUM 100 MG: 100 CAPSULE, LIQUID FILLED ORAL at 08:37

## 2021-06-24 NOTE — ASSESSMENT & PLAN NOTE
· TSH is significantly elevated at 27,644  Free T4 within normal limits     · Patient was not taking her synthroid as directed   · Continue home dose of 75mcg of levothyroxine daily; ensure patient is taking first thing in the morning on an empty stomach

## 2021-06-24 NOTE — ASSESSMENT & PLAN NOTE
· 500 ml NSS bolus administered in the ED with positive result  · Given gentle Isolyte while hospitalized, will discontinued   · Monitor I & O  · PT/OT eval

## 2021-06-24 NOTE — ASSESSMENT & PLAN NOTE
· UA is positive for nitrite and bacteria  · Urine culture growing gram negative rods   · Continue Rocephin, day #2  · Follow-up urine culture

## 2021-06-24 NOTE — ASSESSMENT & PLAN NOTE
· Non MI elevated troponin level, likely due to UTI   · Consult consulted and aware of rising troponin  Troponin peaked at 2 0   Trended down to 1 82    · Given 1x dose of therapeutic Lovenox on admission   · Continue statin and ASA  · Per cardiology, follow-up echocardiogram today and plan for nuclear stress test tomorrow  · Monitor on telemetry

## 2021-06-24 NOTE — PROGRESS NOTES
[facility] 45  Progress Note - Gillian Matthews 5/5/1924, 80 y o  female MRN: [id number]  Unit/Bed#: [address] [place] [address] Encounter: [id number]  Primary Care Provider: Heavenly Gonzalez MD   Date and time admitted to hospital: 6/23/2021 11:09 AM    * UTI (urinary tract infection)  Assessment & Plan  · UA is positive for nitrite and bacteria  · Urine culture growing gram negative rods   · Continue Rocephin, day #2  · Follow-up urine culture     Elevated troponin  Assessment & Plan  · Non MI elevated troponin level, likely due to UTI   · Consult consulted and aware of rising troponin  Troponin peaked at 2 0  Trended down to 1 82    · Given 1x dose of therapeutic Lovenox on admission   · Continue statin and ASA  · Per cardiology, follow-up echocardiogram today and plan for nuclear stress test tomorrow  · Monitor on telemetry    Generalized weakness  Assessment & Plan  · 500 ml NSS bolus administered in the ED with positive result  · Given gentle Isolyte while hospitalized, will discontinued   · Monitor I & O  · PT/OT eval     CAD (coronary artery disease)  Assessment & Plan  Hst of CAD s/p CABG about 10 years ago  Patient stopped following with cardiology due to her age  No recent cardiac testing  · Continue statin    Hypothyroidism  Assessment & Plan  · TSH is significantly elevated at 27,644  Free T4 within normal limits  · Patient was not taking her synthroid as directed   · Continue home dose of 75mcg of levothyroxine daily; ensure patient is taking first thing in the morning on an empty stomach     Hyperlipidemia  Assessment & Plan  · Continue Lipitor 20mg    Hypertension  Assessment & Plan  · BP controlled  · Continue Norvasc 5 mg daily     VTE Pharmacologic Prophylaxis:   Pharmacologic: Enoxaparin (Lovenox)  Mechanical VTE Prophylaxis in Place: No    Patient Centered Rounds: I have performed bedside rounds with nursing staff today      Discussions with Specialists or Other Care Team Provider: nursing, CM, cardiology    Education and Discussions with Family / Patient: I have answered all questions to the best of my ability  Spoke with daughter at bedside  Time Spent for Care: 30 minutes  More than 50% of total time spent on counseling and coordination of care as described above  Current Length of Stay: 1 day(s)    Current Patient Status: Inpatient   Certification Statement: The patient will continue to require additional inpatient hospital stay due to ischemic workup, awaiting urine culture, PT eval     Discharge Plan: Patient is not medically stable for discharge today, likely in 24 hours  Code Status: Level 3 - DNAR and DNI      Subjective:   Resting in bed on room air  Denies HA, dizziness, CP  Denies abdominal pain, N/V/D  Overall, feeling better  Willing to undergo stress test tomorrow  Objective:     Vitals:   Temp (24hrs), Av 1 °F (36 2 °C), Min:96 1 °F (35 6 °C), Max:97 8 °F (36 6 °C)    Temp:  [96 1 °F (35 6 °C)-97 8 °F (36 6 °C)] 97 4 °F (36 3 °C)  HR:  [46-67] 46  Resp:  [17-18] 18  BP: (118-136)/(61-72) 125/62  SpO2:  [93 %-98 %] 95 %  Body mass index is 31 15 kg/m²  Input and Output Summary (last 24 hours): Intake/Output Summary (Last 24 hours) at 2021 1638  Last data filed at 2021 0701  Gross per 24 hour   Intake 240 ml   Output 150 ml   Net 90 ml       Physical Exam:     Physical Exam  Vitals and nursing note reviewed  Constitutional:       General: She is not in acute distress  Appearance: She is well-developed  She is ill-appearing  She is not toxic-appearing or diaphoretic  HENT:      Head: Normocephalic  Cardiovascular:      Rate and Rhythm: Normal rate and regular rhythm  Pulmonary:      Effort: Pulmonary effort is normal  No respiratory distress  Breath sounds: Normal breath sounds  No wheezing, rhonchi or rales  Abdominal:      General: Bowel sounds are normal  There is no distension  Palpations: Abdomen is soft        Tenderness: There is no abdominal tenderness  Musculoskeletal:         General: Normal range of motion  Cervical back: Normal range of motion  Right lower leg: No edema  Left lower leg: No edema  Skin:     General: Skin is warm and dry  Neurological:      Mental Status: She is alert and oriented to person, place, and time  Mental status is at baseline  Deep Tendon Reflexes: Reflexes are normal and symmetric  Psychiatric:         Mood and Affect: Mood normal          Behavior: Behavior normal          Thought Content: Thought content normal          Additional Data:     Labs:    Results from last 7 days   Lab Units 06/24/21  0624   WBC Thousand/uL 5 16   HEMOGLOBIN g/dL 11 7   HEMATOCRIT % 36 3   PLATELETS Thousands/uL 197   NEUTROS PCT % 57   LYMPHS PCT % 33   MONOS PCT % 8   EOS PCT % 1     Results from last 7 days   Lab Units 06/24/21  0624   POTASSIUM mmol/L 4 2   CHLORIDE mmol/L 102   CO2 mmol/L 27   BUN mg/dL 20   CREATININE mg/dL 1 14   CALCIUM mg/dL 8 5   ALK PHOS U/L 69   ALT U/L 19   AST U/L 31           * I Have Reviewed All Lab Data Listed Above  * Additional Pertinent Lab Tests Reviewed:  All Labs Within Last 24 Hours Reviewed    Imaging:    Imaging Reports Reviewed Today Include: CXR  Imaging Personally Reviewed by Myself Includes:  None     Recent Cultures (last 7 days):     Results from last 7 days   Lab Units 06/23/21  1228   URINE CULTURE  >100,000 cfu/ml Gram Negative Robert Enteric Like*       Last 24 Hours Medication List:   Current Facility-Administered Medications   Medication Dose Route Frequency Provider Last Rate    acetaminophen  650 mg Oral Q6H PRN VARSHA Jarrett      aspirin  162 mg Oral Daily Jackie Dyer MD      atorvastatin  20 mg Oral Daily With Robin Hernandez MD      calcium carbonate  1,000 mg Oral Daily PRN VARSHA Jarrett      cefTRIAXone  1,000 mg Intravenous Q24H DelroyVARSHA Morales 1,000 mg (06/24/21 1247)   Nedra rGimm cholecalciferol  1,000 Units Oral Daily VARSHA Dos Santos      docusate sodium  100 mg Oral BID VARSHA Dos Santos      enoxaparin  30 mg Subcutaneous Q24H [address]      levothyroxine  75 mcg Oral Early Morning VARSHA Dos Santos      metoprolol tartrate  12 5 mg Oral Q12H [address] Anushka Alcala MD      ondansetron  4 mg Intravenous Q6H PRN VARSHA Dos Santos      pantoprazole  40 mg Oral Early Morning VARSHA Dos Santos      polyethylene glycol  17 g Oral Daily PRN VARSHA Dos Santos      pregabalin  75 mg Oral Daily VARSHA Dos Santos          Today, Patient Was Seen By: VARSHA Dos Santos    ** Please Note: Dictation voice to text software may have been used in the creation of this document   **

## 2021-06-24 NOTE — OCCUPATIONAL THERAPY NOTE
Occupational Therapy Evaluation/Treatment       06/24/21 1154   Note Type   Note type Evaluation   Pain Assessment   Pain Assessment Tool Pain Assessment not indicated - pt denies pain   Home Living   Type of [address] One level   Bathroom Shower/Tub Tub/shower unit   Bathroom Toilet Standard   Bathroom Equipment Shower chair;Grab bars in [facility] 84 (Comment)  (upright walker)   Additional Comments Pt ambulating with upright walker in her home, uses w/c outside for longer distances   Prior Function   Level of [place] Independent with ADLs and functional mobility; Needs assistance with IADLs   Lives With Alone   Receives Help From Family;Personal care attendant  (Caregiver every week day for about 8 hours/day)   ADL Assistance Independent   IADLs Needs assistance   Vocational Retired   Comments Pt has a caregiver to assist with all homemaking tasks  Otherwise pt is independent  Stays at her daughter's home every weekend  Psychosocial   Psychosocial (WDL) WDL   Subjective   Subjective "I don't know why I'm here  I feel fine   I don't believe them "   ADL   Where Assessed Chair   Eating Assistance 7  Independent   Grooming Assistance 5  Supervision/Setup   UB Bathing Assistance 4  Minimal Assistance   LB Bathing Assistance 4  Minimal Assistance   [address] 5  Supervision/Setup   LB Dressing Assistance 5  [address]  5  Supervision/Setup   Functional Deficit Setup;Verbal cueing;Supervision/safety   Additional Comments for all ADLS due to decreased strength, decreased endurance and decreased balance at this time   Bed Mobility   Supine to [address] to Stand 5  Supervision   Additional items Verbal cues   Stand to Sit 5  Supervision   Additional items Verbal cues   Balance   Static Sitting Good   Dynamic Sitting Good   Static Standing Fair +   Dynamic Standing Jef Brody 0805 +  (with RW) Activity Tolerance   Activity Tolerance Patient tolerated treatment well;Patient limited by fatigue   Nurse Made Aware Sahara Gaona RN   RUE Assessment   RUE Assessment WFL  (gross strength 3+/5)   LUE Assessment   LUE Assessment WFL  (gross strength 3+/5)   Hand Function   Gross Motor Coordination Functional   Fine Motor Coordination Functional   Vision-Basic Assessment   Current Vision Wears glasses only for reading   Perception   Spatial Orientation Appears intact   Motor Planning Appears intact   Cognition   Arousal/Participation Alert; Cooperative   Attention Attends with cues to redirect   Orientation Level Oriented to person;Oriented to place; Disoriented to time   Memory Decreased recall of recent events   Following Commands Follows multistep commands with increased time or repetition   Comments decreased safety awareness   Assessment   Limitation Decreased ADL status; Decreased UE strength;Decreased Safe judgement during ADL;Decreased endurance;Decreased self-care trans;Decreased high-level ADLs  (decreased balance and mobility)   Prognosis Good   Assessment Patient evaluated by Occupational Therapy  Patient admitted with UTI (urinary tract infection)  The patients occupational profile, medical and therapy history includes a extensive additional review of physical, cognitive, or psychosocial history related to current functional performance  Comorbidities affecting functional mobility and ADLS include: coronary artery disease, CABG, GERD, hypertension, dyslipidemia and osteoarthritis, hypothyroid, gout R shoulderf  Prior to admission, patient was independent with functional mobility with upright walker, independent with ADLS, requiring assist for IADLS and living alone in 1 story home with no steps to enter, with frequent daily supervision    The evaluation identifies the following performance deficits: weakness, impaired balance, decreased endurance, new onset of impairment of functional mobility, decreased ADLS, decreased IADLS, decreased activity tolerance, decreased safety awareness, decreased cognition and decreased strength, that result in activity limitations and/or participation restrictions  This evaluation requires clinical decision making of high complexity, because the patient presents with comorbidites that affect occupational performance and required significant modification of tasks or assistance with consideration of multiple treatment options  The Barthel Index was used as a functional outcome tool presenting with a score of 50, indicating marked limitations of functional mobility and ADLS  The patient's raw score on the -PAC Daily Activity inpatient short form is 19 , standardized score is 40 22, greater than 39 4  Patients at this level are likely to benefit from DC to home  Please refer to the recommendation of the Occupational Therapist for safe DC planning  Patient will benefit from skilled Occupational Therapy services to address above deficits and facilitate a safe return to prior level of function  Goals   Patient Goals go home   STG Time Frame   (1-7)   Short Term Goal #1 Patient will increase standing tolerance to 4 minutes during ADL task to decrease assistance level and decrease fall risk; Patient will increase bed mobility to independent in preparation for ADLS and transfers;  Patient will increase functional mobility to and from bathroom with rolling walker independently to increase performance with ADLS and to use a toilet; Patient will tolerate 10 minutes of UE ROM/strengthening to increase general activity tolerance and performance in ADLS/IADLS; Patient will improve functional activity tolerance to 10 minutes of sustained functional tasks to increase participation in basic self-care and decrease assistance level;  Patient will be able to to verbalize understanding and perform energy conservation/proper body mechanics during ADLS and functional mobility at least 75% of the time with minimal cueing to decrease signs of fatigue and increase stamina to return to prior level of function;    LTG Time Frame   (8-14)   Long Term Goal #1 Patient will increase standing tolerance to 8 minutes during ADL task to decrease assistance level and decrease fall risk; Patient will tolerate 15 minutes of UE ROM/strengthening to increase general activity tolerance and performance in ADLS/IADLS; Patient will improve functional activity tolerance to 15 minutes of sustained functional tasks to increase participation in basic self-care and decrease assistance level;  Patient will be able to to verbalize understanding and perform energy conservation/proper body mechanics during ADLS and functional mobility at least 90% of the time with minimal cueing to decrease signs of fatigue and increase stamina to return to prior level of function; Patient will increase static/dynamic standing balance to good to improve postural stability and decrease fall risk during standing ADLS and transfers; Pt will score >/= 24/24 on AM-PAC Daily Activity Inpatient scale to promote safe independence with ADLs and functional mobility; Pt will score >/= 70/100 on Barthel Index in order to decrease caregiver assistance needed and increase ability to perform ADLs and functional mobility  Functional Transfer Goals   Pt Will Perform All Functional Transfers With mod indep; With assistive devices; With cueing; With good judgment/safety  (LTG- Independent)   ADL Goals   Pt Will Perform All ADL's In chair; With stand by assist;With adaptive equipment; With good judgment/safety; With setup; With cues  (LTG- Independent)   Plan   Treatment Interventions ADL retraining;Functional transfer training;UE strengthening/ROM; Endurance training;Cognitive reorientation;Patient/family training;Equipment evaluation/education; Compensatory technique education; Energy conservation   Goal Expiration Date 07/08/21   OT Frequency 3-5x/wk   Additional Treatment Session Start Time 1140   End Time 1154   Treatment Assessment Pt seen for ADL training  Education and training begun with pt regarding energy conservation/proper body mechanics during ADLS and functional mobility to decrease fall risks, decrease signs of fatigue and increase stamina needed to return to prior level of function  Transfer bed to chair with Arun/Supervision and RW and cues  Functional ambulation 40 feet in room to obtain ADL supplies and walk to the bathroom with Arun and RW  Toileting with Supervision for clothing management and steadying  Pt able to stand at sink for 2 minutes to wash hands and face and comb hair  No SOB observed  Pt anxious about wanting to go home but cooperative and pleasant  Provided cardiac education and emotional support to pt  Pt demonstrating good verbal understanding of all information provided and all questions answered  Patient left OOB in chair with all needs within reach, SCD pumps, and tab alarm in place  Continue OT per POC  Recommendation   OT Discharge Recommendation Home with home health rehabilitation   AM-PAC Daily Activity Inpatient   Lower Body Dressing 3   Bathing 3   Toileting 3   Upper Body Dressing 3   Grooming 3   Eating 4   Daily Activity Raw Score 19   Daily Activity Standardized Score (Calc for Raw Score >=11) 40 22   AM-PAC Applied Cognition Inpatient   Following a Speech/Presentation 4   Understanding Ordinary Conversation 4   Taking Medications 4   Remembering Where Things Are Placed or Put Away 4   Remembering List of 4-5 Errands 3   Taking Care of Complicated Tasks 3   Applied Cognition Raw Score 22   Applied Cognition Standardized Score 47 83   Barthel Index   Feeding 10   Bathing 0   Grooming Score 0   Dressing Score 5   Bladder Score 10   Bowels Score 10   Toilet Use Score 5   Transfers (Bed/Chair) Score 10   Mobility (Level Surface) Score 0   Stairs Score 0   Barthel Index Score 50   Licensure   [place] License Number  Lala Ramos MS, OTR/L, [place] Lic# [id number]

## 2021-06-24 NOTE — PROGRESS NOTES
Progress Note - Cardiology   Tirso Ramos 80 y o  female MRN: [id number]  Unit/Bed#: [address] [place] [address] Encounter: [id number]    Assessment/Plan     Assessment and Plan:    1  Abnormal troponins     · Possibly secondary to non MI troponin elevation in the setting abnormal TSH and urinary tract infection  · Troponin peaked to 2 0  · EKG showed normal sinus rhythm without significant abnormality  · Initiated aspirin 162 mg daily  · Follow-up echo  · Lexiscan stress test    2  History of CAD s/p CABG    · CABG performed approximately 10 years ago at [facility]  · Patient does not appear be on aspirin or Plavix at home  · Troponin peaked to 2 0  · Patient continues to deny chest pain  · Patient not on beta-blocker due to bradycardia  · Initiated aspirin 162 mg daily  · Follow-up echo  · Lexiscan stress test    3  Hypertension    · Patient is on amlodipine 5 mg daily at home for hypertension  · Blood pressures appear well controlled this admission  · Continue home amlodipine    4  Dyslipidemia    · Patient has history of hyperlipidemia, on atorvastatin 10 mg daily at home  · May need to increase atorvastatin dose pending results of stress test    5  Weakness concerning for possible UTI    · Patient with weakness at home  · Urine culture grew out greater than 100,000 colony-forming units gram-negative rods  · Currently on ceftriaxone, care per primary    6  Hypothyroidism with elevated TSH     · Patient with history of hypothyroidism, maintained on levothyroxine 75 mg daily at home, TSH elevated to 27 6 on admission  · Care per primary      Subjective:    No acute overnight events  Patient denies chest pain and shortness of breath this morning  She states she is tolerating diet well this admission  We discussed that she would undergo echo and stress test to further workup her elevated troponin level  Objective   Vitals: Blood pressure 120/63, pulse 59, temperature 97 8 °F (36 6 °C), temperature source Oral, resp   rate 17, height 4' 7" (1 397 m), weight 60 8 kg (134 lb 0 6 oz), SpO2 98 %, not currently breastfeeding  Orthostatic Blood Pressures      Most Recent Value   Blood Pressure  120/63 filed at 06/24/2021 5723   Patient Position - Orthostatic VS  Lying filed at 06/24/2021 4039            Intake/Output Summary (Last 24 hours) at 6/24/2021 1327  Last data filed at 6/24/2021 0701  Gross per 24 hour   Intake 240 ml   Output 150 ml   Net 90 ml       Invasive Devices     Peripheral Intravenous Line            Peripheral IV 06/23/21 Left Forearm 1 day                Physical Exam  Vitals and nursing note reviewed  Constitutional:       General: She is not in acute distress  Appearance: Normal appearance  HENT:      Right Ear: External ear normal       Left Ear: External ear normal       Nose: Nose normal    Eyes:      General: No scleral icterus  Right eye: No discharge  Left eye: No discharge  Cardiovascular:      Rate and Rhythm: Normal rate  Pulses: Normal pulses  Heart sounds: Murmur heard  Pulmonary:      Effort: Pulmonary effort is normal  No respiratory distress  Breath sounds: No wheezing or rales  Abdominal:      General: Bowel sounds are normal  There is no distension  Palpations: Abdomen is soft  Musculoskeletal:      Cervical back: Normal range of motion and neck supple  Right lower leg: No edema  Left lower leg: No edema  Skin:     General: Skin is warm and dry  Capillary Refill: Capillary refill takes less than 2 seconds  Neurological:      General: No focal deficit present  Mental Status: She is alert and oriented to person, place, and time  Mental status is at baseline  Psychiatric:         Mood and Affect: Mood normal          Behavior: Behavior normal          Thought Content: Thought content normal          Judgment: Judgment normal          Lab Results:   I have personally reviewed pertinent lab results        Troponin:   0   Lab Value Date/Time    TROPONINI 1 82 (H) 06/24/2021 0624    TROPONINI 2 00 (H) 06/24/2021 0331    TROPONINI 1 99 (H) 06/23/2021 2153    TROPONINI 1 57 (H) 06/23/2021 1734    TROPONINI 0 97 (H) 06/23/2021 1446    TROPONINI 0 19 (H) 06/23/2021 1125    TROPONINI <0 02 07/30/2019 2239    TROPONINI <0 02 04/17/2019 1351     Coags:     TSH:   Results from last 7 days   Lab Units 06/23/21  1125   TSH 3RD GENERATON uIU/mL 27 644*     Lipid Profile:   Results from last 7 days   Lab Units 06/24/21  0624   HDL mg/dL 42   LDL CALC mg/dL 91   TRIGLYCERIDES mg/dL 75     Imaging: I have personally reviewed pertinent reports      EKG:  Sinus rhythm without acute changes  VTE Prophylaxis: Sequential compression device Jessica Daniel)     Code Status: Level 3 - DNAR and DNI    Reg Calvillo MD  Cardiology

## 2021-06-25 ENCOUNTER — APPOINTMENT (INPATIENT)
Dept: NON INVASIVE DIAGNOSTICS | Facility: HOSPITAL | Age: 86
DRG: 690 | End: 2021-06-25
Payer: MEDICARE

## 2021-06-25 ENCOUNTER — APPOINTMENT (INPATIENT)
Dept: RADIOLOGY | Facility: HOSPITAL | Age: 86
DRG: 690 | End: 2021-06-25
Payer: MEDICARE

## 2021-06-25 VITALS
WEIGHT: 129.63 LBS | OXYGEN SATURATION: 98 % | SYSTOLIC BLOOD PRESSURE: 132 MMHG | RESPIRATION RATE: 18 BRPM | DIASTOLIC BLOOD PRESSURE: 66 MMHG | HEIGHT: 55 IN | HEART RATE: 58 BPM | BODY MASS INDEX: 30 KG/M2 | TEMPERATURE: 96.8 F

## 2021-06-25 LAB
ANION GAP SERPL CALCULATED.3IONS-SCNC: 7 MMOL/L (ref 4–13)
BACTERIA UR CULT: ABNORMAL
BUN SERPL-MCNC: 18 MG/DL (ref 5–25)
CALCIUM SERPL-MCNC: 8.5 MG/DL (ref 8.3–10.1)
CHEST PAIN STATEMENT: NORMAL
CHLORIDE SERPL-SCNC: 103 MMOL/L (ref 100–108)
CO2 SERPL-SCNC: 28 MMOL/L (ref 21–32)
CREAT SERPL-MCNC: 1.11 MG/DL (ref 0.6–1.3)
GFR SERPL CREATININE-BSD FRML MDRD: 42 ML/MIN/1.73SQ M
GLUCOSE SERPL-MCNC: 88 MG/DL (ref 65–140)
MAGNESIUM SERPL-MCNC: 1.9 MG/DL (ref 1.6–2.6)
MAX DIASTOLIC BP: 67 MMHG
MAX HEART RATE: 67 BPM
MAX PREDICTED HEART RATE: 123 BPM
MAX. SYSTOLIC BP: 146 MMHG
POTASSIUM SERPL-SCNC: 4.1 MMOL/L (ref 3.5–5.3)
PROTOCOL NAME: NORMAL
REASON FOR TERMINATION: NORMAL
SODIUM SERPL-SCNC: 138 MMOL/L (ref 136–145)
TARGET HR FORMULA: NORMAL
TEST INDICATION: NORMAL
TIME IN EXERCISE PHASE: NORMAL

## 2021-06-25 PROCEDURE — 97110 THERAPEUTIC EXERCISES: CPT

## 2021-06-25 PROCEDURE — 78452 HT MUSCLE IMAGE SPECT MULT: CPT

## 2021-06-25 PROCEDURE — A9502 TC99M TETROFOSMIN: HCPCS

## 2021-06-25 PROCEDURE — 80048 BASIC METABOLIC PNL TOTAL CA: CPT | Performed by: NURSE PRACTITIONER

## 2021-06-25 PROCEDURE — 83735 ASSAY OF MAGNESIUM: CPT | Performed by: NURSE PRACTITIONER

## 2021-06-25 PROCEDURE — 93017 CV STRESS TEST TRACING ONLY: CPT

## 2021-06-25 PROCEDURE — 99239 HOSP IP/OBS DSCHRG MGMT >30: CPT | Performed by: NURSE PRACTITIONER

## 2021-06-25 PROCEDURE — 99232 SBSQ HOSP IP/OBS MODERATE 35: CPT | Performed by: INTERNAL MEDICINE

## 2021-06-25 PROCEDURE — 97163 PT EVAL HIGH COMPLEX 45 MIN: CPT

## 2021-06-25 PROCEDURE — G1004 CDSM NDSC: HCPCS

## 2021-06-25 RX ORDER — CEPHALEXIN 500 MG/1
500 CAPSULE ORAL EVERY 6 HOURS SCHEDULED
Qty: 8 CAPSULE | Refills: 0 | Status: SHIPPED | OUTPATIENT
Start: 2021-06-26 | End: 2021-06-28

## 2021-06-25 RX ORDER — ASPIRIN 81 MG/1
81 TABLET ORAL DAILY
Refills: 0
Start: 2021-06-25 | End: 2022-06-30

## 2021-06-25 RX ORDER — AMLODIPINE BESYLATE 5 MG/1
5 TABLET ORAL DAILY
Status: DISCONTINUED | OUTPATIENT
Start: 2021-06-25 | End: 2021-06-25 | Stop reason: HOSPADM

## 2021-06-25 RX ADMIN — ASPIRIN 162 MG: 81 TABLET, COATED ORAL at 08:01

## 2021-06-25 RX ADMIN — ACETAMINOPHEN 650 MG: 325 TABLET, FILM COATED ORAL at 07:57

## 2021-06-25 RX ADMIN — ATORVASTATIN CALCIUM 20 MG: 20 TABLET, FILM COATED ORAL at 15:36

## 2021-06-25 RX ADMIN — REGADENOSON 0.4 MG: 0.08 INJECTION, SOLUTION INTRAVENOUS at 13:31

## 2021-06-25 RX ADMIN — Medication 1000 UNITS: at 08:01

## 2021-06-25 RX ADMIN — AMLODIPINE BESYLATE 5 MG: 5 TABLET ORAL at 15:36

## 2021-06-25 RX ADMIN — PREGABALIN 75 MG: 75 CAPSULE ORAL at 08:01

## 2021-06-25 RX ADMIN — DOCUSATE SODIUM 100 MG: 100 CAPSULE, LIQUID FILLED ORAL at 08:01

## 2021-06-25 RX ADMIN — ENOXAPARIN SODIUM 30 MG: 30 INJECTION SUBCUTANEOUS at 08:02

## 2021-06-25 RX ADMIN — PANTOPRAZOLE SODIUM 40 MG: 40 TABLET, DELAYED RELEASE ORAL at 05:01

## 2021-06-25 RX ADMIN — LEVOTHYROXINE SODIUM 75 MCG: 75 TABLET ORAL at 05:01

## 2021-06-25 RX ADMIN — CEFTRIAXONE 1000 MG: 1 INJECTION, SOLUTION INTRAVENOUS at 15:36

## 2021-06-25 NOTE — PROGRESS NOTES
Progress Note - Cardiology   [facility]     Russ Gage 80 y o  female MRN: [id number]  : 1924  Unit/Bed#: [address] [place] [address] Encounter: [id number]    Assessment and Plan:   1  Abnormal troponins concerning for non MI troponin elevation in the setting abnormal TSH and urinary tract infection:  Peak troponin was 2    -  beta-blocker discontinued due to bradycardia    -  continue aspirin and Lipitor    2  History of coronary artery disease and CABG:  Patient for Lexiscan nuclear stress test today    -  continue aspirin and Lipitor    3  Hypertension:  Norvasc held on admission  Will resume at this time and continue monitor blood pressure  4  Dyslipidemia:  Continue statin therapy     5  Weakness concerning for possible UTI    6  Hypothyroidism with elevated TSH:  Managed per primary team    Subjective / Objective:   Patient seen examined  No complaints chest discomfort noted  For Lexiscan nuclear stress test today  Vitals: Blood pressure 162/75, pulse 77, temperature (!) 97 4 °F (36 3 °C), temperature source Oral, resp  rate 20, height 4' 7" (1 397 m), weight 58 8 kg (129 lb 10 1 oz), SpO2 96 %, not currently breastfeeding  Vitals:    21 0600 21 06   Weight: 60 8 kg (134 lb 0 6 oz) 58 8 kg (129 lb 10 1 oz)     Body mass index is 30 13 kg/m²  BP Readings from Last 3 Encounters:   21 162/75   21 165/99   19 155/68     Orthostatic Blood Pressures      Most Recent Value   Blood Pressure  162/75 filed at 2021 0736   Patient Position - Orthostatic VS  Lying filed at 2021 0736        I/O        07 -  07 07 -  07 07 -  0700    P  O  240 150     IV Piggyback 500      Total Intake(mL/kg) 740 (12 2) 150 (2 6)     Urine (mL/kg/hr)  1200 (0 9)     Total Output  1200     Net +740 -1050            Unmeasured Urine Occurrence 2 x          Invasive Devices     Peripheral Intravenous Line            Peripheral IV 06/23/21 Left Forearm 1 day                  Intake/Output Summary (Last 24 hours) at 6/25/2021 1102  Last data filed at 6/25/2021 0301  Gross per 24 hour   Intake 150 ml   Output 1050 ml   Net -900 ml         Physical Exam:   Physical Exam  Vitals and nursing note reviewed  Constitutional:       General: She is not in acute distress  Appearance: Normal appearance  She is well-developed  She is obese  HENT:      Head: Normocephalic  Right Ear: External ear normal       Left Ear: External ear normal    Eyes:      General: No scleral icterus  Right eye: No discharge  Left eye: No discharge  Neck:      Thyroid: No thyromegaly  Cardiovascular:      Rate and Rhythm: Regular rhythm  Bradycardia present  Frequent extrasystoles are present  Pulses: Normal pulses  Pulmonary:      Effort: Pulmonary effort is normal  No respiratory distress  Breath sounds: Normal breath sounds  No wheezing, rhonchi or rales  Abdominal:      General: Bowel sounds are normal  There is no distension  Palpations: Abdomen is soft  Musculoskeletal:      Cervical back: Normal range of motion and neck supple  Right lower leg: No edema  Left lower leg: No edema  Skin:     General: Skin is warm and dry  Capillary Refill: Capillary refill takes less than 2 seconds  Neurological:      General: No focal deficit present  Mental Status: She is alert and oriented to person, place, and time  Mental status is at baseline  Psychiatric:         Mood and Affect: Mood normal          Behavior: Behavior normal          Thought Content:  Thought content normal          Judgment: Judgment normal                    Medications/ Allergies:     Current Facility-Administered Medications   Medication Dose Route Frequency Provider Last Rate    acetaminophen  650 mg Oral Q6H PRN VARSHA Rose      aspirin  162 mg Oral Daily Monet Biggs MD      atorvastatin  20 mg Oral Daily With Sally Fox MD      calcium carbonate  1,000 mg Oral Daily PRN Víctor Breech, CRNP      cefTRIAXone  1,000 mg Intravenous Q24H Víctor Breech, CRNP 1,000 mg (06/24/21 1247)    cholecalciferol  1,000 Units Oral Daily Víctor Breech, CRNP      docusate sodium  100 mg Oral BID Víctor Breech, CRNP      enoxaparin  30 mg Subcutaneous Q24H [facility] Víctor Breech, CRNP      levothyroxine  75 mcg Oral Early Morning Víctor Breech, CRNP      metoprolol tartrate  12 5 mg Oral Q12H [address], MD      ondansetron  4 mg Intravenous Q6H PRN Víctor Breech, CRNP      pantoprazole  40 mg Oral Early Morning Víctor Breech, CRNP      polyethylene glycol  17 g Oral Daily PRN Víctor Breech, CRNP      pregabalin  75 mg Oral Daily Víctor Breech, CRNP       acetaminophen, 650 mg, Q6H PRN  calcium carbonate, 1,000 mg, Daily PRN  ondansetron, 4 mg, Q6H PRN  polyethylene glycol, 17 g, Daily PRN      Allergies   Allergen Reactions    Codeine Hives    Percocet [Oxycodone-Acetaminophen] Vomiting       VTE Pharmacologic Prophylaxis:   Sequential compression device (Venodyne)     Labs:   Troponins:  Results from last 7 days   Lab Units 06/24/21  0624 06/24/21  0331 06/23/21  2153   TROPONIN I ng/mL 1 82* 2 00* 1 99*     CBC with diff:  Results from last 7 days   Lab Units 06/24/21  0624 06/23/21  1125   WBC Thousand/uL 5 16 5 59   HEMOGLOBIN g/dL 11 7 14 0   HEMATOCRIT % 36 3 43 4   MCV fL 85 85   PLATELETS Thousands/uL 197 223   MCH pg 27 4 27 3   MCHC g/dL 32 2 32 3   RDW % 16 4* 16 3*   MPV fL 10 3 9 6   NRBC AUTO /100 WBCs 0 0     CMP:  Results from last 7 days   Lab Units 06/25/21  0506 06/24/21  0624 06/23/21  1125   SODIUM mmol/L 138 138 136   POTASSIUM mmol/L 4 1 4 2 4 1   CHLORIDE mmol/L 103 102 99*   CO2 mmol/L 28 27 29   ANION GAP mmol/L 7 9 8   BUN mg/dL 18 20 19   CREATININE mg/dL 1 11 1 14 1 20   CALCIUM mg/dL 8 5 8 5 9 2   AST U/L  --  31 28   ALT U/L  --  19 27   ALK PHOS U/L  --  69 80   TOTAL PROTEIN g/dL  --  6 1* 7 2   ALBUMIN g/dL  --  3 1* 3 8   TOTAL BILIRUBIN mg/dL  --  0 60 0 79   EGFR ml/min/1 73sq m 42 40 38     Magnesium:  Results from last 7 days   Lab Units 06/25/21  0506 06/24/21  0624 06/23/21  1125   MAGNESIUM mg/dL 1 9 1 9 1 8     TSH:  Results from last 7 days   Lab Units 06/23/21  1125   TSH 3RD GENERATON uIU/mL 27 644*     No components found for: TSH3  Lipid Profile:  Results from last 7 days   Lab Units 06/24/21  0624   CHOLESTEROL mg/dL 148   TRIGLYCERIDES mg/dL 75   HDL mg/dL 42   LDL CALC mg/dL 91     Hgb A1c:    NT-proBNP:   Recent Labs     06/23/21  1125   NTBNP 1,691*        Imaging & Testing   I have personally reviewed pertinent reports  CT head without contrast    Result Date: 6/23/2021  Narrative: CT BRAIN - WITHOUT CONTRAST INDICATION:   weakness  COMPARISON:  CT brain 7/30/2019  TECHNIQUE:  CT examination of the brain was performed  In addition to axial images, sagittal and coronal 2D reformatted images were created and submitted for interpretation  Radiation dose length product (DLP) for this visit:  854 91 mGy-cm   This examination, like all CT scans performed in the [facility], was performed utilizing techniques to minimize radiation dose exposure, including the use of iterative  reconstruction and automated exposure control  IMAGE QUALITY:  Diagnostic  FINDINGS: PARENCHYMA: Decreased attenuation is noted in periventricular and subcortical white matter demonstrating an appearance that is statistically most likely to represent moderate microangiopathic change; this appearance is similar when compared to most recent prior examination  No CT signs of acute infarction  No intracranial mass, mass effect or midline shift  No acute parenchymal hemorrhage  VENTRICLES AND EXTRA-AXIAL SPACES:  Ventricles and extra-axial CSF spaces are prominent commensurate with the degree of volume loss    No hydrocephalus  No acute extra-axial hemorrhage  VISUALIZED ORBITS AND PARANASAL SINUSES:  No acute abnormality involving the orbits  Mild scattered sinus mucosal thickening is noted  No fluid levels are seen  CALVARIUM AND EXTRACRANIAL SOFT TISSUES:  Normal      Impression: No acute intracranial abnormality  Workstation performed: [id number]     XR chest 1 view    Result Date: 2021  Narrative: CHEST INDICATION:   elevated trop  COMPARISON:  2019 EXAM PERFORMED/VIEWS:  XR CHEST 1 VIEW Images:  1 FINDINGS: Cardiomediastinal silhouette appears enlarged  A median sternotomy has been performed  The lungs are clear  No pneumothorax or pleural effusion  Evidence of rotator cuff arthropathy in both shoulders  Foreshortening of the distal left clavicle likely postoperative in origin  Impression: No acute cardiopulmonary disease  Workstation performed: [id number]        EKG / Monitor: Personally reviewed  Bradycardia with PACs    Cardiac testing:   Results for orders placed during the hospital encounter of 21    Echo complete with contrast if indicated    Narrative  [address]  [address] [facility] [address]  [phone]    Transthoracic Echocardiogram  2D, M-mode, Doppler, and Color Doppler    Study date:  2021    Patient: Laure Valdes  MR number: [id number]  Account number: [de-identified]  : 05-May-1924  Age: 80 years  Gender: Female  Status: Inpatient  Location: Bedside  Height: 55 in  Weight: 133 8 lb  BP: 120/ 63 mmHg    Indications: CAD    Diagnoses: I25 83 - Coronary atherosclerosis due to lipid rich plaque    Sonographer:  FABIENNE Ruiz  Primary Physician:  Josias Huitron DO  Referring Physician:  VARSHA Hart  Group:  [facility]  Interpreting Physician:  Natasha Bach MD    SUMMARY    LEFT VENTRICLE:  Systolic function was normal  Ejection fraction was estimated in the range of 55 % to 60 % to be 55 %    Although no diagnostic regional wall motion abnormality was identified, this possibility cannot be completely excluded on the basis of this study  Wall thickness was at the upper limits of normal     LEFT ATRIUM:  The atrium was mildly to moderately dilated  RIGHT ATRIUM:  The atrium was mildly dilated  MITRAL VALVE:  There was mild to moderate annular calcification  There was mild regurgitation  AORTIC VALVE:  There was mild regurgitation  TRICUSPID VALVE:  There was mild to moderate regurgitation  Estimated peak PA pressure was 35 mmHg  AORTA:  The root exhibited normal size, but ascending aorta is dilated with size around 4 4 cm  [place] HISTORY: PRIOR HISTORY: HTN , HLD, CAD, Hypothyroid, Arthritis, Gout CABG    PROCEDURE: The procedure was performed at the bedside  This was a routine study  The transthoracic approach was used  The study included complete 2D imaging, M-mode, complete spectral Doppler, and color Doppler  The heart rate was 52 bpm,  at the start of the study  Image quality was good  LEFT VENTRICLE: Size was normal  Systolic function was normal  Ejection fraction was estimated in the range of 55 % to 60 % to be 55 %  Although no diagnostic regional wall motion abnormality was identified, this possibility cannot be  completely excluded on the basis of this study  Wall thickness was at the upper limits of normal  DOPPLER: Left ventricular diastolic function parameters were normal for the patient's age  RIGHT VENTRICLE: The size was normal  Systolic function was normal     LEFT ATRIUM: The atrium was mildly to moderately dilated  RIGHT ATRIUM: The atrium was mildly dilated  MITRAL VALVE: There was mild to moderate annular calcification  There was mild calcification of the anterior and posterior leaflets  DOPPLER: The transmitral velocity was within the normal range  There was no evidence for stenosis  There  was mild regurgitation  AORTIC VALVE: The valve was trileaflet   Leaflets exhibited mildly increased thickness, mild calcification, and normal cuspal separation  DOPPLER: Transaortic velocity was within the normal range  There was no evidence for stenosis  There  was mild regurgitation  TRICUSPID VALVE: DOPPLER: There was mild to moderate regurgitation  Estimated peak PA pressure was 35 mmHg  PULMONIC VALVE: DOPPLER: There was trace regurgitation  PERICARDIUM: There was no thickening or calcification  There was no pericardial effusion  AORTA: The root exhibited normal size, but ascending aorta is dilated with size around 4 4 cm       SYSTEMIC VEINS: IVC: The inferior vena cava was not well visualized  SYSTEM MEASUREMENT TABLES    2D  EF (Teich): 54 07 %  %FS: 28 17 %  Ao Diam: 3 16 cm  EDV(Teich): 130 75 ml  ESV(Teich): 60 05 ml  IVSd: 0 92 cm  LA Area: 16 58 cm2  LA Diam: 4 75 cm  LVIDd: 5 22 cm  LVIDs: 3 75 cm  LVPWd: 0 92 cm  RA Area: 15 57 cm2  RVIDd: 2 84 cm  SV (Teich): 70 7 ml    CW  TR Vmax: 2 44 m/s  TR maxP 77 mmHg    MM  TAPSE: 1 7 cm    PW  MV E/A Ratio: 1 97  MV A Pako: 0 58 m/s  MV Dec Story: 5 04 m/s2  MV DecT: 225 13 ms  MV E Pako: 1 14 m/s  MV PHT: 65 29 ms  MVA By PHT: 3 37 cm2    [employer] Accredited Echocardiography Laboratory    Prepared and electronically signed by    Teresita Medina MD  Signed 2021 18:03:52        Jaylin Neighbor        "This note has been constructed using a voice recognition system  Therefore there may be syntax, spelling, and/or grammatical errors   Please call if you have any questions  "

## 2021-06-25 NOTE — NURSING NOTE
Patient left the unit via wheelchair accompanied by daughter , patient awake alert not in distress denies pain and discomfort, discharge instructions given to patient's daughter, verbalized understanding discharge instructions, IV site discontinued, no bleeding noted, dry dressing applied

## 2021-06-25 NOTE — DISCHARGE SUMMARY
Stan 45  Discharge- Fabiana Stallings 5/5/1924, 80 y o  female MRN: [id number]  Unit/Bed#: [address] [place] [address] Encounter: [id number]  Primary Care Provider: Koffi Spaulding MD   Date and time admitted to hospital: 6/23/2021 11:09 AM    * UTI (urinary tract infection)  Assessment & Plan  · UA is positive for nitrite and bacteria  · Urine culture grew E coli  · Received 3 days of Rocephin, will discharge patient on Keflex 500 mg p o  Every 6 hours for 2 more days to complete 5 day treatment  · Follow-up PCP in 1 week      Elevated troponin  Assessment & Plan  · Non MI elevated troponin level, likely due to UTI   · Cardiology consulted and aware of rising troponin  Troponin peaked at 2 0  Trended down to 1 82    · Given 1x dose of therapeutic Lovenox on admission   · 2D echo normal EF,no RWMA  · Patient underwent nuclear stress test today which was normal per Cardiology  · Telemetry sinus rhythm, rate 50s to 60s  Metoprolol discontinue  · Continue statin   · Continue baby aspirin on discharge for cardiology  · Follow-up PCP in 1 week    CAD (coronary artery disease)  Assessment & Plan  Hst of CAD s/p CABG about 10 years ago  Patient stopped following with cardiology due to her age  No recent cardiac testing  · Continue statin  · Continue baby aspirin on discharge    Generalized weakness  Assessment & Plan  · 500 ml NSS bolus administered in the ED with positive result  · Given gentle Isolyte while hospitalized  · Monitor I & O  · PT/OT eval    Recommend home with VNA  Arranged prior to discharge  Hypothyroidism  Assessment & Plan  · TSH is significantly elevated at 27 644  Free T4 within normal limits     · Patient was not taking her synthroid as directed   · Continue home dose of 75mcg of levothyroxine daily; ensure patient is taking first thing in the morning on an empty stomach   · Repeat TSH, free T4 in 6 weeks    Hyperlipidemia  Assessment & Plan  · Continue Lipitor 20mg  · LDL 91    Hypertension  Assessment & Plan  · BP controlled   · Continue Norvasc 5 mg daily   · Monitor BP at  Home  Goal -140's      Discharging Physician / Practitioner: VARSHA Ruggiero  PCP: Pennelope Kayser, MD  Admission Date: 6/23/2021  Discharge Date: 06/25/21    Reason for Admission: Weakness - Generalized (Pt states this morning around 0800 noticed she "has no energy at all", lightheaded, and is SOB on both exertion and at rest  Pt  denies any Nausea, vomiting, diarrhea or CP) and Shortness of Breath        Medical Problems     Resolved Problems  Date Reviewed: 6/25/2021    None                Consultations During Hospital Stay:  IP CONSULT TO CASE MANAGEMENT  IP CONSULT TO CARDIOLOGY    Procedures Performed:     · Nuclear stress test    Significant Findings / Test Results:     · None  Results from last 7 days   Lab Units 06/24/21  0624 06/23/21  1125   WBC Thousand/uL 5 16 5 59   HEMOGLOBIN g/dL 11 7 14 0   PLATELETS Thousands/uL 197 223     Results from last 7 days   Lab Units 06/25/21  0506 06/24/21  0624 06/23/21  1125   SODIUM mmol/L 138 138 136   POTASSIUM mmol/L 4 1 4 2 4 1   CHLORIDE mmol/L 103 102 99*   CO2 mmol/L 28 27 29   BUN mg/dL 18 20 19   CREATININE mg/dL 1 11 1 14 1 20   CALCIUM mg/dL 8 5 8 5 9 2   TOTAL BILIRUBIN mg/dL  --  0 60 0 79   ALK PHOS U/L  --  69 80   ALT U/L  --  19 27   AST U/L  --  31 28         Results from last 7 days   Lab Units 06/24/21  0624 06/24/21  0331 06/23/21  2153   TROPONIN I ng/mL 1 82* 2 00* 1 99*     Lab Results   Component Value Date/Time    HGBA1C 6 2 (H) 03/16/2018 06:05 PM             Blood Culture:   Lab Results   Component Value Date    BLOODCX No Growth After 5 Days   07/14/2019    BLOODCX Staphylococcus coagulase negative (A) 07/14/2019     Urine Culture:   Lab Results   Component Value Date    URINECX >100,000 cfu/ml Escherichia coli (A) 06/23/2021     Sputum Culture: No components found for: SPUTUMCX  Wound Culture: No results found for: [facility]  XR chest 1 view   Final Result by Zenia Mendez MD (06/23 2567)      No acute cardiopulmonary disease  Workstation performed: [id number]         CT head without contrast   Final Result by Lori Devries MD (06/23 2828)      No acute intracranial abnormality  Workstation performed: [id number]                Incidental Findings:   · None     Test Results Pending at Discharge (will require follow up): · None     Outpatient Tests Requested:  · None    Complications:  None    Reason for Admission:   Chief Complaint   Patient presents with    Weakness - Generalized     Pt states this morning around 0800 noticed she "has no energy at all", lightheaded, and is SOB on both exertion and at rest  Pt  denies any Nausea, vomiting, diarrhea or CP    Shortness of Breath       Hospital Course:     Per HPI: Gillian Matthews is a 80 y o  female patient with a PMH of CAD, hyperlipidemia, hypertension, hypothyroid who originally presented to the hospital on 6/23/2021 due to UTI and generalized weakness, non MI troponin elevation  Patient received 3 days of IV Rocephin  Urine culture grew E coli, susceptible to cefazolin  Patient underwent nuclear stress test which was normal per Cardiology, official report pending  Patient seen by PT OT, recommend with VNA  Will DC patient home today  Will prescribe Keflex 500 mg p o  every 6 hours for 2 more days to complete 5 day course  Cardiology recommend continue baby aspirin on discharge  Follow-up PCP in 1 week  Spoke to patient's daughter at bedside, all questions answered  Hospital Course:    Please see above list of diagnoses and related plan for additional information  Condition at Discharge: good       Discharge Day Visit / Exam:     Subjective:  Patient reports right knee pain this morning which is chronic    Denies chest pain, headache, dizziness, SOB, nausea, vomiting, diarrhea constipation, fever, chills, urinary urgency/frequency/burning on urination  Vitals: Blood Pressure: 132/66 (06/25/21 1523)  Pulse: 58 (06/25/21 1523)  Temperature: (!) 96 8 °F (36 °C) (06/25/21 1523)  Temp Source: Oral (06/25/21 1523)  Respirations: 18 (06/25/21 1523)  Height: 4' 7" (139 7 cm) (06/23/21 1114)  Weight - Scale: 58 8 kg (129 lb 10 1 oz) (06/25/21 0600)  SpO2: 98 % (06/25/21 1523)  Exam:   Physical Exam  Vitals and nursing note reviewed  Constitutional:       Appearance: She is well-developed  HENT:      Head: Normocephalic and atraumatic  Neck:      Thyroid: No thyromegaly  Vascular: No JVD  Trachea: No tracheal deviation  Cardiovascular:      Rate and Rhythm: Normal rate and regular rhythm  Heart sounds: Normal heart sounds  Comments: Telemetry heart rate 50 to 60s, sinus rhythm  Pulmonary:      Effort: Pulmonary effort is normal  No respiratory distress  Breath sounds: Normal breath sounds  No wheezing or rales  Abdominal:      General: Bowel sounds are normal  There is no distension  Palpations: Abdomen is soft  Tenderness: There is no abdominal tenderness  There is no guarding  Musculoskeletal:         General: No swelling or deformity  Cervical back: Neck supple  Right lower leg: No edema  Left lower leg: No edema  Skin:     General: Skin is warm and dry  Neurological:      General: No focal deficit present  Mental Status: She is alert and oriented to person, place, and time  Psychiatric:         Mood and Affect: Mood normal          Judgment: Judgment normal            Discharge instructions/Information to patient and family:   See after visit summary for information provided to patient and family  Provisions for Follow-Up Care:  See after visit summary for information related to follow-up care and any pertinent home health orders        Disposition:     Home with VNA Services (Reminder: Complete face to face encounter)    Planned Readmission:  None Discharge Statement:  I spent >30 minutes discharging the patient  This time was spent on the day of discharge  I had direct contact with the patient on the day of discharge  Greater than 50% of the total time was spent examining patient, answering all patient questions, arranging and discussing plan of care with patient as well as directly providing post-discharge instructions  Additional time then spent on discharge activities  Discharge Medications:  See after visit summary for reconciled discharge medications provided to patient and family        ** Please Note: This note has been constructed using a voice recognition system **

## 2021-06-25 NOTE — ASSESSMENT & PLAN NOTE
Hst of CAD s/p CABG about 10 years ago  Patient stopped following with cardiology due to her age  No recent cardiac testing    · Continue statin  · Continue baby aspirin on discharge

## 2021-06-25 NOTE — ASSESSMENT & PLAN NOTE
· TSH is significantly elevated at 27 644  Free T4 within normal limits     · Patient was not taking her synthroid as directed   · Continue home dose of 75mcg of levothyroxine daily; ensure patient is taking first thing in the morning on an empty stomach   · Repeat TSH, free T4 in 6 weeks

## 2021-06-25 NOTE — PHYSICAL THERAPY NOTE
PT EVALUATION     06/25/21 1025   Note Type   Note type Evaluation   Pain Assessment   Pain Assessment Tool Cisse-Baker FACES   Cisse-Baker FACES Pain Rating 2  (R knee area with walking)   Home Living   Type of [address] [facility] One level   [address] [place] [address]   [address] [place] [address],[address]; Wheelchair-manual  (rollator)   Prior Function   Level of [place] Independent with ADLs and functional mobility   Lives With Alone   Receives Help From Family;Home health  (HHA 8hours/day, stay with family on weekends)   ADL Assistance Independent   IADLs Needs assistance   Restrictions/Precautions   Other Precautions Chair Alarm; Bed Alarm; Fall Risk   General   Additional Pertinent History chart reviewed, patient admitted with UTI  Now tolerating gait well with rollator walker and assist of 1   Cognition   Overall Cognitive Status WFL   Arousal/Participation Cooperative   Attention Attends with cues to redirect   Orientation Level Oriented to person;Oriented to place   Following Commands Follows multistep commands with increased time or repetition   RLE Assessment   RLE Assessment   (ROM WFL, strength 3+/5)   LLE Assessment   LLE Assessment   (ROM WFL, strength 3+/5)   Coordination   Movements are Fluid and Coordinated 0   Transfers   Sit to Stand 5  Supervision   Stand to Sit 5  Supervision   Ambulation/Elevation   Gait Assistance 4  Minimal assist   Additional items Assist x 1;Verbal cues; Tactile cues   Assistive Device Rolling walker  (rollator from home)   Distance 20 feet with change in direction, verbal cuing for safety with walker usage and "keeping the walker closer" to self   Balance   Static Sitting Good   Dynamic Sitting Fair +   Static Standing Fair   Dynamic Standing Fair -   Ambulatory Fair -   Activity Tolerance   Activity Tolerance Patient tolerated treatment well;Patient limited by fatigue  (reports of R knee discomfort)   Nurse Made Aware yes   Assessment   Prognosis Good   Problem List Decreased strength;Decreased range of motion;Decreased endurance; Impaired balance;Decreased coordination;Decreased mobility; Decreased cognition;Pain   Assessment Patient seen for Physical Therapy evaluation  Patient admitted with UTI (urinary tract infection)  Comorbidities affecting patient's physical performance include: osteoarthritis, hypertension, hyperlipidemia, GERD, hypothyroidism, CAD status post CABG  Personal factors affecting patient at time of initial evaluation include: ambulating with assistive device, inability to ambulate household distances, inability to navigate community distances, inability to navigate level surfaces without external assistance, inability to perform dynamic tasks in community, limited home support, inability to perform physical activity, inability to perform ADLS and inability to perform IADLS   Prior to admission, patient was independent with functional mobility with walker, independent with ADLS, requiring assist for IADLS, ambulating household distance, ambulating community distances and home with family assist   Please find objective findings from Physical Therapy assessment regarding body systems outlined above with impairments and limitations including weakness, decreased ROM, impaired balance, decreased endurance, impaired coordination, gait deviations, pain, decreased activity tolerance, decreased functional mobility tolerance and fall risk  The Barthel Index was used as a functional outcome tool presenting with a score of 50 today indicating marked limitations of functional mobility and ADLS  Patient's clinical presentation is currently unstable/unpredictable as seen in patient's presentation of vital sign response, changing level of pain, increased fall risk, new onset of impairment of functional mobility, decreased endurance and new onset of weakness   Pt would benefit from continued Physical Therapy treatment to address deficits as defined above and maximize level of functional mobility  As demonstrated by objective findings, the assigned level of complexity for this evaluation is high  The patient's AM-[facility] Basic Mobility Inpatient Short Form Raw Score is 18, Standardized Score is 41 05  A standardized score greater than and less than 42 9 suggests the patient may benefit from discharge to post-acute rehabilitation services although this patient is demonstrating improving functional mobility, has good family and home health care support and can return home with PT in home  Please also refer to the recommendation of the Physical Therapist for safe discharge planning  Goals   Patient Goals to go home   STG Expiration Date 07/02/21   Short Term Goal #1 transfers and gait with rollator with supervision   Short Term Goal #2 gait endurance to functional household distances, strength BLEs 4-/5 all to meet patient goal of returning home   LTG Expiration Date 07/09/21   Long Term Goal #1 transfers and gait independently with rollator walker   Long Term Goal #2 gait endurance to functional community distances   Plan   Treatment/Interventions ADL retraining;Functional transfer training;LE strengthening/ROM; Therapeutic exercise; Endurance training;Patient/family training;Equipment eval/education; Bed mobility;Gait training; Compensatory technique education   PT Frequency 5x/wk   Recommendation   PT Discharge Recommendation Home with home health rehabilitation   -[facility] Basic Mobility Inpatient   Turning in Bed Without Bedrails 4   Lying on Back to Sitting on Edge of Flat Bed 3   Moving Bed to Chair 3   Standing Up From Chair 3   Walk in Room 3   Climb 3-5 Stairs 2   Basic Mobility Inpatient Raw Score 18   Basic Mobility Standardized Score 41 05   Barthel Index   Feeding 10   Bathing 0   Grooming Score 0   Dressing Score 5   Bladder Score 10   Bowels Score 10   Toilet Use Score 5   Transfers (Bed/Chair) Score 10   Mobility (Level Surface) Score 0   Stairs Score 0   Barthel Index Score 50 Licensure   [place] License Number  Genevieve March PT [id number]     PT TREATMENT  Time In:1010  Time Out:1025  Total Time: 15min      S:  Patient without pain at rest  O:  -gait training with rollator walker with education for safe distance between self and the walker, 40 feet with change in direction  -exercise completed BLEs: LAQs, ankle pumps, hip flexion all 10 reps alternating activity for coordination  A:  Patient will benefit from continued PT with progression as tolerated     P:  [address], PT

## 2021-06-25 NOTE — CASE MANAGEMENT
LOS 2 days  Patient is not a bundle or a 30 day readmission  SW spoke with daughter Tai Smith to discuss discharge planning and complete CM open  Patient is Burmese speaking with some confusion  Information obtained from daughter Tai Smith  Patient lives in a one level home  Patient has a private caregiver daily for 8 hours per day, and on weekends patient stays with daughter  Patient uses a wheelchair, walker, shower chair  Patient has a hx of [facility] and a hx of inpatient rehab at [facility] [place]  PCP is Dr Ilya Soni MD  Preferred pharmacy is boarding pass in [place] on [address] and has prescription insurance  Denies hx of MH/DA issues  Patient has a LW and a DPOA- copy requested for chart  SW discussed recommendation for home health care services and provided choices  Daughter requests a referral to [facility]  Referral sent  Family to provide transportation when medically stable

## 2021-06-25 NOTE — PLAN OF CARE
Problem: Potential for Falls  Goal: Patient will remain free of falls  Description: INTERVENTIONS:  - Educate patient/family on patient safety including physical limitations  - Instruct patient to call for assistance with activity   - Consult OT/PT to assist with strengthening/mobility   - Keep Call bell within reach  - Keep bed low and locked with side rails adjusted as appropriate  - Keep care items and personal belongings within reach  - Initiate and maintain comfort rounds  - Make Fall Risk Sign visible to staff  - Offer Toileting every 2 Hours, in advance of need  - Initiate/Maintain bed/chair alarm  - Obtain necessary fall risk management equipment:   - Apply yellow socks and bracelet for high fall risk patients  - Consider moving patient to room near nurses station  Outcome: Progressing

## 2021-06-25 NOTE — ASSESSMENT & PLAN NOTE
· 500 ml NSS bolus administered in the ED with positive result  · Given gentle Isolyte while hospitalized  · Monitor I & O  · PT/OT eval    Recommend home with VNA  Arranged prior to discharge

## 2021-06-25 NOTE — ASSESSMENT & PLAN NOTE
· Non MI elevated troponin level, likely due to UTI   · Cardiology consulted and aware of rising troponin  Troponin peaked at 2 0  Trended down to 1 82    · Given 1x dose of therapeutic Lovenox on admission   · 2D echo normal EF,no RWMA  · Patient underwent nuclear stress test today which was normal per Cardiology  · Telemetry sinus rhythm, rate 50s to 60s  Metoprolol discontinue    · Continue statin   · Continue baby aspirin on discharge for cardiology  · Follow-up PCP in 1 week

## 2021-06-25 NOTE — ASSESSMENT & PLAN NOTE
· UA is positive for nitrite and bacteria  · Urine culture grew E coli  · Received 3 days of Rocephin, will discharge patient on Keflex 500 mg p o   Every 6 hours for 2 more days to complete 5 day treatment  · Follow-up PCP in 1 week

## 2021-06-25 NOTE — DISCHARGE INSTR - AVS FIRST PAGE
· Continue 75mcg of levothyroxine daily; ensure patient is taking first thing in the morning on an empty stomach   · Repeat TSH, free T4 in 6 weeks  · Continue Norvasc 5 mg daily   Monitor BP at  Home  Goal -140  Hold Norvasc for SBP <  110  Notify PCP if BP out of goal  range consistently    · Recommend over-the-counter probiotic while on antibiotic

## 2021-06-27 PROCEDURE — 78452 HT MUSCLE IMAGE SPECT MULT: CPT | Performed by: INTERNAL MEDICINE

## 2021-06-27 PROCEDURE — 93016 CV STRESS TEST SUPVJ ONLY: CPT | Performed by: INTERNAL MEDICINE

## 2021-06-27 PROCEDURE — 93018 CV STRESS TEST I&R ONLY: CPT | Performed by: INTERNAL MEDICINE

## 2022-04-17 ENCOUNTER — APPOINTMENT (EMERGENCY)
Dept: RADIOLOGY | Facility: HOSPITAL | Age: 87
End: 2022-04-17
Attending: EMERGENCY MEDICINE
Payer: MEDICARE

## 2022-04-17 ENCOUNTER — HOSPITAL ENCOUNTER (EMERGENCY)
Facility: HOSPITAL | Age: 87
Discharge: HOME | End: 2022-04-17
Attending: EMERGENCY MEDICINE | Admitting: EMERGENCY MEDICINE
Payer: MEDICARE

## 2022-04-17 VITALS
TEMPERATURE: 97 F | HEART RATE: 60 BPM | OXYGEN SATURATION: 97 % | SYSTOLIC BLOOD PRESSURE: 198 MMHG | DIASTOLIC BLOOD PRESSURE: 96 MMHG | RESPIRATION RATE: 18 BRPM

## 2022-04-17 DIAGNOSIS — N39.0 UTI (URINARY TRACT INFECTION): ICD-10-CM

## 2022-04-17 DIAGNOSIS — R42 DIZZINESS: ICD-10-CM

## 2022-04-17 DIAGNOSIS — M54.2 NECK PAIN: Primary | ICD-10-CM

## 2022-04-17 LAB
ALBUMIN SERPL BCP-MCNC: 3.5 G/DL (ref 3.5–5)
ALP SERPL-CCNC: 89 U/L (ref 46–116)
ALT SERPL W P-5'-P-CCNC: 37 U/L (ref 12–78)
ANION GAP SERPL CALCULATED.3IONS-SCNC: 10 MMOL/L (ref 4–13)
APTT PPP: 73 SECONDS (ref 23–37)
AST SERPL W P-5'-P-CCNC: 69 U/L (ref 5–45)
BACTERIA UR QL AUTO: ABNORMAL /HPF
BASOPHILS # BLD AUTO: 0.04 THOUSANDS/ΜL (ref 0–0.1)
BASOPHILS NFR BLD AUTO: 1 % (ref 0–1)
BILIRUB SERPL-MCNC: 0.99 MG/DL (ref 0.2–1)
BILIRUB UR QL STRIP: NEGATIVE
BUN SERPL-MCNC: 16 MG/DL (ref 5–25)
CALCIUM SERPL-MCNC: 9.2 MG/DL (ref 8.3–10.1)
CHLORIDE SERPL-SCNC: 99 MMOL/L (ref 100–108)
CLARITY UR: ABNORMAL
CO2 SERPL-SCNC: 25 MMOL/L (ref 21–32)
COLOR UR: YELLOW
CREAT SERPL-MCNC: 0.99 MG/DL (ref 0.6–1.3)
EOSINOPHIL # BLD AUTO: 0.06 THOUSAND/ΜL (ref 0–0.61)
EOSINOPHIL NFR BLD AUTO: 1 % (ref 0–6)
ERYTHROCYTE [DISTWIDTH] IN BLOOD BY AUTOMATED COUNT: 19.1 % (ref 11.6–15.1)
GFR SERPL CREATININE-BSD FRML MDRD: 47 ML/MIN/1.73SQ M
GLUCOSE SERPL-MCNC: 101 MG/DL (ref 65–140)
GLUCOSE UR STRIP-MCNC: NEGATIVE MG/DL
HCT VFR BLD AUTO: 38.5 % (ref 34.8–46.1)
HGB BLD-MCNC: 12.2 G/DL (ref 11.5–15.4)
HGB UR QL STRIP.AUTO: NEGATIVE
IMM GRANULOCYTES # BLD AUTO: 0.03 THOUSAND/UL (ref 0–0.2)
IMM GRANULOCYTES NFR BLD AUTO: 1 % (ref 0–2)
INR PPP: 1.06 (ref 0.84–1.19)
KETONES UR STRIP-MCNC: NEGATIVE MG/DL
LEUKOCYTE ESTERASE UR QL STRIP: NEGATIVE
LYMPHOCYTES # BLD AUTO: 1.37 THOUSANDS/ΜL (ref 0.6–4.47)
LYMPHOCYTES NFR BLD AUTO: 29 % (ref 14–44)
MCH RBC QN AUTO: 25.7 PG (ref 26.8–34.3)
MCHC RBC AUTO-ENTMCNC: 31.7 G/DL (ref 31.4–37.4)
MCV RBC AUTO: 81 FL (ref 82–98)
MONOCYTES # BLD AUTO: 0.36 THOUSAND/ΜL (ref 0.17–1.22)
MONOCYTES NFR BLD AUTO: 8 % (ref 4–12)
NEUTROPHILS # BLD AUTO: 2.91 THOUSANDS/ΜL (ref 1.85–7.62)
NEUTS SEG NFR BLD AUTO: 60 % (ref 43–75)
NITRITE UR QL STRIP: POSITIVE
NON-SQ EPI CELLS URNS QL MICRO: ABNORMAL /HPF
NRBC BLD AUTO-RTO: 0 /100 WBCS
PH UR STRIP.AUTO: 7.5 [PH]
PLATELET # BLD AUTO: 184 THOUSANDS/UL (ref 149–390)
PMV BLD AUTO: 10 FL (ref 8.9–12.7)
POTASSIUM SERPL-SCNC: 5.6 MMOL/L (ref 3.5–5.3)
PROT SERPL-MCNC: 7.5 G/DL (ref 6.4–8.2)
PROT UR STRIP-MCNC: NEGATIVE MG/DL
PROTHROMBIN TIME: 13.6 SECONDS (ref 11.6–14.5)
RBC # BLD AUTO: 4.75 MILLION/UL (ref 3.81–5.12)
RBC #/AREA URNS AUTO: ABNORMAL /HPF
SODIUM SERPL-SCNC: 134 MMOL/L (ref 136–145)
SP GR UR STRIP.AUTO: 1.01 (ref 1–1.03)
UROBILINOGEN UR QL STRIP.AUTO: 0.2 E.U./DL
WBC # BLD AUTO: 4.77 THOUSAND/UL (ref 4.31–10.16)
WBC #/AREA URNS AUTO: ABNORMAL /HPF

## 2022-04-17 PROCEDURE — G1004 CDSM NDSC: HCPCS

## 2022-04-17 PROCEDURE — 70496 CT ANGIOGRAPHY HEAD: CPT

## 2022-04-17 PROCEDURE — 36415 COLL VENOUS BLD VENIPUNCTURE: CPT | Performed by: EMERGENCY MEDICINE

## 2022-04-17 PROCEDURE — 85730 THROMBOPLASTIN TIME PARTIAL: CPT | Performed by: EMERGENCY MEDICINE

## 2022-04-17 PROCEDURE — 99285 EMERGENCY DEPT VISIT HI MDM: CPT | Performed by: EMERGENCY MEDICINE

## 2022-04-17 PROCEDURE — 80053 COMPREHEN METABOLIC PANEL: CPT | Performed by: EMERGENCY MEDICINE

## 2022-04-17 PROCEDURE — 85025 COMPLETE CBC W/AUTO DIFF WBC: CPT | Performed by: EMERGENCY MEDICINE

## 2022-04-17 PROCEDURE — 93005 ELECTROCARDIOGRAM TRACING: CPT

## 2022-04-17 PROCEDURE — 81001 URINALYSIS AUTO W/SCOPE: CPT | Performed by: EMERGENCY MEDICINE

## 2022-04-17 PROCEDURE — 85610 PROTHROMBIN TIME: CPT | Performed by: EMERGENCY MEDICINE

## 2022-04-17 PROCEDURE — 99284 EMERGENCY DEPT VISIT MOD MDM: CPT

## 2022-04-17 PROCEDURE — 70498 CT ANGIOGRAPHY NECK: CPT

## 2022-04-17 RX ORDER — CEPHALEXIN 500 MG/1
500 CAPSULE ORAL EVERY 6 HOURS SCHEDULED
Qty: 28 CAPSULE | Refills: 0 | Status: SHIPPED | OUTPATIENT
Start: 2022-04-17 | End: 2022-04-24

## 2022-04-17 RX ORDER — CEPHALEXIN 500 MG/1
500 CAPSULE ORAL ONCE
Status: COMPLETED | OUTPATIENT
Start: 2022-04-17 | End: 2022-04-17

## 2022-04-17 RX ADMIN — CEPHALEXIN 500 MG: 500 CAPSULE ORAL at 21:45

## 2022-04-17 RX ADMIN — IOHEXOL 85 ML: 350 INJECTION, SOLUTION INTRAVENOUS at 20:26

## 2022-04-17 NOTE — ED PROVIDER NOTES
History  Chief Complaint   Patient presents with    Neck Pain     neck pain that shoots up to head for past 5 days  occ dizziness     Patient brought in by her daughter with complaint of pain in the right lateral side of her neck that shoots up towards the base of the skull to been present for about 5 days  Patient denies any injury to the area  States the pain is worse with certain movement and position  Is relieved by rest   Today she stated that she had a episode of dizziness by which she means lightheadedness and not vertiginous dizziness  It only lasted about a minute and resolved  She had no nausea vomiting  No weakness in any extremity  Patient still having neck pain on arrival but states that the dizziness has resolved  Prior to Admission Medications   Prescriptions Last Dose Informant Patient Reported? Taking?    amLODIPine (NORVASC) 5 mg tablet   Yes No   Sig: Take 5 mg by mouth daily   aspirin (ECOTRIN LOW STRENGTH) 81 mg EC tablet Not Taking at Unknown time  No No   Sig: Take 1 tablet (81 mg total) by mouth daily   Patient not taking: Reported on 2022    atorvastatin (LIPITOR) 10 mg tablet 2022 at Unknown time  Yes Yes   Sig: Take 10 mg by mouth daily   celecoxib (CeleBREX) 200 mg capsule 2022 at Unknown time  Yes Yes   Sig: Take 200 mg by mouth daily   cholecalciferol (VITAMIN D3) 1,000 units tablet Not Taking at Unknown time Self Yes No   Sig: Take 1,000 Units by mouth daily   Patient not taking: Reported on 2022    docusate sodium (COLACE) 100 mg capsule Unknown at Unknown time  No No   Sig: Take 1 capsule (100 mg total) by mouth 2 (two) times a day   esomeprazole (NexIUM) 40 MG capsule Unknown at Unknown time  No No   Sig: Take 1 capsule (40 mg total) by mouth daily   fluticasone (FLONASE) 50 mcg/act nasal spray   No No   Si sprays into each nostril 2 (two) times a day for 3 days   levothyroxine 75 mcg tablet 2022 at Unknown time  Yes Yes   Sig: Take 100 mcg by mouth daily     lidocaine (LIDODERM) 5 %   No No   Sig: Apply 1 patch topically daily for 5 days Remove & Discard patch within 12 hours or as directed by MD   pregabalin (LYRICA) 75 mg capsule 4/16/2022 at Unknown time  Yes Yes   Sig: Take 75 mg by mouth daily    tetrahydrozoline-zinc (VISINE-AC) 0 05-0 25 % ophthalmic solution   No No   Sig: Administer 2 drops to both eyes 3 (three) times a day as needed (watery eyes)      Facility-Administered Medications: None       Past Medical History:   Diagnosis Date    CAD (coronary artery disease)     Disease of thyroid gland     hypo    GERD (gastroesophageal reflux disease)     Gout     right shoulder    Hyperlipidemia     Hypertension     Osteoarthritis (arthritis due to wear and tear of joints)        Past Surgical History:   Procedure Laterality Date    CORONARY ARTERY BYPASS GRAFT      ENDOSCOPIC STENT PLACEMENT W/ MLB Left 2012    HIATAL HERNIA REPAIR      JOINT REPLACEMENT Left     knee 2001    ROTATOR CUFF REPAIR      TUBAL LIGATION  2013       History reviewed  No pertinent family history  I have reviewed and agree with the history as documented  E-Cigarette/Vaping    E-Cigarette Use Never User      E-Cigarette/Vaping Substances     Social History     Tobacco Use    Smoking status: Never Smoker    Smokeless tobacco: Never Used   Vaping Use    Vaping Use: Never used   Substance Use Topics    Alcohol use: Never    Drug use: Never       Review of Systems   Constitutional: Negative for chills and fever  HENT: Negative for congestion and sore throat  Eyes: Negative for visual disturbance  Respiratory: Negative for cough  Cardiovascular: Negative for chest pain  Gastrointestinal: Negative for abdominal pain, nausea and vomiting  Genitourinary: Positive for frequency  Negative for dysuria  Musculoskeletal: Positive for neck pain and neck stiffness  Negative for back pain  Skin: Negative for rash     Neurological: Positive for dizziness and light-headedness  Negative for weakness and headaches  Hematological: Does not bruise/bleed easily  Psychiatric/Behavioral: Negative for confusion  All other systems reviewed and are negative  Physical Exam  Physical Exam  Vitals and nursing note reviewed  Constitutional:       Appearance: Normal appearance  HENT:      Head: Normocephalic  Right Ear: External ear normal       Left Ear: External ear normal       Nose: Nose normal       Mouth/Throat:      Mouth: Mucous membranes are moist    Eyes:      Conjunctiva/sclera: Conjunctivae normal       Pupils: Pupils are equal, round, and reactive to light  Cardiovascular:      Rate and Rhythm: Normal rate and regular rhythm  Heart sounds: Normal heart sounds  Pulmonary:      Effort: Pulmonary effort is normal       Breath sounds: Normal breath sounds  Musculoskeletal:         General: No tenderness  Normal range of motion  Cervical back: Normal range of motion  Skin:     General: Skin is warm and dry  Neurological:      General: No focal deficit present  Mental Status: She is alert  Motor: No weakness        Comments: Good strength and coordination   Psychiatric:         Mood and Affect: Mood normal          Behavior: Behavior normal          Vital Signs  ED Triage Vitals [04/17/22 1728]   Temperature Pulse Respirations Blood Pressure SpO2   (!) 97 °F (36 1 °C) 67 22 (!) 217/140 100 %      Temp Source Heart Rate Source Patient Position - Orthostatic VS BP Location FiO2 (%)   Tympanic Monitor Sitting Right arm --      Pain Score       8           Vitals:    04/17/22 1930 04/17/22 2000 04/17/22 2046 04/17/22 2146   BP: 169/80 (!) 183/108 (!) 197/93 (!) 198/96   Pulse: 62 63 60 60   Patient Position - Orthostatic VS:             Visual Acuity      ED Medications  Medications   iohexol (OMNIPAQUE) 350 MG/ML injection (SINGLE-DOSE) 100 mL (85 mL Intravenous Given 4/17/22 2026)   cephalexin (KEFLEX) capsule 500 mg (500 mg Oral Given 4/17/22 2145)       Diagnostic Studies  Results Reviewed     Procedure Component Value Units Date/Time    Urine Microscopic [[id number]]  (Abnormal) Collected: 04/17/22 2049    Lab Status: Final result Specimen: Urine, Other Updated: 04/17/22 2122     RBC, UA None Seen /hpf      WBC, UA 1-2 /hpf      Epithelial Cells Occasional /hpf      Bacteria, UA Innumerable /hpf     Protime-INR [[id number]]  (Normal) Collected: 04/17/22 1956    Lab Status: Final result Specimen: Blood from Arm, Right Updated: 04/17/22 2110     Protime 13 6 seconds      INR 1 06    APTT [[id number]]  (Abnormal) Collected: 04/17/22 1956    Lab Status: Final result Specimen: Blood from Arm, Right Updated: 04/17/22 2110     PTT 73 seconds     UA (URINE) with reflex to Scope [[id number]]  (Abnormal) Collected: 04/17/22 2049    Lab Status: Final result Specimen: Urine, Other Updated: 04/17/22 2055     Color, UA Yellow     Clarity, UA Slightly Cloudy     Specific Gravity, UA 1 015     pH, UA 7 5     Leukocytes, UA Negative     Nitrite, UA Positive     Protein, UA Negative mg/dl      Glucose, UA Negative mg/dl      Ketones, UA Negative mg/dl      Urobilinogen, UA 0 2 E U /dl      Bilirubin, UA Negative     Blood, UA Negative    Comprehensive metabolic panel [[id number]]  (Abnormal) Collected: 04/17/22 1835    Lab Status: Final result Specimen: Blood from Arm, Right Updated: 04/17/22 1920     Sodium 134 mmol/L      Potassium 5 6 mmol/L      Chloride 99 mmol/L      CO2 25 mmol/L      ANION GAP 10 mmol/L      BUN 16 mg/dL      Creatinine 0 99 mg/dL      Glucose 101 mg/dL      Calcium 9 2 mg/dL      AST 69 U/L      ALT 37 U/L      Alkaline Phosphatase 89 U/L      Total Protein 7 5 g/dL      Albumin 3 5 g/dL      Total Bilirubin 0 99 mg/dL      eGFR 47 ml/min/1 73sq m     Narrative:      [facility] guidelines for Chronic Kidney Disease (CKD):     Stage 1 with normal or high GFR (GFR > 90 mL/min/1 73 square meters)    Stage 2 Mild CKD (GFR = 60-89 mL/min/1 73 square meters)    Stage 3A Moderate CKD (GFR = 45-59 mL/min/1 73 square meters)    Stage 3B Moderate CKD (GFR = 30-44 mL/min/1 73 square meters)    Stage 4 Severe CKD (GFR = 15-29 mL/min/1 73 square meters)    Stage 5 End Stage CKD (GFR <15 mL/min/1 73 square meters)  Note: GFR calculation is accurate only with a steady state creatinine    CBC and differential [[id number]]  (Abnormal) Collected: 04/17/22 1835    Lab Status: Final result Specimen: Blood from Arm, Right Updated: 04/17/22 1840     WBC 4 77 Thousand/uL      RBC 4 75 Million/uL      Hemoglobin 12 2 g/dL      Hematocrit 38 5 %      MCV 81 fL      MCH 25 7 pg      MCHC 31 7 g/dL      RDW 19 1 %      MPV 10 0 fL      Platelets 334 Thousands/uL      nRBC 0 /100 WBCs      Neutrophils Relative 60 %      Immat GRANS % 1 %      Lymphocytes Relative 29 %      Monocytes Relative 8 %      Eosinophils Relative 1 %      Basophils Relative 1 %      Neutrophils Absolute 2 91 Thousands/µL      Immature Grans Absolute 0 03 Thousand/uL      Lymphocytes Absolute 1 37 Thousands/µL      Monocytes Absolute 0 36 Thousand/µL      Eosinophils Absolute 0 06 Thousand/µL      Basophils Absolute 0 04 Thousands/µL                  CTA head and neck with and without contrast   Final Result by Re Aleman MD (04/17 2144)      No acute intracranial abnormality  Stable mild chronic angiopathic changes  No flow-limiting stenosis or large vessel occlusion in the major vessels of the [place]  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 with reconstitution of flow at the level of the V3-V4 junction/proximal intradural segment  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 as above, largest measuring 6 mm at the right MCA bifurcation  Recommend correlation with prior outside imaging if available and follow-up neuro interventional consult        Above findings discussed with Dr Howard Aleman at 9:25 PM on 4/17/2022  Workstation performed: [id number]                    Procedures  ECG 12 Lead Documentation Only    Date/Time: 4/17/2022 7:19 PM  Performed by: Lakisha Alvarez MD  Authorized by: Lakisha Alvarez MD     Indications / Diagnosis:  Dizziness  ECG reviewed by me, the ED Provider: yes    Patient location:  ED  Interpretation:     Interpretation: non-specific    Rate:     ECG rate:  64    ECG rate assessment: normal    Rhythm:     Rhythm: sinus rhythm    Ectopy:     Ectopy: none    QRS:     QRS axis:  Left    QRS intervals:  Normal  Conduction:     Conduction: normal    ST segments:     ST segments:  Normal  T waves:     T waves: normal               ED Course                               SBIRT 20yo+      Most Recent Value   SBIRT (22 yo +)    In order to provide better care to our patients, we are screening all of our patients for alcohol and drug use  Would it be okay to ask you these screening questions? Yes Filed at: 04/17/2022 2037   Initial Alcohol Screen: US AUDIT-C     1  How often do you have a drink containing alcohol? 0 Filed at: 04/17/2022 2037   2  How many drinks containing alcohol do you have on a typical day you are drinking? 0 Filed at: 04/17/2022 2037   3a  Male UNDER 65: How often do you have five or more drinks on one occasion? 0 Filed at: 04/17/2022 2037   3b  FEMALE Any Age, or MALE 65+: How often do you have 4 or more drinks on one occassion? 0 Filed at: 04/17/2022 2037   Audit-C Score 0 Filed at: 04/17/2022 2037   ALYSSA: How many times in the past year have you    Used an illegal drug or used a prescription medication for non-medical reasons? Never Filed at: 04/17/2022 2037                    MDM  Number of Diagnoses or Management Options  Dizziness  Neck pain  UTI (urinary tract infection)  Diagnosis management comments: Will CTA head and neck  Findings discussed with patient's daughter    Will follow-up and monitor the aneurysms with her PCP      Disposition  Final diagnoses:   Neck pain   UTI (urinary tract infection)   Dizziness     Time reflects when diagnosis was documented in both MDM as applicable and the Disposition within this note     Time User Action Codes Description Comment    4/17/2022  9:46 PM Faith Gals Add [M54 2] Neck pain     4/17/2022  9:46 PM Zeenat VILLALPANDO Add [N39 0] UTI (urinary tract infection)     4/17/2022  9:46 PM Courtneyla Gals Add [R42] Dizziness       ED Disposition     ED Disposition Condition Date/Time Comment    Discharge Stable Sun Apr 17, 2022  9:46 PM Christian Vidales discharge to home/self care  Follow-up Information     Follow up With Specialties Details Why Paris Gary MD  Schedule an appointment as soon as possible for a visit in 1 day  [facility] [address] [address]            Patient's Medications   Discharge Prescriptions    CEPHALEXIN (KEFLEX) 500 MG CAPSULE    Take 1 capsule (500 mg total) by mouth every 6 (six) hours for 7 days       Start Date: 4/17/2022 End Date: 4/24/2022       Order Dose: 500 mg       Quantity: 28 capsule    Refills: 0       No discharge procedures on file      PDMP Review     None          ED Provider  Electronically Signed by           Sg Woodall MD  04/17/22 4349

## 2022-04-20 LAB
ATRIAL RATE: 64 BPM
P AXIS: 30 DEGREES
PR INTERVAL: 152 MS
QRS AXIS: -34 DEGREES
QRSD INTERVAL: 84 MS
QT INTERVAL: 376 MS
QTC INTERVAL: 387 MS
T WAVE AXIS: 90 DEGREES
VENTRICULAR RATE: 64 BPM

## 2022-04-20 PROCEDURE — 93010 ELECTROCARDIOGRAM REPORT: CPT | Performed by: INTERNAL MEDICINE

## 2022-06-06 ENCOUNTER — HOSPITAL ENCOUNTER (EMERGENCY)
Facility: HOSPITAL | Age: 87
Discharge: HOME | End: 2022-06-06
Attending: EMERGENCY MEDICINE | Admitting: EMERGENCY MEDICINE
Payer: MEDICARE

## 2022-06-06 ENCOUNTER — APPOINTMENT (EMERGENCY)
Dept: RADIOLOGY | Facility: HOSPITAL | Age: 87
End: 2022-06-06
Payer: MEDICARE

## 2022-06-06 VITALS
DIASTOLIC BLOOD PRESSURE: 74 MMHG | WEIGHT: 130.07 LBS | HEART RATE: 65 BPM | SYSTOLIC BLOOD PRESSURE: 161 MMHG | TEMPERATURE: 96.6 F | RESPIRATION RATE: 16 BRPM | BODY MASS INDEX: 30.23 KG/M2 | OXYGEN SATURATION: 100 %

## 2022-06-06 DIAGNOSIS — M19.011 OSTEOARTHRITIS OF RIGHT SHOULDER, UNSPECIFIED OSTEOARTHRITIS TYPE: ICD-10-CM

## 2022-06-06 DIAGNOSIS — M25.511 RIGHT SHOULDER PAIN, UNSPECIFIED CHRONICITY: Primary | ICD-10-CM

## 2022-06-06 PROCEDURE — 99283 EMERGENCY DEPT VISIT LOW MDM: CPT

## 2022-06-06 PROCEDURE — 99284 EMERGENCY DEPT VISIT MOD MDM: CPT | Performed by: EMERGENCY MEDICINE

## 2022-06-06 PROCEDURE — 73030 X-RAY EXAM OF SHOULDER: CPT

## 2022-06-06 RX ORDER — ACETAMINOPHEN 325 MG/1
975 TABLET ORAL ONCE
Status: COMPLETED | OUTPATIENT
Start: 2022-06-06 | End: 2022-06-06

## 2022-06-06 RX ADMIN — ACETAMINOPHEN 975 MG: 325 TABLET ORAL at 21:46

## 2022-06-06 RX ADMIN — DICLOFENAC SODIUM TOPICAL GEL, 1%, 2 G: 10 GEL TOPICAL at 21:50

## 2022-06-07 NOTE — ED PROVIDER NOTES
History  Chief Complaint   Patient presents with    Shoulder Pain     Family states they only noted severe swelling to R shoulder today, having pain for several days     HPI    81 yo F presents to ed for eval of right shoulder swelling and pain  Started earlier today  Lives at home alone with caregivers and daughter checking in on her  Daughter denies a fall  Pain only with raising her arm  At rest minimal pain  No pain meds taken pta  No chest pain or sob  Due to the mild swelling, brought in for further eval  No fevers  No skin redness noted  No other complaints on ros  Prior to Admission Medications   Prescriptions Last Dose Informant Patient Reported? Taking?    amLODIPine (NORVASC) 5 mg tablet   Yes No   Sig: Take 10 mg by mouth daily   aspirin (ECOTRIN LOW STRENGTH) 81 mg EC tablet   No No   Sig: Take 1 tablet (81 mg total) by mouth daily   Patient not taking: Reported on 2022    atorvastatin (LIPITOR) 10 mg tablet   Yes No   Sig: Take 10 mg by mouth daily   celecoxib (CeleBREX) 200 mg capsule   Yes No   Sig: Take 200 mg by mouth daily   cholecalciferol (VITAMIN D3) 1,000 units tablet  Self Yes No   Sig: Take 1,000 Units by mouth daily   Patient not taking: Reported on 2022    docusate sodium (COLACE) 100 mg capsule   No No   Sig: Take 1 capsule (100 mg total) by mouth 2 (two) times a day   esomeprazole (NexIUM) 40 MG capsule   No No   Sig: Take 1 capsule (40 mg total) by mouth daily   fluticasone (FLONASE) 50 mcg/act nasal spray   No No   Si sprays into each nostril 2 (two) times a day for 3 days   levothyroxine 75 mcg tablet   Yes No   Sig: Take 100 mcg by mouth daily     lidocaine (LIDODERM) 5 %   No No   Sig: Apply 1 patch topically daily for 5 days Remove & Discard patch within 12 hours or as directed by MD   pregabalin (LYRICA) 75 mg capsule   Yes No   Sig: Take 75 mg by mouth daily    tetrahydrozoline-zinc (VISINE-AC) 0 05-0 25 % ophthalmic solution   No No   Sig: Administer 2 drops to both eyes 3 (three) times a day as needed (watery eyes)      Facility-Administered Medications: None       Past Medical History:   Diagnosis Date    Brain aneurysm     CAD (coronary artery disease)     Disease of thyroid gland     hypo    GERD (gastroesophageal reflux disease)     Gout     right shoulder    Hyperlipidemia     Hypertension     Osteoarthritis (arthritis due to wear and tear of joints)        Past Surgical History:   Procedure Laterality Date    CORONARY ARTERY BYPASS GRAFT      ENDOSCOPIC STENT PLACEMENT W/ MLB Left 2012    HIATAL HERNIA REPAIR      JOINT REPLACEMENT Left     knee 2001    ROTATOR CUFF REPAIR      TUBAL LIGATION  2013       History reviewed  No pertinent family history  I have reviewed and agree with the history as documented  E-Cigarette/Vaping    E-Cigarette Use Never User      E-Cigarette/Vaping Substances     Social History     Tobacco Use    Smoking status: Never Smoker    Smokeless tobacco: Never Used   Vaping Use    Vaping Use: Never used   Substance Use Topics    Alcohol use: Never    Drug use: Never       Review of Systems   Constitutional: Negative for chills, fatigue and fever  HENT: Negative for sore throat  Eyes: Negative for redness and visual disturbance  Respiratory: Negative for cough and shortness of breath  Cardiovascular: Negative for chest pain  Gastrointestinal: Negative for abdominal pain, diarrhea and nausea  Genitourinary: Negative for difficulty urinating, dysuria and pelvic pain  Musculoskeletal: Positive for arthralgias  Negative for back pain  Skin: Negative for rash  Neurological: Negative for syncope, weakness and headaches  All other systems reviewed and are negative  Physical Exam  Physical Exam  Vitals and nursing note reviewed  Constitutional:       General: She is not in acute distress  HENT:      Head: Normocephalic and atraumatic     Eyes:      Conjunctiva/sclera: Conjunctivae normal  Cardiovascular:      Rate and Rhythm: Normal rate and regular rhythm  Heart sounds: Normal heart sounds  Pulmonary:      Effort: Pulmonary effort is normal  No respiratory distress  Breath sounds: Normal breath sounds  Abdominal:      General: Bowel sounds are normal       Palpations: Abdomen is soft  Tenderness: There is no abdominal tenderness  Musculoskeletal:         General: Swelling and tenderness present  Normal range of motion  Cervical back: Normal range of motion  Comments: On examination: right shoulder  Patient has full ROM  Mild swelling without overlying skin changes redness or warmth  No laxity of the joint  Distally neurovascularly in tact  Compartments soft    Tenderness on palpation of proximal humerus       Skin:     General: Skin is warm and dry  Findings: No rash  Neurological:      Mental Status: She is alert and oriented to person, place, and time  Cranial Nerves: No cranial nerve deficit  Sensory: No sensory deficit  Motor: No abnormal muscle tone        Coordination: Coordination normal          Vital Signs  ED Triage Vitals [06/06/22 2041]   Temperature Pulse Respirations Blood Pressure SpO2   (!) 96 6 °F (35 9 °C) 65 16 161/74 100 %      Temp Source Heart Rate Source Patient Position - Orthostatic VS BP Location FiO2 (%)   Tympanic Monitor Sitting Left arm --      Pain Score       7           Vitals:    06/06/22 2041   BP: 161/74   Pulse: 65   Patient Position - Orthostatic VS: Sitting         Visual Acuity      ED Medications  Medications   Diclofenac Sodium (VOLTAREN) 1 % topical gel 2 g (2 g Topical Given 6/6/22 2150)   acetaminophen (TYLENOL) tablet 975 mg (975 mg Oral Given 6/6/22 2146)       Diagnostic Studies  Results Reviewed     None                 XR shoulder 2+ views RIGHT    (Results Pending)              Procedures  Procedures         ED Course  ED Course as of 06/06/22 2317   Mon Jun 06, 2022   2136 GFR 47, avoid NSAIDS SBIRT 20yo+    Flowsheet Row Most Recent Value   SBIRT (23 yo +)    In order to provide better care to our patients, we are screening all of our patients for alcohol and drug use  Would it be okay to ask you these screening questions? No Filed at: 06/06/2022 2055                    [facility]     Shoulder pain with tenderness, osteoarthritis on xray  Sling for comfort  No chest pain  Ortho follow up  The patient and her daughter were instructed to follow up as documented  Strict return precautions were discussed with the patient and the patient was instructed to return to the emergency department immediately if symptoms worsen  The patient/patient family member acknowledged and were in agreement with plan  Disposition  Final diagnoses:   Right shoulder pain, unspecified chronicity   Osteoarthritis of right shoulder, unspecified osteoarthritis type     Time reflects when diagnosis was documented in both MDM as applicable and the Disposition within this note     Time User Action Codes Description Comment    6/6/2022  9:45 PM Bert Coelho Right shoulder pain, unspecified chronicity     6/6/2022  9:46 PM Fermin Ureña Add [M19 011] Osteoarthritis of right shoulder, unspecified osteoarthritis type       ED Disposition     ED Disposition   Discharge    Condition   Stable    Date/Time   Mon Jun 6, 2022  9:45 PM    [address] [facility] discharge to home/self care                 Follow-up Information     Follow up With Specialties Details Why Contact Info    Tay Krishnan, DO Orthopedic Surgery, Hand Surgery Schedule an appointment as soon as possible for a visit in 1 day For follow up regarding your symptoms and recheck [address] [place] [address]  [phone]            Discharge Medication List as of 6/6/2022  9:49 PM      CONTINUE these medications which have NOT CHANGED    Details   amLODIPine (NORVASC) 5 mg tablet Take 10 mg by mouth daily, Historical Med      aspirin (ECOTRIN LOW STRENGTH) 81 mg EC tablet Take 1 tablet (81 mg total) by mouth daily, Starting Fri 6/25/2021, No Print      atorvastatin (LIPITOR) 10 mg tablet Take 10 mg by mouth daily, Historical Med      celecoxib (CeleBREX) 200 mg capsule Take 200 mg by mouth daily, Historical Med      cholecalciferol (VITAMIN D3) 1,000 units tablet Take 1,000 Units by mouth daily, Historical Med      docusate sodium (COLACE) 100 mg capsule Take 1 capsule (100 mg total) by mouth 2 (two) times a day, Starting Tue 5/21/2019, Normal      esomeprazole (NexIUM) 40 MG capsule Take 1 capsule (40 mg total) by mouth daily, Starting Fri 8/2/2019, No Print      fluticasone (FLONASE) 50 mcg/act nasal spray 2 sprays into each nostril 2 (two) times a day for 3 days, Starting Sun 5/2/2021, Until Wed 5/5/2021, Normal      levothyroxine 75 mcg tablet Take 100 mcg by mouth daily  , Historical Med      lidocaine (LIDODERM) 5 % Apply 1 patch topically daily for 5 days Remove & Discard patch within 12 hours or as directed by MD, Starting Sun 5/2/2021, Until Fri 5/7/2021, Normal      pregabalin (LYRICA) 75 mg capsule Take 75 mg by mouth daily , Historical Med      tetrahydrozoline-zinc (VISINE-AC) 0 05-0 25 % ophthalmic solution Administer 2 drops to both eyes 3 (three) times a day as needed (watery eyes), Starting Sun 5/2/2021, Normal             No discharge procedures on file      PDMP Review     None          ED Provider  Electronically Signed by           Delores Collins MD  06/06/22 8411

## 2022-06-29 ENCOUNTER — HOSPITAL ENCOUNTER (OUTPATIENT)
Facility: HOSPITAL | Age: 87
Setting detail: OBSERVATION
Discharge: HOME | End: 2022-06-30
Attending: EMERGENCY MEDICINE | Admitting: GENERAL PRACTICE
Payer: MEDICARE

## 2022-06-29 ENCOUNTER — APPOINTMENT (EMERGENCY)
Dept: CT IMAGING | Facility: HOSPITAL | Age: 87
End: 2022-06-29
Payer: MEDICARE

## 2022-06-29 DIAGNOSIS — M19.90 OSTEOARTHRITIS, UNSPECIFIED OSTEOARTHRITIS TYPE, UNSPECIFIED SITE: ICD-10-CM

## 2022-06-29 DIAGNOSIS — R10.9 ABDOMINAL PAIN: Primary | ICD-10-CM

## 2022-06-29 DIAGNOSIS — N39.0 UTI (URINARY TRACT INFECTION): ICD-10-CM

## 2022-06-29 PROBLEM — N18.30 STAGE 3 CHRONIC KIDNEY DISEASE (HCC): Status: ACTIVE | Noted: 2022-06-29

## 2022-06-29 LAB
ALBUMIN SERPL BCP-MCNC: 3.9 G/DL (ref 3.5–5)
ALP SERPL-CCNC: 110 U/L (ref 34–104)
ALT SERPL W P-5'-P-CCNC: 8 U/L (ref 7–52)
ANION GAP SERPL CALCULATED.3IONS-SCNC: 7 MMOL/L (ref 4–13)
AST SERPL W P-5'-P-CCNC: 14 U/L (ref 13–39)
BACTERIA UR QL AUTO: ABNORMAL /HPF
BASOPHILS # BLD AUTO: 0.03 THOUSANDS/ΜL (ref 0–0.1)
BASOPHILS NFR BLD AUTO: 0 % (ref 0–1)
BILIRUB SERPL-MCNC: 0.86 MG/DL (ref 0.2–1)
BILIRUB UR QL STRIP: NEGATIVE
BUN SERPL-MCNC: 14 MG/DL (ref 5–25)
CALCIUM SERPL-MCNC: 9.8 MG/DL (ref 8.4–10.2)
CHLORIDE SERPL-SCNC: 99 MMOL/L (ref 96–108)
CK SERPL-CCNC: 54 U/L (ref 26–192)
CLARITY UR: CLEAR
CO2 SERPL-SCNC: 30 MMOL/L (ref 21–32)
COLOR UR: YELLOW
CREAT SERPL-MCNC: 0.86 MG/DL (ref 0.6–1.3)
EOSINOPHIL # BLD AUTO: 0.06 THOUSAND/ΜL (ref 0–0.61)
EOSINOPHIL NFR BLD AUTO: 1 % (ref 0–6)
ERYTHROCYTE [DISTWIDTH] IN BLOOD BY AUTOMATED COUNT: 14.6 % (ref 11.6–15.1)
GFR SERPL CREATININE-BSD FRML MDRD: 56 ML/MIN/1.73SQ M
GLUCOSE SERPL-MCNC: 102 MG/DL (ref 65–140)
GLUCOSE UR STRIP-MCNC: NEGATIVE MG/DL
HCT VFR BLD AUTO: 40.6 % (ref 34.8–46.1)
HGB BLD-MCNC: 13.1 G/DL (ref 11.5–15.4)
HGB UR QL STRIP.AUTO: NEGATIVE
IMM GRANULOCYTES # BLD AUTO: 0.03 THOUSAND/UL (ref 0–0.2)
IMM GRANULOCYTES NFR BLD AUTO: 0 % (ref 0–2)
KETONES UR STRIP-MCNC: NEGATIVE MG/DL
LACTATE SERPL-SCNC: 0.9 MMOL/L (ref 0.5–2)
LEUKOCYTE ESTERASE UR QL STRIP: ABNORMAL
LIPASE SERPL-CCNC: 9 U/L (ref 11–82)
LYMPHOCYTES # BLD AUTO: 2.01 THOUSANDS/ΜL (ref 0.6–4.47)
LYMPHOCYTES NFR BLD AUTO: 21 % (ref 14–44)
MCH RBC QN AUTO: 26.4 PG (ref 26.8–34.3)
MCHC RBC AUTO-ENTMCNC: 32.3 G/DL (ref 31.4–37.4)
MCV RBC AUTO: 82 FL (ref 82–98)
MONOCYTES # BLD AUTO: 0.88 THOUSAND/ΜL (ref 0.17–1.22)
MONOCYTES NFR BLD AUTO: 9 % (ref 4–12)
NEUTROPHILS # BLD AUTO: 6.8 THOUSANDS/ΜL (ref 1.85–7.62)
NEUTS SEG NFR BLD AUTO: 69 % (ref 43–75)
NITRITE UR QL STRIP: POSITIVE
NON-SQ EPI CELLS URNS QL MICRO: ABNORMAL /HPF
NRBC BLD AUTO-RTO: 0 /100 WBCS
PH UR STRIP.AUTO: 8 [PH]
PLATELET # BLD AUTO: 241 THOUSANDS/UL (ref 149–390)
PMV BLD AUTO: 9.9 FL (ref 8.9–12.7)
POTASSIUM SERPL-SCNC: 4 MMOL/L (ref 3.5–5.3)
PROT SERPL-MCNC: 7.2 G/DL (ref 6.4–8.4)
PROT UR STRIP-MCNC: NEGATIVE MG/DL
RBC # BLD AUTO: 4.97 MILLION/UL (ref 3.81–5.12)
RBC #/AREA URNS AUTO: ABNORMAL /HPF
SODIUM SERPL-SCNC: 136 MMOL/L (ref 135–147)
SP GR UR STRIP.AUTO: 1.01 (ref 1–1.03)
UROBILINOGEN UR QL STRIP.AUTO: 0.2 E.U./DL
WBC # BLD AUTO: 9.81 THOUSAND/UL (ref 4.31–10.16)
WBC #/AREA URNS AUTO: ABNORMAL /HPF

## 2022-06-29 PROCEDURE — 83605 ASSAY OF LACTIC ACID: CPT

## 2022-06-29 PROCEDURE — 99219 PR INITIAL OBSERVATION CARE/DAY 50 MINUTES: CPT | Performed by: GENERAL PRACTICE

## 2022-06-29 PROCEDURE — 96374 THER/PROPH/DIAG INJ IV PUSH: CPT

## 2022-06-29 PROCEDURE — 87181 SC STD AGAR DILUTION PER AGT: CPT

## 2022-06-29 PROCEDURE — 80053 COMPREHEN METABOLIC PANEL: CPT

## 2022-06-29 PROCEDURE — 99285 EMERGENCY DEPT VISIT HI MDM: CPT

## 2022-06-29 PROCEDURE — 81001 URINALYSIS AUTO W/SCOPE: CPT

## 2022-06-29 PROCEDURE — 74176 CT ABD & PELVIS W/O CONTRAST: CPT

## 2022-06-29 PROCEDURE — 87077 CULTURE AEROBIC IDENTIFY: CPT

## 2022-06-29 PROCEDURE — 82550 ASSAY OF CK (CPK): CPT

## 2022-06-29 PROCEDURE — 87186 SC STD MICRODIL/AGAR DIL: CPT

## 2022-06-29 PROCEDURE — 83690 ASSAY OF LIPASE: CPT

## 2022-06-29 PROCEDURE — 85025 COMPLETE CBC W/AUTO DIFF WBC: CPT

## 2022-06-29 PROCEDURE — 87086 URINE CULTURE/COLONY COUNT: CPT

## 2022-06-29 PROCEDURE — 96375 TX/PRO/DX INJ NEW DRUG ADDON: CPT

## 2022-06-29 PROCEDURE — 36415 COLL VENOUS BLD VENIPUNCTURE: CPT

## 2022-06-29 RX ORDER — DOCUSATE SODIUM 100 MG/1
100 CAPSULE, LIQUID FILLED ORAL 2 TIMES DAILY
Status: DISCONTINUED | OUTPATIENT
Start: 2022-06-29 | End: 2022-06-30 | Stop reason: HOSPADM

## 2022-06-29 RX ORDER — SODIUM CHLORIDE 9 MG/ML
125 INJECTION, SOLUTION INTRAVENOUS CONTINUOUS
Status: DISCONTINUED | OUTPATIENT
Start: 2022-06-29 | End: 2022-06-30

## 2022-06-29 RX ORDER — ACETAMINOPHEN 325 MG/1
650 TABLET ORAL ONCE
Status: COMPLETED | OUTPATIENT
Start: 2022-06-29 | End: 2022-06-29

## 2022-06-29 RX ORDER — CEPHALEXIN 250 MG/1
500 CAPSULE ORAL ONCE
Status: DISCONTINUED | OUTPATIENT
Start: 2022-06-29 | End: 2022-06-29

## 2022-06-29 RX ORDER — ACETAMINOPHEN 325 MG/1
975 TABLET ORAL EVERY 8 HOURS SCHEDULED
Status: DISCONTINUED | OUTPATIENT
Start: 2022-06-29 | End: 2022-06-30 | Stop reason: HOSPADM

## 2022-06-29 RX ORDER — LEVOTHYROXINE SODIUM 0.1 MG/1
100 TABLET ORAL
Status: DISCONTINUED | OUTPATIENT
Start: 2022-06-29 | End: 2022-06-30 | Stop reason: HOSPADM

## 2022-06-29 RX ORDER — ATORVASTATIN CALCIUM 10 MG/1
10 TABLET, FILM COATED ORAL
Status: DISCONTINUED | OUTPATIENT
Start: 2022-06-29 | End: 2022-06-30 | Stop reason: HOSPADM

## 2022-06-29 RX ORDER — ENOXAPARIN SODIUM 100 MG/ML
30 INJECTION SUBCUTANEOUS DAILY
Status: DISCONTINUED | OUTPATIENT
Start: 2022-06-29 | End: 2022-06-30 | Stop reason: HOSPADM

## 2022-06-29 RX ORDER — PANTOPRAZOLE SODIUM 40 MG/1
40 TABLET, DELAYED RELEASE ORAL
Refills: 0 | Status: DISCONTINUED | OUTPATIENT
Start: 2022-06-29 | End: 2022-06-30 | Stop reason: HOSPADM

## 2022-06-29 RX ORDER — AMLODIPINE BESYLATE 10 MG/1
10 TABLET ORAL
Status: DISCONTINUED | OUTPATIENT
Start: 2022-06-29 | End: 2022-06-30 | Stop reason: HOSPADM

## 2022-06-29 RX ADMIN — DOCUSATE SODIUM 100 MG: 100 CAPSULE, LIQUID FILLED ORAL at 11:15

## 2022-06-29 RX ADMIN — ENOXAPARIN SODIUM 30 MG: 30 INJECTION SUBCUTANEOUS at 11:16

## 2022-06-29 RX ADMIN — ACETAMINOPHEN 975 MG: 325 TABLET ORAL at 13:22

## 2022-06-29 RX ADMIN — LEVOTHYROXINE SODIUM 100 MCG: 100 TABLET ORAL at 11:15

## 2022-06-29 RX ADMIN — CEFTRIAXONE SODIUM 1000 MG: 10 INJECTION, POWDER, FOR SOLUTION INTRAVENOUS at 08:11

## 2022-06-29 RX ADMIN — ATORVASTATIN CALCIUM 10 MG: 10 TABLET, FILM COATED ORAL at 16:57

## 2022-06-29 RX ADMIN — ACETAMINOPHEN 650 MG: 325 TABLET ORAL at 05:25

## 2022-06-29 RX ADMIN — PANTOPRAZOLE SODIUM 40 MG: 40 TABLET, DELAYED RELEASE ORAL at 11:15

## 2022-06-29 RX ADMIN — SODIUM CHLORIDE 125 ML/HR: 0.9 INJECTION, SOLUTION INTRAVENOUS at 10:24

## 2022-06-29 RX ADMIN — DOCUSATE SODIUM 100 MG: 100 CAPSULE, LIQUID FILLED ORAL at 17:39

## 2022-06-29 RX ADMIN — SODIUM CHLORIDE 125 ML/HR: 0.9 INJECTION, SOLUTION INTRAVENOUS at 08:11

## 2022-06-29 RX ADMIN — Medication 1 MG: at 08:06

## 2022-06-29 NOTE — H&P
[address] 5/5/1924, 80 y o  female MRN: [id number]  Unit/Bed#: W -01 Encounter: [id number]  Primary Care Provider: Heavenly Gonzalez MD   Date and time admitted to hospital: 6/29/2022  4:47 AM    No new Assessment & Plan notes have been filed under this hospital service since the last note was generated  Service: Hospitalist    VTE Pharmacologic Prophylaxis: VTE Score: 5 High Risk (Score >/= 5) - Pharmacological DVT Prophylaxis Ordered: enoxaparin (Lovenox)  Sequential Compression Devices Ordered  Code Status: Level 3 - DNAR and DNI   Discussion with family: Updated  (daughter) at bedside  Anticipated Length of Stay: Patient will be admitted on an observation basis with an anticipated length of stay of less than 2 midnights secondary to need to r/o UTI and diverticulitis  Total Time for Visit, including Counseling / Coordination of Care: 45 minutes Greater than 50% of this total time spent on direct patient counseling and coordination of care  Chief Complaint: abd pain    History of Present Illness:  Gillian Matthews is a 80 y o  female with a PMH of HTN, UTIs, and diverticulitis who presents with severe LLQ pain starting this AM   Pt says pain improved w/ tylenol and morphine given in ER  Worse w/ movement  Pain not as severe as severe as diverticulitis pain in past   No fevers or dysuria or n/v/c/d  Review of Systems:  Review of Systems   Constitutional: Negative  HENT: Negative  Eyes: Negative  Respiratory: Negative  Cardiovascular: Negative  Gastrointestinal: Positive for abdominal pain  Endocrine: Negative  Genitourinary: Negative  Musculoskeletal: Negative  Skin: Negative  Allergic/Immunologic: Negative  Neurological: Negative  Hematological: Negative  Psychiatric/Behavioral: Negative          Past Medical and Surgical History:   Past Medical History:   Diagnosis Date    Brain aneurysm     CAD (coronary artery disease)     Disease of thyroid gland     hypo    GERD (gastroesophageal reflux disease)     Gout     right shoulder    Hyperlipidemia     Hypertension     Osteoarthritis (arthritis due to wear and tear of joints)        Past Surgical History:   Procedure Laterality Date    CORONARY ARTERY BYPASS GRAFT      ENDOSCOPIC STENT PLACEMENT W/ MLB Left 2012    HIATAL HERNIA REPAIR      JOINT REPLACEMENT Left     knee 2001    ROTATOR CUFF REPAIR      TUBAL LIGATION  2013       Meds/Allergies:  Prior to Admission medications    Medication Sig Start Date End Date Taking?  Authorizing Provider   amLODIPine (NORVASC) 5 mg tablet Take 10 mg by mouth daily    Historical Provider, MD   aspirin (ECOTRIN LOW STRENGTH) 81 mg EC tablet Take 1 tablet (81 mg total) by mouth daily  Patient not taking: Reported on 4/17/2022 6/25/21   VARSHA Francisco   atorvastatin (LIPITOR) 10 mg tablet Take 10 mg by mouth daily    Historical Provider, MD   celecoxib (CeleBREX) 200 mg capsule Take 200 mg by mouth daily    Historical Provider, MD   cholecalciferol (VITAMIN D3) 1,000 units tablet Take 1,000 Units by mouth daily  Patient not taking: Reported on 4/17/2022     Historical Provider, MD   docusate sodium (COLACE) 100 mg capsule Take 1 capsule (100 mg total) by mouth 2 (two) times a day 5/21/19   Susan Dunlap MD   esomeprazole (NexIUM) 40 MG capsule Take 1 capsule (40 mg total) by mouth daily 8/2/19   VARSHA Yanez   fluticasone (FLONASE) 50 mcg/act nasal spray 2 sprays into each nostril 2 (two) times a day for 3 days 5/2/21 5/5/21  Anastasiia Fournier PA-C   levothyroxine 75 mcg tablet Take 100 mcg by mouth daily      Historical Provider, MD   lidocaine (LIDODERM) 5 % Apply 1 patch topically daily for 5 days Remove & Discard patch within 12 hours or as directed by MD 5/2/21 5/7/21  Anastasiia Fournier PA-C   pregabalin (LYRICA) 75 mg capsule Take 75 mg by mouth daily     Historical Provider, MD tetrahydrozoline-zinc (VISINE-AC) 0 05-0 25 % ophthalmic solution Administer 2 drops to both eyes 3 (three) times a day as needed (watery eyes) 5/2/21   Anastasiia Fournier PA-C     I have reviewed home medications with patient family member  Allergies: Allergies   Allergen Reactions    Codeine Hives    Percocet [Oxycodone-Acetaminophen] Vomiting       Social History:  Marital Status:      Patient Pre-hospital Living Situation: Home  Patient Pre-hospital Level of Mobility: walks    Substance Use History:   Social History     Substance and Sexual Activity   Alcohol Use Never     Social History     Tobacco Use   Smoking Status Never Smoker   Smokeless Tobacco Never Used     Social History     Substance and Sexual Activity   Drug Use Never       Family History:  History reviewed  No pertinent family history  Physical Exam:     Vitals:   Blood Pressure: 140/71 (06/29/22 1009)  Pulse: 60 (06/29/22 0945)  Temperature: 98 3 °F (36 8 °C) (06/29/22 1009)  Temp Source: Oral (06/29/22 1009)  Respirations: 16 (06/29/22 1009)  Height: 4' 9" (144 8 cm) (06/29/22 0945)  Weight - Scale: 53 kg (116 lb 13 5 oz) (06/29/22 0945)  SpO2: 96 % (06/29/22 0945)    Physical Exam  HENT:      Head: Normocephalic and atraumatic  Nose: Nose normal       Mouth/Throat:      Mouth: Mucous membranes are moist    Eyes:      Extraocular Movements: Extraocular movements intact  Conjunctiva/sclera: Conjunctivae normal    Cardiovascular:      Rate and Rhythm: Normal rate and regular rhythm  Pulmonary:      Effort: Pulmonary effort is normal       Breath sounds: Normal breath sounds  No wheezing or rales  Abdominal:      General: Bowel sounds are normal  There is no distension  Palpations: Abdomen is soft  Tenderness: There is abdominal tenderness (LLQ)  Musculoskeletal:         General: Normal range of motion  Cervical back: Normal range of motion and neck supple  Right lower leg: No edema        Left lower leg: No edema  Skin:     General: Skin is warm and dry  Neurological:      Mental Status: She is alert and oriented to person, place, and time  Additional Data:     Lab Results:  Results from last 7 days   Lab Units 06/29/22  0523   WBC Thousand/uL 9 81   HEMOGLOBIN g/dL 13 1   HEMATOCRIT % 40 6   PLATELETS Thousands/uL 241   NEUTROS PCT % 69   LYMPHS PCT % 21   MONOS PCT % 9   EOS PCT % 1     Results from last 7 days   Lab Units 06/29/22  0523   SODIUM mmol/L 136   POTASSIUM mmol/L 4 0   CHLORIDE mmol/L 99   CO2 mmol/L 30   BUN mg/dL 14   CREATININE mg/dL 0 86   ANION GAP mmol/L 7   CALCIUM mg/dL 9 8   ALBUMIN g/dL 3 9   TOTAL BILIRUBIN mg/dL 0 86   ALK PHOS U/L 110*   ALT U/L 8   AST U/L 14   GLUCOSE RANDOM mg/dL 102                 Results from last 7 days   Lab Units 06/29/22  0523   LACTIC ACID mmol/L 0 9       Imaging: Reviewed radiology reports from this admission including: abdominal/pelvic CT  CT abdomen pelvis wo contrast   Final Result by Vallery Habermann, MD (06/29 6919)      No evidence of acute abdominopelvic process  Colonic diverticulosis  Workstation performed: [id number]             EKG and Other Studies Reviewed on Admission:   · EKG: NSR     ** Please Note: This note has been constructed using a voice recognition system   **

## 2022-06-29 NOTE — ASSESSMENT & PLAN NOTE
· Started this AM  · ?  UTI but only 0-1 WBC in urine vs diverticulitis although CTAP unremarkable vs muscle strain  · C/w Rocephin for now which would cover UTI and diverticulitis  · F/u U Cx and procal  · F/u bladder scan

## 2022-06-29 NOTE — ASSESSMENT & PLAN NOTE
Lab Results   Component Value Date    EGFR 56 06/29/2022    EGFR 47 04/17/2022    EGFR 42 06/25/2021    CREATININE 0 86 06/29/2022    CREATININE 0 99 04/17/2022    CREATININE 1 11 06/25/2021   Estimated Creatinine Clearance: 26 2 mL/min (by C-G formula based on SCr of 0 86 mg/dL)    · Cr at b/l 0 9-1 1

## 2022-06-29 NOTE — ED PROVIDER NOTES
History  Chief Complaint   Patient presents with    Medical Problem     Pt has LLQ abdominal pain, started last night, tender to touch, denies N/V/D     Pt is a 80-year old female with a PMHx significant for HTN, HLD, CAD, hypothyroid, arthritis and GERD presenting to the ED with a complaint of LLQ pain for 1 day  Pt reports she began with the pain 1 day ago with no known inciting event  No injury or trauma  Reports the pain is a dull pain in the LLQ without radiation  Denies having this pain before  No previous abdominal surgeries  Denies nausea, vomiting, diarrhea and constipation  Reports she last had a normal BM today  No melena or BRBPR  No dysuria, hematuria, malodorous urine or increased urinary frequency  No vaginal complaints  No back pain  States she has been drinking and eating normally  No suspicious food intake or recent travel  Reports she lives at home with full-time help  States she has otherwise been feeling well and denies any other complaints at today's visit  No fever, chills, cough, congestion, chest pain, shortness of breath, back pain, neck pain, headache, confusion, weakness or any other complaint  Prior to Admission Medications   Prescriptions Last Dose Informant Patient Reported? Taking?    amLODIPine (NORVASC) 5 mg tablet   Yes No   Sig: Take 10 mg by mouth daily   aspirin (ECOTRIN LOW STRENGTH) 81 mg EC tablet   No No   Sig: Take 1 tablet (81 mg total) by mouth daily   Patient not taking: Reported on 4/17/2022    atorvastatin (LIPITOR) 10 mg tablet   Yes No   Sig: Take 10 mg by mouth daily   celecoxib (CeleBREX) 200 mg capsule   Yes No   Sig: Take 200 mg by mouth daily   cholecalciferol (VITAMIN D3) 1,000 units tablet  Self Yes No   Sig: Take 1,000 Units by mouth daily   Patient not taking: Reported on 4/17/2022    docusate sodium (COLACE) 100 mg capsule   No No   Sig: Take 1 capsule (100 mg total) by mouth 2 (two) times a day   esomeprazole (NexIUM) 40 MG capsule No No   Sig: Take 1 capsule (40 mg total) by mouth daily   fluticasone (FLONASE) 50 mcg/act nasal spray   No No   Si sprays into each nostril 2 (two) times a day for 3 days   levothyroxine 75 mcg tablet   Yes No   Sig: Take 100 mcg by mouth daily     lidocaine (LIDODERM) 5 %   No No   Sig: Apply 1 patch topically daily for 5 days Remove & Discard patch within 12 hours or as directed by MD   pregabalin (LYRICA) 75 mg capsule   Yes No   Sig: Take 75 mg by mouth daily    tetrahydrozoline-zinc (VISINE-AC) 0 05-0 25 % ophthalmic solution   No No   Sig: Administer 2 drops to both eyes 3 (three) times a day as needed (watery eyes)      Facility-Administered Medications: None       Past Medical History:   Diagnosis Date    Brain aneurysm     CAD (coronary artery disease)     Disease of thyroid gland     hypo    GERD (gastroesophageal reflux disease)     Gout     right shoulder    Hyperlipidemia     Hypertension     Osteoarthritis (arthritis due to wear and tear of joints)        Past Surgical History:   Procedure Laterality Date    CORONARY ARTERY BYPASS GRAFT      ENDOSCOPIC STENT PLACEMENT W/ MLB Left     HIATAL HERNIA REPAIR      JOINT REPLACEMENT Left     knee     ROTATOR CUFF REPAIR      TUBAL LIGATION  2013       History reviewed  No pertinent family history  I have reviewed and agree with the history as documented  E-Cigarette/Vaping    E-Cigarette Use Never User      E-Cigarette/Vaping Substances     Social History     Tobacco Use    Smoking status: Never Smoker    Smokeless tobacco: Never Used   Vaping Use    Vaping Use: Never used   Substance Use Topics    Alcohol use: Never    Drug use: Never       Review of Systems   Constitutional: Negative for chills and fever  HENT: Negative for ear pain and sore throat  Eyes: Negative for pain and visual disturbance  Respiratory: Negative for cough and shortness of breath      Cardiovascular: Negative for chest pain and palpitations  Gastrointestinal: Positive for abdominal pain  Negative for blood in stool, constipation, diarrhea, nausea and vomiting  Genitourinary: Negative for dysuria, flank pain and hematuria  Musculoskeletal: Negative for arthralgias and back pain  Skin: Negative for color change and rash  Neurological: Negative for seizures, syncope, weakness and headaches  Psychiatric/Behavioral: Negative for confusion  All other systems reviewed and are negative  Physical Exam  Physical Exam  Vitals and nursing note reviewed  Constitutional:       General: She is awake  She is not in acute distress  Appearance: She is well-developed  She is not ill-appearing or diaphoretic  HENT:      Head: Normocephalic and atraumatic  Mouth/Throat:      Mouth: Mucous membranes are moist       Pharynx: Oropharynx is clear  Eyes:      Conjunctiva/sclera: Conjunctivae normal    Cardiovascular:      Rate and Rhythm: Normal rate and regular rhythm  Heart sounds: No murmur heard  Pulmonary:      Effort: Pulmonary effort is normal  No respiratory distress  Breath sounds: Normal breath sounds  Abdominal:      Palpations: Abdomen is soft  Tenderness: There is no abdominal tenderness  Comments: TTP were indicated  No distention or rebound  No overlying skin changes  No other abdominal tenderness to palpation  Genitourinary:     Comments: Deferred  Musculoskeletal:      Cervical back: Neck supple  Lymphadenopathy:      Cervical: No cervical adenopathy  Skin:     General: Skin is warm and dry  Capillary Refill: Capillary refill takes less than 2 seconds  Coloration: Skin is not jaundiced or pale  Neurological:      General: No focal deficit present  Mental Status: She is alert  Psychiatric:         Behavior: Behavior is cooperative           Vital Signs  ED Triage Vitals   Temperature Pulse Respirations Blood Pressure SpO2   06/29/22 0448 06/29/22 0448 06/29/22 0448 06/29/22 0448 06/29/22 0448   98 9 °F (37 2 °C) 81 18 169/79 98 %      Temp Source Heart Rate Source Patient Position - Orthostatic VS BP Location FiO2 (%)   06/29/22 0448 06/29/22 0448 06/29/22 0448 06/29/22 0448 --   Oral Monitor Lying Right arm       Pain Score       06/29/22 0711       4           Vitals:    06/29/22 1009 06/29/22 1535 06/29/22 2019 06/30/22 0724   BP: 140/71 120/66 129/69 136/66   Pulse:  (!) 53 63    Patient Position - Orthostatic VS: Lying Lying Lying          Visual Acuity      ED Medications  Medications   acetaminophen (TYLENOL) tablet 650 mg (650 mg Oral Given 6/29/22 0525)   morphine injection 1 mg (1 mg Intravenous Given 6/29/22 0806)   ceftriaxone (ROCEPHIN) 1 g/50 mL in dextrose IVPB (0 mg Intravenous Stopped 6/29/22 0907)   sodium chloride 0 9 % infusion (0 mL/hr Intravenous Stopped 6/30/22 1449)       Diagnostic Studies  Results Reviewed     Procedure Component Value Units Date/Time    Urine culture [[id number]]  (Abnormal)  (Susceptibility) Collected: 06/29/22 0737    Lab Status: Final result Specimen: Urine, Other Updated: 07/02/22 9237     Urine Culture >100,000 cfu/ml Escherichia coli ESBL    Susceptibility     Escherichia coli ESBL (1)     Antibiotic Interpretation Microscan   Method Status    ZID Performed  Yes  DANAY Final    Amikacin ($$) Susceptible <=16 ug/ml DANAY Final    Amoxicillin + Clavulanate Susceptible <=8/4 ug/ml DANAY Final    Ampicillin ($$) Resistant >16 00 ug/ml DANAY Final    Ampicillin + Sulbactam ($) Susceptible 8/4 ug/ml DANAY Final    Aztreonam ($$$)  Resistant <=4 ug/ml DANAY Final    Cefazolin ($) Resistant >16 00 ug/ml DANAY Final    Cefepime ($) Resistant <=2 00 ug/ml DANAY Final    Ceftazidime ($$) Resistant <=1 ug/ml DANAY Final    Ceftriaxone ($$) Resistant >32 00 ug/ml DANAY Final    Cefuroxime ($$) Resistant >16 ug/ml DANAY Final    Ciprofloxacin ($)  Susceptible <=0 25 ug/ml DANAY Final    Ertapenem ($$$) Susceptible <=0 5 ug/ml DANAY Final    Gentamicin ($$) Resistant >8 ug/ml DANAY Final    Levofloxacin ($) Susceptible <=0 50 ug/ml DANAY Final    Nitrofurantoin Susceptible <=32 ug/ml DANAY Final    Piperacillin + Tazobactam ($$$) Susceptible <=8 ug/ml DANAY Final    Tetracycline Susceptible <=4 ug/ml DANAY Final    Tobramycin ($) Intermediate 8 ug/ml DANAY Final    Trimethoprim + Sulfamethoxazole ($$$) Resistant >2/38 ug/ml DANAY Final    Fosfomycin   Susceptible 0 250 ug/ml DANAY Final                   Urine Microscopic [[id number]]  (Abnormal) Collected: 06/29/22 0718    Lab Status: Final result Specimen: Urine, Clean Catch Updated: 06/29/22 0734     RBC, UA 0-1 /hpf      WBC, UA 0-1 /hpf      Epithelial Cells None Seen /hpf      Bacteria, UA Moderate /hpf     UA (URINE) with reflex to Scope [[id number]]  (Abnormal) Collected: 06/29/22 0718    Lab Status: Final result Specimen: Urine, Clean Catch Updated: 06/29/22 0726     Color, UA Yellow     Clarity, UA Clear     Specific Gravity, UA 1 015     pH, UA 8 0     Leukocytes, UA Small     Nitrite, UA Positive     Protein, UA Negative mg/dl      Glucose, UA Negative mg/dl      Ketones, UA Negative mg/dl      Urobilinogen, UA 0 2 E U /dl      Bilirubin, UA Negative     Occult Blood, UA Negative    Lactic acid [[id number]]  (Normal) Collected: 06/29/22 0523    Lab Status: Final result Specimen: Blood from Arm, Left Updated: 06/29/22 0604     LACTIC ACID 0 9 mmol/L     Narrative:      Result may be elevated if tourniquet was used during collection      CK [[id number]]  (Normal) Collected: 06/29/22 0523    Lab Status: Final result Specimen: Blood from Arm, Left Updated: 06/29/22 0604     Total CK 54 U/L     Lipase [[id number]]  (Abnormal) Collected: 06/29/22 0523    Lab Status: Final result Specimen: Blood from Arm, Left Updated: 06/29/22 0604     Lipase 9 u/L     Comprehensive metabolic panel [[id number]]  (Abnormal) Collected: 06/29/22 0523    Lab Status: Final result Specimen: Blood from Arm, Left Updated: 06/29/22 0604     Sodium 136 mmol/L Potassium 4 0 mmol/L      Chloride 99 mmol/L      CO2 30 mmol/L      ANION GAP 7 mmol/L      BUN 14 mg/dL      Creatinine 0 86 mg/dL      Glucose 102 mg/dL      Calcium 9 8 mg/dL      AST 14 U/L      ALT 8 U/L      Alkaline Phosphatase 110 U/L      Total Protein 7 2 g/dL      Albumin 3 9 g/dL      Total Bilirubin 0 86 mg/dL      eGFR 56 ml/min/1 73sq m     Narrative:      [place] guidelines for Chronic Kidney Disease (CKD):     Stage 1 with normal or high GFR (GFR > 90 mL/min/1 73 square meters)    Stage 2 Mild CKD (GFR = 60-89 mL/min/1 73 square meters)    Stage 3A Moderate CKD (GFR = 45-59 mL/min/1 73 square meters)    Stage 3B Moderate CKD (GFR = 30-44 mL/min/1 73 square meters)    Stage 4 Severe CKD (GFR = 15-29 mL/min/1 73 square meters)    Stage 5 End Stage CKD (GFR <15 mL/min/1 73 square meters)  Note: GFR calculation is accurate only with a steady state creatinine    CBC and differential [[id number]]  (Abnormal) Collected: 06/29/22 0523    Lab Status: Final result Specimen: Blood from Arm, Left Updated: 06/29/22 0533     WBC 9 81 Thousand/uL      RBC 4 97 Million/uL      Hemoglobin 13 1 g/dL      Hematocrit 40 6 %      MCV 82 fL      MCH 26 4 pg      MCHC 32 3 g/dL      RDW 14 6 %      MPV 9 9 fL      Platelets 307 Thousands/uL      nRBC 0 /100 WBCs      Neutrophils Relative 69 %      Immat GRANS % 0 %      Lymphocytes Relative 21 %      Monocytes Relative 9 %      Eosinophils Relative 1 %      Basophils Relative 0 %      Neutrophils Absolute 6 80 Thousands/µL      Immature Grans Absolute 0 03 Thousand/uL      Lymphocytes Absolute 2 01 Thousands/µL      Monocytes Absolute 0 88 Thousand/µL      Eosinophils Absolute 0 06 Thousand/µL      Basophils Absolute 0 03 Thousands/µL                  CT abdomen pelvis wo contrast   Final Result by Ismael Granados MD (06/29 7852)      No evidence of acute abdominopelvic process  Colonic diverticulosis  Workstation performed: [id number]                    Procedures  Procedures         ED Course  ED Course as of 07/04/22 1036   Wed Jun 29, 2022   8864 UA (URINE) with reflex to Scope(!)                               SBIRT 20yo+    Flowsheet Row Most Recent Value   SBIRT (25 yo +)    In order to provide better care to our patients, we are screening all of our patients for alcohol and drug use  Would it be okay to ask you these screening questions? No Filed at: 06/29/2022 0706   Initial Alcohol Screen: US AUDIT-C     1  How often do you have a drink containing alcohol? 0 Filed at: 06/29/2022 0706   2  How many drinks containing alcohol do you have on a typical day you are drinking? 0 Filed at: 06/29/2022 0706   3b  FEMALE Any Age, or MALE 65+: How often do you have 4 or more drinks on one occassion? 0 Filed at: 06/29/2022 0706   Audit-C Score 0 Filed at: 06/29/2022 6597   ALYSSA: How many times in the past year have you    Used an illegal drug or used a prescription medication for non-medical reasons? Never Filed at: 06/29/2022 0706                    MDM  Number of Diagnoses or Management Options     Amount and/or Complexity of Data Reviewed  Clinical lab tests: ordered and reviewed  Tests in the radiology section of CPT®: ordered and reviewed    Risk of Complications, Morbidity, and/or Mortality  General comments: Patient is a 28-year-old female presenting to the ED with a complaint of left lower quadrant abdominal pain for 1 day  No other complaints  Reports she has otherwise been feeling well  On exam patient is a well-appearing 28-year-old female resting comfortably in the stretcher in no acute distress  Patient is tender to palpation left lower quadrant    No other concerning findings on exam   DDx including but not limited to: appendicitis, gastroenteritis, gastritis, PUD, GERD, gastroparesis, hepatitis, pancreatitis, colitis, enteritis, diverticulitis, food poisoning, mesenteric adenitis, epiploic appendagitis, mesenteric panniculitis, mesenteric ischemia, IBD, IBS, ileus, bowel obstruction, volvulus, internal hernia, AAA, cholecystitis, biliary colic, choledocholithiasis, perforated viscus, tumor, splenic etiology, constipation, pelvic pathology, renal colic, pyelonephritis, UTI; doubt cardiac etiology  Labs significant for nitrite (+) UA with small leukocytes  CT unremarkable  Other labs grossly unremarkable  Pt's daughter at bedside  Pt continues with the LLQ pain  States it has improved with pain medication but still present  No other complaints  Pt and daughter concerned for pt going home and concerned about how she will manage the pain  In light of nitrite (+) UA, persistent abdominal pain and limited outpt pain medication options will admit to observation for re-evaluation and further management  [facility] contacted who accept pt  Verbally communicated all above findings and disposition with the patient who verbalized her understanding and agreement to the above plan  Pt stable at admission  Patient Progress  Patient progress: stable      Disposition  Final diagnoses:   Abdominal pain   UTI (urinary tract infection)     Time reflects when diagnosis was documented in both MDM as applicable and the Disposition within this note     Time User Action Codes Description Comment    6/29/2022  7:36 AM Kyle Soto Payment Add [R10 9] Abdominal pain     6/29/2022  7:36 AM Kyle Soto Payment Add [N39 0] UTI (urinary tract infection)     6/30/2022 12:44 PM Stoudt, Hallie Simmonds Add [M19 90] Osteoarthritis, unspecified osteoarthritis type, unspecified site       ED Disposition     ED Disposition   Admit    Condition   Stable    Date/Time   Wed Jun 29, 2022  8:19 AM    Comment   Case was discussed with HERMINIO and the patient's admission status was agreed to be Admission Status: observation status to the service of Dr Wilkins Generous              Follow-up Information     Follow up With Specialties Details Why Contact Info Valeri Reyes MD  Schedule an appointment as soon as possible for a visit in 1 week(s)  [address]  [address] [phone]            Discharge Medication List as of 6/30/2022  1:07 PM      START taking these medications    Details   acetaminophen (TYLENOL) 325 mg tablet Take 3 tablets (975 mg total) by mouth every 8 (eight) hours, Starting u 6/30/2022, No Print      amoxicillin-clavulanate (AUGMENTIN) 875-125 mg per tablet Take 1 tablet by mouth every 12 (twelve) hours for 6 days, Starting Thu 6/30/2022, Until Wed 7/6/2022, Normal      oxyCODONE (ROXICODONE) 5 immediate release tablet Take 0 5 tablets (2 5 mg total) by mouth every 6 (six) hours as needed for severe pain for up to 10 days Max Daily Amount: 10 mg, Starting Thu 6/30/2022, Until Sun 7/10/2022 at 2359, Normal         CONTINUE these medications which have NOT CHANGED    Details   amLODIPine (NORVASC) 5 mg tablet Take 10 mg by mouth daily, Historical Med      atorvastatin (LIPITOR) 10 mg tablet Take 10 mg by mouth daily, Historical Med      docusate sodium (COLACE) 100 mg capsule Take 1 capsule (100 mg total) by mouth 2 (two) times a day, Starting Tue 5/21/2019, Normal      esomeprazole (NexIUM) 40 MG capsule Take 1 capsule (40 mg total) by mouth daily, Starting Fri 8/2/2019, No Print      levothyroxine 75 mcg tablet Take 100 mcg by mouth daily  , Historical Med      lidocaine (LIDODERM) 5 % Apply 1 patch topically daily for 5 days Remove & Discard patch within 12 hours or as directed by MD, Starting Sun 5/2/2021, Until Fri 5/7/2021, Normal         STOP taking these medications       aspirin (ECOTRIN LOW STRENGTH) 81 mg EC tablet Comments:   Reason for Stopping:         celecoxib (CeleBREX) 200 mg capsule Comments:   Reason for Stopping:         cholecalciferol (VITAMIN D3) 1,000 units tablet Comments:   Reason for Stopping:         fluticasone (FLONASE) 50 mcg/act nasal spray Comments:   Reason for Stopping: pregabalin (LYRICA) 75 mg capsule Comments:   Reason for Stopping:         tetrahydrozoline-zinc (VISINE-AC) 0 05-0 25 % ophthalmic solution Comments:   Reason for Stopping:               Outpatient Discharge Orders   Discharge Diet     Activity as tolerated       PDMP Review     None          ED Provider  Electronically Signed by           Wale Caldera PA-C  07/04/22 8992

## 2022-06-29 NOTE — CASE MANAGEMENT
Case Management Assessment & Discharge Planning Note    Patient name Margot Ruelas  Location W /W -42 MRN [id number]  : 1924 Date 2022       Current Admission Date: 2022  Current Admission Diagnosis:Abdominal pain, acute, left lower quadrant   Patient Active Problem List    Diagnosis Date Noted    Stage 3 chronic kidney disease ([address] ) 2022    Elevated troponin 2021    UTI (urinary tract infection) 2021    Status post fall 2019    Anemia 2019    Tendon tear 2019    Generalized weakness 07/15/2019    URI (upper respiratory infection) 07/15/2019    Hypothyroidism 07/15/2019    CAD (coronary artery disease) 07/15/2019    Abdominal pain, acute, left lower quadrant 2019    Hypertension 2019    Hyperlipidemia 2019    Acquired hypothyroidism 2019    Osteoarthritis (arthritis due to wear and tear of joints) 2019      LOS (days): 0  Geometric Mean LOS (GMLOS) (days):   Days to GMLOS:     OBJECTIVE:              Current admission status: Observation       Preferred Pharmacy:   [facility][id number]- [facility]   [address] [place] [address]  Phone: [phone] Fax: [phone]    Primary Care Provider: Jake Bowman MD    Primary Insurance: MEDICARE  Secondary Insurance: [facility]    ASSESSMENT:  [address] Proxies    There are no active Health Care Proxies on file  DISCHARGE DETAILS:                                          Other Referral/Resources/Interventions Provided:  Interventions: Advanced Directives  Referral Comments: CM received pt's Advanced Directive from the nurse, CM labeled it and placed in bin for upload to pt's chart

## 2022-06-30 VITALS
RESPIRATION RATE: 16 BRPM | TEMPERATURE: 98.3 F | OXYGEN SATURATION: 95 % | BODY MASS INDEX: 25.21 KG/M2 | SYSTOLIC BLOOD PRESSURE: 136 MMHG | DIASTOLIC BLOOD PRESSURE: 66 MMHG | WEIGHT: 116.84 LBS | HEIGHT: 57 IN | HEART RATE: 63 BPM

## 2022-06-30 LAB
ANION GAP SERPL CALCULATED.3IONS-SCNC: 6 MMOL/L (ref 4–13)
BUN SERPL-MCNC: 10 MG/DL (ref 5–25)
CALCIUM SERPL-MCNC: 9.2 MG/DL (ref 8.4–10.2)
CHLORIDE SERPL-SCNC: 104 MMOL/L (ref 96–108)
CO2 SERPL-SCNC: 27 MMOL/L (ref 21–32)
CREAT SERPL-MCNC: 0.74 MG/DL (ref 0.6–1.3)
ERYTHROCYTE [DISTWIDTH] IN BLOOD BY AUTOMATED COUNT: 14.6 % (ref 11.6–15.1)
GFR SERPL CREATININE-BSD FRML MDRD: 67 ML/MIN/1.73SQ M
GLUCOSE P FAST SERPL-MCNC: 91 MG/DL (ref 65–99)
GLUCOSE SERPL-MCNC: 91 MG/DL (ref 65–140)
HCT VFR BLD AUTO: 37.9 % (ref 34.8–46.1)
HGB BLD-MCNC: 12.2 G/DL (ref 11.5–15.4)
MCH RBC QN AUTO: 26.3 PG (ref 26.8–34.3)
MCHC RBC AUTO-ENTMCNC: 32.2 G/DL (ref 31.4–37.4)
MCV RBC AUTO: 82 FL (ref 82–98)
PLATELET # BLD AUTO: 229 THOUSANDS/UL (ref 149–390)
PMV BLD AUTO: 9.8 FL (ref 8.9–12.7)
POTASSIUM SERPL-SCNC: 4.1 MMOL/L (ref 3.5–5.3)
PROCALCITONIN SERPL-MCNC: 0.05 NG/ML
RBC # BLD AUTO: 4.63 MILLION/UL (ref 3.81–5.12)
SODIUM SERPL-SCNC: 137 MMOL/L (ref 135–147)
WBC # BLD AUTO: 7.14 THOUSAND/UL (ref 4.31–10.16)

## 2022-06-30 PROCEDURE — 80048 BASIC METABOLIC PNL TOTAL CA: CPT | Performed by: GENERAL PRACTICE

## 2022-06-30 PROCEDURE — 84145 PROCALCITONIN (PCT): CPT | Performed by: GENERAL PRACTICE

## 2022-06-30 PROCEDURE — 85027 COMPLETE CBC AUTOMATED: CPT | Performed by: GENERAL PRACTICE

## 2022-06-30 PROCEDURE — 99239 HOSP IP/OBS DSCHRG MGMT >30: CPT | Performed by: PHYSICIAN ASSISTANT

## 2022-06-30 RX ORDER — AMOXICILLIN AND CLAVULANATE POTASSIUM 875; 125 MG/1; MG/1
1 TABLET, FILM COATED ORAL EVERY 12 HOURS SCHEDULED
Qty: 12 TABLET | Refills: 0 | Status: ON HOLD | OUTPATIENT
Start: 2022-06-30 | End: 2022-07-07 | Stop reason: CLARIF

## 2022-06-30 RX ORDER — ACETAMINOPHEN 325 MG/1
975 TABLET ORAL EVERY 8 HOURS SCHEDULED
Refills: 0 | Status: ON HOLD
Start: 2022-06-30 | End: 2022-07-07 | Stop reason: CLARIF

## 2022-06-30 RX ORDER — AMOXICILLIN AND CLAVULANATE POTASSIUM 875; 125 MG/1; MG/1
1 TABLET, FILM COATED ORAL EVERY 12 HOURS SCHEDULED
Status: DISCONTINUED | OUTPATIENT
Start: 2022-06-30 | End: 2022-06-30 | Stop reason: HOSPADM

## 2022-06-30 RX ORDER — SODIUM CHLORIDE 9 MG/ML
75 INJECTION, SOLUTION INTRAVENOUS ONCE
Status: COMPLETED | OUTPATIENT
Start: 2022-06-30 | End: 2022-06-30

## 2022-06-30 RX ORDER — LIDOCAINE 50 MG/G
1 PATCH TOPICAL DAILY
Status: DISCONTINUED | OUTPATIENT
Start: 2022-06-30 | End: 2022-06-30 | Stop reason: HOSPADM

## 2022-06-30 RX ORDER — OXYCODONE HYDROCHLORIDE 5 MG/1
2.5 TABLET ORAL EVERY 6 HOURS PRN
Status: DISCONTINUED | OUTPATIENT
Start: 2022-06-30 | End: 2022-06-30 | Stop reason: HOSPADM

## 2022-06-30 RX ORDER — OXYCODONE HYDROCHLORIDE 5 MG/1
2.5 TABLET ORAL EVERY 6 HOURS PRN
Qty: 10 TABLET | Refills: 0 | Status: ON HOLD | OUTPATIENT
Start: 2022-06-30 | End: 2022-07-07 | Stop reason: CLARIF

## 2022-06-30 RX ADMIN — PANTOPRAZOLE SODIUM 40 MG: 40 TABLET, DELAYED RELEASE ORAL at 05:21

## 2022-06-30 RX ADMIN — ENOXAPARIN SODIUM 30 MG: 30 INJECTION SUBCUTANEOUS at 08:31

## 2022-06-30 RX ADMIN — LEVOTHYROXINE SODIUM 100 MCG: 100 TABLET ORAL at 05:21

## 2022-06-30 RX ADMIN — ACETAMINOPHEN 975 MG: 325 TABLET ORAL at 05:21

## 2022-06-30 RX ADMIN — CEFTRIAXONE SODIUM 1000 MG: 10 INJECTION, POWDER, FOR SOLUTION INTRAVENOUS at 08:32

## 2022-06-30 RX ADMIN — OXYCODONE HYDROCHLORIDE 2.5 MG: 5 TABLET ORAL at 11:39

## 2022-06-30 RX ADMIN — LIDOCAINE 5% 1 PATCH: 700 PATCH TOPICAL at 13:00

## 2022-06-30 RX ADMIN — SODIUM CHLORIDE 75 ML/HR: 0.9 INJECTION, SOLUTION INTRAVENOUS at 11:05

## 2022-06-30 RX ADMIN — ACETAMINOPHEN 975 MG: 325 TABLET ORAL at 13:01

## 2022-06-30 RX ADMIN — DOCUSATE SODIUM 100 MG: 100 CAPSULE, LIQUID FILLED ORAL at 08:31

## 2022-06-30 RX ADMIN — SODIUM CHLORIDE 125 ML/HR: 0.9 INJECTION, SOLUTION INTRAVENOUS at 02:33

## 2022-06-30 RX ADMIN — AMOXICILLIN AND CLAVULANATE POTASSIUM 1 TABLET: 875; 125 TABLET, FILM COATED ORAL at 11:08

## 2022-06-30 NOTE — ASSESSMENT & PLAN NOTE
· >100k GNR on culture, despite urinalysis being minimally abnormal--positive nitrates and low leukocytes  · Prior cultures grew E coli which was sensitive to Augmentin    Given that patient is clinically stable with no sepsis it is very reasonable to change her from IV ceftriaxone to oral Augmentin to complete a course  · CT scan unremarkable with no evidence of cystitis or pyelo

## 2022-06-30 NOTE — ASSESSMENT & PLAN NOTE
· Left lower quadrant abdominal pain noted on admission  Appears to have improved with conservative management  · CT scan unremarkable    No leukocytosis  · Tolerating diet  · Possibly UTI vs mild recurrent diverticulitis versus musculoskeletal pain from underlying arthritis  · Convert from IV ceftriaxone to oral Augmentin which would cover both UTI and diverticulitis  · Continue Tylenol around the clock as well as Lidoderm and low-dose oxycodone for severe pain which would cover for musculoskeletal pain or pain from infection

## 2022-06-30 NOTE — DISCHARGE SUMMARY
[facility]  Discharge- Russ Gage 5/5/1924, 80 y o  female MRN: [id number]  Unit/Bed#: W -01 Encounter: [id number]  Primary Care Provider: Sathya Liriano MD   Date and time admitted to hospital: 6/29/2022  4:47 AM    * Abdominal pain, acute, left lower quadrant  Assessment & Plan  · Left lower quadrant abdominal pain noted on admission  Appears to have improved with conservative management  · CT scan unremarkable  No leukocytosis  · Tolerating diet  · Possibly UTI vs mild recurrent diverticulitis versus musculoskeletal pain from underlying arthritis  · Convert from IV ceftriaxone to oral Augmentin which would cover both UTI and diverticulitis  · Continue Tylenol around the clock as well as Lidoderm and low-dose oxycodone for severe pain which would cover for musculoskeletal pain or pain from infection    UTI (urinary tract infection)  Assessment & Plan  · >100k GNR on culture, despite urinalysis being minimally abnormal--positive nitrates and low leukocytes  · Prior cultures grew E coli which was sensitive to Augmentin  Given that patient is clinically stable with no sepsis it is very reasonable to change her from IV ceftriaxone to oral Augmentin to complete a course  · CT scan unremarkable with no evidence of cystitis or pyelo    Osteoarthritis (arthritis due to wear and tear of joints)  Assessment & Plan  · Stop Celebrex due to CKD  · Scheduled tylenol    Stage 3 chronic kidney disease [facility])  Assessment & Plan  Lab Results   Component Value Date    EGFR 67 06/30/2022    EGFR 56 06/29/2022    EGFR 47 04/17/2022    CREATININE 0 74 06/30/2022    CREATININE 0 86 06/29/2022    CREATININE 0 99 04/17/2022   Estimated Creatinine Clearance: 30 5 mL/min (by C-G formula based on SCr of 0 74 mg/dL)    · Cr at b/l 0 9-1 1    Hypertension  Assessment & Plan  · C/w [facility]    Medical Problems             Resolved Problems  Date Reviewed: 6/30/2022   None               Discharging Physician / Practitioner: Dani Louise PA-C  PCP: Alicia Morejon MD  Admission Date:   Admission Orders (From admission, onward)     Ordered        06/29/22 0820  Place in Observation  Once                      Discharge Date: 06/30/22    Consultations During Hospital Stay:  · None    Procedures Performed:   · CT scan of the abdomen and pelvis    Significant Findings / Test Results:   · As above    Incidental Findings:   · None     Test Results Pending at Discharge (will require follow up): · Final urine culture sensitivity     Outpatient Tests Requested:  · None    Complications:  None    Reason for Admission:  Left lower quadrant abdominal pain    Hospital Course:   Ryan Thakur is a 80 y o  female patient who originally presented to the hospital on 6/29/2022 due to left lower quadrant abdominal pain  Patient has previous history of diverticulitis and upon presentation to the hospital had a CT scan of the abdomen and pelvis however this was done without contrast due to the nationwide shortage  It did not show any convincing evidence of diverticulitis and patient also had no sepsis criteria including no leukocytosis  Patient had mildly abnorma urinalysis with positive nitrites and urine culture was showing greater than 100,000 gram-negative rods  In addition her imaging did show multiple areas of degenerative disease and arthritis  Pain patient was tolerating diet, clinically stable with no signs of sepsis, and improving with current pain regimen  Given these factors, it was reasonable to transition her to oral antibiotics to cover either urinary tract infection or diverticulitis for 7 days and pain regimen  She will return back to her senior citizen community with home health aides  Please see above list of diagnoses and related plan for additional information       Condition at Discharge: stable    Discharge Day Visit / Exam:   Subjective:  Upon my evaluation in the morning patient was still having some left lower quadrant abdominal pain but was tolerating diet and denied any nausea, vomiting, diarrhea, bleeding  After continuing around the clock Tylenol and adding low-dose oxycodone, I spoke with her nurse who reported that she was doing better, ambulating and with decreased pain  Vitals: Blood Pressure: 136/66 (06/30/22 0724)  Pulse: 63 (06/29/22 2019)  Temperature: 98 3 °F (36 8 °C) (06/30/22 0724)  Temp Source: Oral (06/29/22 2019)  Respirations: 16 (06/29/22 2019)  Height: 4' 9" (144 8 cm) (06/29/22 0945)  Weight - Scale: 53 kg (116 lb 13 5 oz) (06/29/22 0945)  SpO2: 95 % (06/30/22 0900)  Exam:   Physical Exam  Vitals reviewed  Constitutional:       General: She is not in acute distress  Appearance: Normal appearance  She is not ill-appearing, toxic-appearing or diaphoretic  Comments: Well appearing   Eyes:      General:         Right eye: No discharge  Cardiovascular:      Rate and Rhythm: Normal rate and regular rhythm  Heart sounds: No murmur heard  Pulmonary:      Effort: No respiratory distress  Breath sounds: No stridor  No wheezing or rhonchi  Abdominal:      General: There is no distension  Tenderness: There is abdominal tenderness (LLQ pain)  There is no guarding or rebound  Musculoskeletal:      Right lower leg: No edema  Left lower leg: No edema  Skin:     General: Skin is warm and dry  Coloration: Skin is not jaundiced or pale  Findings: No bruising, erythema, lesion or rash  Neurological:      General: No focal deficit present  Mental Status: She is alert  Mental status is at baseline  Psychiatric:         Mood and Affect: Mood normal          Behavior: Behavior normal          Thought Content: Thought content normal           Discussion with Family:  Case discussed with her daughter over the phone    Discharge instructions/Information to patient and family:   See after visit summary for information provided to patient and family  Provisions for Follow-Up Care:  See after visit summary for information related to follow-up care and any pertinent home health orders  Disposition:   Home with aides    Planned Readmission: none     Discharge Statement:  I spent 35 minutes discharging the patient  This time was spent on the day of discharge  I had direct contact with the patient on the day of discharge  Greater than 50% of the total time was spent examining patient, answering all patient questions, arranging and discussing plan of care with patient as well as directly providing post-discharge instructions  Additional time then spent on discharge activities  Case discussed with case management  Bedside nursing rounds performed  Discharge Medications:  See after visit summary for reconciled discharge medications provided to patient and/or family        **Please Note: This note may have been constructed using a voice recognition system**

## 2022-06-30 NOTE — DISCHARGE INSTR - AVS FIRST PAGE
Dear Bill Pettit,     It was our pleasure to care for you here at [facility]  It is our hope that we were always able to exceed the expected standards for your care during your stay  You were hospitalized due to abdominal pain possibly due to urinary tract infection verses diverticulitis plus minus musculoskeletal pain from arthritis  You were cared for on the 4th floor by Brisa Duval PA-C under the service of Gely Loo MD with the [facility] who covers for your primary care physician (PCP), Ilya Soni MD, while you were hospitalized  If you have any questions or concerns related to this hospitalization, you may contact us at [phone]  For follow up as well as any medication refills, we recommend that you follow up with your primary care physician  A registered nurse will reach out to you by phone within a few days after your discharge to answer any additional questions that you may have after going home  However, at this time we provide for you here, the most important instructions / recommendations at discharge:     Notable Medication Adjustments -   Augmentin for 6 more days  Tylenol around the clock for pain; very low-dose oxycodone for severe pain  Testing Required after Discharge -     Important follow up information -   Follow-up with your family doctor--they can review the results of the sensitivities of the urine culture but we suspect he will do well with the antibiotic ordered based on your previous cultures  Other Instructions -     Please review this entire after visit summary as additional general instructions including medication list, appointments, activity, diet, any pertinent wound care, and other additional recommendations from your care team that may be provided for you        Sincerely,     Birsa Duval PA-C

## 2022-06-30 NOTE — ASSESSMENT & PLAN NOTE
Lab Results   Component Value Date    EGFR 67 06/30/2022    EGFR 56 06/29/2022    EGFR 47 04/17/2022    CREATININE 0 74 06/30/2022    CREATININE 0 86 06/29/2022    CREATININE 0 99 04/17/2022   Estimated Creatinine Clearance: 30 5 mL/min (by C-G formula based on SCr of 0 74 mg/dL)    · Cr at b/l 0 9-1 1

## 2022-06-30 NOTE — CASE MANAGEMENT
Case Management Progress Note    Patient name Ryan Thakur  Location W /W -01 MRN [id number]  : 1924 Date 2022       LOS (days): 0  Geometric Mean LOS (GMLOS) (days):   Days to GMLOS:        OBJECTIVE:        Current admission status: Observation  Preferred Pharmacy:   [facility]/pharmacy #[id number] [place] - [address]  Phone: [phone] Fax: [phone]    Primary Care Provider: Alicia Morejon MD    Primary Insurance: MEDICARE  Secondary Insurance: [employer]    PROGRESS NOTE:    Call made to patient's daughter, Gabo Simmons ([phone]), to complete assessment, but no answer  VM left requesting return call, as patient stable for d/c today  Callback number for this writer provided

## 2022-07-02 LAB — BACTERIA UR CULT: ABNORMAL

## 2022-07-06 ENCOUNTER — APPOINTMENT (EMERGENCY)
Dept: RADIOLOGY | Facility: HOSPITAL | Age: 87
End: 2022-07-06
Payer: MEDICARE

## 2022-07-06 ENCOUNTER — HOSPITAL ENCOUNTER (OUTPATIENT)
Facility: HOSPITAL | Age: 87
Setting detail: OBSERVATION
Discharge: HOME | End: 2022-07-07
Attending: EMERGENCY MEDICINE | Admitting: FAMILY MEDICINE
Payer: MEDICARE

## 2022-07-06 DIAGNOSIS — M25.551 RIGHT HIP PAIN: ICD-10-CM

## 2022-07-06 DIAGNOSIS — M25.552 LEFT HIP PAIN: Primary | ICD-10-CM

## 2022-07-06 DIAGNOSIS — R26.2 AMBULATORY DYSFUNCTION: ICD-10-CM

## 2022-07-06 LAB
ANION GAP SERPL CALCULATED.3IONS-SCNC: 11 MMOL/L (ref 4–13)
BASOPHILS # BLD AUTO: 0.02 THOUSANDS/ΜL (ref 0–0.1)
BASOPHILS NFR BLD AUTO: 0 % (ref 0–1)
BUN SERPL-MCNC: 13 MG/DL (ref 5–25)
CALCIUM SERPL-MCNC: 9.1 MG/DL (ref 8.3–10.1)
CHLORIDE SERPL-SCNC: 99 MMOL/L (ref 100–108)
CO2 SERPL-SCNC: 26 MMOL/L (ref 21–32)
CREAT SERPL-MCNC: 0.93 MG/DL (ref 0.6–1.3)
EOSINOPHIL # BLD AUTO: 0.05 THOUSAND/ΜL (ref 0–0.61)
EOSINOPHIL NFR BLD AUTO: 1 % (ref 0–6)
ERYTHROCYTE [DISTWIDTH] IN BLOOD BY AUTOMATED COUNT: 13.7 % (ref 11.6–15.1)
GFR SERPL CREATININE-BSD FRML MDRD: 51 ML/MIN/1.73SQ M
GLUCOSE SERPL-MCNC: 104 MG/DL (ref 65–140)
HCT VFR BLD AUTO: 35.3 % (ref 34.8–46.1)
HGB BLD-MCNC: 11.7 G/DL (ref 11.5–15.4)
IMM GRANULOCYTES # BLD AUTO: 0.03 THOUSAND/UL (ref 0–0.2)
IMM GRANULOCYTES NFR BLD AUTO: 0 % (ref 0–2)
LYMPHOCYTES # BLD AUTO: 1.19 THOUSANDS/ΜL (ref 0.6–4.47)
LYMPHOCYTES NFR BLD AUTO: 13 % (ref 14–44)
MCH RBC QN AUTO: 26.4 PG (ref 26.8–34.3)
MCHC RBC AUTO-ENTMCNC: 33.1 G/DL (ref 31.4–37.4)
MCV RBC AUTO: 80 FL (ref 82–98)
MONOCYTES # BLD AUTO: 0.91 THOUSAND/ΜL (ref 0.17–1.22)
MONOCYTES NFR BLD AUTO: 10 % (ref 4–12)
NEUTROPHILS # BLD AUTO: 7 THOUSANDS/ΜL (ref 1.85–7.62)
NEUTS SEG NFR BLD AUTO: 76 % (ref 43–75)
NRBC BLD AUTO-RTO: 0 /100 WBCS
PLATELET # BLD AUTO: 300 THOUSANDS/UL (ref 149–390)
PMV BLD AUTO: 9.2 FL (ref 8.9–12.7)
POTASSIUM SERPL-SCNC: 3.9 MMOL/L (ref 3.5–5.3)
RBC # BLD AUTO: 4.43 MILLION/UL (ref 3.81–5.12)
SODIUM SERPL-SCNC: 136 MMOL/L (ref 136–145)
WBC # BLD AUTO: 9.2 THOUSAND/UL (ref 4.31–10.16)

## 2022-07-06 PROCEDURE — 74176 CT ABD & PELVIS W/O CONTRAST: CPT

## 2022-07-06 PROCEDURE — 99285 EMERGENCY DEPT VISIT HI MDM: CPT | Performed by: EMERGENCY MEDICINE

## 2022-07-06 PROCEDURE — 36415 COLL VENOUS BLD VENIPUNCTURE: CPT | Performed by: EMERGENCY MEDICINE

## 2022-07-06 PROCEDURE — 85025 COMPLETE CBC W/AUTO DIFF WBC: CPT | Performed by: EMERGENCY MEDICINE

## 2022-07-06 PROCEDURE — 96374 THER/PROPH/DIAG INJ IV PUSH: CPT

## 2022-07-06 PROCEDURE — 80048 BASIC METABOLIC PNL TOTAL CA: CPT | Performed by: EMERGENCY MEDICINE

## 2022-07-06 PROCEDURE — G1004 CDSM NDSC: HCPCS

## 2022-07-06 PROCEDURE — 96375 TX/PRO/DX INJ NEW DRUG ADDON: CPT

## 2022-07-06 PROCEDURE — 96361 HYDRATE IV INFUSION ADD-ON: CPT

## 2022-07-06 PROCEDURE — 99285 EMERGENCY DEPT VISIT HI MDM: CPT

## 2022-07-06 RX ORDER — ONDANSETRON 2 MG/ML
4 INJECTION INTRAMUSCULAR; INTRAVENOUS ONCE
Status: COMPLETED | OUTPATIENT
Start: 2022-07-06 | End: 2022-07-06

## 2022-07-06 RX ADMIN — SODIUM CHLORIDE 500 ML: 0.9 INJECTION, SOLUTION INTRAVENOUS at 21:35

## 2022-07-06 RX ADMIN — ONDANSETRON 4 MG: 2 INJECTION INTRAMUSCULAR; INTRAVENOUS at 21:36

## 2022-07-06 RX ADMIN — MORPHINE SULFATE 2 MG: 2 INJECTION, SOLUTION INTRAMUSCULAR; INTRAVENOUS at 21:37

## 2022-07-07 VITALS
OXYGEN SATURATION: 94 % | TEMPERATURE: 98.9 F | DIASTOLIC BLOOD PRESSURE: 71 MMHG | RESPIRATION RATE: 18 BRPM | BODY MASS INDEX: 25.89 KG/M2 | HEART RATE: 77 BPM | SYSTOLIC BLOOD PRESSURE: 155 MMHG | HEIGHT: 57 IN | WEIGHT: 120 LBS

## 2022-07-07 PROBLEM — R10.30 LOWER ABDOMINAL PAIN: Status: ACTIVE | Noted: 2019-05-21

## 2022-07-07 PROBLEM — M25.551 RIGHT HIP PAIN: Status: ACTIVE | Noted: 2022-07-07

## 2022-07-07 LAB
ANION GAP SERPL CALCULATED.3IONS-SCNC: 10 MMOL/L (ref 4–13)
BASOPHILS # BLD AUTO: 0.03 THOUSANDS/ΜL (ref 0–0.1)
BASOPHILS NFR BLD AUTO: 0 % (ref 0–1)
BILIRUB UR QL STRIP: NEGATIVE
BUN SERPL-MCNC: 12 MG/DL (ref 5–25)
CALCIUM SERPL-MCNC: 9.2 MG/DL (ref 8.3–10.1)
CHLORIDE SERPL-SCNC: 98 MMOL/L (ref 100–108)
CLARITY UR: CLEAR
CO2 SERPL-SCNC: 26 MMOL/L (ref 21–32)
COLOR UR: YELLOW
CREAT SERPL-MCNC: 0.9 MG/DL (ref 0.6–1.3)
EOSINOPHIL # BLD AUTO: 0.03 THOUSAND/ΜL (ref 0–0.61)
EOSINOPHIL NFR BLD AUTO: 0 % (ref 0–6)
ERYTHROCYTE [DISTWIDTH] IN BLOOD BY AUTOMATED COUNT: 13.8 % (ref 11.6–15.1)
GFR SERPL CREATININE-BSD FRML MDRD: 53 ML/MIN/1.73SQ M
GLUCOSE P FAST SERPL-MCNC: 90 MG/DL (ref 65–99)
GLUCOSE SERPL-MCNC: 90 MG/DL (ref 65–140)
GLUCOSE UR STRIP-MCNC: NEGATIVE MG/DL
HCT VFR BLD AUTO: 35.5 % (ref 34.8–46.1)
HGB BLD-MCNC: 11.6 G/DL (ref 11.5–15.4)
HGB UR QL STRIP.AUTO: NEGATIVE
IMM GRANULOCYTES # BLD AUTO: 0.05 THOUSAND/UL (ref 0–0.2)
IMM GRANULOCYTES NFR BLD AUTO: 0 % (ref 0–2)
KETONES UR STRIP-MCNC: NEGATIVE MG/DL
LEUKOCYTE ESTERASE UR QL STRIP: NEGATIVE
LYMPHOCYTES # BLD AUTO: 1.54 THOUSANDS/ΜL (ref 0.6–4.47)
LYMPHOCYTES NFR BLD AUTO: 14 % (ref 14–44)
MCH RBC QN AUTO: 26.4 PG (ref 26.8–34.3)
MCHC RBC AUTO-ENTMCNC: 32.7 G/DL (ref 31.4–37.4)
MCV RBC AUTO: 81 FL (ref 82–98)
MONOCYTES # BLD AUTO: 0.99 THOUSAND/ΜL (ref 0.17–1.22)
MONOCYTES NFR BLD AUTO: 9 % (ref 4–12)
NEUTROPHILS # BLD AUTO: 8.52 THOUSANDS/ΜL (ref 1.85–7.62)
NEUTS SEG NFR BLD AUTO: 77 % (ref 43–75)
NITRITE UR QL STRIP: NEGATIVE
NRBC BLD AUTO-RTO: 0 /100 WBCS
PH UR STRIP.AUTO: 7 [PH]
PLATELET # BLD AUTO: 311 THOUSANDS/UL (ref 149–390)
PMV BLD AUTO: 9.7 FL (ref 8.9–12.7)
POTASSIUM SERPL-SCNC: 3.9 MMOL/L (ref 3.5–5.3)
PROT UR STRIP-MCNC: NEGATIVE MG/DL
RBC # BLD AUTO: 4.39 MILLION/UL (ref 3.81–5.12)
SODIUM SERPL-SCNC: 134 MMOL/L (ref 136–145)
SP GR UR STRIP.AUTO: 1.01 (ref 1–1.03)
UROBILINOGEN UR QL STRIP.AUTO: 0.2 E.U./DL
WBC # BLD AUTO: 11.16 THOUSAND/UL (ref 4.31–10.16)

## 2022-07-07 PROCEDURE — 85025 COMPLETE CBC W/AUTO DIFF WBC: CPT

## 2022-07-07 PROCEDURE — 97162 PT EVAL MOD COMPLEX 30 MIN: CPT

## 2022-07-07 PROCEDURE — 97530 THERAPEUTIC ACTIVITIES: CPT

## 2022-07-07 PROCEDURE — 99236 HOSP IP/OBS SAME DATE HI 85: CPT | Performed by: INTERNAL MEDICINE

## 2022-07-07 PROCEDURE — 81003 URINALYSIS AUTO W/O SCOPE: CPT

## 2022-07-07 PROCEDURE — 80048 BASIC METABOLIC PNL TOTAL CA: CPT

## 2022-07-07 RX ORDER — HEPARIN SODIUM 5000 [USP'U]/ML
5000 INJECTION, SOLUTION INTRAVENOUS; SUBCUTANEOUS EVERY 8 HOURS SCHEDULED
Status: DISCONTINUED | OUTPATIENT
Start: 2022-07-07 | End: 2022-07-07 | Stop reason: HOSPADM

## 2022-07-07 RX ORDER — PANTOPRAZOLE SODIUM 40 MG/1
40 TABLET, DELAYED RELEASE ORAL
Status: DISCONTINUED | OUTPATIENT
Start: 2022-07-07 | End: 2022-07-07 | Stop reason: HOSPADM

## 2022-07-07 RX ORDER — DOCUSATE SODIUM 100 MG/1
100 CAPSULE, LIQUID FILLED ORAL 2 TIMES DAILY
Status: DISCONTINUED | OUTPATIENT
Start: 2022-07-07 | End: 2022-07-07 | Stop reason: HOSPADM

## 2022-07-07 RX ORDER — LEVOTHYROXINE SODIUM 0.1 MG/1
100 TABLET ORAL
Status: DISCONTINUED | OUTPATIENT
Start: 2022-07-07 | End: 2022-07-07 | Stop reason: HOSPADM

## 2022-07-07 RX ORDER — AMLODIPINE BESYLATE 10 MG/1
10 TABLET ORAL DAILY
Status: DISCONTINUED | OUTPATIENT
Start: 2022-07-07 | End: 2022-07-07 | Stop reason: HOSPADM

## 2022-07-07 RX ORDER — ATORVASTATIN CALCIUM 10 MG/1
10 TABLET, FILM COATED ORAL DAILY
Status: DISCONTINUED | OUTPATIENT
Start: 2022-07-07 | End: 2022-07-07 | Stop reason: HOSPADM

## 2022-07-07 RX ORDER — ACETAMINOPHEN 325 MG/1
975 TABLET ORAL EVERY 8 HOURS SCHEDULED
Status: DISCONTINUED | OUTPATIENT
Start: 2022-07-07 | End: 2022-07-07 | Stop reason: HOSPADM

## 2022-07-07 RX ORDER — ACETAMINOPHEN AND CODEINE PHOSPHATE 300; 30 MG/1; MG/1
1 TABLET ORAL EVERY 8 HOURS PRN
Qty: 20 TABLET | Refills: 0 | Status: SHIPPED | OUTPATIENT
Start: 2022-07-07

## 2022-07-07 RX ORDER — METHOCARBAMOL 500 MG/1
250 TABLET, FILM COATED ORAL 3 TIMES DAILY PRN
Qty: 30 TABLET | Refills: 0 | Status: SHIPPED | OUTPATIENT
Start: 2022-07-07

## 2022-07-07 RX ADMIN — ATORVASTATIN CALCIUM 10 MG: 10 TABLET, FILM COATED ORAL at 08:24

## 2022-07-07 RX ADMIN — HEPARIN SODIUM 5000 UNITS: 5000 INJECTION INTRAVENOUS; SUBCUTANEOUS at 05:35

## 2022-07-07 RX ADMIN — LEVOTHYROXINE SODIUM 100 MCG: 100 TABLET ORAL at 05:35

## 2022-07-07 RX ADMIN — AMLODIPINE BESYLATE 10 MG: 10 TABLET ORAL at 08:24

## 2022-07-07 RX ADMIN — PANTOPRAZOLE SODIUM 40 MG: 40 TABLET, DELAYED RELEASE ORAL at 05:36

## 2022-07-07 RX ADMIN — DOCUSATE SODIUM 100 MG: 100 CAPSULE, LIQUID FILLED ORAL at 08:24

## 2022-07-07 RX ADMIN — ACETAMINOPHEN 975 MG: 325 TABLET ORAL at 05:35

## 2022-07-07 NOTE — ASSESSMENT & PLAN NOTE
Patient presents with right hip pain beginning yesterday, difficulty with ambulation  · Daughter is concerned she is unable to take care of patient at home  · Known history of osteoarthritis, no recent falls  · CT abdomen/pelvis shows no acute fracture  · Pain medication p r n    · Scheduled Tylenol  · PT/OT eval  · Case management

## 2022-07-07 NOTE — PHYSICAL THERAPY NOTE
PHYSICAL THERAPY EVALUATION/TREATMENT       07/07/22 1105   PT Last Visit   PT Visit Date 07/07/22   Note Type   Note type Evaluation   Pain Assessment   Pain Assessment Tool 0-10   Pain Score No Pain   Pain Location/Orientation Orientation: Right;Location: Hip   Hospital Pain Intervention(s) Repositioned   Multiple Pain Sites No   Restrictions/Precautions   Weight Bearing Precautions Per Order No   Other Precautions Bed Alarm; Chair Alarm; Fall Risk;Pain   Home Living   Type of [address] [place] [address] One level   [address] Walker  (shower chair, bed rail)   Prior Function   Level of [place] Independent with ADLs and functional mobility; Needs assistance with IADLs   Lives With Alone; Family  (Stays with daughter on weekends)   Receives Help From Family;Home health  ([facility] 24/7 5 days/week M-F)   ADL Assistance Independent   IADLs Needs assistance   Falls in the last 6 months 0   General   Additional Pertinent History Pt is a 80year-old female who was admitted to the hospital due to severe R hip pain, difficulty walking  No recent falls and imaging negative for fracture at this time     Family/Caregiver Present Yes  (Pt's daughter at bedside)   Cognition   Overall Cognitive Status WFL   Arousal/Participation Responsive   Orientation Level Oriented to person;Oriented to place;Oriented to situation   Following Commands Follows multistep commands with increased time or repetition   Subjective   Subjective Pt with no complaints at beginning of session - no pain in R hip this AM  Daughter assisting with translation but patient understands short phrases in English   RLE Assessment   RLE Assessment WFL   LLE Assessment   LLE Assessment WFL   Bed Mobility   Rolling R 5  Supervision   Additional items Assist x 1   Rolling L 5  Supervision   Additional items Assist x 1   Supine to Sit 4  Minimal assistance   Additional items Assist x 1;Verbal cues   Sit to Supine 4  Minimal assistance   Additional items Assist x 1;Verbal cues   Transfers   Sit to Stand 5  Supervision   Additional items Assist x 1;Verbal cues   Stand to Sit 5  Supervision   Additional items Assist x 1   Ambulation/Elevation   Gait pattern Knees flexed; Step through pattern; Wide KAIT  (Decreased step length bilat, decreased gait speed)   Gait Assistance 5  Supervision  (Intermittent Min A when turning)   Additional items Assist x 1   Assistive Device Rolling walker   Distance 20 feet   Balance   Static Sitting Fair   Dynamic Sitting Fair -   Static Standing Fair -   Dynamic Standing Fair -   Ambulatory Poor +   Activity Tolerance   Activity Tolerance Patient tolerated treatment well;Patient limited by fatigue   Medical Staff Made Aware emily   Nurse Made Aware CHAPO [place]ley   Assessment   Prognosis Good   Problem List Decreased strength;Decreased endurance; Impaired balance;Decreased mobility; Decreased safety awareness;Pain   Assessment Pt is a 80year-old female who was admitted to the hospital on 7/6/22 due to R hip pain, difficulty walking  Patient seen for Physical Therapy evaluation  Patient admitted with Right hip pain  Comorbidities affecting patient's physical performance include: CKD and hypertension  Personal factors affecting patient at time of initial evaluation include: ambulating with assistive device, inability to navigate community distances and inability to perform IADLS   Prior to admission, patient was independent with functional mobility with RW, independent with ADLS, requiring assist for IADLS and having 24 hour care   Please find objective findings from Physical Therapy assessment regarding body systems outlined above with impairments and limitations including weakness, impaired balance, decreased endurance, gait deviations, pain and decreased activity tolerance  The Barthel Index was used as a functional outcome tool presenting with a score of Barthel Index Score: 40 today indicating marked limitations of functional mobility and ADLS  Patient's clinical presentation is currently evolving as seen in patient's presentation of changing level of pain and decreased endurance  Pt would benefit from continued Physical Therapy treatment to address deficits as defined above and maximize level of functional mobility  As demonstrated by objective findings, the assigned level of complexity for this evaluation is moderate  The patient's AM-PAC Basic Mobility Inpatient Short Form Raw Score is 17  A Raw score of greater than 16 suggests the patient may benefit from discharge to home  Goals   Patient Goals To go home   STG Expiration Date 07/14/22   Short Term Goal #1 Pt will perform all bed mobility, transfers with Superivision   Short Term Goal #2 Pt will ambulate x 50 feet with RW with Supervision   LTG Expiration Date 07/21/22   Long Term Goal #1 Pt will be able to perform all bed mobility/transfers Mod I using bed rail for support   Long Term Goal #2 Pt will be able to ambulate x 75 feet using RW with Superivision   Plan   Treatment/Interventions ADL retraining;Functional transfer training;LE strengthening/ROM; Elevations; Therapeutic exercise; Endurance training;Patient/family training;Bed mobility;Gait training;Spoke to nursing   PT Frequency   (5x/week)   Recommendation   PT Discharge Recommendation Home with home health rehabilitation   Equipment Recommended   (Pt has necessary equipment at home)   [address] Mobility Inpatient   Turning in Bed Without Bedrails 4   Lying on Back to Sitting on Edge of Flat Bed 3   Moving Bed to Chair 3   Standing Up From Chair 3   Walk in Room 3   Climb 3-5 Stairs 1   Basic Mobility Inpatient Raw Score 17   Basic Mobility Standardized Score 39 67   Highest Level Of Mobility   -[facility] Goal 5: Stand one or more mins   -HL Achieved 6: Walk 10 steps or more   Barthel Index   Feeding 10   Bathing 0   Grooming Score 0   Dressing Score 5   Bladder Score 5   Bowels Score 5   Toilet Use Score 5   Transfers (Bed/Chair) Score 10   Mobility (Level Surface) Score 0   Stairs Score 0   Barthel Index Score 40   Additional Treatment Session   Start Time 1100   End Time 1110   Treatment Assessment S: "No pain " O: Pt received laying supine in bed with bed alarm donned, daughter at bedside  A: Pt agreeable to participate in PT session  Pt able to perform bed mobility with Min A with brief assistance to sit at edge of bed  Able to tolerate ambulation short distance within room x 2 trials with Supervision, intermittent Min A when turning while using RW  Pt fatigues, requests to sit in bedside chair  P: Pt will benefit from skilled PT to address deficits to maximize IND for safe d/c   Equipment Use walker   End of Consult   Patient Position at End of Consult Bedside chair; All needs within reach   [facility] Insurance Number  Karen José Miguel [id number]

## 2022-07-07 NOTE — DISCHARGE SUMMARY
[address]  Discharge- Armand Warren 5/5/1924, 80 y o  female MRN: [id number]  Unit/Bed#: [address] [place] [address] Encounter: [id number]  Primary Care Provider: María Elena Khan MD   Date and time admitted to hospital: 7/6/2022  8:29 PM    Admitting Provider:  Walter Richard DO  Discharge Provider:  Pam Webster DO  Admission Date: 7/6/2022       Discharge Date: 07/07/22   LOS: 0  Primary Care Physician at Discharge: María Elena Khan -[phone]    DISCHARGE DIAGNOSES  * Right hip pain  Assessment & Plan  77-year-old female with hypertension hypothyroidism recently hospitalized at [facility] for abdominal pain discharged with Augmentin for UTI/colitis presents with worsening acute right hip pain and ambulatory dysfunction  · With Tylenol and one dose of IV morphine her pain subsided  · She was seen by PT and able to ambulate  Recommendations for home services  · Discharge:  Methocarbamol 250 mg along with tylenol No   3 as needed sparingly    Stage 3 chronic kidney disease ([facility] [address] )  Assessment & Plan  · Stable at baseline    Results from last 7 days   Lab Units 07/07/22  0531 07/06/22  2134   BUN mg/dL 12 13   CREATININE mg/dL 0 90 0 93   EGFR ml/min/1 73sq m 53 51       Acquired hypothyroidism  Assessment & Plan  · Continue levothyroxine    Hyperlipidemia  Assessment & Plan  · Continue atorvastatin    Hypertension  Assessment & Plan  · Continue amlodipine    Lower abdominal pain  Assessment & Plan  · Recently hospitalized at [facility] for similar and discharged with augmentin for treatment of possible colitis and UTI  · Urinalysis negative and repeat imaging does not demonstrate any colitis  REASON FOR ADMISSION  Hip Pain (Pt brought in by EMS for right side hip pain  Denies any trauma or injury would have caused pain   Started about 2 hrs prior to arrival  )    HOSPITAL COURSE:  Armand Warren is a 80 y o  female with a history of hypertension hypothyroidism who presented hip pain and ambulatory dysfunction  Symptoms started almost immediately and the patient was unable to be transferred out of position  EMS was called and the patient was brought to the hospital where she did not have any bony fractures but was admitted for ambulatory dysfunction  She was given one dose of morphine in ED and scheduled Tylenol  Her symptoms resolved  She was able to ambulate and is being discharged home in good condition  Home services will be set up  The case was discussed with daughter at bedside on day of discharge  Please see problem list listed above  CONSULTING PROVIDERS   None    PROCEDURES PERFORMED  * No surgery found *    RADIOLOGY RESULTS  CT abdomen pelvis wo contrast    Result Date: 7/7/2022  Impression: No acute findings in the abdomen or pelvis within limitations of noncontrast exam  No displaced fracture identified  No significant interval change compared to prior recent study dated 6/29/2022  Workstation performed: [id number]       LABS  Results from last 7 days   Lab Units 07/07/22 0531 07/06/22  2134   WBC Thousand/uL 11 16* 9 20   HEMOGLOBIN g/dL 11 6 11 7   HEMATOCRIT % 35 5 35 3   MCV fL 81* 80*   PLATELETS Thousands/uL 311 300     Results from last 7 days   Lab Units 07/07/22  0531 07/06/22  2134   SODIUM mmol/L 134* 136   POTASSIUM mmol/L 3 9 3 9   CHLORIDE mmol/L 98* 99*   CO2 mmol/L 26 26   BUN mg/dL 12 13   CREATININE mg/dL 0 90 0 93   CALCIUM mg/dL 9 2 9 1   EGFR ml/min/1 73sq m 53 51   GLUCOSE RANDOM mg/dL 90 104       Cultures:   Results from last 7 days   Lab Units 07/07/22  0537   COLOR UA  Yellow   CLARITY UA  Clear   SPEC GRAV UA  1 015   PH UA  7 0   LEUKOCYTES UA  Negative   NITRITE UA  Negative   GLUCOSE UA mg/dl Negative   KETONES UA mg/dl Negative   BILIRUBIN UA  Negative   BLOOD UA  Negative              DISCHARGE DAY VISIT AND PHYSICAL EXAM:  Subjective:  Patient seen and examined during morning rounds  No further muscle or joint pain      Vitals:   Blood Pressure: 155/71 (07/07/22 0533)  Pulse: 77 (07/07/22 0533)  Temperature: 98 9 °F (37 2 °C) (07/07/22 0533)  Temp Source: Oral (07/07/22 0533)  Respirations: 18 (07/07/22 0533)  Height: 4' 9" (144 8 cm) (07/07/22 0533)  Weight - Scale: 54 4 kg (120 lb) (07/07/22 0533)  SpO2: 94 % (07/07/22 0533)    Physical Exam  Vitals reviewed  Constitutional:       General: She is not in acute distress  Appearance: Normal appearance  HENT:      Head: Atraumatic  Cardiovascular:      Rate and Rhythm: Regular rhythm  Pulmonary:      Breath sounds: Decreased breath sounds present  No wheezing  Abdominal:      General: Bowel sounds are normal       Palpations: Abdomen is soft  Tenderness: There is no guarding or rebound  Musculoskeletal:         General: No swelling  Skin:     General: Skin is warm  Neurological:      Mental Status: She is alert  Mental status is at baseline  Psychiatric:         Mood and Affect: Mood normal        Planned Re-admission:  No  Discharge Disposition: Home with [address] [place] [address]    Test Results Pending at Discharge:   Incidental findings:     Medications   · Discharge Medication List: See after visit summary for reconciled discharge medications  Diet restrictions:  Regular diet   Activity restrictions: No strenuous activity  Discharge Condition: stable    Outpatient Follow-Up and Discharge Instructions  See after visit summary section titled Discharge Instructions for information provided to patient and family  Code Status: Level 3 - DNAR and DNI  Discharge Statement   I spent 55 minutes admitting and discharging the patient  This time was spent on the day of discharge  Greater than 50% of total time was spent with the patient and / or family counseling and / or coordination of care  ** Please Note: This note has been constructed using a voice recognition system   **

## 2022-07-07 NOTE — ASSESSMENT & PLAN NOTE
Lab Results   Component Value Date    EGFR 51 07/06/2022    EGFR 67 06/30/2022    EGFR 56 06/29/2022    CREATININE 0 93 07/06/2022    CREATININE 0 74 06/30/2022    CREATININE 0 86 06/29/2022   Creatinine at baseline

## 2022-07-07 NOTE — PLAN OF CARE
Problem: MOBILITY - ADULT  Goal: Maintain or return to baseline ADL function  Description: INTERVENTIONS:  -  Assess patient's ability to carry out ADLs; assess patient's baseline for ADL function and identify physical deficits which impact ability to perform ADLs (bathing, care of mouth/teeth, toileting, grooming, dressing, etc )  - Assess/evaluate cause of self-care deficits   - Assess range of motion  - Assess patient's mobility; develop plan if impaired  - Assess patient's need for assistive devices and provide as appropriate  - Encourage maximum independence but intervene and supervise when necessary  - Involve family in performance of ADLs  - Assess for home care needs following discharge   - Consider OT consult to assist with ADL evaluation and planning for discharge  - Provide patient education as appropriate  Outcome: Progressing  Goal: Maintains/Returns to pre admission functional level  Description: INTERVENTIONS:  - Perform BMAT or MOVE assessment daily    - Set and communicate daily mobility goal to care team and patient/family/caregiver  - Collaborate with rehabilitation services on mobility goals if consulted  - Perform Range of Motion 4 times a day  - Reposition patient every 3 hours    - Dangle patient 3 times a day  - Stand patient 3 times a day  - Ambulate patient 3 times a day  - Out of bed to chair 3 times a day   - Out of bed for meals 3 times a day  - Out of bed for toileting  - Record patient progress and toleration of activity level   Outcome: Progressing     Problem: Potential for Falls  Goal: Patient will remain free of falls  Description: INTERVENTIONS:  - Educate patient/family on patient safety including physical limitations  - Instruct patient to call for assistance with activity   - Consult OT/PT to assist with strengthening/mobility   - Keep Call bell within reach  - Keep bed low and locked with side rails adjusted as appropriate  - Keep care items and personal belongings within reach  - Initiate and maintain comfort rounds  - Make Fall Risk Sign visible to staff  - Offer Toileting every 2 Hours, in advance of need  - Initiate/Maintain bed alarm  - Obtain necessary fall risk management equipment: yellow socks  - Apply yellow socks and bracelet for high fall risk patients  - Consider moving patient to room near nurses station  Outcome: Progressing     Problem: Prexisting or High Potential for Compromised Skin Integrity  Goal: Skin integrity is maintained or improved  Description: INTERVENTIONS:  - Identify patients at risk for skin breakdown  - Assess and monitor skin integrity  - Assess and monitor nutrition and hydration status  - Monitor labs   - Assess for incontinence   - Turn and reposition patient  - Assist with mobility/ambulation  - Relieve pressure over bony prominences  - Avoid friction and shearing  - Provide appropriate hygiene as needed including keeping skin clean and dry  - Evaluate need for skin moisturizer/barrier cream  - Collaborate with interdisciplinary team   - Patient/family teaching  - Consider wound care consult   Outcome: Progressing     Problem: PAIN - ADULT  Goal: Verbalizes/displays adequate comfort level or baseline comfort level  Description: Interventions:  - Encourage patient to monitor pain and request assistance  - Assess pain using appropriate pain scale  - Administer analgesics based on type and severity of pain and evaluate response  - Implement non-pharmacological measures as appropriate and evaluate response  - Consider cultural and social influences on pain and pain management  - Notify physician/advanced practitioner if interventions unsuccessful or patient reports new pain  Outcome: Progressing     Problem: INFECTION - ADULT  Goal: Absence or prevention of progression during hospitalization  Description: INTERVENTIONS:  - Assess and monitor for signs and symptoms of infection  - Monitor lab/diagnostic results  - Monitor all insertion sites, i e  indwelling lines, tubes, and drains  - Monitor endotracheal if appropriate and nasal secretions for changes in amount and color  - Lutz appropriate cooling/warming therapies per order  - Administer medications as ordered  - Instruct and encourage patient and family to use good hand hygiene technique  - Identify and instruct in appropriate isolation precautions for identified infection/condition  Outcome: Progressing     Problem: SAFETY ADULT  Goal: Maintain or return to baseline ADL function  Description: INTERVENTIONS:  -  Assess patient's ability to carry out ADLs; assess patient's baseline for ADL function and identify physical deficits which impact ability to perform ADLs (bathing, care of mouth/teeth, toileting, grooming, dressing, etc )  - Assess/evaluate cause of self-care deficits   - Assess range of motion  - Assess patient's mobility; develop plan if impaired  - Assess patient's need for assistive devices and provide as appropriate  - Encourage maximum independence but intervene and supervise when necessary  - Involve family in performance of ADLs  - Assess for home care needs following discharge   - Consider OT consult to assist with ADL evaluation and planning for discharge  - Provide patient education as appropriate  Outcome: Progressing  Goal: Maintains/Returns to pre admission functional level  Description: INTERVENTIONS:  - Perform BMAT or MOVE assessment daily    - Set and communicate daily mobility goal to care team and patient/family/caregiver  - Collaborate with rehabilitation services on mobility goals if consulted  - Perform Range of Motion 4 times a day  - Reposition patient every 2 hours    - Dangle patient 3 times a day  - Stand patient 3 times a day  - Ambulate patient 3 times a day  - Out of bed to chair 3 times a day   - Out of bed for meals 3 times a day  - Out of bed for toileting  - Record patient progress and toleration of activity level   Outcome: Progressing  Goal: Patient will remain free of falls  Description: INTERVENTIONS:  - Educate patient/family on patient safety including physical limitations  - Instruct patient to call for assistance with activity   - Consult OT/PT to assist with strengthening/mobility   - Keep Call bell within reach  - Keep bed low and locked with side rails adjusted as appropriate  - Keep care items and personal belongings within reach  - Initiate and maintain comfort rounds  - Make Fall Risk Sign visible to staff  - Offer Toileting every 2 Hours, in advance of need  - Initiate/Maintain bed alarm  - Obtain necessary fall risk management equipment: yellow socks  - Apply yellow socks and bracelet for high fall risk patients  - Consider moving patient to room near nurses station  Outcome: Progressing     Problem: DISCHARGE PLANNING  Goal: Discharge to home or other facility with appropriate resources  Description: INTERVENTIONS:  - Identify barriers to discharge w/patient and caregiver  - Arrange for needed discharge resources and transportation as appropriate  - Identify discharge learning needs (meds, wound care, etc )  - Arrange for interpretive services to assist at discharge as needed  - Refer to Case Management Department for coordinating discharge planning if the patient needs post-hospital services based on physician/advanced practitioner order or complex needs related to functional status, cognitive ability, or social support system  Outcome: Progressing     Problem: Knowledge Deficit  Goal: Patient/family/caregiver demonstrates understanding of disease process, treatment plan, medications, and discharge instructions  Description: Complete learning assessment and assess knowledge base    Interventions:  - Provide teaching at level of understanding  - Provide teaching via preferred learning methods  Outcome: Progressing

## 2022-07-07 NOTE — ASSESSMENT & PLAN NOTE
80-year-old female with hypertension hypothyroidism recently hospitalized at [facility] for abdominal pain discharged with Augmentin for UTI/colitis presents with worsening acute right hip pain and ambulatory dysfunction  · With Tylenol and one dose of IV morphine her pain subsided  · She was seen by PT and able to ambulate  Recommendations for home services    · Discharge:  Methocarbamol 250 mg along with tylenol No   3 as needed sparingly

## 2022-07-07 NOTE — PROGRESS NOTES
Patient discharged today  Iv removed  Patient discharge instructions given to pt/dtr, both VU  Pt left the unit in good health via wheelchair

## 2022-07-07 NOTE — ASSESSMENT & PLAN NOTE
· Recently hospitalized at [facility] for similar and discharged with augmentin for treatment of possible colitis and UTI  · Urinalysis negative and repeat imaging does not demonstrate any colitis

## 2022-07-07 NOTE — ASSESSMENT & PLAN NOTE
Admitted last week with the same complaint, sent home on Augmentin to cover UTI vs diverticulitis  · CT abdomen/pelvis unremarkable  · UA negative  · WBC 9 2  · Continue normal diet  · Tylenol pain medication p r n

## 2022-07-07 NOTE — H&P
[address]  H&P- Danie Dura 5/5/1924, 80 y o  female MRN: [id number]  Unit/Bed#: [address] [place] [address] Encounter: [id number]  Primary Care Provider: Jfefrey Caban MD   Date and time admitted to hospital: 7/6/2022  8:29 PM    * Right hip pain  Assessment & Plan  Patient presents with right hip pain beginning yesterday, difficulty with ambulation  · Daughter is concerned she is unable to take care of patient at home  · Known history of osteoarthritis, no recent falls  · CT abdomen/pelvis shows no acute fracture  · Pain medication p r n  · Scheduled Tylenol  · PT/OT eval  · Case management    Lower abdominal pain  Assessment & Plan  Admitted last week with the same complaint, sent home on Augmentin to cover UTI vs diverticulitis  · CT abdomen/pelvis unremarkable  · UA negative  · WBC 9 2  · Continue normal diet  · Tylenol pain medication p r n  Stage 3 chronic kidney disease [facility])  Assessment & Plan  Lab Results   Component Value Date    EGFR 51 07/06/2022    EGFR 67 06/30/2022    EGFR 56 06/29/2022    CREATININE 0 93 07/06/2022    CREATININE 0 74 06/30/2022    CREATININE 0 86 06/29/2022   Creatinine at baseline    Acquired hypothyroidism  Assessment & Plan  Continue levothyroxine    Hyperlipidemia  Assessment & Plan  Continue statin    Hypertension  Assessment & Plan  Continue amlodipine    VTE Pharmacologic Prophylaxis: VTE Score: 4 Moderate Risk (Score 3-4) - Pharmacological DVT Prophylaxis Ordered: heparin  Code Status: Level 3 - DNAR and DNI   Discussion with family: Patient declined call to   Anticipated Length of Stay: Patient will be admitted on an observation basis with an anticipated length of stay of less than 2 midnights secondary to ambulatory dysfunction  Total Time for Visit, including Counseling / Coordination of Care: 45 minutes Greater than 50% of this total time spent on direct patient counseling and coordination of care      Chief Complaint:  Hip pain    History of Present Illness:  Marina Sheikh is a 80 y o  female with a PMH of CAD, hypothyroidism, hypertension, osteoarthritis who presents with right-sided hip pain  Patient states that she began with right-sided hip pain and bilateral lower abdominal pain that started yesterday  She states the pain is sharp rated 8/10 in her hip and is worsened with movement  Of note patient was recently admitted at [facility] for abdominal pain and discharged on antibiotics  Denies any nausea, vomiting, diarrhea/constipation  Denies any fevers, chills, chest pain, shortness a breath, dysuria, hematuria  Daughter states that patient has not been able to ambulate on her own and is concerned she can not take care of her home  Patient will be admitted for ambulatory dysfunction  Review of Systems:  Review of Systems   Constitutional: Negative for chills and fever  HENT: Negative for ear pain and sore throat  Eyes: Negative for pain and visual disturbance  Respiratory: Negative for cough and shortness of breath  Cardiovascular: Negative for chest pain and palpitations  Gastrointestinal: Negative for abdominal pain and vomiting  Genitourinary: Negative for dysuria and hematuria  Musculoskeletal: Positive for arthralgias and gait problem  Skin: Negative for color change and rash  Neurological: Negative for dizziness, light-headedness and headaches  All other systems reviewed and are negative        Past Medical and Surgical History:   Past Medical History:   Diagnosis Date    Brain aneurysm     CAD (coronary artery disease)     Disease of thyroid gland     hypo    GERD (gastroesophageal reflux disease)     Gout     right shoulder    Hyperlipidemia     Hypertension     Osteoarthritis (arthritis due to wear and tear of joints)        Past Surgical History:   Procedure Laterality Date    CORONARY ARTERY BYPASS GRAFT      ENDOSCOPIC STENT PLACEMENT W/ MLB Left 2012    HIATAL HERNIA REPAIR      JOINT REPLACEMENT Left     knee 2001    ROTATOR CUFF REPAIR      TUBAL LIGATION  2013       Meds/Allergies:  Prior to Admission medications    Medication Sig Start Date End Date Taking? Authorizing Provider   acetaminophen (TYLENOL) 325 mg tablet Take 3 tablets (975 mg total) by mouth every 8 (eight) hours 6/30/22   Aniya Pino PA-C   amLODIPine (NORVASC) 5 mg tablet Take 10 mg by mouth daily    Historical Provider, MD   amoxicillin-clavulanate (AUGMENTIN) 875-125 mg per tablet Take 1 tablet by mouth every 12 (twelve) hours for 6 days 6/30/22 7/6/22  Aniya Pino PA-C   atorvastatin (LIPITOR) 10 mg tablet Take 10 mg by mouth daily    Historical Provider, MD   docusate sodium (COLACE) 100 mg capsule Take 1 capsule (100 mg total) by mouth 2 (two) times a day 5/21/19   Jackie Palacio MD   esomeprazole (NexIUM) 40 MG capsule Take 1 capsule (40 mg total) by mouth daily 8/2/19   VARSHA Yanez   levothyroxine 75 mcg tablet Take 100 mcg by mouth daily      Historical Provider, MD   lidocaine (LIDODERM) 5 % Apply 1 patch topically daily for 5 days Remove & Discard patch within 12 hours or as directed by MD 5/2/21 5/7/21  Andreea David PA-C   oxyCODONE (ROXICODONE) 5 immediate release tablet Take 0 5 tablets (2 5 mg total) by mouth every 6 (six) hours as needed for severe pain for up to 10 days Max Daily Amount: 10 mg 6/30/22 7/10/22  Tiffanie Miller PA-C     I have reviewed home medications with patient personally  Allergies: Allergies   Allergen Reactions    Codeine Hives    Percocet [Oxycodone-Acetaminophen] Vomiting       Social History:  Marital Status:     Occupation:   Patient Pre-hospital Living Situation: Home  Patient Pre-hospital Level of Mobility: walks with walker  Patient Pre-hospital Diet Restrictions: none  Substance Use History:   Social History     Substance and Sexual Activity   Alcohol Use Never     Social History     Tobacco Use   Smoking Status Never Smoker   Smokeless Tobacco Never Used     Social History     Substance and Sexual Activity   Drug Use Never       Family History:  History reviewed  No pertinent family history  Physical Exam:     Vitals:   Blood Pressure: 155/71 (07/07/22 0533)  Pulse: 77 (07/07/22 0533)  Temperature: 98 9 °F (37 2 °C) (07/07/22 0533)  Temp Source: Oral (07/07/22 0533)  Respirations: 18 (07/07/22 0533)  Height: 4' 9" (144 8 cm) (07/07/22 0533)  Weight - Scale: 54 4 kg (120 lb) (07/07/22 0533)  SpO2: 94 % (07/07/22 0533)    Physical Exam  Vitals and nursing note reviewed  Constitutional:       General: She is not in acute distress  Appearance: She is well-developed  HENT:      Head: Normocephalic and atraumatic  Eyes:      Conjunctiva/sclera: Conjunctivae normal    Cardiovascular:      Rate and Rhythm: Normal rate and regular rhythm  Heart sounds: No murmur heard  Pulmonary:      Effort: Pulmonary effort is normal  No respiratory distress  Breath sounds: Normal breath sounds  Abdominal:      Palpations: Abdomen is soft  Tenderness: There is abdominal tenderness  Comments: Bilateral lower abdominal tenderness   Musculoskeletal:      Cervical back: Neck supple  Comments: Right hip tenderness, no swelling noted   Skin:     General: Skin is warm and dry  Neurological:      Mental Status: She is alert            Additional Data:     Lab Results:  Results from last 7 days   Lab Units 07/07/22  0531   WBC Thousand/uL 11 16*   HEMOGLOBIN g/dL 11 6   HEMATOCRIT % 35 5   PLATELETS Thousands/uL 311   NEUTROS PCT % 77*   LYMPHS PCT % 14   MONOS PCT % 9   EOS PCT % 0     Results from last 7 days   Lab Units 07/07/22  0531   SODIUM mmol/L 134*   POTASSIUM mmol/L 3 9   CHLORIDE mmol/L 98*   CO2 mmol/L 26   BUN mg/dL 12   CREATININE mg/dL 0 90   ANION GAP mmol/L 10   CALCIUM mg/dL 9 2   GLUCOSE RANDOM mg/dL 90                       Imaging: Reviewed radiology reports from this admission including: abdominal/pelvic CT  CT abdomen pelvis wo contrast   Final Result by Amilcar Caruso MD (07/07 0129)      No acute findings in the abdomen or pelvis within limitations of noncontrast exam    No displaced fracture identified  No significant interval change compared to prior recent study dated 6/29/2022  Workstation performed: [id number]             EKG and Other Studies Reviewed on Admission:   ·     ** Please Note: This note has been constructed using a voice recognition system   **

## 2022-07-07 NOTE — PLAN OF CARE
Problem: MOBILITY - ADULT  Goal: Maintain or return to baseline ADL function  Description: INTERVENTIONS:  -  Assess patient's ability to carry out ADLs; assess patient's baseline for ADL function and identify physical deficits which impact ability to perform ADLs (bathing, care of mouth/teeth, toileting, grooming, dressing, etc )  - Assess/evaluate cause of self-care deficits   - Assess range of motion  - Assess patient's mobility; develop plan if impaired  - Assess patient's need for assistive devices and provide as appropriate  - Encourage maximum independence but intervene and supervise when necessary  - Involve family in performance of ADLs  - Assess for home care needs following discharge   - Consider OT consult to assist with ADL evaluation and planning for discharge  - Provide patient education as appropriate  Outcome: Progressing     Problem: Potential for Falls  Goal: Patient will remain free of falls  Description: INTERVENTIONS:  -  Assess patient's ability to carry out ADLs; assess patient's baseline for ADL function and identify physical deficits which impact ability to perform ADLs (bathing, care of mouth/teeth, toileting, grooming, dressing, etc )  - Assess/evaluate cause of self-care deficits   - Assess range of motion  - Assess patient's mobility; develop plan if impaired  - Assess patient's need for assistive devices and provide as appropriate  - Encourage maximum independence but intervene and supervise when necessary  - Involve family in performance of ADLs  - Assess for home care needs following discharge   - Consider OT consult to assist with ADL evaluation and planning for discharge  - Provide patient education as appropriate  Outcome: Progressing     Problem: PAIN - ADULT  Goal: Verbalizes/displays adequate comfort level or baseline comfort level  Description: Interventions:  - Encourage patient to monitor pain and request assistance  - Assess pain using appropriate pain scale  - Administer analgesics based on type and severity of pain and evaluate response  - Implement non-pharmacological measures as appropriate and evaluate response  - Consider cultural and social influences on pain and pain management  - Notify physician/advanced practitioner if interventions unsuccessful or patient reports new pain  Outcome: Progressing

## 2022-07-07 NOTE — CASE MANAGEMENT
Case Management Assessment & Discharge Planning Note    Patient name Job Armor  Location [address] [facility]/[address]-* MRN [id number]  : 1924 Date 2022       Current Admission Date: 2022  Current Admission Diagnosis:Right hip pain   Patient Active Problem List    Diagnosis Date Noted    Right hip pain 2022    Stage 3 chronic kidney disease ([address] ) 2022    Elevated troponin 2021    UTI (urinary tract infection) 2021    Status post fall 2019    Anemia 2019    Tendon tear 2019    Generalized weakness 07/15/2019    URI (upper respiratory infection) 07/15/2019    Hypothyroidism 07/15/2019    CAD (coronary artery disease) 07/15/2019    Lower abdominal pain 2019    Hypertension 2019    Hyperlipidemia 2019    Acquired hypothyroidism 2019    Osteoarthritis (arthritis due to wear and tear of joints) 2019      LOS (days): 0  Geometric Mean LOS (GMLOS) (days):   Days to GMLOS:     OBJECTIVE:        Current admission status: Observation  Referral Reason: Other (Discharge planning)    Preferred Pharmacy:   [facility]/pharmacy #[id number]- [facility]   [address] [place] [address] Drive  Phone: [phone] Fax: [phone]    Primary Care Provider: Ilya Soni MD    Primary Insurance: MEDICARE  Secondary Insurance: [facility]    ASSESSMENT:  Dorie 26 Proxies    There are no active Health Care Proxies on file          Obs Notice Signed: 22 (Notice given by registration per chart)    Readmission Root Cause  30 Day Readmission: No    Patient Information  Admitted from[de-identified] Home  Mental Status: Alert  During Assessment patient was accompanied by: Daughter  Assessment information provided by[de-identified] Daughter  Primary Caregiver: Family  Caregiver's Name[de-identified] Titus Velásquez (daughter)  Caregiver's Relationship to Patient[de-identified] Family Member  Caregiver's Telephone Number[de-identified] [phone]  Support Systems: Daughter, Family members, [address] [address] [address] of Residence: [address] [facility] do you live in?: [address]  Type of Current Residence: Apartment  Floor Level: 1  Upon entering residence, is there a bedroom on the main floor (no further steps)?: Yes  Upon entering residence, is there a bathroom on the main floor (no further steps)?: Yes  In the last 12 months, was there a time when you were not able to pay the mortgage or rent on time?: No  In the last 12 months, how many places have you lived?: 1  In the last 12 months, was there a time when you did not have a steady place to sleep or slept in a shelter (including now)?: No  Homeless/housing insecurity resource given?: N/A  Living Arrangements: Lives Alone (Patient has aides round the clock at home and stays with daughter on weekends)  Is patient a ?: No    Activities of Daily Living Prior to Admission  Completes ADLs independently?: Yes  Ambulates independently?: Yes  Does patient use assisted devices?: Yes  Assisted Devices (DME) used: Walker, Shower Chair, Bedside Commode  Does patient currently own DME?: Yes  What DME does the patient currently own?: Bedside Commode, Walker, Shower Chair  Does patient have a history of HHC?: Yes ([facility])  Does patient currently have [facility]?: No      Patient Information Continued  Income Source: Pension/nursing home  Does patient have prescription coverage?: Yes  Within the past 12 months, you worried that your food would run out before you got the money to buy more : Never true  Within the past 12 months, the food you bought just didn't last and you didn't have money to get more : Never true  Food insecurity resource given?: N/A  Does patient receive dialysis treatments?: No  Does patient have a history of substance abuse?: No  Does patient have a history of Mental Health Diagnosis?: No     Means of Transportation  Means of Transport to [facility][de-identified] Family transport  In the past 12 months, has lack of transportation kept you from medical appointments or from getting medications?: No  In the past 12 months, has lack of transportation kept you from meetings, work, or from getting things needed for daily living?: No  Was application for public transport provided?: N/A    DISCHARGE DETAILS:    Discharge planning discussed with[de-identified] Daughter Raghavendra Johnson of Choice: Yes  Comments - Freedom of Choice: Daughter is in agreement with recommendation for [facility] and expressed preference for [facility]  [facility] unable to accept, additional referrals placed in [facility]  CM contacted family/caregiver?: Yes  Were Treatment Team discharge recommendations reviewed with patient/caregiver?: Yes  Did patient/caregiver verbalize understanding of patient care needs?: Yes  Were patient/caregiver advised of the risks associated with not following Treatment Team discharge recommendations?: Yes    Contacts  Patient Contacts: Suzan Beckham (daughter)  Relationship to Patient[de-identified] Family  Contact Method:  In Person  Reason/Outcome: Emergency Contact, Discharge [address]         Is the patient interested in [facility] at discharge?: Yes  [address] requested[de-identified] Physical [address] Name[de-identified] Other  [facility] Provider[de-identified] PCP  Home Health Services Needed[de-identified] Evaluate Functional Status and Safety, Gait/ADL Training, Strengthening/Theraputic Exercises to Improve Function  Homebound Criteria Met[de-identified] Requires the Assistance of Another Person for Safe Ambulation or to Leave the Home, Uses an Assist Device (i e  cane, walker, etc)  Supporting Clincal Findings[de-identified] Fatigues Easliy in [employer], Limited Endurance    DME Referral Provided  Referral made for DME?: Yes  DME referral completed for the following items[de-identified] Wheelchair  DME Supplier Name[de-identified] [employer]    Other Referral/Resources/Interventions Provided:  Interventions: HHC, DME    Would you like to participate in our [address] service program?  : No - Declined    Treatment Team Recommendation: Home with [address]  Discharge Destination Plan[de-identified] Home with Gabriayalatad at Discharge : Family      SW spoke with daughter Virginia Dey at bedside to introduce role of CM and conduct assessment  Patient lives in a one-level home with round-the clock aides and stays with her daughter on weekends  Daughter provides all transportation to appointments  Patient has been medically cleared for discharge today  Treatment team has recommended that patient be discharged home with home PT  Daughter expressed preference for [facility] and SW placed referral in [facility] but agency is unable to accept  SW placed additional referrals and patient was accepted by [facility]  SW discussed with daughter and she was in agreement with accepting services from [address] [place] [address]  AVS was faxed to [address] [place] [address] at [phone] after discharge  Daughter Virginia Dey noted that patient does not have a transport wheelchair at home and she was going to look for one to purchase  SW offered to place an order in [place] and daughter accepted  SW placed order and was notified that patient does not have a co-pay  [employer] will contact patient's daughter to arrange delivery

## 2022-07-07 NOTE — ASSESSMENT & PLAN NOTE
· Stable at baseline    Results from last 7 days   Lab Units 07/07/22  0531 07/06/22  2134   BUN mg/dL 12 13   CREATININE mg/dL 0 90 0 93   EGFR ml/min/1 73sq m 53 51

## 2022-07-07 NOTE — ED PROVIDER NOTES
History  Chief Complaint   Patient presents with    Hip Pain     Pt brought in by EMS for right side hip pain  Denies any trauma or injury would have caused pain  Started about 2 hrs prior to arrival       80year-old female presents with right-sided hip pain and bilateral lower abdominal pain that started a day ago  Pain is sharp continuous currently 8/10 movement makes it worse nothing makes it better  Denies any nausea vomiting diarrhea constipation fevers chills dysuria urgency frequency or any other symptoms no trauma noted  Recent diagnosis of diverticulitis admitted to the hospital and discharged on antibiotics  History provided by:  Patient   used: No        Prior to Admission Medications   Prescriptions Last Dose Informant Patient Reported?  Taking?   acetaminophen (TYLENOL) 325 mg tablet   No No   Sig: Take 3 tablets (975 mg total) by mouth every 8 (eight) hours   amLODIPine (NORVASC) 5 mg tablet   Yes No   Sig: Take 10 mg by mouth daily   amoxicillin-clavulanate (AUGMENTIN) 875-125 mg per tablet   No No   Sig: Take 1 tablet by mouth every 12 (twelve) hours for 6 days   atorvastatin (LIPITOR) 10 mg tablet   Yes No   Sig: Take 10 mg by mouth daily   docusate sodium (COLACE) 100 mg capsule   No No   Sig: Take 1 capsule (100 mg total) by mouth 2 (two) times a day   esomeprazole (NexIUM) 40 MG capsule   No No   Sig: Take 1 capsule (40 mg total) by mouth daily   levothyroxine 75 mcg tablet   Yes No   Sig: Take 100 mcg by mouth daily     lidocaine (LIDODERM) 5 %   No No   Sig: Apply 1 patch topically daily for 5 days Remove & Discard patch within 12 hours or as directed by MD   oxyCODONE (ROXICODONE) 5 immediate release tablet   No No   Sig: Take 0 5 tablets (2 5 mg total) by mouth every 6 (six) hours as needed for severe pain for up to 10 days Max Daily Amount: 10 mg      Facility-Administered Medications: None       Past Medical History:   Diagnosis Date    Brain aneurysm     CAD (coronary artery disease)     Disease of thyroid gland     hypo    GERD (gastroesophageal reflux disease)     Gout     right shoulder    Hyperlipidemia     Hypertension     Osteoarthritis (arthritis due to wear and tear of joints)        Past Surgical History:   Procedure Laterality Date    CORONARY ARTERY BYPASS GRAFT      ENDOSCOPIC STENT PLACEMENT W/ MLB Left 2012    HIATAL HERNIA REPAIR      JOINT REPLACEMENT Left     knee 2001    ROTATOR CUFF REPAIR      TUBAL LIGATION  2013       History reviewed  No pertinent family history  I have reviewed and agree with the history as documented  E-Cigarette/Vaping    E-Cigarette Use Never User      E-Cigarette/Vaping Substances    Nicotine No     THC No     CBD No     Flavoring No     Other No     Unknown No      Social History     Tobacco Use    Smoking status: Never Smoker    Smokeless tobacco: Never Used   Vaping Use    Vaping Use: Never used   Substance Use Topics    Alcohol use: Never    Drug use: Never       Review of Systems   Constitutional: Negative  HENT: Negative  Eyes: Negative  Respiratory: Negative  Cardiovascular: Negative  Gastrointestinal: Positive for abdominal pain  Endocrine: Negative  Genitourinary: Negative  Musculoskeletal: Positive for arthralgias and myalgias  Skin: Negative  Allergic/Immunologic: Negative  Neurological: Negative  Hematological: Negative  Psychiatric/Behavioral: Negative  All other systems reviewed and are negative  Physical Exam  Physical Exam  Constitutional:       Appearance: Normal appearance  HENT:      Head: Normocephalic and atraumatic  Nose: Nose normal       Mouth/Throat:      Mouth: Mucous membranes are moist    Eyes:      Extraocular Movements: Extraocular movements intact  Pupils: Pupils are equal, round, and reactive to light  Cardiovascular:      Rate and Rhythm: Normal rate and regular rhythm     Pulmonary:      Effort: Pulmonary effort is normal       Breath sounds: Normal breath sounds  Abdominal:      General: Abdomen is flat  Bowel sounds are normal  There is no distension  Palpations: Abdomen is soft  There is no mass  Tenderness: There is abdominal tenderness  There is no right CVA tenderness, left CVA tenderness, guarding or rebound  Hernia: No hernia is present  Musculoskeletal:         General: Normal range of motion  Cervical back: Normal range of motion and neck supple  Comments: Right hip tenderness noted range of motion restricted because of pain no deformity noted  No rash noted  Bilateral lower abdominal tenderness noted  Skin:     General: Skin is warm  Capillary Refill: Capillary refill takes less than 2 seconds  Neurological:      General: No focal deficit present  Mental Status: She is alert and oriented to person, place, and time  Mental status is at baseline  Psychiatric:         Mood and Affect: Mood normal          Thought Content:  Thought content normal          Vital Signs  ED Triage Vitals [07/06/22 2050]   Temperature Pulse Respirations Blood Pressure SpO2   100 1 °F (37 8 °C) 79 14 155/70 95 %      Temp Source Heart Rate Source Patient Position - Orthostatic VS BP Location FiO2 (%)   Axillary Monitor Lying Left arm --      Pain Score       7           Vitals:    07/06/22 2050 07/06/22 2115 07/06/22 2320   BP: 155/70 144/70 147/72   Pulse: 79  73   Patient Position - Orthostatic VS: Lying           Visual Acuity      ED Medications  Medications   morphine injection 2 mg (2 mg Intravenous Given 7/6/22 2137)   ondansetron (ZOFRAN) injection 4 mg (4 mg Intravenous Given 7/6/22 2136)   sodium chloride 0 9 % bolus 500 mL (0 mL Intravenous Stopped 7/6/22 2242)       Diagnostic Studies  Results Reviewed     Procedure Component Value Units Date/Time    UA (URINE) with reflex to Scope [[id number]]     Lab Status: No result Specimen: Urine     Basic metabolic panel [[id number]] (Abnormal) Collected: 07/06/22 2134    Lab Status: Final result Specimen: Blood from Arm, Right Updated: 07/06/22 2153     Sodium 136 mmol/L      Potassium 3 9 mmol/L      Chloride 99 mmol/L      CO2 26 mmol/L      ANION GAP 11 mmol/L      BUN 13 mg/dL      Creatinine 0 93 mg/dL      Glucose 104 mg/dL      Calcium 9 1 mg/dL      eGFR 51 ml/min/1 73sq m     Narrative:      [place] guidelines for Chronic Kidney Disease (CKD):     Stage 1 with normal or high GFR (GFR > 90 mL/min/1 73 square meters)    Stage 2 Mild CKD (GFR = 60-89 mL/min/1 73 square meters)    Stage 3A Moderate CKD (GFR = 45-59 mL/min/1 73 square meters)    Stage 3B Moderate CKD (GFR = 30-44 mL/min/1 73 square meters)    Stage 4 Severe CKD (GFR = 15-29 mL/min/1 73 square meters)    Stage 5 End Stage CKD (GFR <15 mL/min/1 73 square meters)  Note: GFR calculation is accurate only with a steady state creatinine    CBC and differential [[id number]]  (Abnormal) Collected: 07/06/22 2134    Lab Status: Final result Specimen: Blood from Arm, Right Updated: 07/06/22 2144     WBC 9 20 Thousand/uL      RBC 4 43 Million/uL      Hemoglobin 11 7 g/dL      Hematocrit 35 3 %      MCV 80 fL      MCH 26 4 pg      MCHC 33 1 g/dL      RDW 13 7 %      MPV 9 2 fL      Platelets 596 Thousands/uL      nRBC 0 /100 WBCs      Neutrophils Relative 76 %      Immat GRANS % 0 %      Lymphocytes Relative 13 %      Monocytes Relative 10 %      Eosinophils Relative 1 %      Basophils Relative 0 %      Neutrophils Absolute 7 00 Thousands/µL      Immature Grans Absolute 0 03 Thousand/uL      Lymphocytes Absolute 1 19 Thousands/µL      Monocytes Absolute 0 91 Thousand/µL      Eosinophils Absolute 0 05 Thousand/µL      Basophils Absolute 0 02 Thousands/µL                  CT abdomen pelvis wo contrast   Final Result by Jin Szymanski MD (07/07 0129)      No acute findings in the abdomen or pelvis within limitations of noncontrast exam    No displaced fracture identified  No significant interval change compared to prior recent study dated 6/29/2022  Workstation performed: [id number]                    Procedures  Procedures         ED Course                                             MDM  Number of Diagnoses or Management Options     Amount and/or Complexity of Data Reviewed  Clinical lab tests: ordered and reviewed  Tests in the radiology section of CPT®: ordered and reviewed  Tests in the medicine section of CPT®: ordered and reviewed    Patient Progress  Patient progress: stable      Disposition  Final diagnoses:   Left hip pain   Ambulatory dysfunction     Time reflects when diagnosis was documented in both MDM as applicable and the Disposition within this note     Time User Action Codes Description Comment    7/7/2022  1:34 AM Daysi Babin [M25 552] Left hip pain     7/7/2022  1:34 AM Daysi Babin [R26 2] Ambulatory dysfunction       ED Disposition     ED Disposition   Admit    Condition   Stable    Date/Time   Thu Jul 7, 2022  1:34 AM    Comment               Follow-up Information    None         Patient's Medications   Discharge Prescriptions    No medications on file       No discharge procedures on file      PDMP Review     None          ED Provider  Electronically Signed by           Christopher Zarate DO  07/07/22 0147

## 2022-07-11 LAB

## 2022-07-18 ENCOUNTER — HOSPITAL ENCOUNTER (EMERGENCY)
Facility: HOSPITAL | Age: 87
Discharge: HOME | End: 2022-07-18
Attending: EMERGENCY MEDICINE
Payer: MEDICARE

## 2022-07-18 VITALS
OXYGEN SATURATION: 92 % | TEMPERATURE: 98.5 F | SYSTOLIC BLOOD PRESSURE: 154 MMHG | RESPIRATION RATE: 18 BRPM | HEART RATE: 78 BPM | DIASTOLIC BLOOD PRESSURE: 68 MMHG

## 2022-07-18 DIAGNOSIS — N89.8 VAGINAL DISCHARGE: ICD-10-CM

## 2022-07-18 DIAGNOSIS — N39.0 UTI (URINARY TRACT INFECTION): Primary | ICD-10-CM

## 2022-07-18 LAB
ALBUMIN SERPL BCP-MCNC: 3.1 G/DL (ref 3.5–5)
ALP SERPL-CCNC: 86 U/L (ref 34–104)
ALT SERPL W P-5'-P-CCNC: 7 U/L (ref 7–52)
AMORPH URATE CRY URNS QL MICRO: ABNORMAL
ANION GAP SERPL CALCULATED.3IONS-SCNC: 8 MMOL/L (ref 4–13)
AST SERPL W P-5'-P-CCNC: 10 U/L (ref 13–39)
BACTERIA UR QL AUTO: ABNORMAL /HPF
BASOPHILS # BLD AUTO: 0.02 THOUSANDS/ΜL (ref 0–0.1)
BASOPHILS NFR BLD AUTO: 0 % (ref 0–1)
BILIRUB SERPL-MCNC: 0.96 MG/DL (ref 0.2–1)
BILIRUB UR QL STRIP: NEGATIVE
BUN SERPL-MCNC: 16 MG/DL (ref 5–25)
CALCIUM ALBUM COR SERPL-MCNC: 9.8 MG/DL (ref 8.3–10.1)
CALCIUM SERPL-MCNC: 9.1 MG/DL (ref 8.4–10.2)
CHLORIDE SERPL-SCNC: 100 MMOL/L (ref 96–108)
CLARITY UR: ABNORMAL
CO2 SERPL-SCNC: 27 MMOL/L (ref 21–32)
COLOR UR: YELLOW
CREAT SERPL-MCNC: 0.9 MG/DL (ref 0.6–1.3)
EOSINOPHIL # BLD AUTO: 0.01 THOUSAND/ΜL (ref 0–0.61)
EOSINOPHIL NFR BLD AUTO: 0 % (ref 0–6)
ERYTHROCYTE [DISTWIDTH] IN BLOOD BY AUTOMATED COUNT: 14.5 % (ref 11.6–15.1)
GFR SERPL CREATININE-BSD FRML MDRD: 53 ML/MIN/1.73SQ M
GLUCOSE SERPL-MCNC: 101 MG/DL (ref 65–140)
GLUCOSE UR STRIP-MCNC: NEGATIVE MG/DL
HCT VFR BLD AUTO: 39 % (ref 34.8–46.1)
HGB BLD-MCNC: 12.6 G/DL (ref 11.5–15.4)
HGB UR QL STRIP.AUTO: ABNORMAL
IMM GRANULOCYTES # BLD AUTO: 0.1 THOUSAND/UL (ref 0–0.2)
IMM GRANULOCYTES NFR BLD AUTO: 1 % (ref 0–2)
KETONES UR STRIP-MCNC: NEGATIVE MG/DL
LEUKOCYTE ESTERASE UR QL STRIP: ABNORMAL
LYMPHOCYTES # BLD AUTO: 1.48 THOUSANDS/ΜL (ref 0.6–4.47)
LYMPHOCYTES NFR BLD AUTO: 10 % (ref 14–44)
MCH RBC QN AUTO: 25.9 PG (ref 26.8–34.3)
MCHC RBC AUTO-ENTMCNC: 32.3 G/DL (ref 31.4–37.4)
MCV RBC AUTO: 80 FL (ref 82–98)
MONOCYTES # BLD AUTO: 1.24 THOUSAND/ΜL (ref 0.17–1.22)
MONOCYTES NFR BLD AUTO: 8 % (ref 4–12)
MUCOUS THREADS UR QL AUTO: ABNORMAL
NEUTROPHILS # BLD AUTO: 12.28 THOUSANDS/ΜL (ref 1.85–7.62)
NEUTS SEG NFR BLD AUTO: 81 % (ref 43–75)
NITRITE UR QL STRIP: NEGATIVE
NON-SQ EPI CELLS URNS QL MICRO: ABNORMAL /HPF
NRBC BLD AUTO-RTO: 0 /100 WBCS
PH UR STRIP.AUTO: 6.5 [PH]
PLATELET # BLD AUTO: 350 THOUSANDS/UL (ref 149–390)
PMV BLD AUTO: 9.1 FL (ref 8.9–12.7)
POTASSIUM SERPL-SCNC: 3.5 MMOL/L (ref 3.5–5.3)
PROT SERPL-MCNC: 6.4 G/DL (ref 6.4–8.4)
PROT UR STRIP-MCNC: ABNORMAL MG/DL
RBC # BLD AUTO: 4.86 MILLION/UL (ref 3.81–5.12)
RBC #/AREA URNS AUTO: ABNORMAL /HPF
SODIUM SERPL-SCNC: 135 MMOL/L (ref 135–147)
SP GR UR STRIP.AUTO: 1.01 (ref 1–1.03)
URATE CRY URNS QL MICRO: ABNORMAL /HPF
UROBILINOGEN UR STRIP-ACNC: <2 MG/DL
WBC # BLD AUTO: 15.13 THOUSAND/UL (ref 4.31–10.16)
WBC #/AREA URNS AUTO: ABNORMAL /HPF

## 2022-07-18 PROCEDURE — 81001 URINALYSIS AUTO W/SCOPE: CPT | Performed by: EMERGENCY MEDICINE

## 2022-07-18 PROCEDURE — 99283 EMERGENCY DEPT VISIT LOW MDM: CPT

## 2022-07-18 PROCEDURE — 85025 COMPLETE CBC W/AUTO DIFF WBC: CPT | Performed by: EMERGENCY MEDICINE

## 2022-07-18 PROCEDURE — 87510 GARDNER VAG DNA DIR PROBE: CPT | Performed by: EMERGENCY MEDICINE

## 2022-07-18 PROCEDURE — 87660 TRICHOMONAS VAGIN DIR PROBE: CPT | Performed by: EMERGENCY MEDICINE

## 2022-07-18 PROCEDURE — 87086 URINE CULTURE/COLONY COUNT: CPT | Performed by: EMERGENCY MEDICINE

## 2022-07-18 PROCEDURE — 87480 CANDIDA DNA DIR PROBE: CPT | Performed by: EMERGENCY MEDICINE

## 2022-07-18 PROCEDURE — 80053 COMPREHEN METABOLIC PANEL: CPT | Performed by: EMERGENCY MEDICINE

## 2022-07-18 PROCEDURE — 99284 EMERGENCY DEPT VISIT MOD MDM: CPT | Performed by: EMERGENCY MEDICINE

## 2022-07-18 PROCEDURE — 36415 COLL VENOUS BLD VENIPUNCTURE: CPT | Performed by: EMERGENCY MEDICINE

## 2022-07-18 PROCEDURE — 96365 THER/PROPH/DIAG IV INF INIT: CPT

## 2022-07-18 RX ORDER — CIPROFLOXACIN 500 MG/1
500 TABLET, FILM COATED ORAL EVERY 12 HOURS SCHEDULED
Qty: 13 TABLET | Refills: 0 | Status: SHIPPED | OUTPATIENT
Start: 2022-07-18 | End: 2022-07-25

## 2022-07-18 RX ORDER — CIPROFLOXACIN 500 MG/1
500 TABLET, FILM COATED ORAL ONCE
Status: COMPLETED | OUTPATIENT
Start: 2022-07-18 | End: 2022-07-18

## 2022-07-18 RX ADMIN — SODIUM CHLORIDE, SODIUM LACTATE, POTASSIUM CHLORIDE, AND CALCIUM CHLORIDE 500 ML: .6; .31; .03; .02 INJECTION, SOLUTION INTRAVENOUS at 15:58

## 2022-07-18 RX ADMIN — CIPROFLOXACIN HYDROCHLORIDE 500 MG: 500 TABLET, FILM COATED ORAL at 19:49

## 2022-07-19 LAB
BACTERIA UR CULT: NORMAL
CANDIDA RRNA VAG QL PROBE: NEGATIVE
G VAGINALIS RRNA GENITAL QL PROBE: NEGATIVE
T VAGINALIS RRNA GENITAL QL PROBE: NEGATIVE

## 2022-07-19 NOTE — DISCHARGE INSTRUCTIONS
DIAGNOSIS: URINARY TRACT INFECTION - VAGINAL DISCHARGE     - IT ENE L TAKE SEVERAL DAYS FOR THE VAGINAL FLUID TEST TO COME BACK- WE WILL CALL YOU FOR A POSITIVE RESULT-- WHICH WOULD MEAN ADDITIONAL ANTIBIOTICS     - [facility]- - ANTIBIOTIC- STARTING TOMORROW- 1 TABLET 2 TIMES A DAY FOR 7 DAYS    - PLEASE RETURN TO  THE ER FOR ANY FEVERS- TEMP > 100 ,4/  ANY WORSENING PAIN IN PELVIS/ ANY PERSISTENT VOMITING OR ANY NEW/ WORSENING/CONCERNING SYMPTOMS TO YOU     - PLEASE CALL HER PRIMARY DOCTOR TOMORROW  TO SCHEDULE AN APPOINTMENT FOR A RECHECK WITHIN 1 WEEK

## 2022-07-24 NOTE — ED PROVIDER NOTES
History  Chief Complaint   Patient presents with    Possible UTI     Pt daughter reports weakness, loss of appetite, vaginal discharge x 1 week  States there was a small amount of blood on underwear  Denies CP/SOB  Denies fevers  Denies urinary/abdominal pain   Vaginal Discharge     80 yr female c/o 1 week of clear vag d/c- with  Several days of fatigue/ urinary frequency -- no fevers/uri/cough- no cp/sob- no abd/flank pain - no n/v       History provided by:  Patient and relative   used: No    Vaginal Discharge  Associated symptoms: no dyspareunia, no dysuria and no fever        Prior to Admission Medications   Prescriptions Last Dose Informant Patient Reported?  Taking?   acetaminophen-codeine (TYLENOL #3) 300-30 mg per tablet   No No   Sig: Take 1 tablet by mouth every 8 (eight) hours as needed for moderate pain   amLODIPine (NORVASC) 5 mg tablet   Yes No   Sig: Take 10 mg by mouth daily   atorvastatin (LIPITOR) 10 mg tablet   Yes No   Sig: Take 10 mg by mouth daily   docusate sodium (COLACE) 100 mg capsule   No No   Sig: Take 1 capsule (100 mg total) by mouth 2 (two) times a day   esomeprazole (NexIUM) 40 MG capsule   No No   Sig: Take 1 capsule (40 mg total) by mouth daily   levothyroxine 75 mcg tablet   Yes No   Sig: Take 100 mcg by mouth daily     methocarbamol (ROBAXIN) 500 mg tablet   No No   Sig: Take 0 5 tablets (250 mg total) by mouth 3 (three) times a day as needed for muscle spasms      Facility-Administered Medications: None       Past Medical History:   Diagnosis Date    Brain aneurysm     CAD (coronary artery disease)     Disease of thyroid gland     hypo    GERD (gastroesophageal reflux disease)     Gout     right shoulder    Hyperlipidemia     Hypertension     Osteoarthritis (arthritis due to wear and tear of joints)        Past Surgical History:   Procedure Laterality Date    CORONARY ARTERY BYPASS GRAFT      ENDOSCOPIC STENT PLACEMENT W/ MLB Left 2012    HIATAL HERNIA REPAIR      JOINT REPLACEMENT Left     knee 2001    ROTATOR CUFF REPAIR      TUBAL LIGATION  2013       History reviewed  No pertinent family history  I have reviewed and agree with the history as documented  E-Cigarette/Vaping    E-Cigarette Use Never User      E-Cigarette/Vaping Substances    Nicotine No     THC No     CBD No     Flavoring No     Other No     Unknown No      Social History     Tobacco Use    Smoking status: Never Smoker    Smokeless tobacco: Never Used   Vaping Use    Vaping Use: Never used   Substance Use Topics    Alcohol use: Never    Drug use: Never       Review of Systems   Constitutional: Positive for fatigue  Negative for activity change, appetite change, chills, diaphoresis, fever and unexpected weight change  HENT: Negative  Eyes: Negative  Respiratory: Negative  Cardiovascular: Negative  Gastrointestinal: Negative  Endocrine: Negative  Genitourinary: Positive for frequency and vaginal discharge  Negative for decreased urine volume, difficulty urinating, dyspareunia, dysuria, enuresis, flank pain, genital sores, hematuria, menstrual problem, pelvic pain, urgency, vaginal bleeding and vaginal pain  Musculoskeletal: Negative  Skin: Negative  Allergic/Immunologic: Negative  Neurological: Negative  Hematological: Negative  Psychiatric/Behavioral: Negative  Physical Exam  Physical Exam  Vitals and nursing note reviewed  Constitutional:       General: She is not in acute distress  Appearance: Normal appearance  She is not ill-appearing, toxic-appearing or diaphoretic  Comments: avss-- pulse ox 98 % on ra- interpretation is normal- no intervention    HENT:      Head: Normocephalic and atraumatic  Nose: Nose normal       Mouth/Throat:      Mouth: Mucous membranes are moist    Eyes:      General: No scleral icterus  Right eye: No discharge  Left eye: No discharge        Extraocular Movements: Extraocular movements intact  Conjunctiva/sclera: Conjunctivae normal       Pupils: Pupils are equal, round, and reactive to light  Comments: Mm pink   Neck:      Vascular: No carotid bruit  Comments: No pmt c/t/l/s spine   Cardiovascular:      Rate and Rhythm: Normal rate and regular rhythm  Pulses: Normal pulses  Heart sounds: Normal heart sounds  No murmur heard  No friction rub  No gallop  Pulmonary:      Effort: Pulmonary effort is normal  No respiratory distress  Breath sounds: No stridor  No wheezing, rhonchi or rales  Chest:      Chest wall: No tenderness  Abdominal:      General: Bowel sounds are normal  There is no distension  Palpations: Abdomen is soft  There is no mass  Tenderness: There is no abdominal tenderness  There is no right CVA tenderness, left CVA tenderness, guarding or rebound  Hernia: No hernia is present  Comments: Soft nt/nd- no hsm- no cva tenderness-- no ascites- no peritoneal signs- no pulsatile abd mass/bruit/ tenderness   Musculoskeletal:         General: No swelling, tenderness, deformity or signs of injury  Normal range of motion  Cervical back: Normal range of motion and neck supple  No rigidity or tenderness  Right lower leg: No edema  Left lower leg: No edema  Comments: Equal bilateral radial/dp pulses- no ble edema/calf tenderness/asym/ erythema   Lymphadenopathy:      Cervical: No cervical adenopathy  Skin:     General: Skin is warm  Capillary Refill: Capillary refill takes less than 2 seconds  Coloration: Skin is not jaundiced or pale  Findings: No bruising, erythema, lesion or rash  Neurological:      General: No focal deficit present  Mental Status: She is alert and oriented to person, place, and time  Mental status is at baseline  Cranial Nerves: No cranial nerve deficit  Sensory: No sensory deficit  Motor: No weakness        Coordination: Coordination normal       Comments: Normal non focal neuro exam    Psychiatric:         Mood and Affect: Mood normal          Behavior: Behavior normal          Vital Signs  ED Triage Vitals [07/18/22 1341]   Temperature Pulse Respirations Blood Pressure SpO2   98 5 °F (36 9 °C) 67 16 126/60 98 %      Temp Source Heart Rate Source Patient Position - Orthostatic VS BP Location FiO2 (%)   Oral Monitor Sitting Left arm --      Pain Score       No Pain           Vitals:    07/18/22 1341 07/18/22 1551 07/18/22 1730 07/18/22 1945   BP: 126/60 134/64 161/70 154/68   Pulse: 67 57 64 78   Patient Position - Orthostatic VS: Sitting Lying           Visual Acuity      ED Medications  Medications   lactated ringers bolus 500 mL (0 mL Intravenous Stopped 7/18/22 1658)   ciprofloxacin (CIPRO) tablet 500 mg (500 mg Oral Given 7/18/22 1949)       Diagnostic Studies  Results Reviewed     Procedure Component Value Units Date/Time    Urine culture [[id number]] Collected: 07/18/22 1725    Lab Status: Final result Specimen: Urine, Clean Catch Updated: 07/19/22 2319     Urine Culture >100,000 cfu/ml     VAGINOSIS DNA PROBE (AFFIRM) [[id number]] Collected: 07/18/22 1615    Lab Status: Final result Specimen: Genital from Vaginal Updated: 07/19/22 1605     Candida Species Negative     Gardnerella vaginalis Negative     Trichomonas vaginalis Negative    Narrative:      Performed at:  [address] [place] [address]  [id number]  : Keyonna Torres MD, Phone:  [phone]    Urine Microscopic [[id number]]  (Abnormal) Collected: 07/18/22 1725    Lab Status: Final result Specimen: Urine, Clean Catch Updated: 07/18/22 1901     RBC, UA 20-30 /hpf      WBC, UA Innumerable /hpf      Epithelial Cells Occasional /hpf      Bacteria, UA Moderate /hpf      MUCUS THREADS Innumerable     Amorphous Crystals, UA Occasional     Uric Acid Silva, UA Moderate /hpf     UA (URINE) with reflex to Scope [[id number]]  (Abnormal) Collected: 07/18/22 1725 Lab Status: Final result Specimen: Urine, Clean Catch Updated: 07/18/22 1854     Color, UA Yellow     Clarity, UA Turbid     Specific [place], UA 1 014     pH, UA 6 5     Leukocytes, UA Small     Nitrite, UA Negative     Protein, UA 70 (1+) mg/dl      Glucose, UA Negative mg/dl      Ketones, UA Negative mg/dl      Urobilinogen, UA <2 0 mg/dl      Bilirubin, UA Negative     Occult Blood, UA Small    Comprehensive metabolic panel [[id number]]  (Abnormal) Collected: 07/18/22 1438    Lab Status: Final result Specimen: Blood from Arm, Right Updated: 07/18/22 1503     Sodium 135 mmol/L      Potassium 3 5 mmol/L      Chloride 100 mmol/L      CO2 27 mmol/L      ANION GAP 8 mmol/L      BUN 16 mg/dL      Creatinine 0 90 mg/dL      Glucose 101 mg/dL      Calcium 9 1 mg/dL      Corrected Calcium 9 8 mg/dL      AST 10 U/L      ALT 7 U/L      Alkaline Phosphatase 86 U/L      Total Protein 6 4 g/dL      Albumin 3 1 g/dL      Total Bilirubin 0 96 mg/dL      eGFR 53 ml/min/1 73sq m     Narrative:      [facility] guidelines for Chronic Kidney Disease (CKD):     Stage 1 with normal or high GFR (GFR > 90 mL/min/1 73 square meters)    Stage 2 Mild CKD (GFR = 60-89 mL/min/1 73 square meters)    Stage 3A Moderate CKD (GFR = 45-59 mL/min/1 73 square meters)    Stage 3B Moderate CKD (GFR = 30-44 mL/min/1 73 square meters)    Stage 4 Severe CKD (GFR = 15-29 mL/min/1 73 square meters)    Stage 5 End Stage CKD (GFR <15 mL/min/1 73 square meters)  Note: GFR calculation is accurate only with a steady state creatinine    CBC and differential [[id number]]  (Abnormal) Collected: 07/18/22 1438    Lab Status: Final result Specimen: Blood from Arm, Right Updated: 07/18/22 1445     WBC 15 13 Thousand/uL      RBC 4 86 Million/uL      Hemoglobin 12 6 g/dL      Hematocrit 39 0 %      MCV 80 fL      MCH 25 9 pg      MCHC 32 3 g/dL      RDW 14 5 %      MPV 9 1 fL      Platelets 625 Thousands/uL      nRBC 0 /100 WBCs Neutrophils Relative 81 %      Immat GRANS % 1 %      Lymphocytes Relative 10 %      Monocytes Relative 8 %      Eosinophils Relative 0 %      Basophils Relative 0 %      Neutrophils Absolute 12 28 Thousands/µL      Immature Grans Absolute 0 10 Thousand/uL      Lymphocytes Absolute 1 48 Thousands/µL      Monocytes Absolute 1 24 Thousand/µL      Eosinophils Absolute 0 01 Thousand/µL      Basophils Absolute 0 02 Thousands/µL                  No orders to display              Procedures  Procedures         ED Course  ED Course as of 07/24/22 1117   Mon Jul 18, 2022   1423 Er md note- recent labs/ urine/ imaging/ d/c summary all reviewed by er md   56 - ER MD NOTE- PT RE-EVALUATED  - RESTING IN NAD-DAUGHTER STATES JUST GAVE  URINE SPECIMEN --    1937 ER MD NOTE-  PREVIOUS UC AND SUSP ALL REVIEWED BY ER MD                                             MDM    Disposition  Final diagnoses:   UTI (urinary tract infection)   Vaginal discharge     Time reflects when diagnosis was documented in both MDM as applicable and the Disposition within this note     Time User Action Codes Description Comment    7/18/2022  8:02 PM Shabbir Dashawn D Add [N39 0] UTI (urinary tract infection)     7/18/2022  8:02 PM Colonel Spoon Add [N89 8] Vaginal discharge       ED Disposition     ED Disposition   Discharge    Condition   Stable    Date/Time   Mon Jul 18, 2022  8:02 PM    [address] discharge to home/self care                 Follow-up Information    None         Discharge Medication List as of 7/18/2022  8:06 PM      START taking these medications    Details   ciprofloxacin (Cipro) 500 mg tablet Take 1 tablet (500 mg total) by mouth every 12 (twelve) hours for 13 doses, Starting Mon 7/18/2022, Until Mon 7/25/2022, Print         CONTINUE these medications which have NOT CHANGED    Details   acetaminophen-codeine (TYLENOL #3) 300-30 mg per tablet Take 1 tablet by mouth every 8 (eight) hours as needed for moderate pain, Starting u 7/7/2022, Normal      amLODIPine (NORVASC) 5 mg tablet Take 10 mg by mouth daily, Historical Med      atorvastatin (LIPITOR) 10 mg tablet Take 10 mg by mouth daily, Historical Med      docusate sodium (COLACE) 100 mg capsule Take 1 capsule (100 mg total) by mouth 2 (two) times a day, Starting Tue 5/21/2019, Normal      esomeprazole (NexIUM) 40 MG capsule Take 1 capsule (40 mg total) by mouth daily, Starting Fri 8/2/2019, No Print      levothyroxine 75 mcg tablet Take 100 mcg by mouth daily  , Historical Med      methocarbamol (ROBAXIN) 500 mg tablet Take 0 5 tablets (250 mg total) by mouth 3 (three) times a day as needed for muscle spasms, Starting u 7/7/2022, Normal             No discharge procedures on file      PDMP Review     None          ED Provider  Electronically Signed by           Jillian Gray MD  07/24/22 0384

## 2022-10-11 PROBLEM — N39.0 UTI (URINARY TRACT INFECTION): Status: RESOLVED | Noted: 2021-06-23 | Resolved: 2022-10-11

## 2023-04-28 ENCOUNTER — HOSPITAL ENCOUNTER (EMERGENCY)
Facility: HOSPITAL | Age: 88
Discharge: HOME | End: 2023-04-28
Attending: EMERGENCY MEDICINE

## 2023-04-28 VITALS
DIASTOLIC BLOOD PRESSURE: 94 MMHG | TEMPERATURE: 99.3 F | RESPIRATION RATE: 20 BRPM | HEART RATE: 78 BPM | WEIGHT: 123.46 LBS | OXYGEN SATURATION: 97 % | SYSTOLIC BLOOD PRESSURE: 164 MMHG | BODY MASS INDEX: 26.72 KG/M2

## 2023-04-28 DIAGNOSIS — M10.9 GOUT: ICD-10-CM

## 2023-04-28 DIAGNOSIS — M79.671 RIGHT FOOT PAIN: Primary | ICD-10-CM

## 2023-04-28 LAB
ALBUMIN SERPL BCP-MCNC: 3.7 G/DL (ref 3.5–5)
ALP SERPL-CCNC: 114 U/L (ref 34–104)
ALT SERPL W P-5'-P-CCNC: 6 U/L (ref 7–52)
ANION GAP SERPL CALCULATED.3IONS-SCNC: 10 MMOL/L (ref 4–13)
AST SERPL W P-5'-P-CCNC: 14 U/L (ref 13–39)
BASOPHILS # BLD AUTO: 0.04 THOUSANDS/ΜL (ref 0–0.1)
BASOPHILS NFR BLD AUTO: 0 % (ref 0–1)
BILIRUB SERPL-MCNC: 0.78 MG/DL (ref 0.2–1)
BUN SERPL-MCNC: 24 MG/DL (ref 5–25)
CALCIUM SERPL-MCNC: 9.6 MG/DL (ref 8.4–10.2)
CHLORIDE SERPL-SCNC: 96 MMOL/L (ref 96–108)
CO2 SERPL-SCNC: 27 MMOL/L (ref 21–32)
CREAT SERPL-MCNC: 1.39 MG/DL (ref 0.6–1.3)
EOSINOPHIL # BLD AUTO: 0.09 THOUSAND/ΜL (ref 0–0.61)
EOSINOPHIL NFR BLD AUTO: 1 % (ref 0–6)
ERYTHROCYTE [DISTWIDTH] IN BLOOD BY AUTOMATED COUNT: 15.8 % (ref 11.6–15.1)
GFR SERPL CREATININE-BSD FRML MDRD: 31 ML/MIN/1.73SQ M
GLUCOSE SERPL-MCNC: 101 MG/DL (ref 65–140)
HCT VFR BLD AUTO: 34.7 % (ref 34.8–46.1)
HGB BLD-MCNC: 11.1 G/DL (ref 11.5–15.4)
IMM GRANULOCYTES # BLD AUTO: 0.02 THOUSAND/UL (ref 0–0.2)
IMM GRANULOCYTES NFR BLD AUTO: 0 % (ref 0–2)
LYMPHOCYTES # BLD AUTO: 2.05 THOUSANDS/ΜL (ref 0.6–4.47)
LYMPHOCYTES NFR BLD AUTO: 23 % (ref 14–44)
MAGNESIUM SERPL-MCNC: 1.7 MG/DL (ref 1.9–2.7)
MCH RBC QN AUTO: 24.7 PG (ref 26.8–34.3)
MCHC RBC AUTO-ENTMCNC: 32 G/DL (ref 31.4–37.4)
MCV RBC AUTO: 77 FL (ref 82–98)
MONOCYTES # BLD AUTO: 0.95 THOUSAND/ΜL (ref 0.17–1.22)
MONOCYTES NFR BLD AUTO: 11 % (ref 4–12)
NEUTROPHILS # BLD AUTO: 5.93 THOUSANDS/ΜL (ref 1.85–7.62)
NEUTS SEG NFR BLD AUTO: 65 % (ref 43–75)
NRBC BLD AUTO-RTO: 0 /100 WBCS
PLATELET # BLD AUTO: 314 THOUSANDS/UL (ref 149–390)
PMV BLD AUTO: 9.4 FL (ref 8.9–12.7)
POTASSIUM SERPL-SCNC: 4 MMOL/L (ref 3.5–5.3)
PROT SERPL-MCNC: 7.2 G/DL (ref 6.4–8.4)
RBC # BLD AUTO: 4.5 MILLION/UL (ref 3.81–5.12)
SODIUM SERPL-SCNC: 133 MMOL/L (ref 135–147)
URATE SERPL-MCNC: 6.7 MG/DL (ref 2–7.5)
WBC # BLD AUTO: 9.08 THOUSAND/UL (ref 4.31–10.16)

## 2023-04-28 RX ORDER — PREDNISONE 10 MG/1
30 TABLET ORAL DAILY
Qty: 9 TABLET | Refills: 0 | Status: SHIPPED | OUTPATIENT
Start: 2023-04-29 | End: 2023-05-02

## 2023-04-28 RX ORDER — TRAMADOL HYDROCHLORIDE 50 MG/1
50 TABLET ORAL ONCE
Status: COMPLETED | OUTPATIENT
Start: 2023-04-28 | End: 2023-04-28

## 2023-04-28 RX ORDER — ACETAMINOPHEN 325 MG/1
650 TABLET ORAL ONCE
Status: COMPLETED | OUTPATIENT
Start: 2023-04-28 | End: 2023-04-28

## 2023-04-28 RX ORDER — TRAMADOL HYDROCHLORIDE 50 MG/1
25 TABLET ORAL EVERY 12 HOURS PRN
Qty: 3 TABLET | Refills: 0 | Status: SHIPPED | OUTPATIENT
Start: 2023-04-28 | End: 2023-05-01

## 2023-04-28 RX ORDER — LANOLIN ALCOHOL/MO/W.PET/CERES
800 CREAM (GRAM) TOPICAL ONCE
Status: COMPLETED | OUTPATIENT
Start: 2023-04-28 | End: 2023-04-28

## 2023-04-28 RX ADMIN — TRAMADOL HYDROCHLORIDE 50 MG: 50 TABLET, COATED ORAL at 19:36

## 2023-04-28 RX ADMIN — ACETAMINOPHEN 650 MG: 325 TABLET ORAL at 19:36

## 2023-04-28 RX ADMIN — PREDNISONE 30 MG: 10 TABLET ORAL at 20:31

## 2023-04-28 RX ADMIN — MAGNESIUM OXIDE TAB 400 MG (241.3 MG ELEMENTAL MG) 800 MG: 400 (241.3 MG) TAB at 20:31

## 2023-04-28 RX ADMIN — SODIUM CHLORIDE 500 ML: 0.9 INJECTION, SOLUTION INTRAVENOUS at 19:34

## 2023-04-28 NOTE — ED PROVIDER NOTES
History  Chief Complaint   Patient presents with   • Toe Pain     EMS called by family for right toe pain  Patient is a 19-year-old female seen in the emergency department brought by EMS with concern for right foot pain, focused in the region just proximal to the right great toe  Pain is apparently worse with movement, weightbearing, and palpation  Patient and daughter explained that the patient took colchicine earlier in the day, but has not had any relief  Patient's daughter explains that the patient is not a great eater, and she is concerned about hydration status for the patient  Patient and family declined x-ray imaging in the emergency department, which is reasonable  Family explains that the patient has had multiple bouts of gout in the past, with similar symptoms  Patient notes no trauma, fever, chest pain, shortness of breath, abdominal pain, weakness, numbness, tingling  Prior to Admission Medications   Prescriptions Last Dose Informant Patient Reported?  Taking?   acetaminophen-codeine (TYLENOL #3) 300-30 mg per tablet   No No   Sig: Take 1 tablet by mouth every 8 (eight) hours as needed for moderate pain   amLODIPine (NORVASC) 5 mg tablet   Yes No   Sig: Take 10 mg by mouth daily   atorvastatin (LIPITOR) 10 mg tablet   Yes No   Sig: Take 10 mg by mouth daily   docusate sodium (COLACE) 100 mg capsule   No No   Sig: Take 1 capsule (100 mg total) by mouth 2 (two) times a day   esomeprazole (NexIUM) 40 MG capsule   No No   Sig: Take 1 capsule (40 mg total) by mouth daily   levothyroxine 75 mcg tablet   Yes No   Sig: Take 100 mcg by mouth daily     methocarbamol (ROBAXIN) 500 mg tablet   No No   Sig: Take 0 5 tablets (250 mg total) by mouth 3 (three) times a day as needed for muscle spasms      Facility-Administered Medications: None       Past Medical History:   Diagnosis Date   • Brain aneurysm    • CAD (coronary artery disease)    • Disease of thyroid gland     hypo   • GERD (gastroesophageal reflux disease)    • Gout     right shoulder   • Hyperlipidemia    • Hypertension    • Osteoarthritis (arthritis due to wear and tear of joints)        Past Surgical History:   Procedure Laterality Date   • CORONARY ARTERY BYPASS GRAFT     • ENDOSCOPIC STENT PLACEMENT W/ MLB Left 2012   • HIATAL HERNIA REPAIR     • JOINT REPLACEMENT Left     knee 2001   • ROTATOR CUFF REPAIR     • TUBAL LIGATION  2013       History reviewed  No pertinent family history  I have reviewed and agree with the history as documented  E-Cigarette/Vaping   • E-Cigarette Use Never User      E-Cigarette/Vaping Substances   • Nicotine No    • THC No    • CBD No    • Flavoring No    • Other No    • Unknown No      Social History     Tobacco Use   • Smoking status: Never   • Smokeless tobacco: Never   Vaping Use   • Vaping Use: Never used   Substance Use Topics   • Alcohol use: Never   • Drug use: Never       Review of Systems   Constitutional: Negative for chills and fever  HENT: Negative for ear pain and sore throat  Eyes: Negative for pain and visual disturbance  Respiratory: Negative for cough and shortness of breath  Cardiovascular: Negative for chest pain and palpitations  Gastrointestinal: Negative for abdominal pain and vomiting  Genitourinary: Negative for decreased urine volume and difficulty urinating  Musculoskeletal: Negative for neck stiffness  Right foot/great toe pain   Skin: Negative for color change and rash  Neurological: Negative for weakness and numbness  Psychiatric/Behavioral: Negative for agitation and confusion  All other systems reviewed and are negative  Physical Exam  Physical Exam  Vitals and nursing note reviewed  Constitutional:       General: She is not in acute distress  Appearance: She is well-developed  HENT:      Head: Normocephalic and atraumatic        Right Ear: External ear normal       Left Ear: External ear normal       Nose: Nose normal  Mouth/Throat:      Pharynx: Oropharynx is clear  Eyes:      General: No scleral icterus  Conjunctiva/sclera: Conjunctivae normal    Cardiovascular:      Rate and Rhythm: Normal rate  Comments: well-perfused extremities  Pulmonary:      Effort: Pulmonary effort is normal  No respiratory distress  Breath sounds: Normal breath sounds  Abdominal:      General: There is no distension  Palpations: Abdomen is soft  Tenderness: There is no abdominal tenderness  Musculoskeletal:         General: Swelling and tenderness present  Cervical back: Normal range of motion and neck supple  Comments: moderate warmth/swelling/tenderness to right foot, just proximal to right great toe; neurovascularly intact extremities   Skin:     General: Skin is warm and dry  Neurological:      General: No focal deficit present  Mental Status: She is alert  Cranial Nerves: No cranial nerve deficit  Sensory: No sensory deficit  Psychiatric:         Mood and Affect: Mood normal          Thought Content:  Thought content normal          Vital Signs  ED Triage Vitals [04/28/23 1900]   Temperature Pulse Respirations Blood Pressure SpO2   99 3 °F (37 4 °C) 78 20 164/94 97 %      Temp Source Heart Rate Source Patient Position - Orthostatic VS BP Location FiO2 (%)   Oral Monitor Sitting Left arm --      Pain Score       4           Vitals:    04/28/23 1900   BP: 164/94   Pulse: 78   Patient Position - Orthostatic VS: Sitting         Visual Acuity      ED Medications  Medications   magnesium Oxide (MAG-OX) tablet 800 mg (has no administration in time range)   predniSONE tablet 30 mg (has no administration in time range)   sodium chloride 0 9 % bolus 500 mL (500 mL Intravenous New Bag 4/1934)   acetaminophen (TYLENOL) tablet 650 mg (650 mg Oral Given 4/28/23 1936)   traMADol (ULTRAM) tablet 50 mg (50 mg Oral Given 4/28/23 1936)       Diagnostic Studies  Results Reviewed     Procedure Component Value Units Date/Time    Comprehensive metabolic panel [[id number]]  (Abnormal) Collected: 04/1934    Lab Status: Final result Specimen: Blood from Arm, Left Updated: 04/28/23 2006     Sodium 133 mmol/L      Potassium 4 0 mmol/L      Chloride 96 mmol/L      CO2 27 mmol/L      ANION GAP 10 mmol/L      BUN 24 mg/dL      Creatinine 1 39 mg/dL      Glucose 101 mg/dL      Calcium 9 6 mg/dL      AST 14 U/L      ALT 6 U/L      Alkaline Phosphatase 114 U/L      Total Protein 7 2 g/dL      Albumin 3 7 g/dL      Total Bilirubin 0 78 mg/dL      eGFR 31 ml/min/1 73sq m     Narrative:      [facility] guidelines for Chronic Kidney Disease (CKD):   •  Stage 1 with normal or high GFR (GFR > 90 mL/min/1 73 square meters)  •  Stage 2 Mild CKD (GFR = 60-89 mL/min/1 73 square meters)  •  Stage 3A Moderate CKD (GFR = 45-59 mL/min/1 73 square meters)  •  Stage 3B Moderate CKD (GFR = 30-44 mL/min/1 73 square meters)  •  Stage 4 Severe CKD (GFR = 15-29 mL/min/1 73 square meters)  •  Stage 5 End Stage CKD (GFR <15 mL/min/1 73 square meters)  Note: GFR calculation is accurate only with a steady state creatinine    Magnesium [[id number]]  (Abnormal) Collected: 04/1934    Lab Status: Final result Specimen: Blood from Arm, Left Updated: 04/28/23 2006     Magnesium 1 7 mg/dL     Uric acid [[id number]]  (Normal) Collected: 04/1934    Lab Status: Final result Specimen: Blood from Arm, Left Updated: 04/28/23 2006     Uric Acid 6 7 mg/dL     Narrative:      N-acetyl-p-benzoquinone imine (metabolite of Acetaminophen) will generate erroneously low results in samples for patients that have taken an overdose of Acetaminophen      CBC and differential [[id number]]  (Abnormal) Collected: 04/1934    Lab Status: Final result Specimen: Blood from Arm, Left Updated: 04/28/23 1945     WBC 9 08 Thousand/uL      RBC 4 50 Million/uL      Hemoglobin 11 1 g/dL      Hematocrit 34 7 %      MCV 77 fL      MCH 24 7 pg MCHC 32 0 g/dL      RDW 15 8 %      MPV 9 4 fL      Platelets 512 Thousands/uL      nRBC 0 /100 WBCs      Neutrophils Relative 65 %      Immat GRANS % 0 %      Lymphocytes Relative 23 %      Monocytes Relative 11 %      Eosinophils Relative 1 %      Basophils Relative 0 %      Neutrophils Absolute 5 93 Thousands/µL      Immature Grans Absolute 0 02 Thousand/uL      Lymphocytes Absolute 2 05 Thousands/µL      Monocytes Absolute 0 95 Thousand/µL      Eosinophils Absolute 0 09 Thousand/µL      Basophils Absolute 0 04 Thousands/µL                  No orders to display              Procedures  Procedures         ED Course                               SBIRT 22yo+    Flowsheet Row Most Recent Value   Initial Alcohol Screen: US AUDIT-C     1  How often do you have a drink containing alcohol? 0 Filed at: 04/28/2023 1907   Audit-C Score 0 Filed at: 04/28/2023 1907                    Medical Decision Making  Patient is a 80-year-old female seen in the emergency department with concern for right foot/great toe pain, in the setting of apparent gouty flare  Evaluation is not consistent with septic arthritis or  traumatic injury  Patient was treated with medication for symptom control  Laboratory studies were ordered to evaluate for hydration status, electrolyte/renal abnormalities  Laboratory evaluation remarkable for low sodium of 133, elevated creatinine of 1 39, elevated alk phos of 114, low magnesium of 1 7, low hemoglobin of 11 1, low hematocrit of 34 7, low MCV of 77  Patient is already on colchicine for gouty flare  Plan to provide patient tramadol at home as needed for pain control, in addition to short burst course of prednisone  Plan to have patient follow up with PCP/outpatient providers  Patient stable for discharge home  Discharge instructions were reviewed with patient/family  Gout: acute illness or injury  Right foot pain: acute illness or injury  Amount and/or Complexity of Data Reviewed  Labs: ordered  Decision-making details documented in ED Course  Risk  OTC drugs  Prescription drug management  Disposition  Final diagnoses:   Right foot pain   Gout     Time reflects when diagnosis was documented in both MDM as applicable and the Disposition within this note     Time User Action Codes Description Comment    4/28/2023  7:32 PM Gomez Guardaod Add [Q79 027] Right foot pain     4/28/2023  7:32 PM Gomez Guardado Add [M10 9] Gout       ED Disposition     ED Disposition   Discharge    Condition   Stable    Date/Time   Fri Apr 28, 2023  8:20 PM    Comment   Caryl Alvarenga discharge to home/self care  Follow-up Information     Follow up With Specialties Details Why Araceli Juares MD  Call in 1 day  [address] [facility]  [phone]            Patient's Medications   Discharge Prescriptions    PREDNISONE 10 MG TABLET    Take 3 tablets (30 mg total) by mouth daily for 3 days Do not start before April 29, 2023  Start Date: 4/29/2023 End Date: 5/2/2023       Order Dose: 30 mg       Quantity: 9 tablet    Refills: 0    TRAMADOL (ULTRAM) 50 MG TABLET    Take 0 5 tablets (25 mg total) by mouth every 12 (twelve) hours as needed for moderate pain or severe pain for up to 3 days       Start Date: 4/28/2023 End Date: 5/1/2023       Order Dose: 25 mg       Quantity: 3 tablet    Refills: 0       No discharge procedures on file      PDMP Review       Value Time User    PDMP Reviewed  Yes 4/28/2023  7:33 PM Paresh Sotelo MD          ED Provider  Electronically Signed by           Paresh Sotelo MD  04/28/23 2035

## 2023-04-28 NOTE — DISCHARGE INSTRUCTIONS
Creatinine(kidney function test) was elevated at 1 39  Follow up with your primary doctor/outpatient providers, and return to the emergency department for new or worsening symptoms

## 2023-06-11 ENCOUNTER — HOSPITAL ENCOUNTER (INPATIENT)
Facility: HOSPITAL | Age: 88
LOS: 1 days | Discharge: HOME HEALTH | End: 2023-06-13
Attending: EMERGENCY MEDICINE | Admitting: STUDENT IN AN ORGANIZED HEALTH CARE EDUCATION/TRAINING PROGRAM
Payer: MEDICARE

## 2023-06-11 ENCOUNTER — APPOINTMENT (EMERGENCY)
Dept: RADIOLOGY | Facility: HOSPITAL | Age: 88
End: 2023-06-11
Payer: MEDICARE

## 2023-06-11 DIAGNOSIS — R53.1 GENERALIZED WEAKNESS: ICD-10-CM

## 2023-06-11 DIAGNOSIS — I10 PRIMARY HYPERTENSION: ICD-10-CM

## 2023-06-11 DIAGNOSIS — M25.551 RIGHT HIP PAIN: ICD-10-CM

## 2023-06-11 DIAGNOSIS — K21.9 GERD (GASTROESOPHAGEAL REFLUX DISEASE): ICD-10-CM

## 2023-06-11 DIAGNOSIS — R10.9 ABDOMINAL PAIN: ICD-10-CM

## 2023-06-11 DIAGNOSIS — F32.A DEPRESSION, UNSPECIFIED DEPRESSION TYPE: ICD-10-CM

## 2023-06-11 DIAGNOSIS — Z91.81 STATUS POST FALL: ICD-10-CM

## 2023-06-11 DIAGNOSIS — N39.0 UTI (URINARY TRACT INFECTION): Primary | ICD-10-CM

## 2023-06-11 PROBLEM — E87.8 DISORDER OF ELECTROLYTES: Status: ACTIVE | Noted: 2023-06-11

## 2023-06-11 PROBLEM — R40.0 SOMNOLENCE: Status: ACTIVE | Noted: 2023-06-11

## 2023-06-11 PROBLEM — I63.9 CVA (CEREBRAL VASCULAR ACCIDENT) (HCC): Status: ACTIVE | Noted: 2023-06-11

## 2023-06-11 LAB
ALBUMIN SERPL BCP-MCNC: 3.5 G/DL (ref 3.5–5)
ALP SERPL-CCNC: 114 U/L (ref 34–104)
ALT SERPL W P-5'-P-CCNC: 9 U/L (ref 7–52)
ANION GAP SERPL CALCULATED.3IONS-SCNC: 10 MMOL/L (ref 4–13)
AST SERPL W P-5'-P-CCNC: 16 U/L (ref 13–39)
BACTERIA UR QL AUTO: ABNORMAL /HPF
BASOPHILS # BLD AUTO: 0.03 THOUSANDS/ÂΜL (ref 0–0.1)
BASOPHILS NFR BLD AUTO: 0 % (ref 0–1)
BILIRUB SERPL-MCNC: 0.66 MG/DL (ref 0.2–1)
BILIRUB UR QL STRIP: NEGATIVE
BUN SERPL-MCNC: 23 MG/DL (ref 5–25)
CALCIUM SERPL-MCNC: 9 MG/DL (ref 8.4–10.2)
CHLORIDE SERPL-SCNC: 96 MMOL/L (ref 96–108)
CLARITY UR: CLEAR
CO2 SERPL-SCNC: 28 MMOL/L (ref 21–32)
COLOR UR: YELLOW
CREAT SERPL-MCNC: 1.43 MG/DL (ref 0.6–1.3)
EOSINOPHIL # BLD AUTO: 0.08 THOUSAND/ÂΜL (ref 0–0.61)
EOSINOPHIL NFR BLD AUTO: 1 % (ref 0–6)
ERYTHROCYTE [DISTWIDTH] IN BLOOD BY AUTOMATED COUNT: 16.8 % (ref 11.6–15.1)
GFR SERPL CREATININE-BSD FRML MDRD: 30 ML/MIN/1.73SQ M
GLUCOSE SERPL-MCNC: 107 MG/DL (ref 65–140)
GLUCOSE UR STRIP-MCNC: NEGATIVE MG/DL
HCT VFR BLD AUTO: 38.3 % (ref 34.8–46.1)
HGB BLD-MCNC: 12.8 G/DL (ref 11.5–15.4)
HGB UR QL STRIP.AUTO: NEGATIVE
IMM GRANULOCYTES # BLD AUTO: 0.02 THOUSAND/UL (ref 0–0.2)
IMM GRANULOCYTES NFR BLD AUTO: 0 % (ref 0–2)
KETONES UR STRIP-MCNC: NEGATIVE MG/DL
LEUKOCYTE ESTERASE UR QL STRIP: ABNORMAL
LYMPHOCYTES # BLD AUTO: 1.32 THOUSANDS/ÂΜL (ref 0.6–4.47)
LYMPHOCYTES NFR BLD AUTO: 17 % (ref 14–44)
MAGNESIUM SERPL-MCNC: 1.5 MG/DL (ref 1.9–2.7)
MCH RBC QN AUTO: 25.3 PG (ref 26.8–34.3)
MCHC RBC AUTO-ENTMCNC: 33.4 G/DL (ref 31.4–37.4)
MCV RBC AUTO: 76 FL (ref 82–98)
MONOCYTES # BLD AUTO: 0.64 THOUSAND/ÂΜL (ref 0.17–1.22)
MONOCYTES NFR BLD AUTO: 8 % (ref 4–12)
NEUTROPHILS # BLD AUTO: 5.62 THOUSANDS/ÂΜL (ref 1.85–7.62)
NEUTS SEG NFR BLD AUTO: 74 % (ref 43–75)
NITRITE UR QL STRIP: POSITIVE
NON-SQ EPI CELLS URNS QL MICRO: ABNORMAL /HPF
NRBC BLD AUTO-RTO: 0 /100 WBCS
PH UR STRIP.AUTO: 7 [PH]
PLATELET # BLD AUTO: 253 THOUSANDS/UL (ref 149–390)
PMV BLD AUTO: 9.4 FL (ref 8.9–12.7)
POTASSIUM SERPL-SCNC: 3.6 MMOL/L (ref 3.5–5.3)
PROT SERPL-MCNC: 6.7 G/DL (ref 6.4–8.4)
PROT UR STRIP-MCNC: ABNORMAL MG/DL
RBC # BLD AUTO: 5.06 MILLION/UL (ref 3.81–5.12)
RBC #/AREA URNS AUTO: ABNORMAL /HPF
SODIUM SERPL-SCNC: 134 MMOL/L (ref 135–147)
SP GR UR STRIP.AUTO: 1.01 (ref 1–1.03)
UROBILINOGEN UR QL STRIP.AUTO: 4 E.U./DL
WBC # BLD AUTO: 7.71 THOUSAND/UL (ref 4.31–10.16)
WBC #/AREA URNS AUTO: ABNORMAL /HPF

## 2023-06-11 PROCEDURE — 80053 COMPREHEN METABOLIC PANEL: CPT | Performed by: EMERGENCY MEDICINE

## 2023-06-11 PROCEDURE — 87077 CULTURE AEROBIC IDENTIFY: CPT | Performed by: EMERGENCY MEDICINE

## 2023-06-11 PROCEDURE — 85025 COMPLETE CBC W/AUTO DIFF WBC: CPT | Performed by: EMERGENCY MEDICINE

## 2023-06-11 PROCEDURE — 36415 COLL VENOUS BLD VENIPUNCTURE: CPT | Performed by: EMERGENCY MEDICINE

## 2023-06-11 PROCEDURE — 99222 1ST HOSP IP/OBS MODERATE 55: CPT | Performed by: NURSE PRACTITIONER

## 2023-06-11 PROCEDURE — 87086 URINE CULTURE/COLONY COUNT: CPT | Performed by: EMERGENCY MEDICINE

## 2023-06-11 PROCEDURE — 74176 CT ABD & PELVIS W/O CONTRAST: CPT

## 2023-06-11 PROCEDURE — 96365 THER/PROPH/DIAG IV INF INIT: CPT

## 2023-06-11 PROCEDURE — 87186 SC STD MICRODIL/AGAR DIL: CPT | Performed by: EMERGENCY MEDICINE

## 2023-06-11 PROCEDURE — 99285 EMERGENCY DEPT VISIT HI MDM: CPT

## 2023-06-11 PROCEDURE — 83735 ASSAY OF MAGNESIUM: CPT | Performed by: EMERGENCY MEDICINE

## 2023-06-11 PROCEDURE — 96361 HYDRATE IV INFUSION ADD-ON: CPT

## 2023-06-11 PROCEDURE — 81001 URINALYSIS AUTO W/SCOPE: CPT | Performed by: EMERGENCY MEDICINE

## 2023-06-11 PROCEDURE — 87181 SC STD AGAR DILUTION PER AGT: CPT | Performed by: EMERGENCY MEDICINE

## 2023-06-11 PROCEDURE — 96375 TX/PRO/DX INJ NEW DRUG ADDON: CPT

## 2023-06-11 PROCEDURE — G1004 CDSM NDSC: HCPCS

## 2023-06-11 RX ORDER — MIRTAZAPINE 7.5 MG/1
7.5 TABLET, FILM COATED ORAL
COMMUNITY
Start: 2023-05-18

## 2023-06-11 RX ORDER — HYDROCHLOROTHIAZIDE 12.5 MG/1
25 TABLET ORAL
COMMUNITY
End: 2023-06-13

## 2023-06-11 RX ORDER — PREGABALIN 75 MG/1
75 CAPSULE ORAL DAILY
COMMUNITY

## 2023-06-11 RX ORDER — MAGNESIUM OXIDE 400 MG/1
400 TABLET ORAL DAILY
COMMUNITY
Start: 2023-05-18 | End: 2024-05-17

## 2023-06-11 RX ORDER — CEFTRIAXONE 1 G/50ML
1000 INJECTION, SOLUTION INTRAVENOUS ONCE
Status: DISCONTINUED | OUTPATIENT
Start: 2023-06-11 | End: 2023-06-11

## 2023-06-11 RX ORDER — MAGNESIUM SULFATE HEPTAHYDRATE 40 MG/ML
2 INJECTION, SOLUTION INTRAVENOUS ONCE
Status: COMPLETED | OUTPATIENT
Start: 2023-06-11 | End: 2023-06-12

## 2023-06-11 RX ORDER — NITROFURANTOIN MACROCRYSTALS 50 MG/1
50 CAPSULE ORAL
COMMUNITY
Start: 2023-04-10

## 2023-06-11 RX ORDER — TRAZODONE HYDROCHLORIDE 50 MG/1
50 TABLET ORAL
Status: ON HOLD | COMMUNITY
End: 2023-06-13 | Stop reason: SDUPTHER

## 2023-06-11 RX ADMIN — MAGNESIUM SULFATE HEPTAHYDRATE 2 G: 40 INJECTION, SOLUTION INTRAVENOUS at 22:41

## 2023-06-11 RX ADMIN — SODIUM CHLORIDE 500 ML: 0.9 INJECTION, SOLUTION INTRAVENOUS at 21:23

## 2023-06-12 PROBLEM — Z86.73 HISTORY OF CVA (CEREBROVASCULAR ACCIDENT): Status: ACTIVE | Noted: 2023-06-11

## 2023-06-12 PROBLEM — F32.A DEPRESSION: Status: ACTIVE | Noted: 2023-06-12

## 2023-06-12 LAB
ANION GAP SERPL CALCULATED.3IONS-SCNC: 9 MMOL/L (ref 4–13)
BASOPHILS # BLD AUTO: 0.04 THOUSANDS/ÂΜL (ref 0–0.1)
BASOPHILS NFR BLD AUTO: 1 % (ref 0–1)
BUN SERPL-MCNC: 21 MG/DL (ref 5–25)
CALCIUM SERPL-MCNC: 8.6 MG/DL (ref 8.4–10.2)
CHLORIDE SERPL-SCNC: 98 MMOL/L (ref 96–108)
CO2 SERPL-SCNC: 25 MMOL/L (ref 21–32)
CREAT SERPL-MCNC: 1.25 MG/DL (ref 0.6–1.3)
EOSINOPHIL # BLD AUTO: 0.1 THOUSAND/ÂΜL (ref 0–0.61)
EOSINOPHIL NFR BLD AUTO: 1 % (ref 0–6)
ERYTHROCYTE [DISTWIDTH] IN BLOOD BY AUTOMATED COUNT: 16.5 % (ref 11.6–15.1)
GFR SERPL CREATININE-BSD FRML MDRD: 35 ML/MIN/1.73SQ M
GLUCOSE SERPL-MCNC: 86 MG/DL (ref 65–140)
HCT VFR BLD AUTO: 34.8 % (ref 34.8–46.1)
HGB BLD-MCNC: 11.4 G/DL (ref 11.5–15.4)
IMM GRANULOCYTES # BLD AUTO: 0.02 THOUSAND/UL (ref 0–0.2)
IMM GRANULOCYTES NFR BLD AUTO: 0 % (ref 0–2)
LYMPHOCYTES # BLD AUTO: 2.26 THOUSANDS/ÂΜL (ref 0.6–4.47)
LYMPHOCYTES NFR BLD AUTO: 29 % (ref 14–44)
MAGNESIUM SERPL-MCNC: 2.3 MG/DL (ref 1.9–2.7)
MCH RBC QN AUTO: 24.7 PG (ref 26.8–34.3)
MCHC RBC AUTO-ENTMCNC: 32.8 G/DL (ref 31.4–37.4)
MCV RBC AUTO: 75 FL (ref 82–98)
MONOCYTES # BLD AUTO: 0.76 THOUSAND/ÂΜL (ref 0.17–1.22)
MONOCYTES NFR BLD AUTO: 10 % (ref 4–12)
NEUTROPHILS # BLD AUTO: 4.65 THOUSANDS/ÂΜL (ref 1.85–7.62)
NEUTS SEG NFR BLD AUTO: 59 % (ref 43–75)
NRBC BLD AUTO-RTO: 0 /100 WBCS
PLATELET # BLD AUTO: 236 THOUSANDS/UL (ref 149–390)
PMV BLD AUTO: 9.2 FL (ref 8.9–12.7)
POTASSIUM SERPL-SCNC: 3.5 MMOL/L (ref 3.5–5.3)
RBC # BLD AUTO: 4.62 MILLION/UL (ref 3.81–5.12)
SODIUM SERPL-SCNC: 132 MMOL/L (ref 135–147)
WBC # BLD AUTO: 7.83 THOUSAND/UL (ref 4.31–10.16)

## 2023-06-12 PROCEDURE — 83735 ASSAY OF MAGNESIUM: CPT | Performed by: NURSE PRACTITIONER

## 2023-06-12 PROCEDURE — 99232 SBSQ HOSP IP/OBS MODERATE 35: CPT | Performed by: STUDENT IN AN ORGANIZED HEALTH CARE EDUCATION/TRAINING PROGRAM

## 2023-06-12 PROCEDURE — 85025 COMPLETE CBC W/AUTO DIFF WBC: CPT | Performed by: NURSE PRACTITIONER

## 2023-06-12 PROCEDURE — 80048 BASIC METABOLIC PNL TOTAL CA: CPT | Performed by: NURSE PRACTITIONER

## 2023-06-12 PROCEDURE — 99223 1ST HOSP IP/OBS HIGH 75: CPT | Performed by: INTERNAL MEDICINE

## 2023-06-12 PROCEDURE — 97530 THERAPEUTIC ACTIVITIES: CPT

## 2023-06-12 PROCEDURE — 97163 PT EVAL HIGH COMPLEX 45 MIN: CPT

## 2023-06-12 RX ORDER — AMLODIPINE BESYLATE 10 MG/1
10 TABLET ORAL
Status: DISCONTINUED | OUTPATIENT
Start: 2023-06-12 | End: 2023-06-12

## 2023-06-12 RX ORDER — SACCHAROMYCES BOULARDII 250 MG
250 CAPSULE ORAL 2 TIMES DAILY
Status: DISCONTINUED | OUTPATIENT
Start: 2023-06-12 | End: 2023-06-13 | Stop reason: HOSPADM

## 2023-06-12 RX ORDER — PREGABALIN 75 MG/1
75 CAPSULE ORAL DAILY
Status: DISCONTINUED | OUTPATIENT
Start: 2023-06-12 | End: 2023-06-13 | Stop reason: HOSPADM

## 2023-06-12 RX ORDER — LANOLIN ALCOHOL/MO/W.PET/CERES
400 CREAM (GRAM) TOPICAL DAILY
Status: DISCONTINUED | OUTPATIENT
Start: 2023-06-12 | End: 2023-06-13 | Stop reason: HOSPADM

## 2023-06-12 RX ORDER — LEVOTHYROXINE SODIUM 0.07 MG/1
75 TABLET ORAL
Status: DISCONTINUED | OUTPATIENT
Start: 2023-06-12 | End: 2023-06-13 | Stop reason: HOSPADM

## 2023-06-12 RX ORDER — DOCUSATE SODIUM 100 MG/1
100 CAPSULE, LIQUID FILLED ORAL 2 TIMES DAILY
Status: DISCONTINUED | OUTPATIENT
Start: 2023-06-12 | End: 2023-06-13 | Stop reason: HOSPADM

## 2023-06-12 RX ORDER — METRONIDAZOLE 500 MG/100ML
500 INJECTION, SOLUTION INTRAVENOUS ONCE
Status: COMPLETED | OUTPATIENT
Start: 2023-06-12 | End: 2023-06-12

## 2023-06-12 RX ORDER — ONDANSETRON 2 MG/ML
4 INJECTION INTRAMUSCULAR; INTRAVENOUS EVERY 6 HOURS PRN
Status: DISCONTINUED | OUTPATIENT
Start: 2023-06-12 | End: 2023-06-13 | Stop reason: HOSPADM

## 2023-06-12 RX ORDER — ACETAMINOPHEN 325 MG/1
650 TABLET ORAL EVERY 6 HOURS PRN
Status: DISCONTINUED | OUTPATIENT
Start: 2023-06-12 | End: 2023-06-13 | Stop reason: HOSPADM

## 2023-06-12 RX ORDER — METRONIDAZOLE 500 MG/100ML
500 INJECTION, SOLUTION INTRAVENOUS EVERY 8 HOURS
Status: COMPLETED | OUTPATIENT
Start: 2023-06-12 | End: 2023-06-12

## 2023-06-12 RX ORDER — MIRTAZAPINE 15 MG/1
7.5 TABLET, FILM COATED ORAL
Status: DISCONTINUED | OUTPATIENT
Start: 2023-06-12 | End: 2023-06-13 | Stop reason: HOSPADM

## 2023-06-12 RX ORDER — TRAZODONE HYDROCHLORIDE 50 MG/1
25 TABLET ORAL
Status: DISCONTINUED | OUTPATIENT
Start: 2023-06-12 | End: 2023-06-13 | Stop reason: HOSPADM

## 2023-06-12 RX ORDER — AMLODIPINE BESYLATE 10 MG/1
10 TABLET ORAL
Status: DISCONTINUED | OUTPATIENT
Start: 2023-06-12 | End: 2023-06-13 | Stop reason: HOSPADM

## 2023-06-12 RX ORDER — AMLODIPINE BESYLATE 5 MG/1
5 TABLET ORAL
Status: DISCONTINUED | OUTPATIENT
Start: 2023-06-12 | End: 2023-06-12

## 2023-06-12 RX ORDER — PANTOPRAZOLE SODIUM 40 MG/1
40 TABLET, DELAYED RELEASE ORAL DAILY PRN
Status: DISCONTINUED | OUTPATIENT
Start: 2023-06-12 | End: 2023-06-13 | Stop reason: HOSPADM

## 2023-06-12 RX ADMIN — MAGNESIUM OXIDE TAB 400 MG (241.3 MG ELEMENTAL MG) 400 MG: 400 (241.3 MG) TAB at 09:29

## 2023-06-12 RX ADMIN — AMLODIPINE BESYLATE 10 MG: 10 TABLET ORAL at 01:16

## 2023-06-12 RX ADMIN — TRAZODONE HYDROCHLORIDE 25 MG: 50 TABLET ORAL at 22:54

## 2023-06-12 RX ADMIN — PREGABALIN 75 MG: 75 CAPSULE ORAL at 12:55

## 2023-06-12 RX ADMIN — LEVOTHYROXINE SODIUM 75 MCG: 100 TABLET ORAL at 05:00

## 2023-06-12 RX ADMIN — Medication 250 MG: at 09:29

## 2023-06-12 RX ADMIN — METRONIDAZOLE 500 MG: 500 INJECTION, SOLUTION INTRAVENOUS at 09:29

## 2023-06-12 RX ADMIN — DOCUSATE SODIUM 100 MG: 100 CAPSULE, LIQUID FILLED ORAL at 01:16

## 2023-06-12 RX ADMIN — Medication 250 MG: at 17:23

## 2023-06-12 RX ADMIN — AMLODIPINE BESYLATE 10 MG: 10 TABLET ORAL at 22:53

## 2023-06-12 RX ADMIN — METRONIDAZOLE 500 MG: 500 INJECTION, SOLUTION INTRAVENOUS at 02:08

## 2023-06-12 RX ADMIN — SODIUM CHLORIDE 500 MG: 9 INJECTION, SOLUTION INTRAVENOUS at 01:23

## 2023-06-12 RX ADMIN — DOCUSATE SODIUM 100 MG: 100 CAPSULE, LIQUID FILLED ORAL at 09:29

## 2023-06-12 RX ADMIN — MIRTAZAPINE 7.5 MG: 15 TABLET, FILM COATED ORAL at 22:54

## 2023-06-12 RX ADMIN — TRAZODONE HYDROCHLORIDE 25 MG: 50 TABLET ORAL at 01:16

## 2023-06-12 RX ADMIN — MIRTAZAPINE 7.5 MG: 15 TABLET, FILM COATED ORAL at 01:17

## 2023-06-12 NOTE — ASSESSMENT & PLAN NOTE
Baseline creatinine appears to be around 1 3-1 7 since December last year    · Creatinine 1 25  · Monitor

## 2023-06-12 NOTE — PROGRESS NOTES
[facility]  Progress Note  Name: Kayli Ascencio  MRN: [id number]  Unit/Bed#: 10239 [address] I Date of Admission: 6/11/2023   Date of Service: 6/12/2023 I Hospital Day: 0    Assessment/Plan   * UTI (urinary tract infection)  Assessment & Plan  Patient presents with odorous urine since the weekend, decreased oral intake, daughter concerned about recurrent UTI and dehydration  Daughter reports patient sleeps a lot and not eating or drinking much for about 6 months  Patient completed Bactrim 10 days ago for UTI per daughter  Patient complained of lower abdominal pain in ED per daughter  Daughter denies nausea vomiting fever chills at home  Daughter thinks patient has constipation  · History of UTIs, on nitrofurantoin 50mg p o  at bedtime prophylactically  · UA showed WBC 10-20, innumerable bacteria, positive nitrite  · CT abdomen pelvis pending  · No recent urine culture in epic, pending  · Urine culture in 6/2022 grew E  coli ESBL   · ID consulted recs appreciated  · Likely not a candidate for eterpenum due to age, no recent UCx       Somnolence  Assessment & Plan  Daughter reports patient sleeps a lot for about 6 months  · Likely due to advanced age  Patient is on trazodone Remeron nightly for depression at home  · decrease trazodone dose to 25 mg p o  at bedtime  · Continue Remeron    Depression  Assessment & Plan  · On trazodone, Remeron at home  · We will decrease trazodone to half in view of somnolence  · Continue Remeron    Disorder of electrolytes  Assessment & Plan  · Sodium 132  · Could be secondary to HCTZ  Hold due to decreased oral intake  History of CVA (cerebrovascular accident)  Assessment & Plan  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  · Ambulates with walker, on regular diet at home per daughter  Lives with 24-hour caregiver at home    · PT OT      Stage 3 chronic kidney disease ([facility] [address] )  Assessment & Plan  Baseline creatinine "appears to be around 1 3-1 7 since December last year  · Creatinine 1 25  · Monitor    CAD (coronary artery disease)  Assessment & Plan  Status post quadruple bypass in 2010  · No longer on Lipitor  · Denies chest pain    Osteoarthritis (arthritis due to wear and tear of joints)  Assessment & Plan  Chronic joint pain due to OA  On Lyrica at home  We will continue  · Monitor for pain    Acquired hypothyroidism  Assessment & Plan  · Continue Synthroid  · Recent TSH normal    Hypertension  Assessment & Plan  On HCTZ, amlodipine at bedtime at home  · Continue amlodipine  · Hold HCTZ as above, consider half dose on DC  · BP labile           VTE Pharmacologic Prophylaxis:   Moderate Risk (Score 3-4) - Pharmacological DVT Prophylaxis Ordered: enoxaparin (Lovenox)  Patient Centered Rounds: I performed bedside rounds with nursing staff today  Discussions with Specialists or Other Care Team Provider: ID    Education and Discussions with Family / Patient: Updated  (daughter) at bedside  Total Time Spent on Date of Encounter in care of patient: 45 minutes This time was spent on one or more of the following: performing physical exam; counseling and coordination of care; obtaining or reviewing history; documenting in the medical record; reviewing/ordering tests, medications or procedures; communicating with other healthcare professionals and discussing with patient's family/caregivers  Current Length of Stay: 0 day(s)  Current Patient Status: Inpatient   Certification Statement: The patient will continue to require additional inpatient hospital stay due to Clinical course  Discharge Plan: Anticipate discharge in 24-48 hrs to home  Code Status: Level 3 - DNAR and DNI    Subjective:   Patient seen and examined at bedside, denied any abdominal pain back pain frequent urination at this time    Daughter reported on admission overnight patient had abdominal pain and  \"winced\" with left-sided abdominal " exam   Patient daughter reported recurrent UTIs and being treated empirically with Macrobid  Patient reported patient lives home alone with home health aides and she gets on weekends in which she noticed a strong smell of urine but denied patient's symptoms of UTI at those times  Patient's daughter informed about asymptomatic bacteriuria, likely colonization and not to treat if not symptomatic  Patient's daughter reported patient having associated fatigue and fogginess  Objective:     Vitals:   Temp (24hrs), Av 4 °F (36 3 °C), Min:97 3 °F (36 3 °C), Max:97 4 °F (36 3 °C)    Temp:  [97 3 °F (36 3 °C)-97 4 °F (36 3 °C)] 97 4 °F (36 3 °C)  HR:  [56-77] 63  Resp:  [15-20] 18  BP: (132-178)/(67-85) 132/67  SpO2:  [94 %-99 %] 95 %  Body mass index is 26 49 kg/m²  Input and Output Summary (last 24 hours): Intake/Output Summary (Last 24 hours) at 2023 1627  Last data filed at 2023 0007  Gross per 24 hour   Intake 50 ml   Output --   Net 50 ml       Physical Exam:   Physical Exam  Vitals and nursing note reviewed  Constitutional:       General: She is not in acute distress  Appearance: She is well-developed  HENT:      Head: Normocephalic and atraumatic  Eyes:      Conjunctiva/sclera: Conjunctivae normal    Cardiovascular:      Rate and Rhythm: Normal rate and regular rhythm  Heart sounds: No murmur heard  No friction rub  No gallop  Pulmonary:      Effort: Pulmonary effort is normal  No respiratory distress  Breath sounds: Normal breath sounds  No stridor  No wheezing, rhonchi or rales  Abdominal:      Palpations: Abdomen is soft  Tenderness: There is no abdominal tenderness  There is no guarding or rebound  Comments: No suprapubic tenderness   Musculoskeletal:         General: No swelling or tenderness  Cervical back: Neck supple  Right lower leg: No edema  Left lower leg: No edema  Skin:     General: Skin is warm and dry        Capillary Refill: Capillary refill takes less than 2 seconds  Findings: No bruising  Neurological:      Mental Status: She is alert and oriented to person, place, and time  Motor: No weakness     Psychiatric:         Mood and Affect: Mood normal          Behavior: Behavior normal           Additional Data:     Labs:  Results from last 7 days   Lab Units 06/12/23  0530   EOS PCT % 1   HEMATOCRIT % 34 8   HEMOGLOBIN g/dL 11 4*   LYMPHS PCT % 29   MONOS PCT % 10   NEUTROS PCT % 59   PLATELETS Thousands/uL 236   WBC Thousand/uL 7 83     Results from last 7 days   Lab Units 06/12/23  0459 06/11/23  2122   ANION GAP mmol/L 9 10   ALBUMIN g/dL  --  3 5   ALK PHOS U/L  --  114*   ALT U/L  --  9   AST U/L  --  16   BUN mg/dL 21 23   CALCIUM mg/dL 8 6 9 0   CHLORIDE mmol/L 98 96   CO2 mmol/L 25 28   CREATININE mg/dL 1 25 1 43*   GLUCOSE RANDOM mg/dL 86 107   POTASSIUM mmol/L 3 5 3 6   SODIUM mmol/L 132* 134*   TOTAL BILIRUBIN mg/dL  --  0 66                       Lines/Drains:  Invasive Devices     Peripheral Intravenous Line  Duration           Peripheral IV 06/11/23 Right Antecubital <1 day    Peripheral IV 06/12/23 Right;Ventral (anterior) Forearm <1 day                      Imaging: Reviewed radiology reports from this admission including: abdominal/pelvic CT    Recent Cultures (last 7 days):         Last 24 Hours Medication List:   Current Facility-Administered Medications   Medication Dose Route Frequency Provider Last Rate   • acetaminophen  650 mg Oral Q6H PRN VARSHA Yanez     • amLODIPine  10 mg Oral HS VARSHA Yanez     • docusate sodium  100 mg Oral BID VARSHA Yanez     • levothyroxine  75 mcg Oral Early Morning VARSHA Yanez     • magnesium Oxide  400 mg Oral Daily VARSHA Yanez     • mirtazapine  7 5 mg Oral HS VARSHA Yanez     • ondansetron  4 mg Intravenous Q6H PRN VARSHA Yanez     • pantoprazole  40 mg Oral Daily PRN VARSHA Yanez     • pregabalin  75 mg Oral Daily VARSHA Yanez     • saccharomyces boulardii  250 mg Oral BID VARSHA Yanez     • traZODone  25 mg Oral HS VARSHA Yanez          Today, Patient Was Seen By: Antonietta Zamarripa MD    **Please Note: This note may have been constructed using a voice recognition system  **

## 2023-06-12 NOTE — ASSESSMENT & PLAN NOTE
On HCTZ, amlodipine at bedtime at home    · Continue amlodipine  · Hold HCTZ as above, consider half dose on DC  · BP labile

## 2023-06-12 NOTE — H&P
[facility]  H&P  Name: Eli Raymundo 80 y o  female I MRN: [id number]  Unit/Bed#: ED 03 I Date of Admission: 6/11/2023   Date of Service: 6/12/2023 I Hospital Day: 0      Assessment/Plan   * UTI (urinary tract infection)  Assessment & Plan  Patient presents with odorous urine since the weekend, decreased oral intake, daughter concerned about recurrent UTI and dehydration  Daughter reports patient sleeps a lot and not eating or drinking much for about 6 months  Patient completed Bactrim 10 days ago for UTI per daughter  Patient complained of lower abdominal pain in ED per daughter  Daughter denies nausea vomiting fever chills at home  Daughter thinks patient has constipation  · History of UTIs, on nitrofurantoin 50mg p o  at bedtime prophylactically  · UA showed WBC 10-20, innumerable bacteria, positive nitrite  · CT abdomen pelvis pending  · No recent urine culture in epic  · Urine culture in 6/2022 grew E  coli ESBL   · Will order ertapenem IV  · Hold nitrofurantoin  · Urine culture pending  · Check PVR    Somnolence  Assessment & Plan  · Daughter reports patient sleeps a lot for about 6 months  · Likely due to advanced age  Patient is on trazodone Remeron nightly for depression at home  · We will decrease trazodone dose to 25 mg p o  at bedtime  · Continue Remeron    Disorder of electrolytes  Assessment & Plan  · Sodium 134  Mild  Could be secondary to HCTZ  Hold HCTZ due to decreased oral intake  Received normal saline 500 mL in ED  Repeat lab in the morning  · Magnesium 1 5  Received mag 2 g IV in ED  Repeat lab in the morning    Stage 3 chronic kidney disease ([address] )  Assessment & Plan  · Baseline creatinine appears to be around 1 3-1 7 since December last year  · Creatinine stable  · Monitor    CAD (coronary artery disease)  Assessment & Plan  · Status post quadruple bypass in 2010    · No longer on Lipitor  · Denies chest pain    Hypertension  Assessment & Plan  · On HCTZ, amlodipine at bedtime at home  · Continue amlodipine  · Hold HCTZ as above  · BP labile    Depression  Assessment & Plan  · On trazodone, Remeron at home  · We will decrease trazodone to half in view of somnolence  · Continue Remeron    History of CVA (cerebrovascular accident)  Assessment & Plan  · 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  · Ambulates with walker, on regular diet at home per daughter  Lives with 24-hour caregiver at home  · PT OT      Osteoarthritis (arthritis due to wear and tear of joints)  Assessment & Plan  · Chronic joint pain due to OA  On Lyrica at home  We will continue  · Monitor for pain    Acquired hypothyroidism  Assessment & Plan  · Continue Synthroid  · Recent TSH normal            VTE Prophylaxis: Pharmacologic VTE Prophylaxis contraindicated due to Low risk  / sequential compression device   Code Status: DNR/DNI  POLST: POLST form is not discussed and not completed at this time  Anticipated Length of Stay:  Patient will be admitted on an Observation basis with an anticipated length of stay of  < 2 midnights  Justification for Hospital Stay: UTI    Total Time for Visit, including Counseling / Coordination of Care: 45 minutes  Greater than 50% of this total time spent on direct patient counseling and coordination of care  Chief Complaint:   Urine has strong smell, decreased oral intake    History of Present Illness:    Liliya Myrick is a 80 y o  female with PMH of UTI, hypertension, osteoarthritis, depression, CKD 3, CAD, hypothyroid who presents with odorous urine since the weekend, decreased oral intake, daughter concerned about recurrent UTI and dehydration  Daughter reports patient sleeps a lot and not eating or drinking much for about 6 months  Patient completed Bactrim 10 days ago for UTI per daughter  Patient complained of lower abdominal pain in ED per daughter    Daughter denies nausea vomiting fever chills, SOB, cough at home   Daughter thinks patient has constipation, had a small BM today  Patient lives with 24-hour caregiver Monday through Friday, on regular diet at home per daughter  Patient lives with daughter on Saturday Sundays  Patient ambulates with walker at home  Patient reports mild lower abdominal discomfort currently  Denies burning on urination  Denies chest pain, SOB, headache, dizziness, nausea  Patient is awake alert oriented x3 on exam   I did speak with daughter over the phone  Review of Systems:    Review of Systems   Unable to perform ROS: Age (Spoke to daughter over the phone)   Constitutional: Positive for activity change and appetite change  Genitourinary:        Urine has strong smell   Musculoskeletal: Positive for arthralgias  Psychiatric/Behavioral:        Sleeps a lot   All other systems reviewed and are negative  Past Medical and Surgical History:     Past Medical History:   Diagnosis Date   • Brain aneurysm    • CAD (coronary artery disease)    • CKD (chronic kidney disease), stage III ([facility] [address] )    • CVA (cerebral vascular accident) ([facility] [address] )     9/2022   • Disease of thyroid gland     hypo   • GERD (gastroesophageal reflux disease)    • Gout     right shoulder   • Hyperlipidemia    • Hypertension    • Osteoarthritis (arthritis due to wear and tear of joints)        Past Surgical History:   Procedure Laterality Date   • CORONARY ARTERY BYPASS GRAFT  2010    x 4   • ENDOSCOPIC STENT PLACEMENT W/ MLB Left 2012   • HIATAL HERNIA REPAIR     • JOINT REPLACEMENT Left     knee 2001   • RENAL ARTERY STENT     • ROTATOR CUFF REPAIR     • TUBAL LIGATION  2013       Meds/Allergies:    Prior to Admission medications    Medication Sig Start Date End Date Taking?  Authorizing Provider   amLODIPine (NORVASC) 5 mg tablet Take 10 mg by mouth daily at bedtime   Yes Historical Provider, MD   hydrochlorothiazide (HYDRODIURIL) 12 5 mg tablet Take 25 mg by mouth daily at bedtime   Yes Historical Provider, MD levothyroxine 75 mcg tablet Take 75 mcg by mouth daily   Yes Historical Provider, MD   magnesium oxide (MAG-OX) 400 mg tablet Take 400 mg by mouth daily 5/18/23 5/17/24 Yes Historical Provider, MD   mirtazapine (REMERON) 7 5 MG tablet Take 7 5 mg by mouth daily at bedtime 5/18/23  Yes Historical Provider, MD   nitrofurantoin (MACRODANTIN) 50 mg capsule Take 50 mg by mouth daily at bedtime 4/10/23  Yes Historical Provider, MD   pregabalin (LYRICA) 75 mg capsule Take 75 mg by mouth daily At noon   Yes Historical Provider, MD   traZODone (DESYREL) 50 mg tablet Take 50 mg by mouth daily at bedtime   Yes Historical Provider, MD   acetaminophen-codeine (TYLENOL #3) 300-30 mg per tablet Take 1 tablet by mouth every 8 (eight) hours as needed for moderate pain 7/7/22   Karlo Brooks DO   atorvastatin (LIPITOR) 10 mg tablet Take 10 mg by mouth daily  Patient not taking: Reported on 6/11/2023    Historical Provider, MD   docusate sodium (COLACE) 100 mg capsule Take 1 capsule (100 mg total) by mouth 2 (two) times a day 5/21/19   Keyon Tong MD   esomeprazole (NexIUM) 40 MG capsule Take 1 capsule (40 mg total) by mouth daily  Patient taking differently: Take 40 mg by mouth daily as needed (heart burn) 8/2/19   VARSHA Yanez   methocarbamol (ROBAXIN) 500 mg tablet Take 0 5 tablets (250 mg total) by mouth 3 (three) times a day as needed for muscle spasms 7/7/22   Karlo Brooks DO     I have reviewed home medications with patient family member  Allergies: Allergies   Allergen Reactions   • Codeine Hives   • Percocet [Oxycodone-Acetaminophen] Vomiting       Social History:     Marital Status:     Occupation: Retired  Patient Pre-hospital Living Situation: 24-hour caregiver Monday to Friday, lives with daughter on Saturday Sundays  Patient Pre-hospital Level of Mobility: Walker  Patient Pre-hospital Diet Restrictions: Regular diet  Substance Use History:   Social History     Substance and Sexual Activity   Alcohol Use Never     Social History     Tobacco Use   Smoking Status Never   Smokeless Tobacco Never     Social History     Substance and Sexual Activity   Drug Use Never       Family History:    non-contributory    Physical Exam:     Vitals:   Blood Pressure: 142/67 (06/11/23 2317)  Pulse: 68 (06/11/23 2317)  Temperature: (!) 97 4 °F (36 3 °C) (06/11/23 2012)  Temp Source: Tympanic (06/11/23 2012)  Respirations: 18 (06/11/23 2317)  SpO2: 98 % (06/11/23 2317)    Physical Exam  Vitals and nursing note reviewed  Constitutional:       Appearance: She is well-developed  HENT:      Head: Normocephalic and atraumatic  Neck:      Thyroid: No thyromegaly  Vascular: No JVD  Trachea: No tracheal deviation  Cardiovascular:      Rate and Rhythm: Normal rate and regular rhythm  Heart sounds: Normal heart sounds  Comments: Occasional early beats appreciated  Pulmonary:      Effort: Pulmonary effort is normal  No respiratory distress  Breath sounds: Normal breath sounds  No wheezing or rales  Abdominal:      General: Bowel sounds are normal  There is no distension  Palpations: Abdomen is soft  Tenderness: There is abdominal tenderness  There is no guarding  Comments: Lower abdomen slight tender   Musculoskeletal:         General: No swelling  Cervical back: Neck supple  Right lower leg: No edema  Left lower leg: No edema  Skin:     General: Skin is warm and dry  Neurological:      General: No focal deficit present  Mental Status: She is alert and oriented to person, place, and time  Psychiatric:         Mood and Affect: Mood normal          Judgment: Judgment normal          Additional Data:     Lab Results: I have personally reviewed pertinent reports        Results from last 7 days   Lab Units 06/11/23 2122   EOS PCT % 1   HEMATOCRIT % 38 3   HEMOGLOBIN g/dL 12 8   LYMPHS PCT % 17   MONOS PCT % 8   NEUTROS PCT % 74   PLATELETS Thousands/uL 253   WBC Thousand/uL 7 71     Results from last 7 days   Lab Units 06/11/23  2122   ALK PHOS U/L 114*   ALT U/L 9   AST U/L 16   BUN mg/dL 23   CALCIUM mg/dL 9 0   CHLORIDE mmol/L 96   CO2 mmol/L 28   CREATININE mg/dL 1 43*   POTASSIUM mmol/L 3 6           Imaging: I have personally reviewed pertinent reports  CT abdomen pelvis pending    EKG, Pathology, and Other Studies Reviewed on Admission:   · EKG: NA    Allscripts Records Reviewed: Yes     ** Please Note: Dragon 360 Dictation voice to text software may have been used in the creation of this document   **

## 2023-06-12 NOTE — ASSESSMENT & PLAN NOTE
Patient presents with odorous urine since the weekend, decreased oral intake, daughter concerned about recurrent UTI and dehydration  Daughter reports patient sleeps a lot and not eating or drinking much for about 6 months  Patient completed Bactrim 10 days ago for UTI per daughter  Patient complained of lower abdominal pain in ED per daughter  Daughter denies nausea vomiting fever chills at home  Daughter thinks patient has constipation  · History of UTIs, on nitrofurantoin 50mg p o  at bedtime prophylactically    · UA showed WBC 10-20, innumerable bacteria, positive nitrite  · CT abdomen pelvis pending  · No recent urine culture in epic, pending  · Urine culture in 6/2022 grew E  coli ESBL   · ID consulted recs appreciated  · Likely not a candidate for eterpenum due to age, no recent UCx

## 2023-06-12 NOTE — ASSESSMENT & PLAN NOTE
Patient presents with odorous urine since the weekend, decreased oral intake, daughter concerned about recurrent UTI and dehydration  Daughter reports patient sleeps a lot and not eating or drinking much for about 6 months  Patient completed Bactrim 10 days ago for UTI per daughter  Patient complained of lower abdominal pain in ED per daughter  Daughter denies nausea vomiting fever chills at home  Daughter thinks patient has constipation  · History of UTIs, on nitrofurantoin 50mg p o  at bedtime prophylactically    · UA showed WBC 10-20, innumerable bacteria, positive nitrite  · CT abdomen pelvis pending  · No recent urine culture in epic  · Urine culture in 6/2022 grew E  coli ESBL   · Will order ertapenem IV  · Hold nitrofurantoin  · Urine culture pending  · Check PVR

## 2023-06-12 NOTE — PLAN OF CARE
Problem: PHYSICAL THERAPY ADULT  Goal: Performs mobility at highest level of function for planned discharge setting  See evaluation for individualized goals  Description: Treatment/Interventions: ADL retraining, Functional transfer training, LE strengthening/ROM, Therapeutic exercise, Endurance training, Bed mobility, Gait training, Spoke to nursing          See flowsheet documentation for full assessment, interventions and recommendations  Note: Prognosis: Good  Problem List: Decreased strength, Decreased endurance, Impaired balance, Decreased mobility, Impaired judgement, Decreased safety awareness, Pain  Assessment: Patient seen for Physical Therapy evaluation  Patient admitted with UTI (urinary tract infection)  Comorbidities affecting patient's physical performance include: arthritis, CAD, cardiac disease, CKD, CVA, depression, hypertension, hypothyroid, osteoarthritis and UTI  Personal factors affecting patient at time of initial evaluation include: lives in 1 story house, ambulating with assistive device, inability to navigate community distances, decreased cognition, inability to perform physical activity, inability to perform ADLS, inability to perform IADLS  and inability to live alone  Prior to admission, patient was independent with functional mobility with walker, requiring assist for ADLS, requiring assist for IADLS, living alone in 1 story home with 0 steps to enter, ambulating household distance and having 24 hour care   Please find objective findings from Physical Therapy assessment regarding body systems outlined above with impairments and limitations including weakness, impaired balance, decreased endurance, gait deviations, pain, decreased activity tolerance, decreased functional mobility tolerance, decreased safety awareness, impaired judgement and fall risk    The Barthel Index was used as a functional outcome tool presenting with a score of Barthel Index Score: 40 today indicating marked limitations of functional mobility and ADLS  Patient's clinical presentation is currently unstable/unpredictable as seen in patient's presentation of varying levels of cognitive performance, increased fall risk, new onset of impairment of functional mobility and decreased endurance  Pt would benefit from continued Physical Therapy treatment to address deficits as defined above and maximize level of functional mobility  As demonstrated by objective findings, the assigned level of complexity for this evaluation is high  The patient's AM-PAC Basic Mobility Inpatient Short Form Raw Score is 16  A Raw score of less than or equal to 16 suggests the patient may benefit from discharge to post-acute rehabilitation services  However pt has 24/7 care at home + can return home with home PT services  Please also refer to the recommendation of the Physical Therapist for safe discharge planning  PT Discharge Recommendation: Home with home health rehabilitation (+ continued 24/7 HHA)    See flowsheet documentation for full assessment

## 2023-06-12 NOTE — ASSESSMENT & PLAN NOTE
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

## 2023-06-12 NOTE — ASSESSMENT & PLAN NOTE
Daughter reports patient sleeps a lot for about 6 months  · Likely due to advanced age  Patient is on trazodone Remeron nightly for depression at home    · decrease trazodone dose to 25 mg p o  at bedtime  · Continue Remeron

## 2023-06-12 NOTE — PHYSICAL THERAPY NOTE
"       PHYSICAL THERAPY EVALUATION/TREATMENT       06/12/23 0935   PT Last Visit   PT Visit Date 06/12/23   Note Type   Note type Evaluation   Pain Assessment   Pain Assessment Tool 0-10   Pain Score No Pain   Patient's Stated Pain Goal No pain   Multiple Pain Sites No   Restrictions/Precautions   Weight Bearing Precautions Per Order No   Other Precautions Bed Alarm; Chair Alarm; Fall Risk;Pain   Home Living   Type of [address] One level  (no EVY)   Bathroom Equipment Shower chair   Home Equipment Walker  (bedrails)   Prior Function   Level of [place] Needs assistance with ADLs; Needs assistance with functional mobility; Needs assistance with IADLS   Lives With Alone; Other (Comment)  (24/7 A 5 days/week)   Receives Help From Family;Personal care attendant   IADLs Family/Friend/Other provides transportation; Family/Friend/Other provides meals; Family/Friend/Other provides medication management   Vocational Retired   General   Additional Pertinent History Pt is a 80year-old female who was admitted to the hospital on 6/11/23 due to abdominal pain, possible UTI   Family/Caregiver Present No   Cognition   Arousal/Participation Cooperative   Orientation Level Oriented to person;Oriented to place;Oriented to time   Following Commands Follows one step commands with increased time or repetition   Subjective   Subjective \"i feel better\"  (Altimet video translation used this AM throughout session)   RLE Assessment   RLE Assessment WFL  (3+/5)   LLE Assessment   LLE Assessment WFL  (Grossly 3+/5)   Bed Mobility   Rolling R 5  Supervision   Rolling L 5  Supervision   Supine to Sit 5  Supervision   Additional items Verbal cues; Bedrails   Additional Comments transferred to bedside chair   Transfers   Sit to Stand 4  Minimal assistance   Additional items Assist x 1;Verbal cues   Stand to Sit 4  Minimal assistance   Additional items Assist x 1;Verbal cues   Toilet transfer 4  Minimal assistance   Additional items " Assist x 1;Verbal cues   Ambulation/Elevation   Gait pattern Foward flexed;Narrow KAIT; Short stride; Step to  (decreased gait speed, mild unsteadiness with intermittent LOB)   Gait Assistance 4  Minimal assist   Additional items Assist x 1;Verbal cues   Assistive Device Rolling walker   Distance 10 feet to bathroom   Stair Management Assistance Not tested   Balance   Static Sitting Fair +   Dynamic Sitting Fair   Static Standing Fair   Dynamic Standing Fair -   Ambulatory Poor +   Activity Tolerance   Activity Tolerance Patient tolerated treatment well;Patient limited by fatigue   Nurse Made Aware yes   Assessment   Prognosis Good   Problem List Decreased strength;Decreased endurance; Impaired balance;Decreased mobility; Impaired judgement;Decreased safety awareness;Pain   Assessment Patient seen for Physical Therapy evaluation  Patient admitted with UTI (urinary tract infection)  Comorbidities affecting patient's physical performance include: arthritis, CAD, cardiac disease, CKD, CVA, depression, hypertension, hypothyroid, osteoarthritis and UTI  Personal factors affecting patient at time of initial evaluation include: lives in 1 story house, ambulating with assistive device, inability to navigate community distances, decreased cognition, inability to perform physical activity, inability to perform ADLS, inability to perform IADLS  and inability to live alone  Prior to admission, patient was independent with functional mobility with walker, requiring assist for ADLS, requiring assist for IADLS, living alone in 1 story home with 0 steps to enter, ambulating household distance and having 24 hour care     Please find objective findings from Physical Therapy assessment regarding body systems outlined above with impairments and limitations including weakness, impaired balance, decreased endurance, gait deviations, pain, decreased activity tolerance, decreased functional mobility tolerance, decreased safety awareness, impaired judgement and fall risk  The Barthel Index was used as a functional outcome tool presenting with a score of Barthel Index Score: 40 today indicating marked limitations of functional mobility and ADLS  Patient's clinical presentation is currently unstable/unpredictable as seen in patient's presentation of varying levels of cognitive performance, increased fall risk, new onset of impairment of functional mobility and decreased endurance  Pt would benefit from continued Physical Therapy treatment to address deficits as defined above and maximize level of functional mobility  As demonstrated by objective findings, the assigned level of complexity for this evaluation is high  The patient's AM-PAC Basic Mobility Inpatient Short Form Raw Score is 16  A Raw score of less than or equal to 16 suggests the patient may benefit from discharge to post-acute rehabilitation services  However pt has 24/7 care at home + can return home with home PT services  Please also refer to the recommendation of the Physical Therapist for safe discharge planning  Goals   Patient Goals to go home   STG Expiration Date 06/19/23   Short Term Goal #1 Pt will perform bed mobility with supervision ; Pt will perform bed <> chair with supervision + RW ; Standing balance will improve to Fair to decrease risk of falls ; Pt will ambulate x 50 feet with Min A + RW ; AMPAC score will improve >18/24 to demonstrate improved functional independence   LTG Expiration Date 06/26/23   Long Term Goal #1 Pt will perform bed mobility IND ; Pt will perform bed <> chair Mod I + RW ; Standing balance will improve to Fair+ to decrease risk of falls ; Pt will ambulate x 100 feet with Min A + RW ; AMPAC score will improve >20/24 to demonstrate improved functional independence   Plan   Treatment/Interventions ADL retraining;Functional transfer training;LE strengthening/ROM; Therapeutic exercise; Endurance training;Bed mobility;Gait training;Spoke to nursing   PT Frequency Other (Comment)  (5x/week)   Recommendation   PT Discharge Recommendation Home with home health rehabilitation  (+ continued 24/7 HHA)   AM-PAC Basic Mobility Inpatient   Turning in Flat Bed Without Bedrails 3   Lying on Back to Sitting on Edge of Flat Bed Without Bedrails 3   Moving Bed to Chair 3   Standing Up From Chair Using Arms 3   Walk in Room 3   Climb 3-5 Stairs With Railing 1   Basic Mobility Inpatient Raw Score 16   Basic Mobility Standardized Score 38 32   Highest Level Of Mobility   -[facility] Goal 5: Stand one or more mins   -HL Achieved 7: Walk 25 feet or more   Barthel Index   Feeding 10   Bathing 0   Grooming Score 0   Dressing Score 5   Bladder Score 5   Bowels Score 5   Toilet Use Score 5   Transfers (Bed/Chair) Score 10   Mobility (Level Surface) Score 0   Stairs Score 0   Barthel Index Score 40   Additional Treatment Session   Start Time 0925   End Time 0935   Treatment Assessment S: Pt requesting to use the bathroom  O/A: Pt agreeable to PT session this AM  Able to ambulate Min A to bathroom using RW with + gait deviations as mentioned above + frequent verbal/tactile cues to safely maneuver RW  Pt with poor balance when sitting on toilet - requires Min-Mod A to safely sit  Able to ambulate additional 10 feet to bedside chair with Min A + able to reposition for comfort with supervision P: pt will benefit from skilled PT to address deficits to maximize IND for safe d/c   Equipment Use walker   End of Consult   Patient Position at End of Consult Bedside chair;Bed/Chair alarm activated; All needs within reach   [facility] Insurance Number  Karen [place] [id number]

## 2023-06-12 NOTE — ASSESSMENT & PLAN NOTE
· Daughter reports patient sleeps a lot for about 6 months  · Likely due to advanced age  Patient is on trazodone Remeron nightly for depression at home    · We will decrease trazodone dose to 25 mg p o  at bedtime  · Continue Remeron

## 2023-06-12 NOTE — ASSESSMENT & PLAN NOTE
· On trazodone, Remeron at home  · We will decrease trazodone to half in view of somnolence    · Continue Remeron

## 2023-06-12 NOTE — ED NOTES
Pt  unable to provide urine sample at this time  Fluids running       Jadyn Kwok, CHAPO  06/11/23 6248

## 2023-06-12 NOTE — ASSESSMENT & PLAN NOTE
· Baseline creatinine appears to be around 1 3-1 7 since December last year    · Creatinine stable  · Monitor

## 2023-06-12 NOTE — CONSULTS
Consultation - Infectious Disease   Davy Devine 80 y o  female MRN: [id number]  Unit/Bed#: [address] [place] [address] Encounter: [id number]      IMPRESSION & RECOMMENDATIONS:   1  Asymptomatic bacteriuria  No irritative voiding symptoms or sepsis to suggest invasive UTI  Suspect the patient is colonized based upon her previous history of organisms growing in the urine   -Discontinue ertapenem  -Monitor off all antibiotics  -Keep patient well-hydrated  -Would only treat with antibiotics in the setting of irritative voiding symptoms or sepsis  -Would avoid checking urinalysis or culture unless the above symptoms occur  -Follow-up bladder scan for retention as needed    2  Chronic kidney disease  Stage III  With a notable bump in the creatinine which is now come back down to baseline  Suspect this was playing a role in the patient's generalized fatigue   -Monitor renal function  -Volume management    3  Generalized malaise  In a patient of advanced age and with poor oral intake  Suspect volume depletion playing a role  Perhaps medication effect   -Adjust medications as able  -Volume management  -Monitor cognition    Have discussed the above management plan in detail with the primary service    Extensive review of the medical records in epic including review of the notes, radiographs, and laboratory results     HISTORY OF PRESENT ILLNESS:  Reason for Consult: Urinary tract infection  HPI: Davy Devine is a 80y o  year old female with CKD, depression, coronary artery disease admitted to [address] [place] [address] with decreased oral intake, and malodorous urine who we are asked to assist with management of a possible UTI  The patient apparently stays with her daughter on weekends  Over this past weekend the daughter noticed that she was sleeping a lot and not eating or drinking as much  This apparently has been happening over the last 6 months    She has had recurrent UTIs diagnosed in the past and has been on suppressive Macrobid and recently completed 10-day course of antibiotics for UTI  In the emergency department the patient was found to be afebrile and hemodynamically stable without leukocytosis  She had blood cultures and urine cultures obtained and was started on ertapenem based upon her previous urine culture reads feeling an ESBL positive gram-negative melo  Since being admitted the patient has developed loose stool  She has remained afebrile  She denies any cough or shortness of breath, denies any current nausea or vomiting, denies any abdominal pain, denies any dysuria or hematuria, denies any bladder pain  REVIEW OF SYSTEMS:  A complete review of systems is negative other than that noted in the HPI      PAST MEDICAL HISTORY:  Past Medical History:   Diagnosis Date   • Brain aneurysm    • CAD (coronary artery disease)    • CKD (chronic kidney disease), stage III ([facility] [address] )    • [facility] vascular accident[facility] )     9/2022   • Disease of thyroid gland     hypo   • GERD (gastroesophageal reflux disease)    • Gout     right shoulder   • Hyperlipidemia    • Hypertension    • Osteoarthritis (arthritis due to wear and tear of joints)      Past Surgical History:   Procedure Laterality Date   • CORONARY ARTERY BYPASS GRAFT  2010    x 4   • ENDOSCOPIC STENT PLACEMENT W/ MLB Left 2012   • HIATAL HERNIA REPAIR     • JOINT REPLACEMENT Left     knee 2001   • RENAL ARTERY STENT     • ROTATOR CUFF REPAIR     • TUBAL LIGATION  2013       FAMILY HISTORY:  Non-contributory    SOCIAL HISTORY:  Social History   Social History     Substance and Sexual Activity   Alcohol Use Never     Social History     Substance and Sexual Activity   Drug Use Never     Social History     Tobacco Use   Smoking Status Never   Smokeless Tobacco Never       ALLERGIES:  Allergies   Allergen Reactions   • Codeine Hives   • Percocet [Oxycodone-Acetaminophen] Vomiting       MEDICATIONS:  All current active medications have been reviewed  Antibiotics: Ertapenem 2    PHYSICAL EXAM:  Temp:  [97 3 °F (36 3 °C)-97 4 °F (36 3 °C)] 97 4 °F (36 3 °C)  HR:  [56-77] 63  Resp:  [15-20] 18  BP: (132-178)/(67-85) 132/67  SpO2:  [94 %-99 %] 95 %  Temp (24hrs), Av 4 °F (36 3 °C), Min:97 3 °F (36 3 °C), Max:97 4 °F (36 3 °C)  Current: Temperature: (!) 97 4 °F (36 3 °C)    Intake/Output Summary (Last 24 hours) at 2023 1624  Last data filed at 2023 0007  Gross per 24 hour   Intake 50 ml   Output --   Net 50 ml       General Appearance:  Appearing well, nontoxic, and in no distress   Head:  Normocephalic, without obvious abnormality, atraumatic   Eyes:  Conjunctiva pink and sclera anicteric, both eyes   Nose: Nares normal, mucosa normal, no drainage   Throat: Oropharynx moist without lesions   Neck: Supple, symmetrical, no adenopathy, no tenderness/mass/nodules   Back:   Symmetric, no curvature, ROM normal, no CVA tenderness   Lungs:   Clear to auscultation bilaterally, respirations unlabored   Chest Wall:  No tenderness or deformity   Heart:  RRR; no murmur, rub or gallop   Abdomen:   Soft, non-tender, non-distended, positive bowel sounds    Extremities: No cyanosis, clubbing or edema   Skin: No rashes or lesions  No draining wounds noted  Lymph nodes: Cervical, supraclavicular nodes normal   Neurologic: Alert and oriented times 3, extremity strength 5/5 and symmetric       LABS, IMAGING, & OTHER STUDIES:  Lab Results:  I have personally reviewed pertinent labs    Results from last 7 days   Lab Units 23  0530 23  2122   HEMOGLOBIN g/dL 11 4* 12 8   PLATELETS Thousands/uL 236 253   WBC Thousand/uL 7 83 7 71     Results from last 7 days   Lab Units 23  0459 23  2122   ALK PHOS U/L  --  114*   ALT U/L  --  9   AST U/L  --  16   BUN mg/dL 21 23   CALCIUM mg/dL 8 6 9 0   CHLORIDE mmol/L 98 96   CO2 mmol/L 25 28   CREATININE mg/dL 1 25 1 43*   EGFR ml/min/1 73sq m 35 30   POTASSIUM mmol/L 3 5 3 6   SODIUM mmol/L 132* 134* Imaging Studies:     CT abdomen pelvis- no acute intra-abdominal process    Images personally reviewed by me in PACS

## 2023-06-12 NOTE — ED NOTES
Patient transported to [facility], [address] [place] [address]  06/11/23 4050

## 2023-06-12 NOTE — CASE MANAGEMENT
Case Management Assessment & Discharge Planning Note    Patient name Ashley Chambers  Location [address] [address] [facility] [address] [facility] MRN [id number]  : 1924 Date 2023       Current Admission Date: 2023  Current Admission Diagnosis:UTI (urinary tract infection)   Patient Active Problem List    Diagnosis Date Noted   • Depression 2023   • History of CVA (cerebrovascular accident) 2023   • UTI (urinary tract infection) 2023   • Somnolence 2023   • Disorder of electrolytes 2023   • Right hip pain 2022   • Stage 3 chronic kidney disease ([facility] ) 2022   • Elevated troponin 2021   • Status post fall 2019   • Anemia 2019   • Tendon tear 2019   • Generalized weakness 07/15/2019   • URI (upper respiratory infection) 07/15/2019   • Hypothyroidism 07/15/2019   • CAD (coronary artery disease) 07/15/2019   • Lower abdominal pain 2019   • Hypertension 2019   • Hyperlipidemia 2019   • Acquired hypothyroidism 2019   • Osteoarthritis (arthritis due to wear and tear of joints) 2019      LOS (days): 0  Geometric Mean LOS (GMLOS) (days):   Days to GMLOS:     OBJECTIVE:     Current admission status: Observation     Preferred Pharmacy:   [facility]/pharmacy #[id number]- [facility] [address] [place] [address]  Phone: [phone] Fax: [address] ([place]) [address] [address]  Phone: [phone] Fax: [phone]    Primary Care Provider: Elizabeth Khan MD    Primary Insurance: MEDICARE  Secondary Insurance: [employer]    ASSESSMENT:  Active Health Care Proxies    There are no active Health Care Proxies on file    Readmission Root Cause  30 Day Readmission: No    Patient Information  Admitted from[de-identified] Home  Mental Status: Alert  During Assessment patient was accompanied by: Not accompanied during assessment  Assessment information provided by[de-identified] Daughter  Primary Caregiver: Family  Caregiver's Name[de-identified] Ольга Coffey (daughter)  Caregiver's Relationship to Patient[de-identified] Family Member  Caregiver's Telephone Number[de-identified] [phone]  Support Systems: Daughter, Family members, [address] [address] [address] of Residence: [address] [facility] do you live in?: [place], [place]  Type of Current Residence: [facility]  In the last 12 months, was there a time when you were not able to pay the mortgage or rent on time?: No  In the last 12 months, how many places have you lived?: 1  In the last 12 months, was there a time when you did not have a steady place to sleep or slept in a shelter (including now)?: No  Homeless/housing insecurity resource given?: N/A  Living Arrangements: Other (Comment) (Has 24/7 HHAs)    Activities of Daily Living Prior to Admission  Functional Status: Assistance  Completes ADLs independently?: No  Level of ADL dependence: Assistance  Ambulates independently?: Yes  Does patient use assisted devices?: Yes  Does patient currently own DME?: Yes  What DME does the patient currently own?: Roshni Vogt, Bedside Commode, Shower Chair, Other (Comment) (Transport w/c)  Does patient have a history of HHC?: Yes (Hx with [facility] and [facility])  Does patient currently have [facility]?: Yes    Current Home Health Care  Type of Current Home Care Services: Home health aide    Patient Information Continued  Income Source: Pension/long term  Does patient have prescription coverage?: Yes  Within the past 12 months, you worried that your food would run out before you got the money to buy more : Never true  Within the past 12 months, the food you bought just didn't last and you didn't have money to get more : Never true  Food insecurity resource given?: N/A  Does patient receive dialysis treatments?: No     Means of Transportation  Means of Transport to Appts[de-identified] Family transport  In the past 12 months, has lack of transportation kept you from medical appointments or from getting medications?: No  In the past 12 months, has lack of transportation kept you from meetings, work, or from getting things needed for daily living?: No  Was application for public transport provided?: N/A    DISCHARGE DETAILS:    Discharge planning discussed with[de-identified] Marry Al  Freedom of Choice: Yes  Comments - Freedom of Choice: No choice for [facility] provider  Consenting to [address] referrals to agencies in local area  CM contacted family/caregiver?: Yes  Were Treatment Team discharge recommendations reviewed with patient/caregiver?: Yes  Did patient/caregiver verbalize understanding of patient care needs?: Yes  Were patient/caregiver advised of the risks associated with not following Treatment Team discharge recommendations?: Yes    Contacts  Patient Contacts: Quin Morrow (daughter)  Relationship to Patient[de-identified] Family  Contact Method: Phone  Phone Number: [phone]  Reason/Outcome: Emergency Contact, Discharge Planning, Continuity of [facility]         Is the patient interested in [facility] at discharge?: Yes  Via Kathy Brock requested[de-identified] Occupational Therapy, Physical [address] [place] [address] Av Name[de-identified] Other  [address] [place] [address] Provider[de-identified] PCP  Home Health Services Needed[de-identified] Evaluate Functional Status and Safety, Gait/ADL Training, Strengthening/Theraputic Exercises to Improve Function  Homebound Criteria Met[de-identified] Requires the Assistance of Another Person for Safe Ambulation or to Leave the Home, Uses an Assist Device (i e  cane, walker, etc)  Supporting Clincal Findings[de-identified] Fatigues Easliy in [employer], Limited Endurance     Other Referral/Resources/Interventions Provided:  Interventions: [facility]  Referral Comments: [facility] referrals send in Aidin  Responses pending       Treatment Team Recommendation: Home with [address]  Discharge Destination Plan[de-identified] Home with Heena at Discharge : Family

## 2023-06-12 NOTE — ASSESSMENT & PLAN NOTE
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

## 2023-06-12 NOTE — PLAN OF CARE
Problem: Potential for Falls  Goal: Patient will remain free of falls  Description: INTERVENTIONS:  - Educate patient/family on patient safety including physical limitations  - Instruct patient to call for assistance with activity   - Consult OT/PT to assist with strengthening/mobility   - Keep Call bell within reach  - Keep bed low and locked with side rails adjusted as appropriate  - Keep care items and personal belongings within reach  - Initiate and maintain comfort rounds  - Make Fall Risk Sign visible to staff  - Offer Toileting every  Hours, in advance of need  - Initiate/Maintain alarm  - Obtain necessary fall risk management equipment:   - Apply yellow socks and bracelet for high fall risk patients  - Consider moving patient to room near nurses station  Outcome: Progressing     Problem: MOBILITY - ADULT  Goal: Maintain or return to baseline ADL function  Description: INTERVENTIONS:  -  Assess patient's ability to carry out ADLs; assess patient's baseline for ADL function and identify physical deficits which impact ability to perform ADLs (bathing, care of mouth/teeth, toileting, grooming, dressing, etc )  - Assess/evaluate cause of self-care deficits   - Assess range of motion  - Assess patient's mobility; develop plan if impaired  - Assess patient's need for assistive devices and provide as appropriate  - Encourage maximum independence but intervene and supervise when necessary  - Involve family in performance of ADLs  - Assess for home care needs following discharge   - Consider OT consult to assist with ADL evaluation and planning for discharge  - Provide patient education as appropriate  Outcome: Progressing  Goal: Maintains/Returns to pre admission functional level  Description: INTERVENTIONS:  - Perform BMAT or MOVE assessment daily    - Set and communicate daily mobility goal to care team and patient/family/caregiver     - Collaborate with rehabilitation services on mobility goals if consulted  - Perform Range of Motion  times a day  - Reposition patient every  hours    - Dangle patient  times a day  - Stand patient  times a day  - Ambulate patient  times a day  - Out of bed to chair  times a day   - Out of bed for meals times a day  - Out of bed for toileting  - Record patient progress and toleration of activity level   Outcome: Progressing     Problem: Prexisting or High Potential for Compromised Skin Integrity  Goal: Skin integrity is maintained or improved  Description: INTERVENTIONS:  - Identify patients at risk for skin breakdown  - Assess and monitor skin integrity  - Assess and monitor nutrition and hydration status  - Monitor labs   - Assess for incontinence   - Turn and reposition patient  - Assist with mobility/ambulation  - Relieve pressure over bony prominences  - Avoid friction and shearing  - Provide appropriate hygiene as needed including keeping skin clean and dry  - Evaluate need for skin moisturizer/barrier cream  - Collaborate with interdisciplinary team   - Patient/family teaching  - Consider wound care consult   Outcome: Progressing     Problem: PAIN - ADULT  Goal: Verbalizes/displays adequate comfort level or baseline comfort level  Description: Interventions:  - Encourage patient to monitor pain and request assistance  - Assess pain using appropriate pain scale  - Administer analgesics based on type and severity of pain and evaluate response  - Implement non-pharmacological measures as appropriate and evaluate response  - Consider cultural and social influences on pain and pain management  - Notify physician/advanced practitioner if interventions unsuccessful or patient reports new pain  Outcome: Progressing     Problem: INFECTION - ADULT  Goal: Absence or prevention of progression during hospitalization  Description: INTERVENTIONS:  - Assess and monitor for signs and symptoms of infection  - Monitor lab/diagnostic results  - Monitor all insertion sites, i e  indwelling lines, tubes, and drains  - Monitor endotracheal if appropriate and nasal secretions for changes in amount and color  - [place] appropriate cooling/warming therapies per order  - Administer medications as ordered  - Instruct and encourage patient and family to use good hand hygiene technique  - Identify and instruct in appropriate isolation precautions for identified infection/condition  Outcome: Progressing  Goal: Absence of fever/infection during neutropenic period  Description: INTERVENTIONS:  - Monitor WBC    Outcome: Progressing     Problem: SAFETY ADULT  Goal: Patient will remain free of falls  Description: INTERVENTIONS:  - Educate patient/family on patient safety including physical limitations  - Instruct patient to call for assistance with activity   - Consult OT/PT to assist with strengthening/mobility   - Keep Call bell within reach  - Keep bed low and locked with side rails adjusted as appropriate  - Keep care items and personal belongings within reach  - Initiate and maintain comfort rounds  - Make Fall Risk Sign visible to staff  - Offer Toileting every  Hours, in advance of need  - Initiate/Maintain alarm  - Obtain necessary fall risk management equipment:   - Apply yellow socks and bracelet for high fall risk patients  - Consider moving patient to room near nurses station  Outcome: Progressing     Problem: GENITOURINARY - ADULT  Goal: Maintains or returns to baseline urinary function  Description: INTERVENTIONS:  - Assess urinary function  - Encourage oral fluids to ensure adequate hydration if ordered  - Administer IV fluids as ordered to ensure adequate hydration  - Administer ordered medications as needed  - Offer frequent toileting  - Follow urinary retention protocol if ordered  Outcome: Progressing     Problem: SKIN/TISSUE INTEGRITY - ADULT  Goal: Skin Integrity remains intact(Skin Breakdown Prevention)  Description: Assess:  -Perform Alfredo assessment every   -Clean and moisturize skin every   -Inspect skin when repositioning, toileting, and assisting with ADLS  -Assess under medical devices such as  every   -Assess extremities for adequate circulation and sensation     Bed Management:  -Have minimal linens on bed & keep smooth, unwrinkled  -Change linens as needed when moist or perspiring  -Avoid sitting or lying in one position for more than  hours while in bed  -Keep HOB at degrees     Toileting:  -Offer bedside commode  -Assess for incontinence every   -Use incontinent care products after each incontinent episode such as     Activity:  -Mobilize patient  times a day  -Encourage activity and walks on unit  -Encourage or provide ROM exercises   -Turn and reposition patient every  Hours  -Use appropriate equipment to lift or move patient in bed  -Instruct/ Assist with weight shifting every  when out of bed in chair  -Consider limitation of chair time  hour intervals    Skin Care:  -Avoid use of baby powder, tape, friction and shearing, hot water or constrictive clothing  -Relieve pressure over bony prominences using  -Do not massage red bony areas    Next Steps:  -Teach patient strategies to minimize risks such as    -Consider consults to  interdisciplinary teams such as   Outcome: Progressing

## 2023-06-12 NOTE — PLAN OF CARE
Problem: Potential for Falls  Goal: Patient will remain free of falls  Description: INTERVENTIONS:  - Educate patient/family on patient safety including physical limitations  - Instruct patient to call for assistance with activity   - Consult OT/PT to assist with strengthening/mobility   - Keep Call bell within reach  - Keep bed low and locked with side rails adjusted as appropriate  - Keep care items and personal belongings within reach  - Initiate and maintain comfort rounds  - Make Fall Risk Sign visible to staff    - Apply yellow socks and bracelet for high fall risk patients  - Consider moving patient to room near nurses station  Outcome: Progressing     Problem: MOBILITY - ADULT  Goal: Maintain or return to baseline ADL function  Description: INTERVENTIONS:  -  Assess patient's ability to carry out ADLs; assess patient's baseline for ADL function and identify physical deficits which impact ability to perform ADLs (bathing, care of mouth/teeth, toileting, grooming, dressing, etc )  - Assess/evaluate cause of self-care deficits   - Assess range of motion  - Assess patient's mobility; develop plan if impaired  - Assess patient's need for assistive devices and provide as appropriate  - Encourage maximum independence but intervene and supervise when necessary  - Involve family in performance of ADLs  - Assess for home care needs following discharge   - Consider OT consult to assist with ADL evaluation and planning for discharge  - Provide patient education as appropriate  Outcome: Progressing  Goal: Maintains/Returns to pre admission functional level  Description: INTERVENTIONS:  - Perform BMAT or MOVE assessment daily    - Set and communicate daily mobility goal to care team and patient/family/caregiver     - Collaborate with rehabilitation services on mobility goals if consulted  - Out of bed for toileting  - Record patient progress and toleration of activity level   Outcome: Progressing     Problem: Prexisting or High Potential for Compromised Skin Integrity  Goal: Skin integrity is maintained or improved  Description: INTERVENTIONS:  - Identify patients at risk for skin breakdown  - Assess and monitor skin integrity  - Assess and monitor nutrition and hydration status  - Monitor labs   - Assess for incontinence   - Turn and reposition patient  - Assist with mobility/ambulation  - Relieve pressure over bony prominences  - Avoid friction and shearing  - Provide appropriate hygiene as needed including keeping skin clean and dry  - Evaluate need for skin moisturizer/barrier cream  - Collaborate with interdisciplinary team   - Patient/family teaching  - Consider wound care consult   Outcome: Progressing     Problem: PAIN - ADULT  Goal: Verbalizes/displays adequate comfort level or baseline comfort level  Description: Interventions:  - Encourage patient to monitor pain and request assistance  - Assess pain using appropriate pain scale  - Administer analgesics based on type and severity of pain and evaluate response  - Implement non-pharmacological measures as appropriate and evaluate response  - Consider cultural and social influences on pain and pain management  - Notify physician/advanced practitioner if interventions unsuccessful or patient reports new pain  Outcome: Progressing     Problem: INFECTION - ADULT  Goal: Absence or prevention of progression during hospitalization  Description: INTERVENTIONS:  - Assess and monitor for signs and symptoms of infection  - Monitor lab/diagnostic results  - Monitor all insertion sites, i e  indwelling lines, tubes, and drains  - Monitor endotracheal if appropriate and nasal secretions for changes in amount and color  - [place] appropriate cooling/warming therapies per order  - Administer medications as ordered  - Instruct and encourage patient and family to use good hand hygiene technique  - Identify and instruct in appropriate isolation precautions for identified infection/condition  Outcome: Progressing  Goal: Absence of fever/infection during neutropenic period  Description: INTERVENTIONS:  - Monitor WBC    Outcome: Progressing     Problem: SAFETY ADULT  Goal: Patient will remain free of falls  Description: INTERVENTIONS:  - Educate patient/family on patient safety including physical limitations  - Instruct patient to call for assistance with activity   - Consult OT/PT to assist with strengthening/mobility   - Keep Call bell within reach  - Keep bed low and locked with side rails adjusted as appropriate  - Keep care items and personal belongings within reach  - Initiate and maintain comfort rounds  - Make Fall Risk Sign visible to staff  - Apply yellow socks and bracelet for high fall risk patients  - Consider moving patient to room near nurses station  Outcome: Progressing     Problem: GENITOURINARY - ADULT  Goal: Maintains or returns to baseline urinary function  Description: INTERVENTIONS:  - Assess urinary function  - Encourage oral fluids to ensure adequate hydration if ordered  - Administer IV fluids as ordered to ensure adequate hydration  - Administer ordered medications as needed  - Offer frequent toileting  - Follow urinary retention protocol if ordered  Outcome: Progressing     Problem: SKIN/TISSUE INTEGRITY - ADULT  Goal: Skin Integrity remains intact(Skin Breakdown Prevention)  Description: Assess:  -Inspect skin when repositioning, toileting, and assisting with ADLS  -Assess extremities for adequate circulation and sensation     Bed Management:  -Have minimal linens on bed & keep smooth, unwrinkled  -Change linens as needed when moist or perspiring    Toileting:  -Offer bedside commode    Activity:  -Encourage activity and walks on unit  -Encourage or provide ROM exercises   -Use appropriate equipment to lift or move patient in bed    Skin Care:  -Avoid use of baby powder, tape, friction and sheari  -Do not massage red bony areas    Outcome: Progressing

## 2023-06-12 NOTE — ED PROVIDER NOTES
History  Chief Complaint   Patient presents with   • Possible UTI     Daughter thinks pt has uti, had one recently, finnished bactrim 10 days ago and she is a bit foggy and urine smelling very strong again     80year-old female presents with cloudy strong smelling urine with some generalized weakness and feeling out of it  She recently finished Bactrim 10 days ago for urinary tract infection also has some lower abdominal pain denies any vomiting nausea diarrhea constipation or any other symptoms  History of  Urinary tract infections noted  History provided by:  Patient   used: No        Prior to Admission Medications   Prescriptions Last Dose Informant Patient Reported?  Taking?   acetaminophen-codeine (TYLENOL #3) 300-30 mg per tablet   No No   Sig: Take 1 tablet by mouth every 8 (eight) hours as needed for moderate pain   amLODIPine (NORVASC) 5 mg tablet   Yes No   Sig: Take 10 mg by mouth daily   atorvastatin (LIPITOR) 10 mg tablet   Yes No   Sig: Take 10 mg by mouth daily   docusate sodium (COLACE) 100 mg capsule   No No   Sig: Take 1 capsule (100 mg total) by mouth 2 (two) times a day   esomeprazole (NexIUM) 40 MG capsule   No No   Sig: Take 1 capsule (40 mg total) by mouth daily   levothyroxine 75 mcg tablet   Yes No   Sig: Take 75 mcg by mouth daily   methocarbamol (ROBAXIN) 500 mg tablet   No No   Sig: Take 0 5 tablets (250 mg total) by mouth 3 (three) times a day as needed for muscle spasms   traZODone (DESYREL) 50 mg tablet   Yes Yes   Sig: Take 50 mg by mouth daily at bedtime      Facility-Administered Medications: None       Past Medical History:   Diagnosis Date   • Brain aneurysm    • CAD (coronary artery disease)    • Disease of thyroid gland     hypo   • GERD (gastroesophageal reflux disease)    • Gout     right shoulder   • Hyperlipidemia    • Hypertension    • Osteoarthritis (arthritis due to wear and tear of joints)        Past Surgical History:   Procedure Laterality Date   • CORONARY ARTERY BYPASS GRAFT     • ENDOSCOPIC STENT PLACEMENT W/ MLB Left 2012   • HIATAL HERNIA REPAIR     • JOINT REPLACEMENT Left     knee 2001   • ROTATOR CUFF REPAIR     • TUBAL LIGATION  2013       History reviewed  No pertinent family history  I have reviewed and agree with the history as documented  E-Cigarette/Vaping   • E-Cigarette Use Never User      E-Cigarette/Vaping Substances   • Nicotine No    • THC No    • CBD No    • Flavoring No    • Other No    • Unknown No      Social History     Tobacco Use   • Smoking status: Never   • Smokeless tobacco: Never   Vaping Use   • Vaping Use: Never used   Substance Use Topics   • Alcohol use: Never   • Drug use: Never       Review of Systems   Constitutional: Positive for fatigue  HENT: Negative  Eyes: Negative  Respiratory: Negative  Cardiovascular: Negative  Gastrointestinal: Positive for abdominal pain  Endocrine: Negative  Genitourinary: Positive for decreased urine volume  Musculoskeletal: Negative  Skin: Negative  Allergic/Immunologic: Negative  Neurological: Positive for weakness  Hematological: Negative  Psychiatric/Behavioral: Negative  All other systems reviewed and are negative  Physical Exam  Physical Exam  Vitals and nursing note reviewed  Constitutional:       Appearance: Normal appearance  HENT:      Head: Normocephalic and atraumatic  Nose: Nose normal       Mouth/Throat:      Mouth: Mucous membranes are moist    Eyes:      Extraocular Movements: Extraocular movements intact  Pupils: Pupils are equal, round, and reactive to light  Cardiovascular:      Rate and Rhythm: Normal rate and regular rhythm  Pulmonary:      Effort: Pulmonary effort is normal       Breath sounds: Normal breath sounds  Abdominal:      General: Abdomen is flat  Bowel sounds are normal       Palpations: Abdomen is soft  Tenderness: There is abdominal tenderness     Musculoskeletal:         General: Normal range of motion  Cervical back: Normal range of motion and neck supple  Skin:     General: Skin is warm  Capillary Refill: Capillary refill takes less than 2 seconds  Neurological:      General: No focal deficit present  Mental Status: She is alert and oriented to person, place, and time  Mental status is at baseline  Psychiatric:         Mood and Affect: Mood normal          Thought Content:  Thought content normal          Vital Signs  ED Triage Vitals [06/11/23 2012]   Temperature Pulse Respirations Blood Pressure SpO2   (!) 97 4 °F (36 3 °C) 77 20 (!) 175/75 99 %      Temp Source Heart Rate Source Patient Position - Orthostatic VS BP Location FiO2 (%)   Tympanic Monitor Sitting Right arm --      Pain Score       No Pain           Vitals:    06/11/23 2012   BP: (!) 175/75   Pulse: 77   Patient Position - Orthostatic VS: Sitting         Visual Acuity      ED Medications  Medications   sodium chloride 0 9 % bolus 500 mL (500 mL Intravenous New Bag 6/11/23 2123)   magnesium sulfate 2 g/50 mL IVPB (premix) 2 g (has no administration in time range)       Diagnostic Studies  Results Reviewed     Procedure Component Value Units Date/Time    Comprehensive metabolic panel [[id number]]  (Abnormal) Collected: 06/11/23 2122    Lab Status: Final result Specimen: Blood from Arm, Right Updated: 06/11/23 2147     Sodium 134 mmol/L      Potassium 3 6 mmol/L      Chloride 96 mmol/L      CO2 28 mmol/L      ANION GAP 10 mmol/L      BUN 23 mg/dL      Creatinine 1 43 mg/dL      Glucose 107 mg/dL      Calcium 9 0 mg/dL      AST 16 U/L      ALT 9 U/L      Alkaline Phosphatase 114 U/L      Total Protein 6 7 g/dL      Albumin 3 5 g/dL      Total Bilirubin 0 66 mg/dL      eGFR 30 ml/min/1 73sq m     Narrative:      [place] guidelines for Chronic Kidney Disease (CKD):   •  Stage 1 with normal or high GFR (GFR > 90 mL/min/1 73 square meters)  •  Stage 2 Mild CKD (GFR = 60-89 mL/min/1 73 square meters)  •  Stage 3A Moderate CKD (GFR = 45-59 mL/min/1 73 square meters)  •  Stage 3B Moderate CKD (GFR = 30-44 mL/min/1 73 square meters)  •  Stage 4 Severe CKD (GFR = 15-29 mL/min/1 73 square meters)  •  Stage 5 End Stage CKD (GFR <15 mL/min/1 73 square meters)  Note: GFR calculation is accurate only with a steady state creatinine    Magnesium [[id number]]  (Abnormal) Collected: 06/11/23 2122    Lab Status: Final result Specimen: Blood from Arm, Right Updated: 06/11/23 2147     Magnesium 1 5 mg/dL     CBC and differential [[id number]]  (Abnormal) Collected: 06/11/23 2122    Lab Status: Final result Specimen: Blood from Arm, Right Updated: 06/11/23 2131     WBC 7 71 Thousand/uL      RBC 5 06 Million/uL      Hemoglobin 12 8 g/dL      Hematocrit 38 3 %      MCV 76 fL      MCH 25 3 pg      MCHC 33 4 g/dL      RDW 16 8 %      MPV 9 4 fL      Platelets 156 Thousands/uL      nRBC 0 /100 WBCs      Neutrophils Relative 74 %      Immat GRANS % 0 %      Lymphocytes Relative 17 %      Monocytes Relative 8 %      Eosinophils Relative 1 %      Basophils Relative 0 %      Neutrophils Absolute 5 62 Thousands/µL      Immature Grans Absolute 0 02 Thousand/uL      Lymphocytes Absolute 1 32 Thousands/µL      Monocytes Absolute 0 64 Thousand/µL      Eosinophils Absolute 0 08 Thousand/µL      Basophils Absolute 0 03 Thousands/µL     Urine culture [[id number]]     Lab Status: No result Specimen: Urine     UA (URINE) with reflex to Scope [[id number]]     Lab Status: No result Specimen: Urine                  CT abdomen pelvis wo contrast    (Results Pending)              Procedures  Procedures         ED Course                               SBIRT 20yo+    Flowsheet Row Most Recent Value   Initial Alcohol Screen: US AUDIT-C     1  How often do you have a drink containing alcohol? 0 Filed at: 06/11/2023 2014   3b  FEMALE Any Age, or MALE 65+: How often do you have 4 or more drinks on one occassion?  0 Filed at: 06/11/2023 2014   Audit-C Score 0 Filed at: 06/11/2023 2014   ALYSSA: How many times in the past year have you    Used an illegal drug or used a prescription medication for non-medical reasons? Never Filed at: 06/11/2023 2014                    Medical Decision Making  Patient evaluated with labs urinalysis and imaging  UA and CT of the abdomen pelvis pending care transition to Dr David Easley patient to be admitted hospitalist already aware  Amount and/or Complexity of Data Reviewed  External Data Reviewed: labs, radiology and notes  Labs: ordered  Decision-making details documented in ED Course  Radiology: ordered and independent interpretation performed  Decision-making details documented in ED Course  Risk  Prescription drug management  Disposition  Final diagnoses:   UTI (urinary tract infection)   Abdominal pain     Time reflects when diagnosis was documented in both MDM as applicable and the Disposition within this note     Time User Action Codes Description Comment    6/11/2023 10:19 PM Martín Babin June Add [N39 0] UTI (urinary tract infection)     6/11/2023 10:19 PM Martín Babin June Add [R10 9] Abdominal pain       ED Disposition     None      Follow-up Information    None         Patient's Medications   Discharge Prescriptions    No medications on file       No discharge procedures on file      PDMP Review       Value Time User    PDMP Reviewed  Yes 4/28/2023  7:33 PM Marie Thomas MD          ED Provider  Electronically Signed by           Opal Lieberman DO  06/11/23 1712

## 2023-06-12 NOTE — ASSESSMENT & PLAN NOTE
· On HCTZ, amlodipine at bedtime at home    · Continue amlodipine  · Hold HCTZ as above  · BP labile

## 2023-06-13 VITALS
OXYGEN SATURATION: 95 % | RESPIRATION RATE: 16 BRPM | BODY MASS INDEX: 26.41 KG/M2 | DIASTOLIC BLOOD PRESSURE: 65 MMHG | HEART RATE: 58 BPM | WEIGHT: 122.4 LBS | SYSTOLIC BLOOD PRESSURE: 146 MMHG | HEIGHT: 57 IN | TEMPERATURE: 98.1 F

## 2023-06-13 LAB
ALBUMIN SERPL BCP-MCNC: 3.2 G/DL (ref 3.5–5)
ALP SERPL-CCNC: 95 U/L (ref 34–104)
ALT SERPL W P-5'-P-CCNC: 4 U/L (ref 7–52)
ANION GAP SERPL CALCULATED.3IONS-SCNC: 7 MMOL/L (ref 4–13)
AST SERPL W P-5'-P-CCNC: 12 U/L (ref 13–39)
BASOPHILS # BLD AUTO: 0.03 THOUSANDS/ÂΜL (ref 0–0.1)
BASOPHILS NFR BLD AUTO: 1 % (ref 0–1)
BILIRUB DIRECT SERPL-MCNC: 0.15 MG/DL (ref 0–0.2)
BILIRUB SERPL-MCNC: 0.55 MG/DL (ref 0.2–1)
BUN SERPL-MCNC: 18 MG/DL (ref 5–25)
CALCIUM SERPL-MCNC: 8.7 MG/DL (ref 8.4–10.2)
CHLORIDE SERPL-SCNC: 97 MMOL/L (ref 96–108)
CO2 SERPL-SCNC: 30 MMOL/L (ref 21–32)
CREAT SERPL-MCNC: 1.24 MG/DL (ref 0.6–1.3)
EOSINOPHIL # BLD AUTO: 0.15 THOUSAND/ÂΜL (ref 0–0.61)
EOSINOPHIL NFR BLD AUTO: 3 % (ref 0–6)
ERYTHROCYTE [DISTWIDTH] IN BLOOD BY AUTOMATED COUNT: 16.5 % (ref 11.6–15.1)
GFR SERPL CREATININE-BSD FRML MDRD: 35 ML/MIN/1.73SQ M
GLUCOSE SERPL-MCNC: 78 MG/DL (ref 65–140)
HCT VFR BLD AUTO: 34.3 % (ref 34.8–46.1)
HGB BLD-MCNC: 11.3 G/DL (ref 11.5–15.4)
IMM GRANULOCYTES # BLD AUTO: 0.02 THOUSAND/UL (ref 0–0.2)
IMM GRANULOCYTES NFR BLD AUTO: 0 % (ref 0–2)
LYMPHOCYTES # BLD AUTO: 1.67 THOUSANDS/ÂΜL (ref 0.6–4.47)
LYMPHOCYTES NFR BLD AUTO: 30 % (ref 14–44)
MCH RBC QN AUTO: 25.1 PG (ref 26.8–34.3)
MCHC RBC AUTO-ENTMCNC: 32.9 G/DL (ref 31.4–37.4)
MCV RBC AUTO: 76 FL (ref 82–98)
MONOCYTES # BLD AUTO: 0.51 THOUSAND/ÂΜL (ref 0.17–1.22)
MONOCYTES NFR BLD AUTO: 9 % (ref 4–12)
NEUTROPHILS # BLD AUTO: 3.16 THOUSANDS/ÂΜL (ref 1.85–7.62)
NEUTS SEG NFR BLD AUTO: 57 % (ref 43–75)
NRBC BLD AUTO-RTO: 0 /100 WBCS
PLATELET # BLD AUTO: 235 THOUSANDS/UL (ref 149–390)
PMV BLD AUTO: 9.3 FL (ref 8.9–12.7)
POTASSIUM SERPL-SCNC: 3.3 MMOL/L (ref 3.5–5.3)
PROT SERPL-MCNC: 6 G/DL (ref 6.4–8.4)
RBC # BLD AUTO: 4.5 MILLION/UL (ref 3.81–5.12)
SODIUM SERPL-SCNC: 134 MMOL/L (ref 135–147)
WBC # BLD AUTO: 5.54 THOUSAND/UL (ref 4.31–10.16)

## 2023-06-13 PROCEDURE — 80048 BASIC METABOLIC PNL TOTAL CA: CPT | Performed by: STUDENT IN AN ORGANIZED HEALTH CARE EDUCATION/TRAINING PROGRAM

## 2023-06-13 PROCEDURE — 99239 HOSP IP/OBS DSCHRG MGMT >30: CPT | Performed by: INTERNAL MEDICINE

## 2023-06-13 PROCEDURE — 80076 HEPATIC FUNCTION PANEL: CPT | Performed by: INTERNAL MEDICINE

## 2023-06-13 PROCEDURE — 85025 COMPLETE CBC W/AUTO DIFF WBC: CPT | Performed by: STUDENT IN AN ORGANIZED HEALTH CARE EDUCATION/TRAINING PROGRAM

## 2023-06-13 PROCEDURE — 99232 SBSQ HOSP IP/OBS MODERATE 35: CPT | Performed by: INTERNAL MEDICINE

## 2023-06-13 RX ORDER — POTASSIUM CHLORIDE 20 MEQ/1
20 TABLET, EXTENDED RELEASE ORAL ONCE
Status: COMPLETED | OUTPATIENT
Start: 2023-06-13 | End: 2023-06-13

## 2023-06-13 RX ORDER — TRAZODONE HYDROCHLORIDE 50 MG/1
25 TABLET ORAL
Refills: 0
Start: 2023-06-13

## 2023-06-13 RX ADMIN — MAGNESIUM OXIDE TAB 400 MG (241.3 MG ELEMENTAL MG) 400 MG: 400 (241.3 MG) TAB at 08:01

## 2023-06-13 RX ADMIN — LEVOTHYROXINE SODIUM 75 MCG: 100 TABLET ORAL at 05:46

## 2023-06-13 RX ADMIN — PREGABALIN 75 MG: 75 CAPSULE ORAL at 12:10

## 2023-06-13 RX ADMIN — POTASSIUM CHLORIDE 20 MEQ: 1500 TABLET, EXTENDED RELEASE ORAL at 09:49

## 2023-06-13 RX ADMIN — Medication 250 MG: at 08:01

## 2023-06-13 NOTE — CASE MANAGEMENT
Case Management Discharge Planning Note    Patient name Clementina Porter  Location [facility]-* MRN [id number]  : 1924 Date 2023       Current Admission Date: 2023  Current Admission Diagnosis:UTI (urinary tract infection)   Patient Active Problem List    Diagnosis Date Noted   • Depression 2023   • History of CVA (cerebrovascular accident) 2023   • UTI (urinary tract infection) 2023   • Somnolence 2023   • Disorder of electrolytes 2023   • Right hip pain 2022   • Stage 3 chronic kidney disease ([facility] [address] ) 2022   • Elevated troponin 2021   • Status post fall 2019   • Anemia 2019   • Tendon tear 2019   • Generalized weakness 07/15/2019   • URI (upper respiratory infection) 07/15/2019   • Hypothyroidism 07/15/2019   • CAD (coronary artery disease) 07/15/2019   • Lower abdominal pain 2019   • Hypertension 2019   • Hyperlipidemia 2019   • Acquired hypothyroidism 2019   • Osteoarthritis (arthritis due to wear and tear of joints) 2019      LOS (days): 1  Geometric Mean LOS (GMLOS) (days): 2 60  Days to GMLOS:1 8     OBJECTIVE:  Risk of Unplanned Readmission Score: 11 13      Current admission status: Inpatient   Preferred Pharmacy:   [facility]/pharmacy #[id number]- [facility]   [address] [place] [address]  Phone: [phone] Fax: [address] ([place]) [address] [address]  Phone: [phone] Fax: [phone]    Primary Care Provider: Hernandez Paul MD    Primary Insurance: MEDICARE  Secondary Insurance: [employer]    DISCHARGE DETAILS:    Discharge planning discussed with[de-identified] Dtr Meño Flavors of Choice: Yes  Comments - Freedom of Choice: Accepting [facility] listing reviewed with dtr  Dtr confirmed agency choice is [facility]    CM contacted family/caregiver?: Yes  Were Treatment Team discharge recommendations reviewed with patient/caregiver?: Yes  Did patient/caregiver verbalize understanding of patient care needs?: Yes  Were patient/caregiver advised of the risks associated with not following Treatment Team discharge recommendations?: Yes    Contacts  Patient Contacts: Pa Armstrong (daughter)  Relationship to Patient[de-identified] Family  Contact Method: Phone  Phone Number: [phone]  Reason/Outcome: Emergency Contact, Discharge Planning, Continuity of Care    Other Referral/Resources/Interventions Provided:  Interventions: [facility]  Referral Comments: Mynor Byrd reserved in [facility]  Agency notified of planned discharge home later today  AVS will be faxed when available       Treatment Team Recommendation: Home with [facility]  Discharge Destination Plan[de-identified] Home with Heena at Discharge : Family

## 2023-06-13 NOTE — DISCHARGE SUMMARY
Discharge Summary - [facility]    Patient Information: Scot Webster 80 y o  female MRN: [id number]  Unit/Bed#: [address] [place] [address] Encounter: [id number]    Discharging Physician / Practitioner: Lucia Odonnell MD  PCP: Fátima Madera MD  Admission Date: 6/11/2023  Discharge Date: 06/13/23    Reason for Admission: Possible UTI (Daughter thinks pt has uti, had one recently, finnished bactrim 10 days ago and she is a bit foggy and urine smelling very strong again)      Discharge Diagnoses:     Principal Problem:    UTI (urinary tract infection)  Active Problems:    Hypertension    CAD (coronary artery disease)    Stage 3 chronic kidney disease ([facility] )    History of CVA (cerebrovascular accident)    Somnolence    Disorder of electrolytes    Acquired hypothyroidism    Osteoarthritis (arthritis due to wear and tear of joints)    Depression  Resolved Problems:    * No resolved hospital problems  *        * UTI (urinary tract infection)  Assessment & Plan  Patient presents with odorous urine since the weekend, decreased oral intake, daughter concerned about recurrent UTI and dehydration  Daughter reports patient sleeps a lot and not eating or drinking much for about 6 months  Patient completed Bactrim 10 days ago for UTI per daughter  Patient complained of lower abdominal pain in ED per daughter  Daughter denies nausea vomiting fever chills at home  Daughter thinks patient has constipation  · History of UTIs, on nitrofurantoin 50mg p o  at bedtime prophylactically  · UA showed WBC 10-20, innumerable bacteria, positive nitrite  · CT abdomen pelvis pending  · No recent urine culture in epic, pending  · Urine culture in 6/2022 grew E  coli ESBL   · ID consulted recs appreciated  · Likely not a candidate for eterpenum due to age, no recent UCx       Disorder of electrolytes  Assessment & Plan  · Sodium 132  · Could be secondary to HCTZ  Hold due to decreased oral intake        Somnolence  Assessment & Plan  Daughter reports patient sleeps a lot for about 6 months  · Likely due to advanced age  Patient is on trazodone Remeron nightly for depression at home  · decrease trazodone dose to 25 mg p o  at bedtime  · Continue Remeron    History of CVA (cerebrovascular accident)  Assessment & Plan  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  · Ambulates with walker, on regular diet at home per daughter  Lives with 24-hour caregiver at home  · PT OT      Stage 3 chronic kidney disease ([address] )  Assessment & Plan  Baseline creatinine appears to be around 1 3-1 7 since December last year  · Creatinine 1 25  · Monitor    CAD (coronary artery disease)  Assessment & Plan  Status post quadruple bypass in 2010  · No longer on Lipitor  · Denies chest pain    Hypertension  Assessment & Plan  On HCTZ, amlodipine at bedtime at home  · Continue amlodipine  · Hold HCTZ as above, consider half dose on DC  · BP labile    Depression  Assessment & Plan  · On trazodone, Remeron at home  · We will decrease trazodone to half in view of somnolence  · Continue Remeron    Osteoarthritis (arthritis due to wear and tear of joints)  Assessment & Plan  Chronic joint pain due to OA  On Lyrica at home  We will continue  · Monitor for pain    Acquired hypothyroidism  Assessment & Plan  · Continue Synthroid  · Recent TSH normal    Hypokalemia-repleted    Consultations During Hospital Stay:  IP CONSULT TO INFECTIOUS DISEASES    Procedures Performed:     · ***    Significant Findings:     · Refer to hospital course and above listed diagnosis related plan for details    Imaging while in hospital:    CT abdomen pelvis wo contrast    Result Date: 6/12/2023  Narrative: CT ABDOMEN AND PELVIS WITHOUT IV CONTRAST INDICATION:  Abdominal pain, acute, nonlocalized   COMPARISON: CT abdomen pelvis 7/6/2022 TECHNIQUE:  CT examination of the abdomen and pelvis was performed without intravenous contrast  Multiplanar 2D reformatted images were created from the source data  This examination, like all CT scans performed in the [facility], was performed utilizing techniques to minimize radiation dose exposure, including the use of iterative reconstruction and automated exposure control  Radiation dose length product (DLP) for this visit:  488 83 mGy-cm Enteric contrast was not administered  FINDINGS: ABDOMEN Evaluation of the solid organs is limited without IV contrast  LOWER CHEST: Mild dependent hypoventilatory changes  Mild cardiomegaly  Status post median sternotomy  Atherosclerotic changes thoracic aorta and coronary arteries  Ectatic ascending thoracic aorta measuring 42 mm  LIVER/BILIARY TREE: Calcified granuloma right hepatic lobe  GALLBLADDER: Tiny calcified gallstone along the fundus  No pericholecystic inflammatory change  SPLEEN:  Unremarkable  PANCREAS: Mild fatty replacement of the pancreas without acute findings  ADRENAL GLANDS:  Unremarkable  KIDNEYS/URETERS: Marked left renal atrophy  Small cyst medial lower pole right kidney again noted  Extrarenal pelvis on the right  No hydronephrosis  STOMACH AND BOWEL: Limited evaluation of the bowel without oral or IV contrast  Evaluation of the stomach is limited by underdistention  No focal pathology seen within limitations  Small bowel is normal caliber  APPENDIX:  No findings to suggest appendicitis  ABDOMINOPELVIC CAVITY:  No ascites  No pneumoperitoneum  Evaluation of lymphadenopathy is limited in the absence of intravenous contrast  No bulky adenopathy detected on this noncontrast study  VESSELS: Atherosclerotic changes abdominal aorta without evidence of aneurysm  PELVIS REPRODUCTIVE ORGANS:  Unremarkable for patient's age  URINARY BLADDER:  Unremarkable  ABDOMINAL WALL/INGUINAL REGIONS: Small fat-containing umbilical hernia  OSSEOUS STRUCTURES: Bony demineralization  no acute fracture or destructive osseous lesion   S-shaped lumbar scoliosis with multilevel "degenerative changes  Grade 1 spondylolisthesis L4 on L5 secondary to facet arthropathy  Impression: No acute intra-abdominal abnormality within limitations of noncontrast imaging  Cholelithiasis  Colonic diverticulosis without evidence of diverticulitis  Marked left renal atrophy  Ectatic ascending thoracic aorta measuring 42 mm  Management recommendation is follow-up noncontrast CT chest in 1 year  The study was marked in [facility] for follow-up  Limited study without IV or oral contrast  Workstation performed: [id number]       Incidental Findings:   Cholelithiasis  Colonic diverticulosis without evidence of diverticulitis  Marked left renal atrophy  Ectatic ascending thoracic aorta measuring 42 mm  Management recommendation is follow-up noncontrast CT chest in 1 year  Test Results Pending at Discharge (will require follow up):   · As per After Visit Summary     Outpatient Tests Requested:  · none    Complications:  Refer to hospital course and above listed diagnosis related plan, if any    Hospital Course:     Car Gray is a 80 y o  female patient who originally presented to the hospital on 6/11/2023 due to ***        Please see above list of diagnoses and related plan for additional information         Condition at Discharge: stable     Discharge Day Visit / Exam:     Subjective:      Comfortably sitting in bed  Denies any urinary discomfort denies any flank pain  Denies any GI symptoms  Denies any fever or chills  Reported that she slept well last night      Interviewed patient with help of , Ajith Clayton [id number]    Vitals: Blood Pressure: 146/65 (06/13/23 0739)  Pulse: 58 (06/13/23 0739)  Temperature: 98 1 °F (36 7 °C) (06/13/23 0739)  Temp Source: Tympanic (06/11/23 2012)  Respirations: 16 (06/13/23 0739)  Height: 4' 9\" (144 8 cm) (06/12/23 0800)  Weight - Scale: 55 5 kg (122 lb 6 4 oz) (06/12/23 1417)  SpO2: 95 % (06/13/23 0739)  Exam:   Physical Exam  Constitutional:       General: She " "is not in acute distress  HENT:      Head: Normocephalic and atraumatic  Cardiovascular:      Rate and Rhythm: Normal rate  Pulmonary:      Effort: Pulmonary effort is normal  No respiratory distress  Breath sounds: Normal breath sounds  No wheezing or rales  Abdominal:      General: Bowel sounds are normal  There is no distension  Palpations: Abdomen is soft  Tenderness: There is no abdominal tenderness  There is no guarding or rebound  Skin:     General: Skin is warm and dry  Findings: No rash  Neurological:      Mental Status: She is alert  Mental status is at baseline  Psychiatric:         Mood and Affect: Mood normal          Discharge instructions/Information to patient and family:(Discharge Medications and Follow up):   See after visit summary for information provided to patient and family  Provisions for Follow-Up Care:  See after visit summary for information related to follow-up care and any pertinent home health orders  Disposition: Home    Planned Readmission:  No     Discharge Statement:  I spent 35 minutes discharging the patient  This time was spent on the day of discharge  I had direct contact with the patient on the day of discharge  Greater than 50% of the total time was spent examining patient, answering all patient questions, arranging and discussing plan of care with patient as well as directly providing post-discharge instructions  Additional time then spent on discharge activities  Coordinated with infectious disease  Discussed with patient's daughter over the phone regarding discharge recommendation and incidental findings  Discharge Medications:  See after visit summary for reconciled discharge medications provided to patient and family  ** Please Note: \"This note has been constructed using a voice recognition system  Therefore there may be syntax, spelling, and/or grammatical errors  Please call if you have any questions   \"**      " "Skin is warm and dry  Findings: No rash  Neurological:      Mental Status: She is alert  Mental status is at baseline  Psychiatric:         Mood and Affect: Mood normal          Discharge instructions/Information to patient and family:(Discharge Medications and Follow up):   See after visit summary for information provided to patient and family  Provisions for Follow-Up Care:  See after visit summary for information related to follow-up care and any pertinent home health orders  Disposition: Home    Planned Readmission:  No     Discharge Statement:  I spent 35 minutes discharging the patient  This time was spent on the day of discharge  I had direct contact with the patient on the day of discharge  Greater than 50% of the total time was spent examining patient, answering all patient questions, arranging and discussing plan of care with patient as well as directly providing post-discharge instructions  Additional time then spent on discharge activities  Coordinated with infectious disease  Discussed with patient's daughter over the phone regarding discharge recommendation and incidental findings  Discharge Medications:  See after visit summary for reconciled discharge medications provided to patient and family  ** Please Note: \"This note has been constructed using a voice recognition system  Therefore there may be syntax, spelling, and/or grammatical errors  Please call if you have any questions   \"**      "

## 2023-06-13 NOTE — PLAN OF CARE
Problem: Potential for Falls  Goal: Patient will remain free of falls  Description: INTERVENTIONS:  - Educate patient/family on patient safety including physical limitations  - Instruct patient to call for assistance with activity   - Consult OT/PT to assist with strengthening/mobility   - Keep Call bell within reach  - Keep bed low and locked with side rails adjusted as appropriate  - Keep care items and personal belongings within reach  - Initiate and maintain comfort rounds  - Make Fall Risk Sign visible to staff  - Offer Toileting every  Hours, in advance of need  - Initiate/Maintain alarm  - Obtain necessary fall risk management equipment:   - Apply yellow socks and bracelet for high fall risk patients  - Consider moving patient to room near nurses station  Outcome: Progressing     Problem: MOBILITY - ADULT  Goal: Maintain or return to baseline ADL function  Description: INTERVENTIONS:  -  Assess patient's ability to carry out ADLs; assess patient's baseline for ADL function and identify physical deficits which impact ability to perform ADLs (bathing, care of mouth/teeth, toileting, grooming, dressing, etc )  - Assess/evaluate cause of self-care deficits   - Assess range of motion  - Assess patient's mobility; develop plan if impaired  - Assess patient's need for assistive devices and provide as appropriate  - Encourage maximum independence but intervene and supervise when necessary  - Involve family in performance of ADLs  - Assess for home care needs following discharge   - Consider OT consult to assist with ADL evaluation and planning for discharge  - Provide patient education as appropriate  Outcome: Progressing  Goal: Maintains/Returns to pre admission functional level  Description: INTERVENTIONS:  - Perform BMAT or MOVE assessment daily    - Set and communicate daily mobility goal to care team and patient/family/caregiver     - Collaborate with rehabilitation services on mobility goals if consulted  - Perform Range of Motion times a day  - Reposition patient every  hours    - Dangle patient  times a day  - Stand patient  times a day  - Ambulate patient  times a day  - Out of bed to chair  times a day   - Out of bed for meals times a day  - Out of bed for toileting  - Record patient progress and toleration of activity level   Outcome: Progressing     Problem: Prexisting or High Potential for Compromised Skin Integrity  Goal: Skin integrity is maintained or improved  Description: INTERVENTIONS:  - Identify patients at risk for skin breakdown  - Assess and monitor skin integrity  - Assess and monitor nutrition and hydration status  - Monitor labs   - Assess for incontinence   - Turn and reposition patient  - Assist with mobility/ambulation  - Relieve pressure over bony prominences  - Avoid friction and shearing  - Provide appropriate hygiene as needed including keeping skin clean and dry  - Evaluate need for skin moisturizer/barrier cream  - Collaborate with interdisciplinary team   - Patient/family teaching  - Consider wound care consult   Outcome: Progressing     Problem: PAIN - ADULT  Goal: Verbalizes/displays adequate comfort level or baseline comfort level  Description: Interventions:  - Encourage patient to monitor pain and request assistance  - Assess pain using appropriate pain scale  - Administer analgesics based on type and severity of pain and evaluate response  - Implement non-pharmacological measures as appropriate and evaluate response  - Consider cultural and social influences on pain and pain management  - Notify physician/advanced practitioner if interventions unsuccessful or patient reports new pain  Outcome: Progressing     Problem: INFECTION - ADULT  Goal: Absence or prevention of pr  Problem: SAFETY ADULT  Goal: Patient will remain free of falls  Description: INTERVENTIONS:  - Educate patient/family on patient safety including physical limitations  - Instruct patient to call for assistance with activity   - Consult OT/PT to assist with strengthening/mobility   - Keep Call bell within reach  - Keep bed low and locked with side rails adjusted as appropriate  - Keep care items and personal belongings within reach  - Initiate and maintain comfort rounds  - Make Fall Risk Sign visible to staff  - Offer Toileting every  Hours, in advance of need  - Initiate/Maintain alarm  - Obtain necessary fall risk management equipment:   - Apply yellow socks and bracelet for high fall risk patients  - Consider moving patient to room near nurses station  Outcome: Progressing     Problem: GENITOURINARY - ADULT  Goal: Maintains or returns to baseline urinary function  Description: INTERVENTIONS:  - Assess urinary function  - Encourage oral fluids to ensure adequate hydration if ordered  - Administer IV fluids as ordered to ensure adequate hydration  - Administer ordered medications as needed  - Offer frequent toileting  - Follow urinary retention protocol if ordered  Outcome: Progressing     Problem: SKIN/TISSUE INTEGRITY - ADULT  Goal: Skin Integrity remains intact(Skin Breakdown Prevention)  Description: Assess:  -Perform Alfredo assessment every   -Clean and moisturize skin every   -Inspect skin when repositioning, toileting, and assisting with ADLS  -Assess under medical devices such as every   -Assess extremities for adequate circulation and sensation     Bed Management:  -Have minimal linens on bed & keep smooth, unwrinkled  -Change linens as needed when moist or perspiring  -Avoid sitting or lying in one position for more than  hours while in bed  -Keep HOB at degrees     Toileting:  -Offer bedside commode  -Assess for incontinence every   -Use incontinent care products after each incontinent episode such as     Activity:  -Mobilize patient times a day  -Encourage activity and walks on unit  -Encourage or provide ROM exercises   -Turn and reposition patient every  Hours  -Use appropriate equipment to lift or move patient in bed  -Instruct/ Assist with weight shifting every when out of bed in chair  -Consider limitation of chair time  hour intervals    Skin Care:  -Avoid use of baby powder, tape, friction and shearing, hot water or constrictive clothing  -Relieve pressure over bony prominences using  -Do not massage red bony areas    Next Steps:  -Teach patient strategies to minimize risks such as    -Consider consults to  interdisciplinary teams such as   Outcome: Progressing   ogression during hospitalization  Description: INTERVENTIONS:  - Assess and monitor for signs and symptoms of infection  - Monitor lab/diagnostic results  - Monitor all insertion sites, i e  indwelling lines, tubes, and drains  - Monitor endotracheal if appropriate and nasal secretions for changes in amount and color  - Anabel appropriate cooling/warming therapies per order  - Administer medications as ordered  - Instruct and encourage patient and family to use good hand hygiene technique  - Identify and instruct in appropriate isolation precautions for identified infection/condition  Outcome: Progressing  Goal: Absence of fever/infection during neutropenic period  Description: INTERVENTIONS:  - Monitor WBC    Outcome: Progressing

## 2023-06-13 NOTE — PLAN OF CARE
Problem: Potential for Falls  Goal: Patient will remain free of falls  Description: INTERVENTIONS:  - Educate patient/family on patient safety including physical limitations  - Instruct patient to call for assistance with activity   - Consult OT/PT to assist with strengthening/mobility   - Keep Call bell within reach  - Keep bed low and locked with side rails adjusted as appropriate  - Keep care items and personal belongings within reach  - Initiate and maintain comfort rounds  - Make Fall Risk Sign visible to staff  - Offer Toileting every  Hours, in advance of need  - Initiate/Maintain alarm  - Obtain necessary fall risk management equipment:   - Apply yellow socks and bracelet for high fall risk patients  - Consider moving patient to room near nurses station  6/13/2023 1648 by Jeovany Cotton RN  Outcome: Completed  6/13/2023 1144 by Jeovany Cotton RN  Outcome: Progressing     Problem: MOBILITY - ADULT  Goal: Maintain or return to baseline ADL function  Description: INTERVENTIONS:  -  Assess patient's ability to carry out ADLs; assess patient's baseline for ADL function and identify physical deficits which impact ability to perform ADLs (bathing, care of mouth/teeth, toileting, grooming, dressing, etc )  - Assess/evaluate cause of self-care deficits   - Assess range of motion  - Assess patient's mobility; develop plan if impaired  - Assess patient's need for assistive devices and provide as appropriate  - Encourage maximum independence but intervene and supervise when necessary  - Involve family in performance of ADLs  - Assess for home care needs following discharge   - Consider OT consult to assist with ADL evaluation and planning for discharge  - Provide patient education as appropriate  6/13/2023 1648 by Jeovany Ctoton RN  Outcome: Completed  6/13/2023 1144 by Jeovany Cotton RN  Outcome: Progressing  Goal: Maintains/Returns to pre admission functional level  Description: INTERVENTIONS:  - Perform BMAT or MOVE assessment daily    - Set and communicate daily mobility goal to care team and patient/family/caregiver  - Collaborate with rehabilitation services on mobility goals if consulted  - Perform Range of Motion  times a day  - Reposition patient every  hours    - Dangle patient  times a day  - Stand patient  times a day  - Ambulate patient  times a day  - Out of bed to chair  times a day   - Out of bed for meals  times a day  - Out of bed for toileting  - Record patient progress and toleration of activity level   6/13/2023 1648 by Michael French RN  Outcome: Completed  6/13/2023 1144 by Michael French RN  Outcome: Progressing     Problem: Prexisting or High Potential for Compromised Skin Integrity  Goal: Skin integrity is maintained or improved  Description: INTERVENTIONS:  - Identify patients at risk for skin breakdown  - Assess and monitor skin integrity  - Assess and monitor nutrition and hydration status  - Monitor labs   - Assess for incontinence   - Turn and reposition patient  - Assist with mobility/ambulation  - Relieve pressure over bony prominences  - Avoid friction and shearing  - Provide appropriate hygiene as needed including keeping skin clean and dry  - Evaluate need for skin moisturizer/barrier cream  - Collaborate with interdisciplinary team   - Patient/family teaching  - Consider wound care consult   6/13/2023 1648 by Michael French RN  Outcome: Completed  6/13/2023 1144 by Michael French RN  Outcome: Progressing     Problem: PAIN - ADULT  Goal: Verbalizes/displays adequate comfort level or baseline comfort level  Description: Interventions:  - Encourage patient to monitor pain and request assistance  - Assess pain using appropriate pain scale  - Administer analgesics based on type and severity of pain and evaluate response  - Implement non-pharmacological measures as appropriate and evaluate response  - Consider cultural and social influences on pain and pain management  - Notify physician/advanced practitioner if interventions unsuccessful or patient reports new pain  6/13/2023 1648 by Hebert Mireles RN  Outcome: Completed  6/13/2023 1144 by Hebert Mireles RN  Outcome: Progressing     Problem: INFECTION - ADULT  Goal: Absence or prevention of progression during hospitalization  Description: INTERVENTIONS:  - Assess and monitor for signs and symptoms of infection  - Monitor lab/diagnostic results  - Monitor all insertion sites, i e  indwelling lines, tubes, and drains  - Monitor endotracheal if appropriate and nasal secretions for changes in amount and color  - [place] appropriate cooling/warming therapies per order  - Administer medications as ordered  - Instruct and encourage patient and family to use good hand hygiene technique  - Identify and instruct in appropriate isolation precautions for identified infection/condition  6/13/2023 1648 by Hebert Mireles RN  Outcome: Completed  6/13/2023 1144 by Hebert Mireles RN  Outcome: Progressing  Goal: Absence of fever/infection during neutropenic period  Description: INTERVENTIONS:  - Monitor WBC    6/13/2023 1648 by Hebert Mireles RN  Outcome: Completed  6/13/2023 1144 by Hebert Mireles RN  Outcome: Progressing     Problem: SAFETY ADULT  Goal: Patient will remain free of falls  Description: INTERVENTIONS:  - Educate patient/family on patient safety including physical limitations  - Instruct patient to call for assistance with activity   - Consult OT/PT to assist with strengthening/mobility   - Keep Call bell within reach  - Keep bed low and locked with side rails adjusted as appropriate  - Keep care items and personal belongings within reach  - Initiate and maintain comfort rounds  - Make Fall Risk Sign visible to staff  - Offer Toileting every  Hours, in advance of need  - Initiate/Maintain alarm  - Obtain necessary fall risk management equipment:   - Apply yellow socks and bracelet for high fall risk patients  - Consider moving patient to room near nurses station  6/13/2023 1648 by Adore Dc RN  Outcome: Completed  6/13/2023 1144 by Adore Dc RN  Outcome: Progressing     Problem: GENITOURINARY - ADULT  Goal: Maintains or returns to baseline urinary function  Description: INTERVENTIONS:  - Assess urinary function  - Encourage oral fluids to ensure adequate hydration if ordered  - Administer IV fluids as ordered to ensure adequate hydration  - Administer ordered medications as needed  - Offer frequent toileting  - Follow urinary retention protocol if ordered  6/13/2023 1648 by Adore Dc RN  Outcome: Completed  6/13/2023 1144 by Adore Dc RN  Outcome: Progressing     Problem: SKIN/TISSUE INTEGRITY - ADULT  Goal: Skin Integrity remains intact(Skin Breakdown Prevention)  Description: Assess:  -Perform Alfredo assessment every   -Clean and moisturize skin every   -Inspect skin when repositioning, toileting, and assisting with ADLS  -Assess under medical devices such as  every   -Assess extremities for adequate circulation and sensation     Bed Management:  -Have minimal linens on bed & keep smooth, unwrinkled  -Change linens as needed when moist or perspiring  -Avoid sitting or lying in one position for more than  hours while in bed  -Keep HOB at degrees     Toileting:  -Offer bedside commode  -Assess for incontinence every   -Use incontinent care products after each incontinent episode such as     Activity:  -Mobilize patient  times a day  -Encourage activity and walks on unit  -Encourage or provide ROM exercises   -Turn and reposition patient every  Hours  -Use appropriate equipment to lift or move patient in bed  -Instruct/ Assist with weight shifting every  when out of bed in chair  -Consider limitation of chair time  hour intervals    Skin Care:  -Avoid use of baby powder, tape, friction and shearing, hot water or constrictive clothing  -Relieve pressure over bony prominences using  -Do not massage red bony areas    Next Steps:  -Teach patient strategies to minimize risks such as    -Consider consults to  interdisciplinary teams such as   6/13/2023 1648 by Travon Barrera RN  Outcome: Completed  6/13/2023 1144 by Travon Barrera RN  Outcome: Progressing

## 2023-06-13 NOTE — PROGRESS NOTES
Progress Note - Infectious Disease   More Spencer 80 y o  female MRN: [id number]  Unit/Bed#: 42614 [facility] 414-01 Encounter: [id number]      Impression/Plan:  1  Asymptomatic bacteriuria  No irritative voiding symptoms or sepsis to suggest invasive UTI  Suspect the patient is colonized based upon her previous history of organisms growing in the urine   -Discontinued ertapenem 23  -Monitoring off all antibiotics hereafter  -Keep patient well-hydrated  -Would only treat with antibiotics in the setting of irritative voiding symptoms or sepsis  -Would avoid checking urinalysis or culture unless the above symptoms occur  -Follow-up bladder scan for retention as needed     2  Chronic kidney disease  Stage III  With a notable bump in the creatinine which is now come back down to baseline; 1 24  Suspect this was playing a role in the patient's generalized fatigue   -Monitor renal function  -Volume management     3   Generalized malaise  In a patient of advanced age and with poor oral intake  Suspect volume depletion playing a role  Perhaps medication effect   -Adjust medications as able  -Volume management  -Monitor cognition     Above impression and plan discussed in detail with patient, RN, and Dr Richa Zarate of primary care team     Antibiotics:  None D1    Subjective:  Patient has no fever, chills, sweats; no nausea, vomiting, diarrhea; no cough, shortness of breath; no abdominal, flank pain, no dysuria  No new symptoms  Objective:  Vitals:  Temp:  [97 4 °F (36 3 °C)-98 1 °F (36 7 °C)] 98 1 °F (36 7 °C)  HR:  [58-63] 58  Resp:  [16-18] 16  BP: (132-149)/(65-80) 146/65  SpO2:  [93 %-96 %] 95 %  Temp (24hrs), Av 8 °F (36 6 °C), Min:97 4 °F (36 3 °C), Max:98 1 °F (36 7 °C)  Current: Temperature: 98 1 °F (36 7 °C)    Physical Exam:   General Appearance:  80year old female, propped in bed comfortably, nontoxic appearance, no acute distress     HEENT: Atraumatic normocephalic   Throat: Oropharynx moist    Pulmonary: Normal respiratory excursion without accessory muscle use   Cardiac:  RRR   Abdomen:   Soft, non-tender, non-distended    Extremities: No edema   : No clemens, no SPT   Psychiatric: Awake, cooperative   Skin: No new rashes  IV site nontender          Labs, Imaging, & Other studies:   All pertinent labs and imaging studies were personally reviewed  Results from last 7 days   Lab Units 06/13/23  0554 06/12/23  0530 06/11/23  2122   HEMOGLOBIN g/dL 11 3* 11 4* 12 8   PLATELETS Thousands/uL 235 236 253   WBC Thousand/uL 5 54 7 83 7 71     Results from last 7 days   Lab Units 06/13/23  0554 06/12/23  0459 06/11/23  2122   ALK PHOS U/L 95  --  114*   ALT U/L 4*  --  9   AST U/L 12*  --  16   BUN mg/dL 18 21 23   CALCIUM mg/dL 8 7 8 6 9 0   CHLORIDE mmol/L 97 98 96   CO2 mmol/L 30 25 28   CREATININE mg/dL 1 24 1 25 1 43*   EGFR ml/min/1 73sq m 35 35 30   POTASSIUM mmol/L 3 3* 3 5 3 6   SODIUM mmol/L 134* 132* 134*     Results from last 7 days   Lab Units 06/11/23  2238   URINE CULTURE  >100,000 cfu/ml Gram Negative Robert Enteric Like*  <10,000 cfu/ml

## 2023-06-13 NOTE — INCIDENTAL FINDINGS
The following findings require follow up:  Radiographic finding   Finding:   CT scan incidentally noted to have  1  Cholelithiasis  2  Colonic diverticulosis without evidence of diverticulitis  3  Marked left renal atrophy  4  Ectatic ascending thoracic aorta measuring 42 mm  Management recommendation is follow-up noncontrast CT chest in 1 year        Follow up required: As needed     Please notify the following clinician to assist with the follow up:   Dr Kalie Smith MD

## 2023-06-15 LAB
BACTERIA UR CULT: ABNORMAL
BACTERIA UR CULT: ABNORMAL

## 2023-06-21 PROBLEM — R40.0 SOMNOLENCE: Status: RESOLVED | Noted: 2023-06-11 | Resolved: 2023-06-21

## 2023-06-21 PROBLEM — E87.8 DISORDER OF ELECTROLYTES: Status: RESOLVED | Noted: 2023-06-11 | Resolved: 2023-06-21

## 2023-06-21 PROBLEM — N39.0 UTI (URINARY TRACT INFECTION): Status: RESOLVED | Noted: 2023-06-11 | Resolved: 2023-06-21

## 2024-03-12 ENCOUNTER — APPOINTMENT (EMERGENCY)
Dept: RADIOLOGY | Facility: HOSPITAL | Age: 89
DRG: 062 | End: 2024-03-12
Payer: MEDICARE

## 2024-03-12 ENCOUNTER — HOSPITAL ENCOUNTER (INPATIENT)
Facility: HOSPITAL | Age: 89
LOS: 4 days | Discharge: HOME HEALTH | DRG: 062 | End: 2024-03-16
Attending: EMERGENCY MEDICINE | Admitting: STUDENT IN AN ORGANIZED HEALTH CARE EDUCATION/TRAINING PROGRAM
Payer: MEDICARE

## 2024-03-12 DIAGNOSIS — K21.9 GERD (GASTROESOPHAGEAL REFLUX DISEASE): ICD-10-CM

## 2024-03-12 DIAGNOSIS — R05.1 ACUTE COUGH: ICD-10-CM

## 2024-03-12 DIAGNOSIS — R29.898 RIGHT ARM WEAKNESS: ICD-10-CM

## 2024-03-12 DIAGNOSIS — R29.90 STROKE-LIKE SYMPTOMS: Primary | ICD-10-CM

## 2024-03-12 LAB
ANION GAP SERPL CALCULATED.3IONS-SCNC: 5 MMOL/L (ref 4–13)
APTT PPP: 57 SECONDS (ref 23–37)
BUN SERPL-MCNC: 28 MG/DL (ref 5–25)
CALCIUM SERPL-MCNC: 9.1 MG/DL (ref 8.4–10.2)
CARDIAC TROPONIN I PNL SERPL HS: 8 NG/L
CHLORIDE SERPL-SCNC: 104 MMOL/L (ref 96–108)
CO2 SERPL-SCNC: 24 MMOL/L (ref 21–32)
CREAT SERPL-MCNC: 1.26 MG/DL (ref 0.6–1.3)
ERYTHROCYTE [DISTWIDTH] IN BLOOD BY AUTOMATED COUNT: 17.8 % (ref 11.6–15.1)
FLUAV RNA RESP QL NAA+PROBE: NEGATIVE
FLUBV RNA RESP QL NAA+PROBE: NEGATIVE
GFR SERPL CREATININE-BSD FRML MDRD: 35 ML/MIN/1.73SQ M
GLUCOSE SERPL-MCNC: 107 MG/DL (ref 65–140)
GLUCOSE SERPL-MCNC: 97 MG/DL (ref 65–140)
HCT VFR BLD AUTO: 32.6 % (ref 34.8–46.1)
HGB BLD-MCNC: 10.5 G/DL (ref 11.5–15.4)
INR PPP: 1.12 (ref 0.84–1.19)
MCH RBC QN AUTO: 25.6 PG (ref 26.8–34.3)
MCHC RBC AUTO-ENTMCNC: 32.2 G/DL (ref 31.4–37.4)
MCV RBC AUTO: 80 FL (ref 82–98)
PLATELET # BLD AUTO: 210 THOUSANDS/UL (ref 149–390)
PMV BLD AUTO: 9.6 FL (ref 8.9–12.7)
POTASSIUM SERPL-SCNC: 5.2 MMOL/L (ref 3.5–5.3)
PROTHROMBIN TIME: 14.6 SECONDS (ref 11.6–14.5)
RBC # BLD AUTO: 4.1 MILLION/UL (ref 3.81–5.12)
RSV RNA RESP QL NAA+PROBE: NEGATIVE
SARS-COV-2 RNA RESP QL NAA+PROBE: NEGATIVE
SODIUM SERPL-SCNC: 133 MMOL/L (ref 135–147)
WBC # BLD AUTO: 6.11 THOUSAND/UL (ref 4.31–10.16)

## 2024-03-12 PROCEDURE — 83036 HEMOGLOBIN GLYCOSYLATED A1C: CPT

## 2024-03-12 PROCEDURE — 85610 PROTHROMBIN TIME: CPT | Performed by: EMERGENCY MEDICINE

## 2024-03-12 PROCEDURE — 82948 REAGENT STRIP/BLOOD GLUCOSE: CPT

## 2024-03-12 PROCEDURE — 93005 ELECTROCARDIOGRAM TRACING: CPT

## 2024-03-12 PROCEDURE — 84484 ASSAY OF TROPONIN QUANT: CPT | Performed by: EMERGENCY MEDICINE

## 2024-03-12 PROCEDURE — 3E03317 INTRODUCTION OF OTHER THROMBOLYTIC INTO PERIPHERAL VEIN, PERCUTANEOUS APPROACH: ICD-10-PCS | Performed by: EMERGENCY MEDICINE

## 2024-03-12 PROCEDURE — 99291 CRITICAL CARE FIRST HOUR: CPT

## 2024-03-12 PROCEDURE — 80048 BASIC METABOLIC PNL TOTAL CA: CPT | Performed by: EMERGENCY MEDICINE

## 2024-03-12 PROCEDURE — 99291 CRITICAL CARE FIRST HOUR: CPT | Performed by: EMERGENCY MEDICINE

## 2024-03-12 PROCEDURE — 85027 COMPLETE CBC AUTOMATED: CPT | Performed by: EMERGENCY MEDICINE

## 2024-03-12 PROCEDURE — 87081 CULTURE SCREEN ONLY: CPT

## 2024-03-12 PROCEDURE — 0241U HB NFCT DS VIR RESP RNA 4 TRGT: CPT | Performed by: EMERGENCY MEDICINE

## 2024-03-12 PROCEDURE — 80061 LIPID PANEL: CPT

## 2024-03-12 PROCEDURE — 99223 1ST HOSP IP/OBS HIGH 75: CPT | Performed by: STUDENT IN AN ORGANIZED HEALTH CARE EDUCATION/TRAINING PROGRAM

## 2024-03-12 PROCEDURE — 96374 THER/PROPH/DIAG INJ IV PUSH: CPT

## 2024-03-12 PROCEDURE — 70498 CT ANGIOGRAPHY NECK: CPT

## 2024-03-12 PROCEDURE — 70496 CT ANGIOGRAPHY HEAD: CPT

## 2024-03-12 PROCEDURE — 36415 COLL VENOUS BLD VENIPUNCTURE: CPT | Performed by: EMERGENCY MEDICINE

## 2024-03-12 PROCEDURE — 85730 THROMBOPLASTIN TIME PARTIAL: CPT | Performed by: EMERGENCY MEDICINE

## 2024-03-12 RX ORDER — ATORVASTATIN CALCIUM 40 MG/1
40 TABLET, FILM COATED ORAL
Status: DISCONTINUED | OUTPATIENT
Start: 2024-03-13 | End: 2024-03-16 | Stop reason: HOSPADM

## 2024-03-12 RX ORDER — MIRTAZAPINE 15 MG/1
7.5 TABLET, FILM COATED ORAL
Status: DISCONTINUED | OUTPATIENT
Start: 2024-03-13 | End: 2024-03-16 | Stop reason: HOSPADM

## 2024-03-12 RX ORDER — PREGABALIN 75 MG/1
75 CAPSULE ORAL DAILY
Status: DISCONTINUED | OUTPATIENT
Start: 2024-03-13 | End: 2024-03-16 | Stop reason: HOSPADM

## 2024-03-12 RX ORDER — AMLODIPINE BESYLATE 10 MG/1
10 TABLET ORAL
Status: DISCONTINUED | OUTPATIENT
Start: 2024-03-12 | End: 2024-03-16 | Stop reason: HOSPADM

## 2024-03-12 RX ORDER — CHLORHEXIDINE GLUCONATE ORAL RINSE 1.2 MG/ML
15 SOLUTION DENTAL EVERY 12 HOURS SCHEDULED
Status: DISCONTINUED | OUTPATIENT
Start: 2024-03-12 | End: 2024-03-16 | Stop reason: HOSPADM

## 2024-03-12 RX ORDER — LEVOTHYROXINE SODIUM 0.07 MG/1
75 TABLET ORAL
Status: DISCONTINUED | OUTPATIENT
Start: 2024-03-13 | End: 2024-03-16 | Stop reason: HOSPADM

## 2024-03-12 RX ADMIN — IOHEXOL 85 ML: 350 INJECTION, SOLUTION INTRAVENOUS at 18:01

## 2024-03-12 RX ADMIN — CHLORHEXIDINE GLUCONATE 15 ML: 1.2 SOLUTION ORAL at 21:21

## 2024-03-12 RX ADMIN — Medication 14 MG: at 18:35

## 2024-03-12 RX ADMIN — AMLODIPINE BESYLATE 10 MG: 10 TABLET ORAL at 23:37

## 2024-03-12 NOTE — ED PROVIDER NOTES
History  Chief Complaint   Patient presents with    STROKE Alert     Brought by son in law , he states patient started with right sided weakness 1 hour ago ( patient states started 3 or 4 oclock ). Provider at bedside performing neuro assessment and stroke alert called      Pt is a 89yo F who presents for right arm weakness.  Patient presents with son who provides some history.  Son states history of stroke that left her with some right-sided weakness but today she began with acutely worsening right upper extremity weakness.  Patient states it started around 4 PM.  Patient denying any other complaints.  Patient is not on aspirin or blood thinners.  Patient has been taking her other medications regularly with no recent changes.         Prior to Admission Medications   Prescriptions Last Dose Informant Patient Reported? Taking?   amLODIPine (NORVASC) 5 mg tablet   Yes No   Sig: Take 10 mg by mouth daily at bedtime   atorvastatin (LIPITOR) 10 mg tablet   Yes No   Sig: Take 10 mg by mouth daily   docusate sodium (COLACE) 100 mg capsule   No No   Sig: Take 1 capsule (100 mg total) by mouth 2 (two) times a day   esomeprazole (NexIUM) 40 MG capsule   No No   Sig: Take 1 capsule (40 mg total) by mouth daily   Patient taking differently: Take 40 mg by mouth daily as needed (heart burn)   levothyroxine 75 mcg tablet   Yes No   Sig: Take 75 mcg by mouth daily   magnesium oxide (MAG-OX) 400 mg tablet   Yes No   Sig: Take 400 mg by mouth daily   methocarbamol (ROBAXIN) 500 mg tablet   No No   Sig: Take 0.5 tablets (250 mg total) by mouth 3 (three) times a day as needed for muscle spasms   mirtazapine (REMERON) 7.5 MG tablet   Yes No   Sig: Take 7.5 mg by mouth daily at bedtime   nitrofurantoin (MACRODANTIN) 50 mg capsule   Yes No   Sig: Take 50 mg by mouth daily at bedtime   pregabalin (LYRICA) 75 mg capsule   Yes No   Sig: Take 75 mg by mouth daily At noon   traZODone (DESYREL) 50 mg tablet   No No   Sig: Take 0.5 tablets (25  mg total) by mouth daily at bedtime      Facility-Administered Medications: None       Past Medical History:   Diagnosis Date    Brain aneurysm     CAD (coronary artery disease)     CKD (chronic kidney disease), stage III ([facility])     CVA (cerebral vascular accident) ([facility])     9/2022    Disease of thyroid gland     hypo    GERD (gastroesophageal reflux disease)     Gout     right shoulder    Hyperlipidemia     Hypertension     Osteoarthritis (arthritis due to wear and tear of joints)     Stroke ([facility])        Past Surgical History:   Procedure Laterality Date    CORONARY ARTERY BYPASS GRAFT  2010    x 4    ENDOSCOPIC STENT PLACEMENT W/ MLB Left 2012    HIATAL HERNIA REPAIR      JOINT REPLACEMENT Left     knee 2001    RENAL ARTERY STENT      ROTATOR CUFF REPAIR      TUBAL LIGATION  2013       History reviewed. No pertinent family history.  I have reviewed and agree with the history as documented.    E-Cigarette/Vaping    E-Cigarette Use Never User      E-Cigarette/Vaping Substances    Nicotine No     THC No     CBD No     Flavoring No     Other No     Unknown No      Social History     Tobacco Use    Smoking status: Never    Smokeless tobacco: Never   Vaping Use    Vaping status: Never Used   Substance Use Topics    Alcohol use: Never    Drug use: Never       Review of Systems   Neurological:  Positive for weakness (RUE).       Physical Exam  Physical Exam  Vitals reviewed.   Constitutional:       General: She is not in acute distress.     Appearance: She is well-developed. She is not toxic-appearing or diaphoretic.   HENT:      Head: Normocephalic and atraumatic.      Right Ear: External ear normal.      Left Ear: External ear normal.      Nose: Nose normal.      Mouth/Throat:      Pharynx: Oropharynx is clear.   Eyes:      Extraocular Movements: Extraocular movements intact.      Pupils: Pupils are equal, round, and reactive to light.   Cardiovascular:      Rate and Rhythm: Normal rate and regular rhythm.      Heart  sounds: Normal heart sounds. No murmur heard.  Pulmonary:      Effort: Pulmonary effort is normal. No respiratory distress.      Breath sounds: Normal breath sounds.   Abdominal:      General: There is no distension.      Palpations: Abdomen is soft.      Tenderness: There is no abdominal tenderness.   Musculoskeletal:         General: Normal range of motion.      Cervical back: Normal range of motion and neck supple.      Right lower leg: No edema.      Left lower leg: No edema.   Skin:     General: Skin is warm and dry.      Capillary Refill: Capillary refill takes less than 2 seconds.   Neurological:      Mental Status: She is alert.      Comments: A&Ox2 (baseline per son)  Moving all extremities on command  Significant RUE weakness (unable to hold up against gravity) and RUE ataxia  Mild RLE weakness (able to hold up against gravity but not resistance)  No sensory deficits or other notable deficits.   Psychiatric:         Speech: Speech normal.         Behavior: Behavior is cooperative.         Vital Signs  ED Triage Vitals [03/12/24 1749]   Temperature Pulse Respirations Blood Pressure SpO2   (!) 97.4 °F (36.3 °C) 80 18 (!) 171/89 96 %      Temp Source Heart Rate Source Patient Position - Orthostatic VS BP Location FiO2 (%)   Tympanic Monitor Sitting Left arm --      Pain Score       No Pain           Vitals:    03/12/24 1822 03/12/24 1840 03/12/24 1845 03/12/24 1850   BP: 170/79 167/80 167/80 (!) 176/80   Pulse: 73  74 74   Patient Position - Orthostatic VS: Lying Lying Lying Lying         Visual Acuity  Visual Acuity      Flowsheet Row Most Recent Value   L Pupil Size (mm) 3   R Pupil Size (mm) 3            ED Medications  Medications   iohexol (OMNIPAQUE) 350 MG/ML injection (MULTI-DOSE) 85 mL (85 mL Intravenous Given 3/12/24 1801)   tenecteplase (TNKase) injection 14 mg (14 mg Intravenous Given 3/12/24 1835)       Diagnostic Studies  Results Reviewed       Procedure Component Value Units Date/Time    HS  Troponin 0hr (reflex protocol) [[id number]]  (Normal) Collected: 03/12/24 1812    Lab Status: Final result Specimen: Blood from Arm, Right Updated: 03/12/24 1842     hs TnI 0hr 8 ng/L     HS Troponin I 2hr [[id number]]     Lab Status: No result Specimen: Blood     Basic metabolic panel [[id number]]  (Abnormal) Collected: 03/12/24 1812    Lab Status: Final result Specimen: Blood from Arm, Right Updated: 03/12/24 1841     Sodium 133 mmol/L      Potassium 5.2 mmol/L      Chloride 104 mmol/L      CO2 24 mmol/L      ANION GAP 5 mmol/L      BUN 28 mg/dL      Creatinine 1.26 mg/dL      Glucose 107 mg/dL      Calcium 9.1 mg/dL      eGFR 35 ml/min/1.73sq m     Narrative:      National Kid[employer] Disease Foundation guidelines for Chronic Kidney Disease (CKD):     Stage 1 with normal or high GFR (GFR > 90 mL/min/1.73 square meters)    Stage 2 Mild CKD (GFR = 60-89 mL/min/1.73 square meters)    Stage 3A Moderate CKD (GFR = 45-59 mL/min/1.73 square meters)    Stage 3B Moderate CKD (GFR = 30-44 mL/min/1.73 square meters)    Stage 4 Severe CKD (GFR = 15-29 mL/min/1.73 square meters)    Stage 5 End Stage CKD (GFR <15 mL/min/1.73 square meters)  Note: GFR calculation is accurate only with a steady state creatinine    Protime-INR [[id number]]  (Abnormal) Collected: 03/12/24 1812    Lab Status: Final result Specimen: Blood from Arm, Right Updated: 03/12/24 1835     Protime 14.6 seconds      INR 1.12    APTT [[id number]]  (Abnormal) Collected: 03/12/24 1812    Lab Status: Final result Specimen: Blood from Arm, Right Updated: 03/12/24 1835     PTT 57 seconds     FLU/RSV/COVID - if FLU/RSV clinically relevant [[id number]] Collected: 03/12/24 1817    Lab Status: In process Specimen: Nares from Nose Updated: 03/12/24 1821    CBC and Platelet [[id number]]  (Abnormal) Collected: 03/12/24 1812    Lab Status: Final result Specimen: Blood from Arm, Right Updated: 03/12/24 1819     WBC 6.11 Thousand/uL      RBC 4.10 Million/uL      Hemoglobin 10.5 g/dL       Hematocrit 32.6 %      MCV 80 fL      MCH 25.6 pg      MCHC 32.2 g/dL      RDW 17.8 %      Platelets 210 Thousands/uL      MPV 9.6 fL     Fingerstick Glucose (POCT) [[id number]]  (Normal) Collected: 03/12/24 1751    Lab Status: Final result Updated: 03/12/24 1753     POC Glucose 97 mg/dl                    CTA stroke alert (head/neck)   Final Result by Jacinto Sparrow DO (03/12 1833)      4 intracranial aneurysms are identified unchanged from prior CT angiogram dated 4/17/2022. The largest of these is located at the right M1 bifurcation measuring 7 mm. Small aneurysms measuring 2 to 3 mm at the left M1 bifurcation and in the pericallosal    A2/A3 branches of the anterior circulation.      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 While the occlusion was present on the prior study from    2022, there is actually slight improvement of the distal V2 and proximal V3 segment.      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            Findings were directly discussed with Erika Velasquez at 6:25 p.m.                           Workstation performed: [id number]         CT stroke alert brain   Final Result by Jacinto Sparrow DO (03/12 1834)      No acute intracranial abnormality. No change      Findings were directly discussed with Erika Velasquez at approximately 6:25 p.m.      Workstation performed: [id number]         CT head wo contrast    (Results Pending)              Procedures  CriticalCare Time    Date/Time: 3/12/2024 6:00 PM    Performed by: Lata Killian MD  Authorized by: Lata Killian MD    Critical care provider statement:     Critical care time (minutes):  37    Critical care time was exclusive of:  Separately billable procedures and treating other patients and teaching time    Critical  care was necessary to treat or prevent imminent or life-threatening deterioration of the following conditions:  CNS failure or compromise    Critical care was time spent personally by me on the following activities:  Obtaining history from patient or surrogate, development of treatment plan with patient or surrogate, discussions with consultants, evaluation of patient's response to treatment, examination of patient, ordering and performing treatments and interventions, ordering and review of laboratory studies, review of old charts, re-evaluation of patient's condition and ordering and review of radiographic studies           ED Course  ED Course as of 03/12/24 1910   Tue Mar 12, 2024   1749 Pt with RUE weakness and ataxia, mild RLE weakness when compared to R. Pt not oriented to time but reported at baseline per son.    1753 POC Glucose: 97  WNL   1756 Dr. Velasquez of neurology aware. Stating can proceed with TNK if CT negative and pt/family give consent.    1819 Hemoglobin(!): 10.5  Anemia slightly worsened from most recent prior.    1819 CBC and Platelet(!)  Reviewed and without actionable derangement.    1826 Risk/benefit discussion had with pt and daughter. Daughter expressed understanding of risks and would like to proceed with TNK. Neuro made aware.    1835 CC made aware via TT.    1836 PTT(!): 57   1836 POCT INR: 1.12   1844 Basic metabolic panel(!)  Reviewed and without actionable derangement.    1844 hs TnI 0hr: 8  Will require delta.    1852 Pt reassessed. Mild improvement of RUE weakness. Now able to hold against gravity with effort. Still with ataxia.                   Stroke Assessment       Row Name 03/12/24 1754             NIH Stroke Scale    Interval Baseline      Level of Consciousness (1a.) 0      LOC Questions (1b.) 0      LOC Commands (1c.) 0      Best Gaze (2.) 0      Visual (3.) 0      Facial Palsy (4.) 0      Motor Arm, Left (5a.) 0      Motor Arm, Right (5b.) 2      Motor Leg, Left (6a.) 0       Motor Leg, Right (6b.) 0  No drift but weak against resistance when compared to L      Limb Ataxia (7.) 1      Sensory (8.) 0      Best Language (9.) 0      Dysarthria (10.) 0      Extinction and Inattention (11.) (Formerly Neglect) 0      Total 3                                SBIRT 20yo+      Flowsheet Row Most Recent Value   Initial Alcohol Screen: US AUDIT-C     1. How often do you have a drink containing alcohol? 0 Filed at: 03/12/2024 1752   Audit-C Score 0 Filed at: 03/12/2024 1752   ALYSSA: How many times in the past year have you...    Used an illegal drug or used a prescription medication for non-medical reasons? Never Filed at: 03/12/2024 1752                      Medical Decision Making  Pt is a 89yo F who presents with RUE weakness.    Differential diagnosis to include but not limited to CVA vs TIA.  See ED course for results and details.    Plan to admit pt to CC. Pt discussed with admitting team and admission orders placed. Pt admitted without incident.       Amount and/or Complexity of Data Reviewed  Labs: ordered. Decision-making details documented in ED Course.  Radiology: ordered.    Risk  Prescription drug management.  Decision regarding hospitalization.             Disposition  Final diagnoses:   Right arm weakness   Stroke-like symptoms     Time reflects when diagnosis was documented in both MDM as applicable and the Disposition within this note       Time User Action Codes Description Comment    3/12/2024  5:50 PM Lata Killian Add [R29.898] Right arm weakness     3/12/2024  6:38 PM Lata Killian Add [R29.90] Stroke-like symptoms     3/12/2024  6:38 PM Lata Killian Modify [R29.898] Right arm weakness     3/12/2024  6:38 PM Lata Killian Modify [R29.90] Stroke-like symptoms           ED Disposition       ED Disposition   Admit    Condition   Stable    Date/Time   Tue Mar 12, 2024 7918    Comment   Case was discussed with CC and the patient's admission status was agreed to be  to the service of Dr. Cortez.               Follow-up Information    None         Patient's Medications   Discharge Prescriptions    No medications on file       No discharge procedures on file.    PDMP Review         Value Time User    PDMP Reviewed  Yes 4/28/2023  7:33 PM Jorden Restrepo MD            ED Provider  Electronically Signed by             Lata Killian MD  03/12/24 3020

## 2024-03-12 NOTE — ED NOTES
Pt transported to ICU 12 via stretcher by this surinder RN and Neftaly COWAN. Verbal report given to Giselle COWAN at bedside. Daughter is awaiting in the family room by ICU. RN is aware.      Brit Harrison RN  03/12/24 1958

## 2024-03-12 NOTE — Clinical Note
Case was discussed with CC and the patient's admission status was agreed to be Admission Status: observation status to the service of Dr. Cortez.

## 2024-03-12 NOTE — QUICK NOTE
Svitlana Shahid is a 89 yo F with PMH Of HTN, HLD presents with onset of right sided weakness that began around 4pm. Her NIH is 3 plus some weakness without drift in RLE.     Her CTA head and neck reveals stable right M1 7mm aneurysm, small aneurysm at left M1, right vert occlusion.   No acute process.       TNK Decision: After a discussion of risks, benefits and alternatives (including best medical therapy excluding thrombolysis) reviewing inclusion and exclusion criteria the decision was made to proceed with thrombolytic therapy. Verbal consent was obtained from the patient and family.  Consent was obtained byDr. Killian  : I  advised the emergency room team to have an appropriate discussion confirming with the patient risks benefits and alternatives and to administer tenecteplase as soon as possible.    Admit to ICU for close monitoring  Follow post TNK guidelines  No AP/AC for 24 hrs  MRI brain  TTE       Reason for Consult / Principal Problem: stroke   Hx and PE limited by: none  Patient last known well: 4pm  Stroke alert called: 5:48pm  Neurology time of arrival: n/a, discussed plan with ED staff.

## 2024-03-12 NOTE — ASSESSMENT & PLAN NOTE
Patient presents with acute right sided weakness. Stroke alert was called in the ED  CT head revealed no acute abnormality   CTA revealed 4 intracranial aneurysms are identified unchanged from prior CT angiogram dated 4/17/2022   NIH  3; RUE weakness and RUE/RLE limb ataxia   S/P TNK at 1835    Plan:  Stroke pathway  Obtain MRI  Obtain TTE  PT/OT/ST  Repeat CT head 24 hours post TNK

## 2024-03-12 NOTE — H&P
[facility]  H&P  Name: Svitlana Shahid 89 y.o. female I MRN: [id number]  Unit/Bed#: ICU 12 I Date of Admission: 3/12/2024   Date of Service: 3/12/2024 I Hospital Day: 0      Assessment/Plan   * Stroke-like symptom  Assessment & Plan  Patient presents with acute right sided weakness. 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     Hypertension  Assessment & Plan  OP regimen consists of: amlodipine 10 mg daily  Goal BP <180/105    Stage 3 chronic kidney disease ([facility])  Assessment & Plan  Creatine 1.26 on admission, within baseline  Avoid nephrotoxins if able  Maintain MAP > 65  I&O's  Follow up with repeat BMP tomorrow evening    Depression  Assessment & Plan  OP regimen consists of: Remeron  Continue on admission     Acquired hypothyroidism  Assessment & Plan  Continue levothyroxine     Hyperlipidemia  Assessment & Plan  Op regimen consists of: 10 mg atorvastatin daily  Increase to 40 mg   Obtain lipid panel            History of Present Illness     HPI: Svitlana Shahid is a 89 y.o. who presents with ride sided weakness. She has a past medical history of CVA, hypertension, hyperlipidemia, depression and CKD. Patient has 24 hour home health aides, who noted acute weakness. Patient was brought to ED where stroke alert was activated. Imaging was obtained and negative for acute findings. NIH was 3 and decision was made to receive TNK. TNK was administered at 1835. Patient to be brought to ICU for stroke pathway.     Daughter was bedside in ED. Code status was discussed and confirmed to be  level 3 DNR/I.     History obtained from chart review, the patient, and the patient's daughter.  Review of Systems   Neurological:  Positive for weakness.     Disposition: Critical care  Historical Information   Past Medical History:  No date: Brain aneurysm  No date: CAD (coronary artery disease)  No date: CKD (chronic kidney disease), stage III ([facility])  No date: CVA (cerebral vascular accident) ([facility])      Comment:  9/2022  No date: Disease of thyroid gland      Comment:  hypo  No date: GERD (gastroesophageal reflux disease)  No date: Gout      Comment:  right shoulder  No date: Hyperlipidemia  No date: Hypertension  No date: Osteoarthritis (arthritis due to wear and tear of joints)  No date: Stroke ([facility]) Past Surgical History:  2010: CORONARY ARTERY BYPASS GRAFT      Comment:  x 4  2012: ENDOSCOPIC STENT PLACEMENT W/ MLB; Left  No date: HIATAL HERNIA REPAIR  No date: JOINT REPLACEMENT; Left      Comment:  knee 2001  No date: RENAL ARTERY STENT  No date: ROTATOR CUFF REPAIR  2013: TUBAL LIGATION   Current Outpatient Medications   Medication Instructions    amLODIPine (NORVASC) 10 mg, Oral, Daily at bedtime    atorvastatin (LIPITOR) 10 mg, Daily    docusate sodium (COLACE) 100 mg, Oral, 2 times daily    esomeprazole (NEXIUM) 40 mg, Oral, Daily    levothyroxine 75 mcg, Oral, Daily    magnesium oxide (MAG-OX) 400 mg, Oral, Daily    methocarbamol (ROBAXIN) 250 mg, Oral, 3 times daily PRN    mirtazapine (REMERON) 7.5 mg, Oral, Daily at bedtime    nitrofurantoin (MACRODANTIN) 50 mg, Oral, Daily at bedtime    pregabalin (LYRICA) 75 mg, Oral, Daily, At noon    traZODone (DESYREL) 25 mg, Oral, Daily at bedtime    Allergies   Allergen Reactions    Ace Inhibitors Cough    Acetaminophen Hives    Atorvastatin Cough    Clopidogrel Hives    Codeine Hives    Hydromorphone GI Intolerance    Ketorolac GI Intolerance    Oxycodone Hives     Percocet    Percocet [Oxycodone-Acetaminophen] Vomiting      Social History     Tobacco Use     Smoking status: Never    Smokeless tobacco: Never   Vaping Use    Vaping status: Never Used   Substance Use Topics    Alcohol use: Never    Drug use: Never    History reviewed. No pertinent family history.       Objective                            Vitals I/O      Most Recent Min/Max in 24hrs   Temp 98.4 °F (36.9 °C) Temp  Min: 97.4 °F (36.3 °C)  Max: 98.4 °F (36.9 °C)   Pulse 72 Pulse  Min: 71  Max: 80   Resp 20 Resp  Min: 18  Max: 24   /77 BP  Min: 161/77  Max: 177/79   O2 Sat 97 % SpO2  Min: 96 %  Max: 98 %    No intake or output data in the 24 hours ending 03/12/24 1959    No diet orders on file    Invasive Monitoring           Physical Exam   Physical Exam  Vitals reviewed.   Eyes:      Extraocular Movements: Extraocular movements intact.      Pupils: Pupils are equal, round, and reactive to light.   Skin:     General: Skin is warm and dry.   HENT:      Head: Normocephalic.      Mouth/Throat:      Mouth: Mucous membranes are moist.   Cardiovascular:      Rate and Rhythm: Normal rate and regular rhythm.      Pulses: Normal pulses.   Musculoskeletal:         General: Normal range of motion.      Right lower leg: No edema.      Left lower leg: No edema.   Abdominal:      Palpations: Abdomen is soft.   Constitutional:       General: She is not in acute distress.  Pulmonary:      Effort: Pulmonary effort is normal. No accessory muscle usage, respiratory distress or accessory muscle usage.      Breath sounds: No wheezing, rhonchi or rales.   Neurological:      Mental Status: She is alert and oriented to person, place and time.      Cranial Nerves: No dysarthria or facial asymmetry.      Motor: Weakness.      Coordination: Finger-Nose-Finger Test abnormal and Heel to Meyer Test abnormal.            Diagnostic Studies      EKG: NSR  Imaging:  I have personally reviewed pertinent reports.      CT stroke alert brain: No acute intracranial abnormality. No change     CTA stroke alert: 4 intracranial aneurysms are  identified unchanged from prior CT angiogram dated 4/17/2022. The largest of these is located at the right M1 bifurcation measuring 7 mm. Small aneurysms measuring 2 to 3 mm at the left M1 bifurcation and in the pericallosal   A2/A3 branches of the anterior circulation.     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 While the occlusion was present on the prior study from   2022, there is actually slight improvement of the distal V2 and proximal V3 segment.     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     Medications:  Scheduled PRN        Continuous    No current facility-administered medications for this encounter.       Labs:    CBC    Recent Labs     03/12/24  1812   WBC 6.11   HGB 10.5*   HCT 32.6*        BMP    Recent Labs     03/12/24  1812   SODIUM 133*   K 5.2      CO2 24   AGAP 5   BUN 28*   CREATININE 1.26   CALCIUM 9.1       Coags    Recent Labs     03/12/24  1812   INR 1.12   PTT 57*        Additional Electrolytes  No recent results       Blood Gas    No recent results  No recent results LFTs  No recent results    Infectious  No recent results  Glucose  Recent Labs     03/12/24  1812   GLUC 107             Anticipated Length of Stay is > 2 midnights  VARSHA Lag[place]

## 2024-03-12 NOTE — ASSESSMENT & PLAN NOTE
Creatine 1.26 on admission, within baseline  Avoid nephrotoxins if able  Maintain MAP > 65  I&O's  Follow up with repeat BMP tomorrow evening

## 2024-03-13 ENCOUNTER — APPOINTMENT (INPATIENT)
Dept: RADIOLOGY | Facility: HOSPITAL | Age: 89
DRG: 062 | End: 2024-03-13
Payer: MEDICARE

## 2024-03-13 ENCOUNTER — APPOINTMENT (INPATIENT)
Dept: NON INVASIVE DIAGNOSTICS | Facility: HOSPITAL | Age: 89
DRG: 062 | End: 2024-03-13
Payer: MEDICARE

## 2024-03-13 PROBLEM — T14.8XXA TENDON TEAR: Status: RESOLVED | Noted: 2019-07-30 | Resolved: 2024-03-13

## 2024-03-13 PROBLEM — Z91.81 STATUS POST FALL: Status: RESOLVED | Noted: 2019-07-31 | Resolved: 2024-03-13

## 2024-03-13 PROBLEM — R79.89 ELEVATED TROPONIN: Status: RESOLVED | Noted: 2021-06-23 | Resolved: 2024-03-13

## 2024-03-13 PROBLEM — M25.551 RIGHT HIP PAIN: Status: RESOLVED | Noted: 2022-07-07 | Resolved: 2024-03-13

## 2024-03-13 PROBLEM — D64.9 ANEMIA: Status: RESOLVED | Noted: 2019-07-31 | Resolved: 2024-03-13

## 2024-03-13 PROBLEM — J06.9 URI (UPPER RESPIRATORY INFECTION): Status: RESOLVED | Noted: 2019-07-15 | Resolved: 2024-03-13

## 2024-03-13 PROBLEM — R53.1 GENERALIZED WEAKNESS: Status: RESOLVED | Noted: 2019-07-15 | Resolved: 2024-03-13

## 2024-03-13 PROBLEM — R10.30 LOWER ABDOMINAL PAIN: Status: RESOLVED | Noted: 2019-05-21 | Resolved: 2024-03-13

## 2024-03-13 LAB
ANION GAP SERPL CALCULATED.3IONS-SCNC: 6 MMOL/L (ref 4–13)
AORTIC ROOT: 3 CM
APICAL FOUR CHAMBER EJECTION FRACTION: 67 %
ATRIAL RATE: 74 BPM
BASOPHILS # BLD AUTO: 0.05 THOUSANDS/ÂΜL (ref 0–0.1)
BASOPHILS NFR BLD AUTO: 1 % (ref 0–1)
BSA FOR ECHO PROCEDURE: 1.45 M2
BUN SERPL-MCNC: 24 MG/DL (ref 5–25)
CALCIUM SERPL-MCNC: 10 MG/DL (ref 8.4–10.2)
CHLORIDE SERPL-SCNC: 102 MMOL/L (ref 96–108)
CHOLEST SERPL-MCNC: 170 MG/DL
CO2 SERPL-SCNC: 27 MMOL/L (ref 21–32)
CREAT SERPL-MCNC: 1.29 MG/DL (ref 0.6–1.3)
E WAVE DECELERATION TIME: 245 MS
E/A RATIO: 1.14
EOSINOPHIL # BLD AUTO: 0.35 THOUSAND/ÂΜL (ref 0–0.61)
EOSINOPHIL NFR BLD AUTO: 6 % (ref 0–6)
ERYTHROCYTE [DISTWIDTH] IN BLOOD BY AUTOMATED COUNT: 18 % (ref 11.6–15.1)
EST. AVERAGE GLUCOSE BLD GHB EST-MCNC: 117 MG/DL
FRACTIONAL SHORTENING: 35 (ref 28–44)
GFR SERPL CREATININE-BSD FRML MDRD: 34 ML/MIN/1.73SQ M
GLUCOSE SERPL-MCNC: 97 MG/DL (ref 65–140)
HBA1C MFR BLD: 5.7 %
HCT VFR BLD AUTO: 36.7 % (ref 34.8–46.1)
HDLC SERPL-MCNC: 41 MG/DL
HGB BLD-MCNC: 12 G/DL (ref 11.5–15.4)
IMM GRANULOCYTES # BLD AUTO: 0.01 THOUSAND/UL (ref 0–0.2)
IMM GRANULOCYTES NFR BLD AUTO: 0 % (ref 0–2)
INTERVENTRICULAR SEPTUM IN DIASTOLE (PARASTERNAL SHORT AXIS VIEW): 1.1 CM
INTERVENTRICULAR SEPTUM: 1.1 CM (ref 0.6–1.1)
LAAS-AP2: 21.6 CM2
LAAS-AP4: 18 CM2
LDLC SERPL CALC-MCNC: 97 MG/DL (ref 0–100)
LEFT ATRIUM SIZE: 4.2 CM
LEFT ATRIUM VOLUME (MOD BIPLANE): 58 ML
LEFT ATRIUM VOLUME INDEX (MOD BIPLANE): 40 ML/M2
LEFT INTERNAL DIMENSION IN SYSTOLE: 2.6 CM (ref 2.1–4)
LEFT VENTRICULAR INTERNAL DIMENSION IN DIASTOLE: 4 CM (ref 3.5–6)
LEFT VENTRICULAR POSTERIOR WALL IN END DIASTOLE: 1.2 CM
LEFT VENTRICULAR STROKE VOLUME: 44 ML
LVSV (TEICH): 44 ML
LYMPHOCYTES # BLD AUTO: 2.06 THOUSANDS/ÂΜL (ref 0.6–4.47)
LYMPHOCYTES NFR BLD AUTO: 33 % (ref 14–44)
MAGNESIUM SERPL-MCNC: 1.8 MG/DL (ref 1.9–2.7)
MCH RBC QN AUTO: 25.8 PG (ref 26.8–34.3)
MCHC RBC AUTO-ENTMCNC: 32.7 G/DL (ref 31.4–37.4)
MCV RBC AUTO: 79 FL (ref 82–98)
MONOCYTES # BLD AUTO: 0.59 THOUSAND/ÂΜL (ref 0.17–1.22)
MONOCYTES NFR BLD AUTO: 10 % (ref 4–12)
MV E'TISSUE VEL-LAT: 12 CM/S
MV E'TISSUE VEL-SEP: 5 CM/S
MV PEAK A VEL: 0.92 M/S
MV PEAK E VEL: 105 CM/S
MV STENOSIS PRESSURE HALF TIME: 71 MS
MV VALVE AREA P 1/2 METHOD: 3.1
NEUTROPHILS # BLD AUTO: 3.18 THOUSANDS/ÂΜL (ref 1.85–7.62)
NEUTS SEG NFR BLD AUTO: 50 % (ref 43–75)
NRBC BLD AUTO-RTO: 0 /100 WBCS
P AXIS: 31 DEGREES
PHOSPHATE SERPL-MCNC: 3.3 MG/DL (ref 2.3–4.1)
PLATELET # BLD AUTO: 230 THOUSANDS/UL (ref 149–390)
PMV BLD AUTO: 9.3 FL (ref 8.9–12.7)
POTASSIUM SERPL-SCNC: 4.4 MMOL/L (ref 3.5–5.3)
PR INTERVAL: 144 MS
QRS AXIS: -37 DEGREES
QRSD INTERVAL: 98 MS
QT INTERVAL: 398 MS
QTC INTERVAL: 441 MS
RBC # BLD AUTO: 4.65 MILLION/UL (ref 3.81–5.12)
RIGHT ATRIUM AREA SYSTOLE A4C: 9.1 CM2
RIGHT VENTRICLE ID DIMENSION: 2.9 CM
SL CV LEFT ATRIUM LENGTH A2C: 5.8 CM
SL CV LV EF: 65
SL CV PED ECHO LEFT VENTRICLE DIASTOLIC VOLUME (MOD BIPLANE) 2D: 68 ML
SL CV PED ECHO LEFT VENTRICLE SYSTOLIC VOLUME (MOD BIPLANE) 2D: 25 ML
SODIUM SERPL-SCNC: 135 MMOL/L (ref 135–147)
T WAVE AXIS: 113 DEGREES
TR MAX PG: 42 MMHG
TR PEAK VELOCITY: 3.2 M/S
TRICUSPID ANNULAR PLANE SYSTOLIC EXCURSION: 1.5 CM
TRICUSPID VALVE PEAK REGURGITATION VELOCITY: 3.24 M/S
TRIGL SERPL-MCNC: 158 MG/DL
VENTRICULAR RATE: 74 BPM
WBC # BLD AUTO: 6.24 THOUSAND/UL (ref 4.31–10.16)

## 2024-03-13 PROCEDURE — 97163 PT EVAL HIGH COMPLEX 45 MIN: CPT

## 2024-03-13 PROCEDURE — 70551 MRI BRAIN STEM W/O DYE: CPT

## 2024-03-13 PROCEDURE — 97110 THERAPEUTIC EXERCISES: CPT

## 2024-03-13 PROCEDURE — 92610 EVALUATE SWALLOWING FUNCTION: CPT

## 2024-03-13 PROCEDURE — 83735 ASSAY OF MAGNESIUM: CPT | Performed by: NURSE PRACTITIONER

## 2024-03-13 PROCEDURE — 93306 TTE W/DOPPLER COMPLETE: CPT | Performed by: INTERNAL MEDICINE

## 2024-03-13 PROCEDURE — 93306 TTE W/DOPPLER COMPLETE: CPT

## 2024-03-13 PROCEDURE — 70450 CT HEAD/BRAIN W/O DYE: CPT

## 2024-03-13 PROCEDURE — 99223 1ST HOSP IP/OBS HIGH 75: CPT | Performed by: PSYCHIATRY & NEUROLOGY

## 2024-03-13 PROCEDURE — 93010 ELECTROCARDIOGRAM REPORT: CPT | Performed by: INTERNAL MEDICINE

## 2024-03-13 PROCEDURE — 85025 COMPLETE CBC W/AUTO DIFF WBC: CPT | Performed by: NURSE PRACTITIONER

## 2024-03-13 PROCEDURE — 97167 OT EVAL HIGH COMPLEX 60 MIN: CPT

## 2024-03-13 PROCEDURE — 80048 BASIC METABOLIC PNL TOTAL CA: CPT | Performed by: NURSE PRACTITIONER

## 2024-03-13 PROCEDURE — 99233 SBSQ HOSP IP/OBS HIGH 50: CPT | Performed by: STUDENT IN AN ORGANIZED HEALTH CARE EDUCATION/TRAINING PROGRAM

## 2024-03-13 PROCEDURE — 84100 ASSAY OF PHOSPHORUS: CPT | Performed by: NURSE PRACTITIONER

## 2024-03-13 RX ORDER — ENOXAPARIN SODIUM 100 MG/ML
30 INJECTION SUBCUTANEOUS
Status: DISCONTINUED | OUTPATIENT
Start: 2024-03-14 | End: 2024-03-16 | Stop reason: HOSPADM

## 2024-03-13 RX ORDER — ASPIRIN 81 MG/1
81 TABLET, CHEWABLE ORAL DAILY
Status: DISCONTINUED | OUTPATIENT
Start: 2024-03-13 | End: 2024-03-16 | Stop reason: HOSPADM

## 2024-03-13 RX ORDER — MAGNESIUM SULFATE HEPTAHYDRATE 40 MG/ML
2 INJECTION, SOLUTION INTRAVENOUS ONCE
Status: COMPLETED | OUTPATIENT
Start: 2024-03-13 | End: 2024-03-14

## 2024-03-13 RX ORDER — HYDRALAZINE HYDROCHLORIDE 20 MG/ML
10 INJECTION INTRAMUSCULAR; INTRAVENOUS EVERY 6 HOURS PRN
Status: DISCONTINUED | OUTPATIENT
Start: 2024-03-13 | End: 2024-03-16 | Stop reason: HOSPADM

## 2024-03-13 RX ORDER — CLOPIDOGREL BISULFATE 75 MG/1
75 TABLET ORAL DAILY
Status: DISCONTINUED | OUTPATIENT
Start: 2024-03-14 | End: 2024-03-16 | Stop reason: HOSPADM

## 2024-03-13 RX ADMIN — MAGNESIUM SULFATE HEPTAHYDRATE 2 G: 40 INJECTION, SOLUTION INTRAVENOUS at 22:26

## 2024-03-13 RX ADMIN — MIRTAZAPINE 7.5 MG: 15 TABLET, FILM COATED ORAL at 22:27

## 2024-03-13 RX ADMIN — ATORVASTATIN CALCIUM 40 MG: 40 TABLET, FILM COATED ORAL at 17:09

## 2024-03-13 RX ADMIN — ASPIRIN 81 MG 81 MG: 81 TABLET ORAL at 20:00

## 2024-03-13 RX ADMIN — CHLORHEXIDINE GLUCONATE 15 ML: 1.2 SOLUTION ORAL at 20:21

## 2024-03-13 RX ADMIN — AMLODIPINE BESYLATE 10 MG: 10 TABLET ORAL at 22:27

## 2024-03-13 RX ADMIN — HYDRALAZINE HYDROCHLORIDE 10 MG: 20 INJECTION INTRAMUSCULAR; INTRAVENOUS at 00:15

## 2024-03-13 RX ADMIN — LEVOTHYROXINE SODIUM 75 MCG: 75 TABLET ORAL at 05:47

## 2024-03-13 RX ADMIN — CHLORHEXIDINE GLUCONATE 15 ML: 1.2 SOLUTION ORAL at 08:26

## 2024-03-13 RX ADMIN — PREGABALIN 75 MG: 75 CAPSULE ORAL at 11:51

## 2024-03-13 NOTE — PHYSICAL THERAPY NOTE
PHYSICAL THERAPY EVALUATION/TREATMENT     03/13/24 1430   PT Last Visit   PT Visit Date 03/13/24   Note Type   Note type Evaluation   Pain Assessment   Pain Assessment Tool Cisse-Baker FACES   Cisse-Baker FACES Pain Rating 6  (Right knee area)   Restrictions/Precautions   Braces or Orthoses   (Left wrist brace)   Other Precautions Chair Alarm;Bed Alarm;Multiple lines;Fall Risk;Pain   Home Living   Type of Home House;Apartment   Home Layout One level  (No stairs to enter)   Bathroom Equipment Commode;Shower chair   Home Equipment Walker;Wheelchair-manual  (Rollator)   Additional Comments Patient is a questionable historian although as per the past documentation patient ambulates short distances with a walker with assist   Prior Function   Level of [place] Needs assistance with ADLs;Needs assistance with functional mobility;Needs assistance with IADLS   Lives With Alone   Receives Help From Family;Home health   IADLs Family/Friend/Other provides transportation;Family/Friend/Other provides meals;Family/Friend/Other provides medication management   Comments As per past documentation patient has a caregiver 5 days a week and spends weekends with her daughters   General   Additional Pertinent History Chart reviewed, patient admitted with strokelike symptoms.  Right-sided weakness now with new finding of stroke.  Patient also with history of CVA.  Patient was requiring assist with mobility and ADLs prior to admission   Family/Caregiver Present No   Cognition   Overall Cognitive Status Impaired   Arousal/Participation Cooperative   Attention Attends with cues to redirect   Orientation Level Oriented to person;Oriented to place   Following Commands Follows one step commands with increased time or repetition   Subjective   Subjective Patient reports discomfort in right knee   RLE Assessment   RLE Assessment   (ROM WFL, strength 3+/5)   LLE Assessment   LLE Assessment X  (ROM WFL, strength 3+/5)   Coordination    Movements are Fluid and Coordinated 0   Bed Mobility   Supine to Sit 3  Moderate assistance   Additional items Assist x 1   Sit to Supine 3  Moderate assistance   Additional items Assist x 1   Transfers   Sit to Stand 3  Moderate assistance   Additional items Assist x 1   Stand to Sit 3  Moderate assistance   Additional items Assist x 1   Ambulation/Elevation   Gait Assistance 2  Maximal assist   Additional items Assist x 1;Verbal cues;Tactile cues   Assistive Device Rolling walker   Distance 2 feet with assist for weight shifting and ataxic type patterning with BLEs at times.   Balance   Static Sitting Fair   Dynamic Sitting Fair -   Static Standing Poor +   Dynamic Standing Poor +   Ambulatory Poor +   Activity Tolerance   Activity Tolerance Patient limited by fatigue;Treatment limited secondary to medical complications (Comment)  (seen in ICU s/p TNK, no OOB in chair at this time as per nurse)   Nurse Made Aware Yes   Assessment   Prognosis Good   Problem List Decreased strength;Decreased range of motion;Decreased endurance;Impaired balance;Decreased mobility;Decreased coordination   Goals   Patient Goals go home   STG Expiration Date 03/20/24   Short Term Goal #1 Transfers and gait with  walker with min assist   Short Term Goal #2 Gait endurance to 20 feet   LTG Expiration Date 03/27/24   Long Term Goal #1 Strength bilateral lower extremities 3+/4 -   Long Term Goal #2 Transfers and gait with roller walker with supervision for transfers to wheelchair and commode   Discharge Recommendation   Rehab Resource Intensity Level, PT III (Minimum Resource Intensity)   AM-PAC Basic Mobility Inpatient   Turning in Flat Bed Without Bedrails 2   Lying on Back to Sitting on Edge of Flat Bed Without Bedrails 2   Moving Bed to Chair 2   Standing Up From Chair Using Arms 2   Walk in Room 2   Climb 3-5 Stairs With Railing 1   Basic Mobility Inpatient Raw Score 11   Basic Mobility Standardized Score 30.25   Highest Level Of  Mobility   -[facility] Goal 4: Move to chair/commode   -HL Achieved 6: Walk 10 steps or more   Additional Treatment Session   Start Time 1415   End Time 1430   Treatment Assessment s: Patient reports pain in right lower leg knee area O: Bilateral lower extremity exercise completed as listed below a: Patient will benefit from progression of gait activity and out of bed as medically allowed with continued PT progression as tolerated and increasing functional mobility with clinical staff as well   Exercises   Hamstring Stretch Supine;5 reps;PROM   Heelslides Supine;5 reps;Bilateral   Hip Flexion Sitting;5 reps;AAROM   Balance training  Sitting balance and standing balance activity completed with weight shifting   Licensure   [place] License Number  Macy Mccormick PT [id number]

## 2024-03-13 NOTE — CONSULTS
[facility]   Neurology Initial Consult    Svitlana Shahid is a 89 y.o. female  ICU 12/ICU 12          Information obtained from:   Chief Complaint   Patient presents with    STROKE Alert     Brought by son in law , he states patient started with right sided weakness 1 hour ago ( patient states started 3 or 4 oclock ). Provider at bedside performing neuro assessment and stroke alert called          Assessment/Plan:  1.  Acute right frontal lobe infarct  2.  Chronic CVA  3.  Multiple cerebral aneurysms  4.  HTN  5.  HLD  6.  CAD    -Monitored in intensive care under [facility] protocol  -Neuro assessments per [facility] protocol  -Monitor for neurochanges, stat CT of the head as needed  -Start baby aspirin 81 mg daily today  -Start Plavix 75 mg daily as of 3/14/2024.-To note patient has recorded history of Plavix allergy however per patient's family conversation with attending neurology MD, she had been on Plavix after the initial allergic reaction and has had no further issues with hives on this medication  -Lipitor 80 mg daily  -MRI of the brain-positive Tiny left frontal lobe infarct  -Echocardiogram with shunt study EF of 65%, left atrium mildly dilated, no PFO or septal communication, right atrium normal in size.  -Lipid profile and A1c-hemoglobin A1c 5.7, LDL 97  -PT/OT/ST to evaluate patient for functional needs.  With stroke on board would recommend patient have rehabilitation.    Patient is a 89-year-old female with PMH of CVA, cerebral aneurysms, HTN, HLD and CAD who was brought to the ER with increased weakness of the right upper extremity.  At baseline patient does have some right upper extremity weakness however per family she was unable to hold her arm up against gravity therefore she was brought to the ER.  She had an NIH of 3 upon arrival, her blood pressure was 171/89.  She was a stroke alert and sent for CAT scan.  CT of the head negative for any acute abnormalities or bleeds.  CTA of the head and neck  shows right vertebral artery occlusion, mild stenosis at ICA and 4 cerebral angiogram, largest 7 mm in the right M1 bifurcation, 2 mm and left M1 bifurcation and 2 small aneurysms noted at A2 and A3 branches.  Patient was well within the window of treatment for CVA, she received TNK and was admitted to the ICU.  Patient is scheduled for MRI and follow-up CT 24 hours post TNK.  She is to have no AC or AP therapies for 24 hours post TNK.  She will have echocardiogram, lipid profile and A1c for further risk management and measurement.  PT/OT/ST per the stroke pathway.  Patient to have stat CT of the head for any neurological changes or complaints.  Patient had MRI of the brain this afternoon, was positive for tiny posterior left frontal lobe infarct with no hemorrhage or mass effect.  Stable right MCA aneurysm.  Patient is alert and oriented, she is moving upper extremities, she continues to be weaker in the right upper extremity compared to her baseline.  Patient's family states that she is able to move her right upper extremity at home post stroke however is very limited in this.  She has learned to use her left hand for other things such as eating and drinking, she uses her right hand very minimally.  She has some contractures to the right upper extremity and her hand.  She also has some contractures to the left fingers as well.  Patient has minimal strength and movement to the lower extremities, she does ambulate however has to use a walker at her baseline.  She is able to slightly raise her legs up bilaterally, seems to have equal strength bilaterally in the lower extremities.  Her sensations are equal, reflexes are equal and she does have ataxia noted on finger-to-nose testing to the bilateral upper extremities.  Patient had positive findings for acute ischemic event, she should be on dual antiplatelet therapies with significant vascular disease with right vertebral artery occlusion, will continue her on 3-month  DAPT therapy followed by baby aspirin 81 mg daily.  She can continue on atorvastatin, her LDL slightly above goal range therefore would increase her to 40 mg daily.      Svitlana Shahid will need follow up in 8-10 weeks with general attending or advance practitioner. She will not require outpatient neurological testing.      HPI:  Svitlana Shahid is a 89yo female with PMH of HTN, HLD, CVA, cerebral aneurysms, CAD with stents and arthritis who was brought to the ER after having increased weakness in the right upper extremity.  At baseline patient has some right-sided upper extremity weakness however was unable to hold her arm up against gravity, per family significant change from her prior deficits.  On arrival her blood pressure was 171/89, she had an NIH score of 3 due to her right upper extremity ataxia and inability is to hold above gravity.  Patient was a stroke alert at that time, she was taken for CAT scans, CT of the head was negative for any bleed, CTA of the head and neck included occlusion of the right vertebral artery, mild atherosclerotic disease of the ICA and for cerebral angiograms, the largest at the right M1 bifurcation measuring 7 mm.  She has 2 mm finding in the left M1 bifurcation and 2 small aneurysms in A2/A3 branch.  Patient was within the window of time, she had no significant contraindications therefore was eligible for TNK.  TNK was given and patient was admitted to the ICU.  On arrival to the ICU patient was able to have some movement in the right upper extremity, was able to lift against gravity independently however continued to have significant weakness.  Patient is scheduled for MRI and repeat CT of the head today, AP and AC therapies are on hold for 24 hours.  Met with patient and her family at bedside, patient has no significant complaints at this time.  She notes improvement in her right upper extremity, still limited with elevation, gets to approximately 90 degrees with increased shaking and  tremor.  She has positive ataxia to the upper extremities, right greater than left.  She has limited strength to the lower extremities bilaterally, able to lift them mildly up off the bed, unable to provide heel-to-shin coordination testing.  Patient does have contractures to the bilateral hands, right greater than left, family notes that her right upper extremity tends to be more contracted as well second to her prior stroke.  Patient is pleasant, conversant and oriented.  She speaks Mongolian primarily, fluid with her daughter who is translating.  Patient went for MRI of the brain this afternoon that showed positive acute infarct in the posterior left frontal lobe, no acute hemorrhage or mass effect seen.  She has known aneurysm which is grossly stable.  Patient has not been on AP therapies at home, she has been on Lipitor 10 mg only.  Her LDL is above vascular goal range, she is diabetic with slightly elevated A1c.  She has significant vascular disease with chronically occluded right vertebral artery, she has multiple aneurysm x 4.  Due to vascular disease recommend patient remain on dual antiplatelet therapies x 3 months followed by baby aspirin 81 mg daily and Lipitor 40 mg daily.  To note patient has history of Plavix allergy with hives, however patient's family reports to attending neurology MD she has had Plavix in the past after her initial reaction and has had no further issues with the medication.  Will start on baby aspirin 81 mg daily today post TNK treatment with AP therapies, followed by Plavix 75 mg daily to start tomorrow.  Patient is 89 with risk of bleeding therefore will defer loading doses.      Past Medical History:   Diagnosis Date    Brain aneurysm     CAD (coronary artery disease)     CKD (chronic kidney disease), stage III ([facility])     CVA (cerebral vascular accident) ([facility])     9/2022    Disease of thyroid gland     hypo    GERD (gastroesophageal reflux disease)     Gout     right shoulder     Hyperlipidemia     Hypertension     Osteoarthritis (arthritis due to wear and tear of joints)     Stroke (HCC)        Past Surgical History:   Procedure Laterality Date    CORONARY ARTERY BYPASS GRAFT  2010    x 4    ENDOSCOPIC STENT PLACEMENT W/ MLB Left 2012    HIATAL HERNIA REPAIR      JOINT REPLACEMENT Left     knee 2001    RENAL ARTERY STENT      ROTATOR CUFF REPAIR      TUBAL LIGATION  2013       Allergies   Allergen Reactions    Ace Inhibitors Cough    Acetaminophen Hives    Atorvastatin Cough    Clopidogrel Hives    Codeine Hives    Hydromorphone GI Intolerance    Ketorolac GI Intolerance    Oxycodone Hives     Percocet    Percocet [Oxycodone-Acetaminophen] Vomiting         Current Facility-Administered Medications:     amLODIPine (NORVASC) tablet 10 mg, 10 mg, Oral, HS, Cassie Monkne, CRNP, 10 mg at 03/12/24 2337    atorvastatin (LIPITOR) tablet 40 mg, 40 mg, Oral, Daily With Dinner, Cassie Monkne, CRNP    chlorhexidine (PERIDEX) 0.12 % oral rinse 15 mL, 15 mL, Mouth/Throat, Q12H LAURA, Cassie Monkne, CRNP, 15 mL at 03/13/24 0826    hydrALAZINE (APRESOLINE) injection 10 mg, 10 mg, Intravenous, Q6H PRN, Cassie Monkne, CRNP, 10 mg at 03/13/24 0015    influenza vaccine, high-dose (FLUZONE HIGH-DOSE) IM injection OPAL 0.7 mL, 0.7 mL, Intramuscular, Once, Wali Cortez DO    levothyroxine tablet 75 mcg, 75 mcg, Oral, Early Morning, Cassie Monkne, CRNP, 75 mcg at 03/13/24 0547    mirtazapine (REMERON) tablet 7.5 mg, 7.5 mg, Oral, HS, Cassie Monkne, CRNP    pneumococcal 20-artur conj vacc (PREVNAR 20) IM Injection 0.5 mL, 0.5 mL, Intramuscular, Once, Wali Cortez DO    pregabalin (LYRICA) capsule 75 mg, 75 mg, Oral, Daily, Cassie Monkne, CRNP    Social History     Socioeconomic History    Marital status:      Spouse name: Not on file    Number of children: Not on file    Years of education: Not on file    Highest education level: Not on file  "  Occupational History    Not on file   Tobacco Use    Smoking status: Never    Smokeless tobacco: Never   Vaping Use    Vaping status: Never Used   Substance and Sexual Activity    Alcohol use: Never    Drug use: Never    Sexual activity: Not Currently   Other Topics Concern    Not on file   Social History Narrative    Not on file     Social Determinants of Health     Financial Resource Strain: Not on file   Food Insecurity: No Food Insecurity (3/12/2024)    Hunger Vital Sign     Worried About Running Out of Food in the Last Year: Never true     Ran Out of Food in the Last Year: Never true   Transportation Needs: No Transportation Needs (3/12/2024)    PRAPARE - Transportation     Lack of Transportation (Medical): No     Lack of Transportation (Non-Medical): No   Physical Activity: Not on file   Stress: Not on file   Social Connections: Not on file   Intimate Partner Violence: Not on file   Housing Stability: Low Risk  (3/12/2024)    Housing Stability Vital Sign     Unable to Pay for Housing in the Last Year: No     Number of Places Lived in the Last Year: 1     Unstable Housing in the Last Year: No       History reviewed. No pertinent family history.      Review of systems:  Please see HPI for positive symptoms.   Constitutional: No fever, no chills, no weight change.   Ocular: No diplopia, no blurred vision, spots/zigzag lines  HEENT:  No sore throat, headache or congestion.   COR:  No chest pain. No palpitations.   Lungs:  no sob  GI:  no  nausea, no vomiting, no diarrhea, no constipation, no anorexia.   :  No dysuria, frequency, or urgency. No hematuria.    Musculoskeletal:  No joint pain or swelling   Skin:  No rash or itching.   Psychiatric:  no anxiety, no depression.   Endocrine:  No polyuria or polydipsia.    Physical examination:  /77   Pulse 60   Temp 98 °F (36.7 °C) (Tympanic)   Resp 18   Ht 4' 9\" (1.448 m)   Wt 54.9 kg (121 lb 0.5 oz)   SpO2 94%   BMI 26.19 kg/m²     GENERAL APPEARANCE:  " The patient is alert, oriented.    HEENT:  Head is normocephalic.  Pupils are equal and reactive.    NECK:  Supple without lymphadenopathy.   HEART:  Regular rate and rhythm.  LUNGS: No audible wheezing or stridor heard  ABDOMEN:  Soft, nontender, nondistended     EXTREMITIES:  Without cyanosis, clubbing or edema.     Mental status:  The patient is alert, attentive, and oriented.   Speech is clear and fluent, good repetition, comprehension, and naming.   Cranial nerves:  CN II: Visual fields are full to confrontation.   Fundoscopic exam is normal with sharp discs and no vascular changes.  Pupils are 3 mm and briskly reactive to light.   CN III, IV, VI: At primary gaze, there is no eye deviation.   CN V: Facial sensation is intact in all 3 divisions bilaterally.   Corneal responses are intact.  CN VII: Face is symmetric with normal eye closure and smile.  CN VIII: Hearing is normal to rubbing fingers  CN IX, X: Palate elevates symmetrically. Phonation is normal.  CN XI: Head turning and shoulder shrug are intact  CN XII: Tongue is midline with normal movements and no atrophy.  Motor:  There is + pronator drift of out-stretched right arm.    Muscle bulk are normal With increased tone to the upper extremities, right greater than left  Muscle exam  Arm Right Left Leg Right Left   Deltoid 3+/5 4+/5 Iliopsoas 3+/5 3+/5   Biceps 3+/5 4+/5 Quads 3+/5 3+/5   Triceps 3+/5 4+/5 Hamstrings 3+/5 3+/5   Wrist Extension 4/5 4+/5 Ankle Dorsi Flexion 4/5 4/5   Wrist Flexion 4/5 4+/5 Ankle Plantar Flexion 4/5 4/5        Reflexes    RJ BJ TJ KJ AJ Plantars Dennis's   Right 2+ 2+  2+ 2+ Downgoing Not present   Left 2+ 2+  2+ 2+ Downgoing Not present      Sensory:  Light touch, Temperature, position sense, and vibration sense are intact in fingers and toes.  Coordination:  Rapid alternating movements and fine finger movements are limited due to contractures of bilateral hands, right greater than left  There is +dysmetria on  finger-to-nose in bilateral upper extremities, right greater than left and unable to test heel-knee-shin.   There are no abnormal or extraneous movements.   Deferred for safety Romberg  Gait/Stance:  Deferred gait testing due to safety    Lab Results   Component Value Date    WBC 6.11 03/12/2024    HGB 10.5 (L) 03/12/2024    HCT 32.6 (L) 03/12/2024    MCV 80 (L) 03/12/2024     03/12/2024     Lab Results   Component Value Date    HGBA1C 6.2 (H) 03/16/2018     Lab Results   Component Value Date    ALT 4 (L) 06/13/2023    AST 12 (L) 06/13/2023    ALKPHOS 95 06/13/2023     Lab Results   Component Value Date    CALCIUM 9.1 03/12/2024    K 5.2 03/12/2024    CO2 24 03/12/2024     03/12/2024    BUN 28 (H) 03/12/2024    CREATININE 1.26 03/12/2024     Cholesterol 170  LDL 97  Hemoglobin A1c 5.7      Review of reports and notes reveal:  Independent Interpretation of images or specimens:  CT stroke alert brain    Result Date: 3/12/2024  No acute intracranial abnormality. No change Findings were directly discussed with Erika Velasquez at approximately 6:25 p.m. Workstation performed: [id number]     CTA stroke alert (head/neck)    Result Date: 3/12/2024  4 intracranial aneurysms are identified unchanged from prior CT angiogram dated 4/17/2022. The largest of these is located at the right M1 bifurcation measuring 7 mm. Small aneurysms measuring 2 to 3 mm at the left M1 bifurcation and in the pericallosal A2/A3 branches of the anterior circulation. 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 While the occlusion was present on the prior study from 2022, there is actually slight improvement of the distal V2 and proximal V3 segment. 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 Findings were directly discussed with Erika Velasquez  at 6:25 p.m. Workstation performed: [id number]     MRI of the brain   Shows tiny acute infarct in the posterior left frontal lobe, no acute hemorrhage or mass effect.  Chronic microangiopathy.  Known 7 mm right MCA aneurysm grossly stable      Thank you for this consult.    Total time of encounter: 70 Minutes  More than 50% of time was spent in counseling and coordination of care of patient.   Discussed with medical team and attending neurology MD.

## 2024-03-13 NOTE — SPEECH THERAPY NOTE
Speech/Language Pathology  Assessment    Patient Name: Svitlana Shahid  Today's Date: 3/13/2024     Problem List  Principal Problem:    Stroke-like symptom  Active Problems:    Hypertension    Hyperlipidemia    Acquired hypothyroidism    Hypothyroidism    Stage 3 chronic kidney disease (HCC)    History of CVA (cerebrovascular accident)    Depression    Past Medical History  Past Medical History:   Diagnosis Date    Brain aneurysm     CAD (coronary artery disease)     CKD (chronic kidney disease), stage III (HCC)     CVA (cerebral vascular accident) ([facility])     9/2022    Disease of thyroid gland     hypo    GERD (gastroesophageal reflux disease)     Gout     right shoulder    Hyperlipidemia     Hypertension     Osteoarthritis (arthritis due to wear and tear of joints)     Stroke (HCC)      Past Surgical History  Past Surgical History:   Procedure Laterality Date    CORONARY ARTERY BYPASS GRAFT  2010    x 4    ENDOSCOPIC STENT PLACEMENT W/ MLB Left 2012    HIATAL HERNIA REPAIR      JOINT REPLACEMENT Left     knee 2001    RENAL ARTERY STENT      ROTATOR CUFF REPAIR      TUBAL LIGATION  2013       Summary   Pt presented with no s/s dysphagia, dysarthria, or aphasia (in screening)  Recommended Diet: regular diet and thin liquids   Recommended Form of Meds:  as desired    Other Recommendations: Continue oral/denture care        Current Medical Status  Svitlana Shahid is a 89 y.o. F c PMH of CVA, hypertension, hyperlipidemia, depression and CKD who presents 3/12 with right sided weakness.  DX:   Acute ischemic stroke, NIH 3, status post TNK, initially presented with right-sided weakness, TNK at 1630.  MRI, repeat CT head 24 hours, labs 24 hours later, echocardiogram with bubble although no intervention will be offered.  VA, currently hemorrhagic, neurology following  Hypertension goal blood pressure of 180/105  CKD stage III, baseline creatinine 1.1-1.3    Stroke Team consulted.  She is s/p TNK. Evaluation completed bedside.      Current Precautions:  Fall      Allergies:  No known food allergies    Past medical history:  Please see H&P for details    Special Studies:  3/13 MRI: Tiny acute infarct in the posterior left frontal lobe. No acute hemorrhage or mass effect.  Chronic microangiopathy.  Known 7 mm right MCA aneurysm is grossly stable. Please refer to yesterday's CTA.    Social/Education/Vocational Hx:  Pt lives with family    Swallow Information   Current Risks for Dysphagia & Aspiration: CVA  Baseline Diet: regular diet and thin liquids      Baseline Assessment   Behavior/Cognition: alert  Speech/Language Status: able to follow commands and express elf fluently in Wolof and in simple English.  No s/s dysarthria or dysphonia  Patient Positioning: upright in bed  Pain Status/Interventions/Response to Interventions:   No report of or nonverbal indications of pain.       Swallow Mechanism Exam  Facial: symmetrical  Labial: WFL  Lingual: WFL  Velum: symmetrical  Mandible: adequate ROM  Dentition: edentulous and full dentures  Vocal quality:clear/adequate   Respiratory Status: on RA       Consistencies Assessed and Performance   Consistencies Administered: thin liquids, puree, and soft solids    Oral Stage:   Mastication was adequate with the materials administered today.  Bolus formation and transfer were functional with no significant oral residue noted.  No overt s/s reduced oral control.    Pharyngeal Stage:   Swallow Mechanics:  Swallowing initiation appeared prompt.  Laryngeal rise was palpated and judged to be within functional limits.  No coughing, throat clearing, change in vocal quality or respiratory status noted today.     Esophageal Concerns: none reported    Summary and Recommendations (see above)    Results Reviewed with: patient     Treatment Recommended: None at this time    Thank you for this referral.  Please do not hesitate to contact me with any questions or concerns.    Vivi Kimball MS CCC-SLP  [place] License  [id number]

## 2024-03-13 NOTE — PLAN OF CARE
Care plan initiated & reviewed w/ patient.  Problem: Potential for Falls  Goal: Patient will remain free of falls  Description: INTERVENTIONS:  - Educate patient/family on patient safety including physical limitations  - Instruct patient to call for assistance with activity   - Consult OT/PT to assist with strengthening/mobility   - Keep Call bell within reach  - Keep bed low and locked with side rails adjusted as appropriate  - Keep care items and personal belongings within reach  - Initiate and maintain comfort rounds  - Make Fall Risk Sign visible to staff  - Offer Toileting every 2 Hours, in advance of need  - Initiate/Maintain bed alarm  - Apply yellow socks and bracelet for high fall risk patients  - Consider moving patient to room near nurses station  Outcome: Progressing     Problem: Prexisting or High Potential for Compromised Skin Integrity  Goal: Skin integrity is maintained or improved  Description: INTERVENTIONS:  - Identify patients at risk for skin breakdown  - Assess and monitor skin integrity  - Assess and monitor nutrition and hydration status  - Monitor labs   - Assess for incontinence   - Turn and reposition patient  - Assist with mobility/ambulation  - Relieve pressure over bony prominences  - Avoid friction and shearing  - Provide appropriate hygiene as needed including keeping skin clean and dry  - Evaluate need for skin moisturizer/barrier cream  - Collaborate with interdisciplinary team   - Patient/family teaching  - Consider wound care consult   Outcome: Progressing     Problem: NEUROSENSORY - ADULT  Goal: Achieves stable or improved neurological status  Description: INTERVENTIONS  - Monitor and report changes in neurological status  - Monitor vital signs such as temperature, blood pressure, glucose, and any other labs ordered   - Initiate measures to prevent increased intracranial pressure  - Monitor for seizure activity and implement precautions if appropriate      Outcome:  Progressing  Goal: Achieves maximal functionality and self care  Description: INTERVENTIONS  - Monitor swallowing and airway patency with patient fatigue and changes in neurological status  - Encourage and assist patient to increase activity and self care.   - Encourage visually impaired, hearing impaired and aphasic patients to use assistive/communication devices  Outcome: Progressing     Problem: Neurological Deficit  Goal: Neurological status is stable or improving  Description: Interventions:  - Monitor and assess patient's level of consciousness, motor function, sensory function, and level of assistance needed for ADLs.   - Monitor and report changes from baseline. Collaborate with interdisciplinary team to initiate plan and implement interventions as ordered.   - Provide and maintain a safe environment.  - Consider seizure precautions.  - Consider fall precautions.  - Consider aspiration precautions.  - Consider bleeding precautions.  Outcome: Progressing     Problem: Activity Intolerance/Impaired Mobility  Goal: Mobility/activity is maintained at optimum level for patient  Description: Interventions:  - Assess and monitor patient  barriers to mobility and need for assistive/adaptive devices.  - Assess patient's emotional response to limitations.  - Collaborate with interdisciplinary team and initiate plans and interventions as ordered.  - Encourage independent activity per ability.  - Maintain proper body alignment.  - Perform active/passive rom as tolerated/ordered.  - Plan activities to conserve energy.  - Turn patient as appropriate  Outcome: Progressing     Problem: Communication Impairment  Goal: Ability to express needs and understand communication  Description: Assess patient's communication skills and ability to understand information.  Patient will demonstrate use of effective communication techniques, alternative methods of communication and understanding even if not able to speak.     - Encourage  communication and provide alternate methods of communication as needed.  - Collaborate with case management/ for discharge needs.  - Include patient/family/caregiver in decisions related to communication.  Outcome: Progressing     Problem: Potential for Aspiration  Goal: Non-ventilated patient's risk of aspiration is minimized  Description: Assess and monitor vital signs, respiratory status, and labs (WBC).  Monitor for signs of aspiration (tachypnea, cough, rales, wheezing, cyanosis, fever).    - Assess and monitor patient's ability to swallow.  - Place patient up in chair to eat if possible.  - HOB up at 90 degrees to eat if unable to get patient up into chair.  - Supervise patient during oral intake.   - Instruct patient/ family to take small bites.  - Instruct patient/ family to take small single sips when taking liquids.  - Follow patient-specific strategies generated by speech pathologist.  Outcome: Progressing     Problem: Nutrition  Goal: Nutrition/Hydration status is improving  Description: Monitor and assess patient's nutrition/hydration status for malnutrition (ex- brittle hair, bruises, dry skin, pale skin and conjunctiva, muscle wasting, smooth red tongue, and disorientation). Collaborate with interdisciplinary team and initiate plan and interventions as ordered.  Monitor patient's weight and dietary intake as ordered or per policy. Utilize nutrition screening tool and intervene per policy. Determine patient's food preferences and provide high-protein, high-caloric foods as appropriate.     - Assist patient with eating.  - Allow adequate time for meals.  - Encourage patient to take dietary supplement as ordered.  - Collaborate with clinical nutritionist.  - Include patient/family/caregiver in decisions related to nutrition.  Outcome: Progressing

## 2024-03-13 NOTE — OCCUPATIONAL THERAPY NOTE
OT EVALUATION       03/13/24 1445   OT Last Visit   OT Visit Date 03/13/24   Note Type   Note type Evaluation   Pain Assessment   Pain Assessment Tool Cisse-Baker FACES   Cisse-Baker FACES Pain Rating 6   Pain Location/Orientation Orientation: Right;Location: Knee  (with activity)   Restrictions/Precautions   Braces or Orthoses   (L wrist brace)   Other Precautions Chair Alarm;Bed Alarm;Fall Risk;Pain;Multiple lines;Contact/isolation  (s/p TNK)   Home Living   Type of Home House   Home Layout One level   Bathroom Shower/Tub Tub/shower unit   Bathroom Toilet Standard   Bathroom Equipment Shower chair   Home Equipment Walker  (upright walker)   Prior Function   Level of [place] Needs assistance with IADLS;Needs assistance with ADLs;Needs assistance with functional mobility   Lives With Family  (24 hour caregiver)   Receives Help From Family;Home health  (goes to daughters house on weekends)   IADLs Family/Friend/Other provides transportation;Family/Friend/Other provides meals;Family/Friend/Other provides medication management   Comments pt with history of CVA with R sided weakness,  pt admitted with increase in RUE weakness, per nurse has improved 75%   ADL   Eating Assistance 4  Minimal Assistance   Grooming Assistance 4  Minimal Assistance   UB Bathing Assistance 3  Moderate Assistance   LB Bathing Assistance 2  Maximal Assistance   UB Dressing Assistance 3  Moderate Assistance   LB Dressing Assistance 2  Maximal Assistance   Toileting Assistance  2  Maximal Assistance   Bed Mobility   Supine to Sit 3  Moderate assistance   Sit to Supine 3  Moderate assistance   Transfers   Sit to Stand 2  Maximal assistance   Stand to Sit 2  Maximal assistance   Functional Mobility   Functional Mobility 2  Maximal assistance   Additional Comments few steps with RW, pt holds onto walker with pincer grasp with B hands with 1st and 2nd digits.  Patient uses an upright walker at home, pt has arthritis deformities in B hands    Balance   Static Sitting Fair   Dynamic Sitting Fair -   Static Standing Poor +   Dynamic Standing Poor   Activity Tolerance   Activity Tolerance Patient limited by fatigue   Nurse Made Aware yes   RUE Assessment   RUE Assessment   (shoulder AROM to 90 degrees  MMT 3+/5, elbow WFL but limited by IV MMT 3+/5, Hand arthritis deformities  3/5 mostly pincher grasp)   LUE Assessment   LUE Assessment   (WFL grossly 4-/5 MMT,  3+/5 mostly pincer grasp)   Hand Function   Gross Motor Coordination   (BLE when taking steps)   Fine Motor Coordination Impaired  (RUE)   Cognition   Overall Cognitive Status Impaired   Arousal/Participation Cooperative   Attention Attends with cues to redirect   Orientation Level Oriented to person;Oriented to place   Following Commands Follows one step commands with increased time or repetition   Assessment   Limitation Decreased ADL status;Decreased UE strength;Decreased Safe judgement during ADL;Decreased endurance;Decreased high-level ADLs;Decreased self-care trans;Decreased UE ROM;Decreased cognition;Decreased fine motor control  (decreased balance and mobility)   Prognosis Fair   Assessment Patient evaluated by Occupational Therapy.  Patient admitted with Stroke-like symptom.  The patients occupational profile, medical and therapy history includes a extensive additional review of physical, cognitive, or psychosocial history related to current functional performance.  Comorbidities affecting functional mobility and ADLS include: arthritis, CABG, CAD, CKD, CVA, and hypertension.  Prior to admission, patient was requiring assist for functional mobility with upright walker, requiring assist for ADLS, requiring assist for IADLS, and having 24 hour care .  The evaluation identifies the following performance deficits: weakness, decreased ROM, impaired balance, decreased endurance, decreased coordination, increased fall risk, new onset of impairment of functional mobility, decreased ADLS,  decreased IADLS, pain, decreased activity tolerance, decreased safety awareness, impaired judgement, decreased cognition, and decreased strength, that result in activity limitations and/or participation restrictions. This evaluation requires clinical decision making of high complexity, because the patient presents with comorbidites that affect occupational performance and required significant modification of tasks or assistance with consideration of multiple treatment options.  The Barthel Index was used as a functional outcome tool presenting with a score of Barthel Index Score: 30, indicating marked limitations of functional mobility and ADLS.  The patient's raw score on the -PAC Daily Activity Inpatient Short Form is 12. A raw score of less than 19 suggests the patient may benefit from discharge to post-acute rehabilitation services, however pt likely close to baseline level of function with 24 hour care, recommend home with 24 hour care. Please refer to the recommendation of the Occupational Therapist for safe discharge planning.  Patient will benefit from skilled Occupational Therapy services to address above deficits and facilitate a safe return to prior level of function.   Goals   Patient Goals to go home   STG Time Frame   (1-7 days)   Short Term Goal  Goals established to promote Patient Goals: to go home:  Eating: supervision; Grooming: supervision seated; Bathing: mod assist; Upper Body Dressing min assist; Lower Body Dressing: mod assist; Toileting: mod assist; Patient will increase stand pivot commode transfer to mod assist with rolling walker to increase performance and safety with ADLS and functional mobility; Patient will increase standing tolerance to 2 minutes during ADL task to decrease assistance level and decrease fall risk; Patient will increase bed mobility to min assist in preparation for ADLS and transfers; Patient will increase functional mobility to and from bathroom with rolling walker  with mod assist to increase performance with ADLS and to use a toilet; Patient will tolerate 5 minutes of UE ROM/strengthening to increase general activity tolerance and performance in ADLS/IADLS; Patient will improve functional activity tolerance to 10 minutes of sustained functional tasks to increase participation in basic self-care and decrease assistance level;  Patient will increase dynamic sitting balance to fair to improve the ability to sit at edge of bed or on a chair for ADLS;  Patient will increase dynamic standing balance to poor+ to improve postural stability and decrease fall risk during standing ADLS and transfers.   LTG Time Frame   (8-14 days)   Long Term Goal Eating: independent; Grooming: independent seated; Bathing: min assist; Upper Body Dressing supervision; Lower Body Dressing: min assist; Toileting: min assist; Patient will increase stand pivot commode transfer to min assist with rolling walker to increase performance and safety with ADLS and functional mobility; Patient will increase standing tolerance to 4 minutes during ADL task to decrease assistance level and decrease fall risk; Patient will increase bed mobility to supervision in preparation for ADLS and transfers; Patient will increase functional mobility to and from bathroom with rolling walker with min assist to increase performance with ADLS and to use a toilet; Patient will tolerate 10 minutes of UE ROM/strengthening to increase general activity tolerance and performance in ADLS/IADLS; Patient will improve functional activity tolerance to 20 minutes of sustained functional tasks to increase participation in basic self-care and decrease assistance level;  Patient will increase dynamic sitting balance to fair+ to improve the ability to sit at edge of bed or on a chair for ADLS;  Patient will increase dynamic standing balance to fair- to improve postural stability and decrease fall risk during standing ADLS and transfers.    Pt will  score >/= 16/24 on AM-PAC Daily Activity Inpatient scale to promote safe independence with ADLs and functional mobility; Pt will score >/= 60/100 on Barthel Index in order to decrease caregiver assistance needed and increase ability to perform ADLs and functional mobility.   Plan   Treatment Interventions ADL retraining;Functional transfer training;UE strengthening/ROM;Endurance training;Patient/family training;Equipment evaluation/education;Activityengagement;Compensatory technique education;Cognitive reorientation   Goal Expiration Date 03/27/24   OT Frequency 3-5x/wk   Discharge Recommendation   Rehab Resource Intensity Level, OT III (Minimum Resource Intensity)   AM-PAC Daily Activity Inpatient   Lower Body Dressing 1   Bathing 2   Toileting 1   Upper Body Dressing 2   Grooming 3   Eating 3   Daily Activity Raw Score 12   Daily Activity Standardized Score (Calc for Raw Score >=11) 30.6   AM-[facility] Applied Cognition Inpatient   Following a Speech/Presentation 2   Understanding Ordinary Conversation 4   Taking Medications 2   Remembering Where Things Are Placed or Put Away 2   Remembering List of 4-5 Errands 2   Taking Care of Complicated Tasks 2   Applied Cognition Raw Score 14   Applied Cognition Standardized Score 32.02   Barthel Index   Feeding 5   Bathing 0   Grooming Score 0   Dressing Score 5   Bladder Score 5   Bowels Score 5   Toilet Use Score 5   Transfers (Bed/Chair) Score 5   Mobility (Level Surface) Score 0   Stairs Score 0   Barthel Index Score 30   Modified [place] Scale   Modified Nicol Scale 4   Licensure   [place] License Number  Maribell Pino MS OTR/L [id number]

## 2024-03-13 NOTE — CASE MANAGEMENT
Case Management Assessment & Discharge Planning Note    Patient name Svitlana Shahid  Location ICU 12/ICU 12 MRN [id number]  : 1924 Date 3/13/2024       Current Admission Date: 3/12/2024  Current Admission Diagnosis:Stroke-like symptom   Patient Active Problem List    Diagnosis Date Noted    Stroke-like symptom 2024    Depression 2023    History of CVA (cerebrovascular accident) 2023    Stage 3 chronic kidney disease (HCC) 2022    Hypothyroidism 07/15/2019    CAD (coronary artery disease) 07/15/2019    Hypertension 2019    Hyperlipidemia 2019    Acquired hypothyroidism 2019    Osteoarthritis (arthritis due to wear and tear of joints) 2019      LOS (days): 1  Geometric Mean LOS (GMLOS) (days): 3.2  Days to GMLOS:2.4     OBJECTIVE:    Risk of Unplanned Readmission Score: 11.23       Current admission status: Inpatient  Referral Reason: Stroke, Other (Discharge planning)    Preferred Pharmacy:   [facility]/pharmacy #[id number] - [place] - [address]  [address] [address]  Phone: [phone] Fax: [phone]    [facility] #[id number] - [place], [place] - [address]  [address] [address]  Phone: [phone] Fax: [phone]    Primary Care Provider: More Wadsworth MD    Primary Insurance: MEDICARE  Secondary Insurance: [facility]    ASSESSMENT:  Active Health Care Proxies       Lisa Garza Care Agent - Daughter   Primary Phone: [phone] (Mobile)                 Advance Directives  Does patient have a Health Care POA?: Yes  Does patient have Advance Directives?: Yes  Advance Directives: Power of  for health care, Living will  Primary Contact: Lisa Garza (daughter)     Readmission Root Cause  30 Day Readmission: No    Patient Information  Admitted from:: Home  Mental Status: Alert  During Assessment patient was accompanied by: Daughter, Other-Comment (grandson also at  bedside)  Assessment information provided by:: Daughter  Primary Caregiver: Family  Caregiver's Name:: Lisa Garza (daughter/POA)  Caregiver's Relationship to Patient:: Family Member  Caregiver's Telephone Number:: [phone]  Support Systems: Daughter, Family members, Private Caregivers  County of Residence: [place]  What city do you live in?: [place]  Home entry access options. Select all that apply.: No steps to enter home  Type of Current Residence: Apartment  Floor Level: 1  Upon entering residence, is there a bedroom on the main floor (no further steps)?: Yes  Upon entering residence, is there a bathroom on the main floor (no further steps)?: Yes  Living Arrangements: Lives Alone ([facility], stays with daughter and son in law on weekends)  Is patient a ?: No    Activities of Daily Living Prior to Admission  Functional Status: Assistance  Completes ADLs independently?: No  Level of ADL dependence: Assistance  Ambulates independently?: No  Level of ambulatory dependence: Assistance  Does patient use assisted devices?: Yes  Assisted Devices (DME) used: Rollator, Shower Chair, Bedside Commode, Wheelchair  Does patient currently own DME?: Yes  What DME does the patient currently own?: Bedside Commode, Rollator, Shower Chair, Wheelchair  Does the patient have a history of Short-Term Rehab?: Yes ([facility] [place]  Does patient have a history of HHC?: Yes ([facility], [facility], [facility])  Does patient currently have HHC?: Yes (Daughter unable to recall name of agency)     Patient Information Continued  Income Source: Pension/jail  Does patient have prescription coverage?: Yes  Does patient receive dialysis treatments?: No    Means of Transportation  Means of Transport to Appts:: Family transport      Social Determinants of Health (SDOH)      Flowsheet Row Most Recent Value   Housing Stability    In the last 12 months, was there a time when you  were not able to pay the mortgage or rent on time? N   In the last 12 months, how many places have you lived? 1   In the last 12 months, was there a time when you did not have a steady place to sleep or slept in a shelter (including now)? N   Transportation Needs    In the past 12 months, has lack of transportation kept you from medical appointments or from getting medications? no   In the past 12 months, has lack of transportation kept you from meetings, work, or from getting things needed for daily living? No   Food Insecurity    Within the past 12 months, you worried that your food would run out before you got the money to buy more. Never true   Within the past 12 months, the food you bought just didn't last and you didn't have money to get more. Never true   Utilities    In the past 12 months has the electric, gas, oil, or water company threatened to shut off services in your home? No            DISCHARGE DETAILS:    Discharge planning discussed with:: Daughter Lisa  Freedom of Choice: Yes    Comments - Freedom of Choice: SHADI spoke with daughter Kristi at bedside to introduce role of CM and conduct assessment.  PT/OT evaluations are currently pending and Lisa expressed preference for patient to return home when medically cleared.  Patient has 24-hour caregivers through [facility] and stays with her daughter on the weekends.  She needs assistance with ADLs at baseline.      Lisa provided a copy of patient's medical POA and living will form for the chart and it was placed in scan bin.  SHADI will continue to follow for discharge planning needs.      CM contacted family/caregiver?: Yes        Contacts  Patient Contacts: Lisa Garza (daughter)  Relationship to Patient:: Family  Contact Method: In Person  Reason/Outcome: Emergency Contact, Discharge Planning, Continuity of Care         IMM Given (Date):: 03/13/24

## 2024-03-13 NOTE — PROGRESS NOTES
Counts include 234 beds at the [facility]  Progress Note  Name: Svitlana Shahid I  MRN: [id number]  Unit/Bed#: ICU 12 I Date of Admission: 3/12/2024   Date of Service: 3/12/2024 I Hospital Day: 0    Assessment/Plan   * Stroke-like symptom  Assessment & Plan  Patient presents with acute right sided weakness. 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     Hypertension  Assessment & Plan  OP regimen consists of: amlodipine 10 mg daily  Continue   Goal BP <180/105    Stage 3 chronic kidney disease (HCC)  Assessment & Plan  Creatine 1.26 on admission, within baseline  Avoid nephrotoxins if able  Maintain MAP > 65  I&O's  Follow up with repeat BMP tomorrow evening    Depression  Assessment & Plan  OP regimen consists of: Remeron  Continue     Acquired hypothyroidism  Assessment & Plan  Continue levothyroxine     Hyperlipidemia  Assessment & Plan  Op regimen consists of: 10 mg atorvastatin daily  Increase to 40 mg   Follow up with lipid panel          Disposition: Critical care    ICU Core Measures     A: Assess, Prevent, and Manage Pain Has pain been assessed? Yes  Need for changes to pain regimen? NA   B: Both SAT/SAT  N/A   C: Choice of Sedation RASS Goal: 0 Alert and Calm or N/A patient not on sedation  Need for changes to sedation or analgesia regimen? NA   D: Delirium CAM-ICU: Negative   E: Early Mobility  Plan for early mobility? Yes   F: Family Engagement Plan for family engagement today? Yes         Prophylaxis:  VTE Contraindicated secondary to: post TNK   Stress Ulcer  not orderedcovered byesomeprazole  (NexIUM) 40 MG capsule [[id number]] (Long-Term Med)         Significant 24hr Events     24hr events: Patient presented with stroke like symptoms. Decision was made to receive TNK. She received at 1835 and then admitted on stroke pathway. No acute events overnight     Subjective   Review of Systems   Neurological:  Positive for weakness.   All other systems reviewed and are negative.     Objective                            Vitals I/O      Most Recent Min/Max in 24hrs   Temp 97.7 °F (36.5 °C) Temp  Min: 97.4 °F (36.3 °C)  Max: 98.4 °F (36.9 °C)   Pulse 79 Pulse  Min: 61  Max: 80   Resp (!) 31 Resp  Min: 17  Max: 41   /92 BP  Min: 153/74  Max: 192/90   O2 Sat 94 % SpO2  Min: 93 %  Max: 98 %      Intake/Output Summary (Last 24 hours) at 3/12/2024 2252  Last data filed at 3/12/2024 2000  Gross per 24 hour   Intake --   Output 0 ml   Net 0 ml       Diet NPO; Sips with meds    Invasive Monitoring           Physical Exam   Physical Exam  Vitals reviewed.   Eyes:      Extraocular Movements: Extraocular movements intact.      Pupils: Pupils are equal, round, and reactive to light.   Skin:     General: Skin is warm and dry.   HENT:      Mouth/Throat:      Mouth: Mucous membranes are moist.   Cardiovascular:      Rate and Rhythm: Normal rate and regular rhythm.      Pulses: Normal pulses.   Musculoskeletal:         General: Normal range of motion.      Right lower leg: No edema.      Left lower leg: No edema.   Abdominal:      Palpations: Abdomen is soft.   Constitutional:       General: She is sleeping. She is not in acute distress.  Pulmonary:      Effort: Pulmonary effort is normal.      Breath sounds: No wheezing, rhonchi or rales.   Psychiatric:         Speech: Speech is not no receptive aphasia or no expressive aphasia.   Neurological:      Mental Status: She is easily aroused and oriented to person, place and time. Mental status is at baseline.      Cranial Nerves: No dysarthria or facial asymmetry.      Sensory:  No sensory deficit.      Motor: Weakness (right sided).      Coordination: Finger-Nose-Finger Test abnormal and Heel to Meyer Test abnormal.            Diagnostic Studies      EKG: NSR  Imaging:  I have personally reviewed pertinent reports.       Medications:  Scheduled PRN   amLODIPine, 10 mg, HS  [START ON 3/13/2024] atorvastatin, 40 mg, Daily With Dinner  chlorhexidine, 15 mL, Q12H LAURA  [START ON 3/13/2024] levothyroxine, 75 mcg, Early Morning  [START ON 3/13/2024] mirtazapine, 7.5 mg, HS  [START ON 3/13/2024] pregabalin, 75 mg, Daily          Continuous          Labs:    CBC    Recent Labs     03/12/24  1812   WBC 6.11   HGB 10.5*   HCT 32.6*        BMP    Recent Labs     03/12/24  1812   SODIUM 133*   K 5.2      CO2 24   AGAP 5   BUN 28*   CREATININE 1.26   CALCIUM 9.1       Coags    Recent Labs     03/12/24  1812   INR 1.12   PTT 57*        Additional Electrolytes  No recent results       Blood Gas    No recent results  No recent results LFTs  No recent results    Infectious  No recent results  Glucose  Recent Labs     03/12/24  1812   GLUC 107               VARSHA Lagos

## 2024-03-14 LAB
ANION GAP SERPL CALCULATED.3IONS-SCNC: 8 MMOL/L (ref 4–13)
BUN SERPL-MCNC: 21 MG/DL (ref 5–25)
CALCIUM SERPL-MCNC: 9.4 MG/DL (ref 8.4–10.2)
CHLORIDE SERPL-SCNC: 104 MMOL/L (ref 96–108)
CO2 SERPL-SCNC: 26 MMOL/L (ref 21–32)
CREAT SERPL-MCNC: 1.26 MG/DL (ref 0.6–1.3)
ERYTHROCYTE [DISTWIDTH] IN BLOOD BY AUTOMATED COUNT: 17.9 % (ref 11.6–15.1)
GFR SERPL CREATININE-BSD FRML MDRD: 35 ML/MIN/1.73SQ M
GLUCOSE SERPL-MCNC: 83 MG/DL (ref 65–140)
HCT VFR BLD AUTO: 36.8 % (ref 34.8–46.1)
HGB BLD-MCNC: 11.8 G/DL (ref 11.5–15.4)
MAGNESIUM SERPL-MCNC: 2.4 MG/DL (ref 1.9–2.7)
MCH RBC QN AUTO: 25.3 PG (ref 26.8–34.3)
MCHC RBC AUTO-ENTMCNC: 32.1 G/DL (ref 31.4–37.4)
MCV RBC AUTO: 79 FL (ref 82–98)
MRSA NOSE QL CULT: NORMAL
PHOSPHATE SERPL-MCNC: 3.1 MG/DL (ref 2.3–4.1)
PLATELET # BLD AUTO: 239 THOUSANDS/UL (ref 149–390)
PMV BLD AUTO: 9.5 FL (ref 8.9–12.7)
POTASSIUM SERPL-SCNC: 4.1 MMOL/L (ref 3.5–5.3)
RBC # BLD AUTO: 4.66 MILLION/UL (ref 3.81–5.12)
SODIUM SERPL-SCNC: 138 MMOL/L (ref 135–147)
WBC # BLD AUTO: 6.6 THOUSAND/UL (ref 4.31–10.16)

## 2024-03-14 PROCEDURE — 80048 BASIC METABOLIC PNL TOTAL CA: CPT | Performed by: NURSE PRACTITIONER

## 2024-03-14 PROCEDURE — 85027 COMPLETE CBC AUTOMATED: CPT | Performed by: NURSE PRACTITIONER

## 2024-03-14 PROCEDURE — 83735 ASSAY OF MAGNESIUM: CPT | Performed by: NURSE PRACTITIONER

## 2024-03-14 PROCEDURE — 84100 ASSAY OF PHOSPHORUS: CPT | Performed by: NURSE PRACTITIONER

## 2024-03-14 PROCEDURE — 99232 SBSQ HOSP IP/OBS MODERATE 35: CPT | Performed by: STUDENT IN AN ORGANIZED HEALTH CARE EDUCATION/TRAINING PROGRAM

## 2024-03-14 PROCEDURE — 97530 THERAPEUTIC ACTIVITIES: CPT

## 2024-03-14 RX ORDER — PANTOPRAZOLE SODIUM 40 MG/1
40 TABLET, DELAYED RELEASE ORAL
Status: DISCONTINUED | OUTPATIENT
Start: 2024-03-14 | End: 2024-03-16 | Stop reason: HOSPADM

## 2024-03-14 RX ADMIN — ASPIRIN 81 MG 81 MG: 81 TABLET ORAL at 10:12

## 2024-03-14 RX ADMIN — PREGABALIN 75 MG: 75 CAPSULE ORAL at 14:29

## 2024-03-14 RX ADMIN — CLOPIDOGREL 75 MG: 75 TABLET ORAL at 10:12

## 2024-03-14 RX ADMIN — CHLORHEXIDINE GLUCONATE 15 ML: 1.2 SOLUTION ORAL at 20:26

## 2024-03-14 RX ADMIN — ATORVASTATIN CALCIUM 40 MG: 40 TABLET, FILM COATED ORAL at 16:57

## 2024-03-14 RX ADMIN — PANTOPRAZOLE SODIUM 40 MG: 40 TABLET, DELAYED RELEASE ORAL at 10:31

## 2024-03-14 RX ADMIN — ENOXAPARIN SODIUM 30 MG: 30 INJECTION SUBCUTANEOUS at 14:29

## 2024-03-14 RX ADMIN — AMLODIPINE BESYLATE 10 MG: 10 TABLET ORAL at 22:14

## 2024-03-14 RX ADMIN — CHLORHEXIDINE GLUCONATE 15 ML: 1.2 SOLUTION ORAL at 10:36

## 2024-03-14 RX ADMIN — LEVOTHYROXINE SODIUM 75 MCG: 75 TABLET ORAL at 05:25

## 2024-03-14 RX ADMIN — MIRTAZAPINE 7.5 MG: 15 TABLET, FILM COATED ORAL at 22:14

## 2024-03-14 NOTE — PLAN OF CARE
Problem: Nutrition  Goal: Nutrition/Hydration status is improving  Description: Monitor and assess patient's nutrition/hydration status for malnutrition (ex- brittle hair, bruises, dry skin, pale skin and conjunctiva, muscle wasting, smooth red tongue, and disorientation). Collaborate with interdisciplinary team and initiate plan and interventions as ordered.  Monitor patient's weight and dietary intake as ordered or per policy. Utilize nutrition screening tool and intervene per policy. Determine patient's food preferences and provide high-protein, high-caloric foods as appropriate.     - Assist patient with eating.  - Allow adequate time for meals.  - Encourage patient to take dietary supplement as ordered.  - Collaborate with clinical nutritionist.  - Include patient/family/caregiver in decisions related to nutrition.  Outcome: Progressing     Problem: Potential for Aspiration  Goal: Non-ventilated patient's risk of aspiration is minimized  Description: Assess and monitor vital signs, respiratory status, and labs (WBC).  Monitor for signs of aspiration (tachypnea, cough, rales, wheezing, cyanosis, fever).    - Assess and monitor patient's ability to swallow.  - Place patient up in chair to eat if possible.  - HOB up at 90 degrees to eat if unable to get patient up into chair.  - Supervise patient during oral intake.   - Instruct patient/ family to take small bites.  - Instruct patient/ family to take small single sips when taking liquids.  - Follow patient-specific strategies generated by speech pathologist.  Outcome: Progressing     Problem: Communication Impairment  Goal: Ability to express needs and understand communication  Description: Assess patient's communication skills and ability to understand information.  Patient will demonstrate use of effective communication techniques, alternative methods of communication and understanding even if not able to speak.     - Encourage communication and provide  alternate methods of communication as needed.  - Collaborate with case management/ for discharge needs.  - Include patient/family/caregiver in decisions related to communication.  Outcome: Progressing     Problem: Activity Intolerance/Impaired Mobility  Goal: Mobility/activity is maintained at optimum level for patient  Description: Interventions:  - Assess and monitor patient  barriers to mobility and need for assistive/adaptive devices.  - Assess patient's emotional response to limitations.  - Collaborate with interdisciplinary team and initiate plans and interventions as ordered.  - Encourage independent activity per ability.  - Maintain proper body alignment.  - Perform active/passive rom as tolerated/ordered.  - Plan activities to conserve energy.  - Turn patient as appropriate  Outcome: Progressing     Problem: Neurological Deficit  Goal: Neurological status is stable or improving  Description: Interventions:  - Monitor and assess patient's level of consciousness, motor function, sensory function, and level of assistance needed for ADLs.   - Monitor and report changes from baseline. Collaborate with interdisciplinary team to initiate plan and implement interventions as ordered.   - Provide and maintain a safe environment.  - Consider fall precautions.  - Consider aspiration precautions.  - Consider bleeding precautions.  Outcome: Progressing     Problem: NEUROSENSORY - ADULT  Goal: Achieves stable or improved neurological status  Description: INTERVENTIONS  - Monitor and report changes in neurological status  - Monitor vital signs such as temperature, blood pressure, glucose, and any other labs ordered   - Initiate measures to prevent increased intracranial pressure      Outcome: Progressing  Goal: Achieves maximal functionality and self care  Description: INTERVENTIONS  - Monitor swallowing and airway patency with patient fatigue and changes in neurological status  - Encourage and assist  patient to increase activity and self care.   - Encourage visually impaired, hearing impaired and aphasic patients to use assistive/communication devices  Outcome: Progressing     Problem: Prexisting or High Potential for Compromised Skin Integrity  Goal: Skin integrity is maintained or improved  Description: INTERVENTIONS:  - Identify patients at risk for skin breakdown  - Assess and monitor skin integrity  - Assess and monitor nutrition and hydration status  - Monitor labs   - Assess for incontinence   - Turn and reposition patient  - Assist with mobility/ambulation  - Relieve pressure over bony prominences  - Avoid friction and shearing  - Provide appropriate hygiene as needed including keeping skin clean and dry  - Evaluate need for skin moisturizer/barrier cream  - Collaborate with interdisciplinary team   - Patient/family teaching  - Consider wound care consult   Outcome: Progressing     Problem: Potential for Falls  Goal: Patient will remain free of falls  Description: INTERVENTIONS:  - Educate patient/family on patient safety including physical limitations  - Instruct patient to call for assistance with activity   - Consult OT/PT to assist with strengthening/mobility   - Keep Call bell within reach  - Keep bed low and locked with side rails adjusted as appropriate  - Keep care items and personal belongings within reach  - Initiate and maintain comfort rounds  - Make Fall Risk Sign visible to staff  - Offer Toileting every 2 Hours, in advance of need  - Initiate/Maintain bed/chair alarm  - Obtain necessary fall risk management equipment: walker  - Apply yellow socks and bracelet for high fall risk patients  - Consider moving patient to room near nurses station  Outcome: Progressing

## 2024-03-14 NOTE — ASSESSMENT & PLAN NOTE
Lab Results   Component Value Date    CHOLESTEROL 170 03/12/2024    CHOLESTEROL 148 06/24/2021     Lab Results   Component Value Date    HDL 41 (L) 03/12/2024    HDL 42 06/24/2021     Lab Results   Component Value Date    TRIG 158 (H) 03/12/2024    TRIG 75 06/24/2021     Lab Results   Component Value Date    NONHDLC 106 06/24/2021       Op regimen consists of: 10 mg atorvastatin daily  Increased to 40 mg

## 2024-03-14 NOTE — CASE MANAGEMENT
Case Management Discharge Planning Note    Patient name Svitlana Shahid  Location ICU 12/ICU 12 MRN [id number]  : 1924 Date 3/14/2024       Current Admission Date: 3/12/2024  Current Admission Diagnosis:Stroke-like symptom   Patient Active Problem List    Diagnosis Date Noted    Stroke-like symptom 2024    Depression 2023    History of CVA (cerebrovascular accident) 2023    Stage 3 chronic kidney disease (HCC) 2022    Hypothyroidism 07/15/2019    CAD (coronary artery disease) 07/15/2019    Hypertension 2019    Hyperlipidemia 2019    Osteoarthritis (arthritis due to wear and tear of joints) 2019      LOS (days): 2  Geometric Mean LOS (GMLOS) (days): 3.2  Days to GMLOS:1.4     OBJECTIVE:  Risk of Unplanned Readmission Score: 11.14       Current admission status: Inpatient   Preferred Pharmacy:   [facility]/pharmacy #[id number] - [place] - [address]  [address] [address]  Phone: [phone] Fax: [phone]    [facility]) #497 - [place], [place] - [address]  [address] [address]  Phone: [phone] Fax: [phone]    Primary Care Provider: More Wadsworth MD    Primary Insurance: MEDICARE  Secondary Insurance: [facility]    DISCHARGE DETAILS:    Discharge planning discussed with:: Mario Gonzalez  Freedom of Choice: Yes    Comments - Freedom of Choice: SW discussed Level III rehabilitation assessment with mario Gonzalez and she is in agreement with a referral for [facility].  She expressed preference for [facility] which had provided services to patient in the past.  Referral was placed in [facility] and patient has been accepted by [facility].  Agency information was placed on patient's AVS and SW will continue to follow.      CM contacted family/caregiver?: Yes  Were Treatment Team discharge recommendations reviewed with patient/caregiver?: Yes  Did patient/caregiver verbalize understanding of  patient care needs?: Yes  Were patient/caregiver advised of the risks associated with not following Treatment Team discharge recommendations?: Yes    Contacts  Patient Contacts: Lisa Garza (daughter)  Relationship to Patient:: Family  Contact Method: In Person  Reason/Outcome: Emergency Contact, Discharge Planning, Continuity of Care, Referral    Requested Home Health Care         Is the patient interested in HHC at discharge?: Yes  Home Health Discipline requested:: Occupational Therapy, Physical Therapy, Nursing  Home Health Agency Name:: [facility] External Referral Reason (only applicable if external HHA name selected): Patient has established relationship with provider  Home Health Follow-Up Provider:: PCP  Home Health Services Needed:: Evaluate Functional Status and Safety, Gait/ADL Training, Strengthening/Theraputic Exercises to Improve Function  Homebound Criteria Met:: Requires the Assistance of Another Person for Safe Ambulation or to Leave the Home, Uses an Assist Device (i.e. cane, walker, etc)  Supporting Clincal Findings:: Cognitive Deficit Requiring the Assistance of Others, Fatigues Easliy in Short Distances, Limited Endurance    DME Referral Provided  Referral made for DME?: No    Other Referral/Resources/Interventions Provided:  Interventions: HHC    Would you like to participate in our [facility] service program?  : No - Declined    Treatment Team Recommendation: Home with Home Health Care  Discharge Destination Plan:: Home with Home Health Care  Transport at Discharge : Family

## 2024-03-14 NOTE — ASSESSMENT & PLAN NOTE
Creatine 1.26 on admission, within baseline  Avoid nephrotoxins if able  Maintain MAP > 65  I&O's  Repeat BMP stable last evening

## 2024-03-14 NOTE — ASSESSMENT & PLAN NOTE
Patient presents with acute right sided weakness. Stroke alert was called in the ED  CT head revealed no acute abnormality   CTA revealed 4 intracranial aneurysms are identified unchanged from prior CT angiogram dated 4/17/2022   NIH  3; RUE weakness and RUE/RLE limb ataxia   S/P TNK at 1835 3/12    Plan:  Stroke pathway  MRI 3/13: Tiny acute infarct in the posterior left frontal lobe. No acute hemorrhage or mass effect. Chronic microangiopathy. Known 7 mm right MCA aneurysm is grossly stable.  3/13 TTE: EF 65%, G2DD. No atrial septal defect by saline contrast. No patent foramen ovale detected.    PT/OT/ST  Repeat CT head 24 hours post TNK completed, formal report pending  Improving RUE and RLE strength. Now able to hold both against gravity.

## 2024-03-14 NOTE — PLAN OF CARE
Plan of care reviewed w/ patient.    Problem: Potential for Falls  Goal: Patient will remain free of falls  Description: INTERVENTIONS:  - Educate patient/family on patient safety including physical limitations  - Instruct patient to call for assistance with activity   - Consult OT/PT to assist with strengthening/mobility   - Keep Call bell within reach  - Keep bed low and locked with side rails adjusted as appropriate  - Keep care items and personal belongings within reach  - Initiate and maintain comfort rounds  - Make Fall Risk Sign visible to staff  - Offer Toileting every 2 Hours, in advance of need  - Initiate/Maintain bed alarm  - Apply yellow socks and bracelet for high fall risk patients  - Consider moving patient to room near nurses station  Outcome: Progressing     Problem: Prexisting or High Potential for Compromised Skin Integrity  Goal: Skin integrity is maintained or improved  Description: INTERVENTIONS:  - Identify patients at risk for skin breakdown  - Assess and monitor skin integrity  - Assess and monitor nutrition and hydration status  - Monitor labs   - Assess for incontinence   - Turn and reposition patient  - Assist with mobility/ambulation  - Relieve pressure over bony prominences  - Avoid friction and shearing  - Provide appropriate hygiene as needed including keeping skin clean and dry  - Evaluate need for skin moisturizer/barrier cream  - Collaborate with interdisciplinary team   - Patient/family teaching  - Consider wound care consult   Outcome: Progressing     Problem: NEUROSENSORY - ADULT  Goal: Achieves stable or improved neurological status  Description: INTERVENTIONS  - Monitor and report changes in neurological status  - Monitor vital signs such as temperature, blood pressure, glucose, and any other labs ordered   - Initiate measures to prevent increased intracranial pressure  - Monitor for seizure activity and implement precautions if appropriate      Outcome: Progressing  Goal:  Achieves maximal functionality and self care  Description: INTERVENTIONS  - Monitor swallowing and airway patency with patient fatigue and changes in neurological status  - Encourage and assist patient to increase activity and self care.   - Encourage visually impaired, hearing impaired and aphasic patients to use assistive/communication devices  Outcome: Progressing     Problem: Neurological Deficit  Goal: Neurological status is stable or improving  Description: Interventions:  - Monitor and assess patient's level of consciousness, motor function, sensory function, and level of assistance needed for ADLs.   - Monitor and report changes from baseline. Collaborate with interdisciplinary team to initiate plan and implement interventions as ordered.   - Provide and maintain a safe environment.  - Consider seizure precautions.  - Consider fall precautions.  - Consider aspiration precautions.  - Consider bleeding precautions.  Outcome: Progressing     Problem: Activity Intolerance/Impaired Mobility  Goal: Mobility/activity is maintained at optimum level for patient  Description: Interventions:  - Assess and monitor patient  barriers to mobility and need for assistive/adaptive devices.  - Assess patient's emotional response to limitations.  - Collaborate with interdisciplinary team and initiate plans and interventions as ordered.  - Encourage independent activity per ability.  - Maintain proper body alignment.  - Perform active/passive rom as tolerated/ordered.  - Plan activities to conserve energy.  - Turn patient as appropriate  Outcome: Progressing     Problem: Communication Impairment  Goal: Ability to express needs and understand communication  Description: Assess patient's communication skills and ability to understand information.  Patient will demonstrate use of effective communication techniques, alternative methods of communication and understanding even if not able to speak.     - Encourage communication and  provide alternate methods of communication as needed.  - Collaborate with case management/ for discharge needs.  - Include patient/family/caregiver in decisions related to communication.  Outcome: Progressing     Problem: Potential for Aspiration  Goal: Non-ventilated patient's risk of aspiration is minimized  Description: Assess and monitor vital signs, respiratory status, and labs (WBC).  Monitor for signs of aspiration (tachypnea, cough, rales, wheezing, cyanosis, fever).    - Assess and monitor patient's ability to swallow.  - Place patient up in chair to eat if possible.  - HOB up at 90 degrees to eat if unable to get patient up into chair.  - Supervise patient during oral intake.   - Instruct patient/ family to take small bites.  - Instruct patient/ family to take small single sips when taking liquids.  - Follow patient-specific strategies generated by speech pathologist.  Outcome: Progressing     Problem: Nutrition  Goal: Nutrition/Hydration status is improving  Description: Monitor and assess patient's nutrition/hydration status for malnutrition (ex- brittle hair, bruises, dry skin, pale skin and conjunctiva, muscle wasting, smooth red tongue, and disorientation). Collaborate with interdisciplinary team and initiate plan and interventions as ordered.  Monitor patient's weight and dietary intake as ordered or per policy. Utilize nutrition screening tool and intervene per policy. Determine patient's food preferences and provide high-protein, high-caloric foods as appropriate.     - Assist patient with eating.  - Allow adequate time for meals.  - Encourage patient to take dietary supplement as ordered.  - Collaborate with clinical nutritionist.  - Include patient/family/caregiver in decisions related to nutrition.  Outcome: Progressing

## 2024-03-14 NOTE — PHYSICAL THERAPY NOTE
"   PT TREATMENT     03/14/24 1340   PT Last Visit   PT Visit Date 03/14/24   Note Type   Note Type Treatment   Pain Assessment   Pain Assessment Tool 0-10   Pain Score No Pain   Restrictions/Precautions   Other Precautions Fall Risk  (Estonian speaking)   General   Chart Reviewed Yes   Additional Pertinent History Pt is s/p TNK now with OK from Phyllis COWAN for OOB/ambulation.  Pt reports she feels stronger today than yesterday.   Family/Caregiver Present Yes  (daughter)   Cognition   Arousal/Participation Alert   Attention Attends with cues to redirect   Following Commands Follows one step commands with increased time or repetition   Subjective   Subjective \"I feel good\"   Bed Mobility   Supine to Sit 4  Minimal assistance   Transfers   Sit to Stand 4  Minimal assistance   Stand to Sit 4  Minimal assistance   Stand pivot 4  Minimal assistance   Toilet transfer 4  Minimal assistance   Additional items Standard toilet  (mod assist to manage clothing)   Ambulation/Elevation   Gait pattern   (flexed posture)   Gait Assistance 4  Minimal assist   Assistive Device 4-wheeled walker   Distance 2x75 feet, x 20 feet   Balance   Static Sitting Fair +   Static Standing Fair -   Ambulatory Fair -   Activity Tolerance   Activity Tolerance Patient tolerated treatment well   Assessment   Prognosis Good   Problem List Decreased strength;Decreased range of motion;Impaired balance;Decreased endurance;Decreased mobility   Assessment Pt demonstrates improving strength and mobility s/p admission with CVA/s/p TNK. Pt was able to ambulate with her rolator walker with min assist today 2x 75 feet.  Pt admitted to feeling weak but was pleased with her ability today.    The patient's AM-PAC Basic Mobility Inpatient Short Form Raw Score is 16. A Raw score of less than or equal to 16 suggests the patient may benefit from discharge to post-acute rehabilitation services, however pt has good support at home so recommend DC home with home PT. Please " also refer to the recommendation of the Physical Therapist for safe discharge planning.         Plan   Treatment/Interventions Therapeutic exercise;LE strengthening/ROM;Functional transfer training;ADL retraining;Gait training;Bed mobility;Equipment eval/education;Patient/family training;Endurance training   Progress Progressing toward goals   PT Frequency 5-7x/wk   Discharge Recommendation   Rehab Resource Intensity Level, PT III (Minimum Resource Intensity)   AM-PAC Basic Mobility Inpatient   Turning in Flat Bed Without Bedrails 3   Lying on Back to Sitting on Edge of Flat Bed Without Bedrails 3   Moving Bed to Chair 3   Standing Up From Chair Using Arms 3   Walk in Room 3   Climb 3-5 Stairs With Railing 1   Basic Mobility Inpatient Raw Score 16   Basic Mobility Standardized Score 38.32   Highest Level Of Mobility   JH-HLM Goal 5: Stand one or more mins   JH-HLM Achieved 7: Walk 25 feet or more   End of Consult   Patient Position at End of Consult All needs within reach;Bed/Chair alarm activated;Bedside chair   Licensure   [place] License Number  Maryan Conner PT [id number]

## 2024-03-14 NOTE — PROGRESS NOTES
Patient:  YASH VALDEZ    MRN:  [id number]    Aidin Request ID:  [id number]    Level of care reserved:  Home Health Agency    Partner Reserved:  [place] [address] [phone]    Clinical needs requested:    Geography searched:  10 miles around [id number]    Start of Service:    Request sent:  2:18pm EDT on 3/14/2024 by Alexandrea Gil    Partner reserved:  2:21pm EDT on 3/14/2024 by Alexandrea Gil    Choice list shared:  2:21pm EDT on 3/14/2024 by Alexandrea Gil

## 2024-03-14 NOTE — QUICK NOTE
Critical Care Interval Transfer Note:    Please refer to progress note from earlier today for full details.     Barriers to discharge:   None      Consults: IP CONSULT TO NEUROLOGY  IP CONSULT TO NUTRITION SERVICES  IP CONSULT TO CASE MANAGEMENT    Recommended to review admission imaging for incidental findings and document in discharge navigator: Incidental findings have been documented in discharge navigator. Patient and/or family was informed and they expressed understanding.      Discharge Plan: Anticipate discharge in 24-48 hrs to home with home services.    Patient seen and evaluated by Critical Care today and deemed to be appropriate for transfer to Stepdown Level 2. Spoke to Dr. Brown from [facility] to accept transfer. Critical care can be contacted via [employer] with any questions or concerns.    Daughter Lisa updated on plan of care and transfer to [facility] service at bedside.

## 2024-03-14 NOTE — PROGRESS NOTES
[facility]  Progress Note  Name: Svitlana Shahid I  MRN: [id number]  Unit/Bed#: ICU 12 I Date of Admission: 3/12/2024   Date of Service: 3/13/2024 I Hospital Day: 1    Assessment/Plan   * Stroke-like symptom  Assessment & Plan  Patient presents with acute right sided weakness. Stroke alert was called in the ED  CT head revealed no acute abnormality   CTA revealed 4 intracranial aneurysms are identified unchanged from prior CT angiogram dated 4/17/2022   NIH  3; RUE weakness and RUE/RLE limb ataxia   S/P TNK at 1835 3/12    Plan:  Stroke pathway  MRI 3/13: Tiny acute infarct in the posterior left frontal lobe. No acute hemorrhage or mass effect. Chronic microangiopathy. Known 7 mm right MCA aneurysm is grossly stable.  3/13 TTE: EF 65%, G2DD. No atrial septal defect by saline contrast. No patent foramen ovale detected.    PT/OT/ST  Repeat CT head 24 hours post TNK completed, formal report pending  Improving RUE and RLE strength. Now able to hold both against gravity.     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   Awaiting outside records    Hypertension  Assessment & Plan  OP regimen consists of: amlodipine 10 mg daily  Continue   Goal BP <180/105  VS per unit protocol    Stage 3 chronic kidney disease (HCC)  Assessment & Plan  Creatine 1.26 on admission, within baseline  Avoid nephrotoxins if able  Maintain MAP > 65  I&O's  Repeat BMP stable last evening    Hypothyroidism  Assessment & Plan  Continue levothyroxine 75mcg daily      Hyperlipidemia  Assessment & Plan  Lab Results   Component Value Date    CHOLESTEROL 170 03/12/2024    CHOLESTEROL 148 06/24/2021     Lab Results   Component Value Date    HDL 41 (L) 03/12/2024    HDL 42 06/24/2021     Lab Results   Component Value Date    TRIG 158 (H) 03/12/2024    TRIG 75 06/24/2021     Lab Results   Component Value Date    NONHDLC 106 06/24/2021       Op regimen  "consists of: 10 mg atorvastatin daily  Increased to 40 mg       Depression  Assessment & Plan  OP regimen consists of: Remeron  Continue              Disposition: Med Surg with Telemetry    ICU Core Measures     A: Assess, Prevent, and Manage Pain Has pain been assessed? Yes  Need for changes to pain regimen? No   B: Both SAT/SAT  N/A   C: Choice of Sedation RASS Goal: N/A patient not on sedation  Need for changes to sedation or analgesia regimen? NA   D: Delirium CAM-ICU: Negative   E: Early Mobility  Plan for early mobility? Yes   F: Family Engagement Plan for family engagement today? Yes         Prophylaxis:  VTE VTE covered by:  [START ON 3/14/2024] enoxaparin, Subcutaneous       Stress Ulcer  not orderedcovered byesomeprazole (NexIUM) 40 MG capsule [[id number]] (Long-Term Med)         Significant 24hr Events     24hr events: Admitted for right sided weakness; s/p TNK 3/12 @1835. MRI yesterday showed tiny acute infarct in left frontal lobe. No hemorrhage. No acute events overnight. Can be downgraded today.     Subjective   \"Yes I feel better.\"    Review of Systems   Respiratory:  Negative for shortness of breath.    Cardiovascular:  Negative for chest pain and palpitations.   Gastrointestinal: Negative.    Neurological: Negative.  Negative for weakness (\"it's better now\").   All other systems reviewed and are negative.     Objective                            Vitals I/O      Most Recent Min/Max in 24hrs   Temp 98.4 °F (36.9 °C) Temp  Min: 97.2 °F (36.2 °C)  Max: 98.4 °F (36.9 °C)   Pulse 62 Pulse  Min: 56  Max: 79   Resp (!) 31 Resp  Min: 13  Max: 31   /67 BP  Min: 115/62  Max: 193/93   O2 Sat 95 % SpO2  Min: 92 %  Max: 96 %      Intake/Output Summary (Last 24 hours) at 3/13/2024 2144  Last data filed at 3/13/2024 1210  Gross per 24 hour   Intake 350 ml   Output 800 ml   Net -450 ml       Diet Regular; Regular House    Invasive Monitoring   N/A        Physical Exam   Physical Exam  Vitals and nursing note " reviewed.   Eyes:      General: Lids are normal.      Extraocular Movements: Extraocular movements intact.   Skin:     General: Skin is warm and dry.   HENT:      Head: Normocephalic.      Mouth/Throat:      Mouth: Mucous membranes are moist.   Cardiovascular:      Rate and Rhythm: Regular rhythm. Bradycardia present.      Pulses:           Radial pulses are 2+ on the right side and 2+ on the left side.        Dorsalis pedis pulses are 2+ on the right side and 2+ on the left side.      Heart sounds: Normal heart sounds.   Musculoskeletal:      Right lower leg: No edema.      Left lower leg: No edema.   Abdominal: General: Bowel sounds are normal.      Palpations: Abdomen is soft.   Constitutional:       General: She is not in acute distress.  Pulmonary:      Effort: Pulmonary effort is normal. No accessory muscle usage, respiratory distress or accessory muscle usage.      Breath sounds: Normal breath sounds.   Psychiatric:         Attention and Perception: Attention normal.         Mood and Affect: Mood normal.         Behavior: Behavior is cooperative.   Neurological:      Mental Status: She is alert. She is disoriented to time.      GCS: GCS eye subscore is 4. GCS verbal subscore is 4. GCS motor subscore is 6.      Motor: Strength full and intact in all extremities.   Genitourinary/Anorectal:     Comments: Incontinent, external urinary catheter in place  external catheter present.          Diagnostic Studies      EKG: Snius bradycardia on monitor, rate 50's  Imaging: MRI brain wo contrast  Narrative: MRI BRAIN WITHOUT CONTRAST    INDICATION: Stroke.    COMPARISON:   CT/CTA performed yesterday.    TECHNIQUE:  Multiplanar, multisequence imaging of the brain was performed.    IMAGE QUALITY:  Diagnostic.    FINDINGS:    BRAIN PARENCHYMA: There is a tiny acute infarct in the left precentral gyrus. No acute hemorrhage or mass effect. There are T2/FLAIR hyperintensities in the periventricular and subcortical white matter  due to moderate chronic microangiopathy. There is   hemosiderin in the left thalamus due to remote hemorrhage.    VENTRICLES: Age-appropriate volume loss    SELLA AND PITUITARY GLAND:  Normal.    ORBITS: Bilateral lens replacement.    PARANASAL SINUSES: Mild paranasal sinus mucosal thickening.    VASCULATURE: Flow void of the 7 mm right MCA bifurcation aneurysm is seen. Additional smaller aneurysms described on CTA are not well seen.    CALVARIUM AND SKULL BASE:  Normal.    EXTRACRANIAL SOFT TISSUES:  Normal.  Impression: Tiny acute infarct in the posterior left frontal lobe. No acute hemorrhage or mass effect.  Chronic microangiopathy.  Known 7 mm right MCA aneurysm is grossly stable. Please refer to yesterday's CTA.    The study was marked in EPIC for immediate notification.    Workstation performed: [id number]  Echo complete w/ contrast if indicated    Left Ventricle: Left ventricular cavity size is normal. Wall thickness   is mildly increased. There is mild concentric hypertrophy. The left   ventricular ejection fraction is 65% by visual estimation. Systolic   function is normal. Wall motion is normal. Diastolic function is   moderately abnormal, consistent with grade II (pseudonormal) relaxation.    Left atrial filling pressure is elevated.    Left Atrium: The atrium is mildly dilated.    Atrial Septum: There is no atrial septal defect by saline contrast. No   patent foramen ovale detected, confirmed by provocation with valsalva,   using agitated saline contrast.    Aortic Valve: There is trace to mild regurgitation. There is aortic   valve sclerosis.    Mitral Valve: There is mild annular calcification. There is mild   regurgitation.    Tricuspid Valve: There is mild to moderate regurgitation.  Pulmonary   artery pressure around 45 mmHg    Prior TTE study available for comparison. Prior study date: 6/24/2021.     I have personally reviewed pertinent reports.       Medications:  Scheduled PRN   amLODIPine, 10  mg, HS  aspirin, 81 mg, Daily  atorvastatin, 40 mg, Daily With Dinner  chlorhexidine, 15 mL, Q12H LAURA  [START ON 3/14/2024] clopidogrel, 75 mg, Daily  [START ON 3/14/2024] enoxaparin, 30 mg, Q24H LAURA  influenza vaccine, 0.7 mL, Once  levothyroxine, 75 mcg, Early Morning  magnesium sulfate, 2 g, Once  mirtazapine, 7.5 mg, HS  pneumococcal 20-artur conj vacc, 0.5 mL, Once  pregabalin, 75 mg, Daily      hydrALAZINE, 10 mg, Q6H PRN       Continuous          Labs:    CBC    Recent Labs     03/12/24  1812 03/13/24 2006   WBC 6.11 6.24   HGB 10.5* 12.0   HCT 32.6* 36.7    230     BMP    Recent Labs     03/12/24 1812 03/13/24 2006   SODIUM 133* 135   K 5.2 4.4    102   CO2 24 27   AGAP 5 6   BUN 28* 24   CREATININE 1.26 1.29   CALCIUM 9.1 10.0       Coags    Recent Labs     03/12/24 1812   INR 1.12   PTT 57*        Additional Electrolytes  Recent Labs     03/13/24 2006   MG 1.8*   PHOS 3.3          Blood Gas    No recent results  No recent results LFTs  No recent results    Infectious  No recent results  Glucose  Recent Labs     03/12/24 1812 03/13/24 2006   GLUC 107 97               VARSHA Webb

## 2024-03-14 NOTE — INCIDENTAL FINDINGS
The following findings require follow up:  Radiographic finding   Finding: CTA head/neck 3/12-4 intracranial aneurysms are identified unchanged from prior CT angiogram dated 4/17/2022. The largest of these is located at the right M1 bifurcation measuring 7 mm. Small aneurysms measuring 2 to 3 mm at the left M1 bifurcation and in the pericallosal   A2/A3 branches of the anterior circulation.   Follow up required: outpatient neurology    Follow up should be done within 6 months.     Please notify the following clinician to assist with the follow up:   Dr. Velasquez, neurology     Discussed with patient's daughter Lisa at bedside.

## 2024-03-15 ENCOUNTER — APPOINTMENT (INPATIENT)
Dept: RADIOLOGY | Facility: HOSPITAL | Age: 89
DRG: 062 | End: 2024-03-15
Payer: MEDICARE

## 2024-03-15 PROBLEM — N17.9 ACUTE KIDNEY INJURY SUPERIMPOSED ON CKD: Status: ACTIVE | Noted: 2022-06-29

## 2024-03-15 PROBLEM — N18.9 ACUTE KIDNEY INJURY SUPERIMPOSED ON CKD  (HCC): Status: ACTIVE | Noted: 2022-06-29

## 2024-03-15 PROBLEM — R05.1 ACUTE COUGH: Status: ACTIVE | Noted: 2024-03-15

## 2024-03-15 LAB
ANION GAP SERPL CALCULATED.3IONS-SCNC: 5 MMOL/L (ref 4–13)
BUN SERPL-MCNC: 21 MG/DL (ref 5–25)
CALCIUM SERPL-MCNC: 9.7 MG/DL (ref 8.4–10.2)
CHLORIDE SERPL-SCNC: 104 MMOL/L (ref 96–108)
CO2 SERPL-SCNC: 29 MMOL/L (ref 21–32)
CREAT SERPL-MCNC: 1.35 MG/DL (ref 0.6–1.3)
ERYTHROCYTE [DISTWIDTH] IN BLOOD BY AUTOMATED COUNT: 17.7 % (ref 11.6–15.1)
GFR SERPL CREATININE-BSD FRML MDRD: 32 ML/MIN/1.73SQ M
GLUCOSE SERPL-MCNC: 89 MG/DL (ref 65–140)
HCT VFR BLD AUTO: 36.7 % (ref 34.8–46.1)
HGB BLD-MCNC: 11.7 G/DL (ref 11.5–15.4)
MAGNESIUM SERPL-MCNC: 2.1 MG/DL (ref 1.9–2.7)
MCH RBC QN AUTO: 25.2 PG (ref 26.8–34.3)
MCHC RBC AUTO-ENTMCNC: 31.9 G/DL (ref 31.4–37.4)
MCV RBC AUTO: 79 FL (ref 82–98)
PLATELET # BLD AUTO: 226 THOUSANDS/UL (ref 149–390)
PMV BLD AUTO: 9.5 FL (ref 8.9–12.7)
POTASSIUM SERPL-SCNC: 4.1 MMOL/L (ref 3.5–5.3)
RBC # BLD AUTO: 4.65 MILLION/UL (ref 3.81–5.12)
SODIUM SERPL-SCNC: 138 MMOL/L (ref 135–147)
WBC # BLD AUTO: 6.78 THOUSAND/UL (ref 4.31–10.16)

## 2024-03-15 PROCEDURE — 80048 BASIC METABOLIC PNL TOTAL CA: CPT | Performed by: NURSE PRACTITIONER

## 2024-03-15 PROCEDURE — 97530 THERAPEUTIC ACTIVITIES: CPT

## 2024-03-15 PROCEDURE — 72100 X-RAY EXAM L-S SPINE 2/3 VWS: CPT

## 2024-03-15 PROCEDURE — 99232 SBSQ HOSP IP/OBS MODERATE 35: CPT | Performed by: STUDENT IN AN ORGANIZED HEALTH CARE EDUCATION/TRAINING PROGRAM

## 2024-03-15 PROCEDURE — 85027 COMPLETE CBC AUTOMATED: CPT | Performed by: NURSE PRACTITIONER

## 2024-03-15 PROCEDURE — 83735 ASSAY OF MAGNESIUM: CPT | Performed by: NURSE PRACTITIONER

## 2024-03-15 RX ORDER — GUAIFENESIN 100 MG/5ML
200 SOLUTION ORAL EVERY 4 HOURS PRN
Status: DISCONTINUED | OUTPATIENT
Start: 2024-03-15 | End: 2024-03-16 | Stop reason: HOSPADM

## 2024-03-15 RX ADMIN — GUAIFENESIN 200 MG: 200 SOLUTION ORAL at 21:30

## 2024-03-15 RX ADMIN — CLOPIDOGREL 75 MG: 75 TABLET ORAL at 08:28

## 2024-03-15 RX ADMIN — PREGABALIN 75 MG: 75 CAPSULE ORAL at 11:36

## 2024-03-15 RX ADMIN — LEVOTHYROXINE SODIUM 75 MCG: 75 TABLET ORAL at 05:48

## 2024-03-15 RX ADMIN — ATORVASTATIN CALCIUM 40 MG: 40 TABLET, FILM COATED ORAL at 16:29

## 2024-03-15 RX ADMIN — AMLODIPINE BESYLATE 10 MG: 10 TABLET ORAL at 21:31

## 2024-03-15 RX ADMIN — PANTOPRAZOLE SODIUM 40 MG: 40 TABLET, DELAYED RELEASE ORAL at 05:48

## 2024-03-15 RX ADMIN — MIRTAZAPINE 7.5 MG: 15 TABLET, FILM COATED ORAL at 21:30

## 2024-03-15 RX ADMIN — ASPIRIN 81 MG 81 MG: 81 TABLET ORAL at 08:28

## 2024-03-15 RX ADMIN — CHLORHEXIDINE GLUCONATE 15 ML: 1.2 SOLUTION ORAL at 21:30

## 2024-03-15 RX ADMIN — CHLORHEXIDINE GLUCONATE 15 ML: 1.2 SOLUTION ORAL at 08:28

## 2024-03-15 RX ADMIN — ENOXAPARIN SODIUM 30 MG: 30 INJECTION SUBCUTANEOUS at 08:28

## 2024-03-15 NOTE — POST FALL NOTE
"Post Fall Note    Date of Fall: 03/15/24  Observer/Reported time of Fall: 1640  Name of Provider Notified: Marisela Brown  Time Provider Notified: 1703  Assessment of Patient Injury: No injury noted  Post Fall Interventions: Physician notified; Circumstances of fall reviewed and documented; Fall risk precautions implemented/maitained; Comforts rounds continued  Family/Next of kin notified?: Yes (Family came to the bedside at 1723)      Brief Description of Events  Patient was found on the floor in the sitting position. Pt stated she was attempting to sit on the edge of the bed and slid off the bed and onto the floor. Pt was assessed for pain and injuries, no injury noted. Pt stated she did not hit her head when she fell. Pt was helped off the floor and back into bed. MD notified of fall.    Medications given to patient at 1629   1632 - Assisted by nurse and pulled patient up in bed so she could eat her dinner.  1639 patient was disconnected from the monitor  1640 aide went in to check on patient and found patient sitting on the floor.   1642 Patient was assessed for pain and injury and helped back into bed.   1703 MD was contacted via [employer] about the fall.    Last Recorded Vitals  Blood pressure 159/74, pulse 74, temperature 97.5 °F (36.4 °C), temperature source Temporal, resp. rate 22, height 4' 9\" (1.448 m), weight 55.9 kg (123 lb 3.8 oz), SpO2 93%, not currently breastfeeding.      Principal Problem:    Stroke-like symptom  Active Problems:    Hypertension    Hyperlipidemia    Hypothyroidism    Acute kidney injury superimposed on CKD     History of CVA (cerebrovascular accident)    Depression    Acute cough         "

## 2024-03-15 NOTE — PROGRESS NOTES
[facility]  Progress Note  Name: Svitlana Shahid I  MRN: [id number]  Unit/Bed#: ICU 12 I Date of Admission: 3/12/2024   Date of Service: 3/15/2024 I Hospital Day: 3    Assessment/Plan   * Stroke-like symptom  Assessment & Plan  Patient presents with acute right sided weakness. Stroke alert was called in the ED  CT head revealed no acute abnormality   CTA: revealed 4 intracranial aneurysms are identified unchanged from prior CT angiogram dated 4/17/2022   MRI 3/13: Tiny acute infarct in the posterior left frontal lobe. No acute hemorrhage or mass effect. Chronic microangiopathy. Known 7 mm right MCA aneurysm is grossly stable.  Repeat CTH(24 hours post TNK):No acute intracranial abnormality. See full report for additional findings  [Improving RUE/ RLE strength. Now able to hold both against gravity]  NIH  3; RUE weakness and RUE/RLE limb ataxia   S/P TNK at 1835 3/12  Stroke pathway  3/13 TTE: EF 65%, G2DD. No atrial septal defect by saline contrast. No patent foramen ovale detected.    PT/OT/ST  D/w Neuro recs:  dapt with aspirin 81mg /add plavix 75mg 3/14  Dapt for 3 months >ASA 81mg long term  Cont lipitor 40mg.     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   Awaiting outside records    Acute kidney injury superimposed on CKD   Assessment & Plan  Lab Results   Component Value Date    EGFR 32 03/15/2024    EGFR 35 03/14/2024    EGFR 34 03/13/2024    CREATININE 1.35 (H) 03/15/2024    CREATININE 1.26 03/14/2024    CREATININE 1.29 03/13/2024     Creatine 1.26 on admission, within baseline   Avoid nephrotoxic's  Gentle IVF    Hypothyroidism  Assessment & Plan  Continue home  levothyroxine 75 mcg    Hyperlipidemia  Assessment & Plan  Continue 40 mg  Lipitor    Hypertension  Assessment & Plan  Continue Norvasc 10 mg               VTE Pharmacologic Prophylaxis:   Moderate Risk (Score 3-4) - Pharmacological DVT Prophylaxis Ordered: enoxaparin (Lovenox).    Mobility:   Basic Mobility Inpatient Raw Score: 14  JH-HLM Goal: 4: Move to chair/commode  JH-HLM Achieved: 7: Walk 25 feet or more  JH-HLM Goal achieved. Continue to encourage appropriate mobility.    Patient Centered Rounds: I performed bedside rounds with nursing staff today.   Discussions with Specialists or Other Care Team Provider: Neurology, critical care    Education and Discussions with Family / Patient: Updated  (daughter) at bedside.    Total Time Spent on Date of Encounter in care of patient: 35 mins. This time was spent on one or more of the following: performing physical exam; counseling and coordination of care; obtaining or reviewing history; documenting in the medical record; reviewing/ordering tests, medications or procedures; communicating with other healthcare professionals and discussing with patient's family/caregivers.    Current Length of Stay: 3 day(s)  Current Patient Status: Inpatient   Certification Statement: The patient will continue to require additional inpatient hospital stay due to clinical course specialist recs  Discharge Plan: Anticipate discharge in 24-48 hrs to discharge location to be determined pending rehab evaluations.    Code Status: Level 3 - DNAR and DNI    Subjective:   Patient and daughter at bedside stated there was some weakness with walking, daughter stated patient walked 75 feet and uses rolling walker at home and does need assistance when getting up.  Discussed discharge planning.  Patient daughter stated that patient has 24-hour care at her own home but will be going home with daughter on discharge for several days.  Patient did report associated cough, denied allergies, denied postnasal drip denied reflux.    Addendum: Was made aware by nurse  at end of day that patient fell at the edge of bed onto her butt, denied hitting head or LOC or any pain.    Objective:     Vitals:   Temp (24hrs), Av.7 °F (36.5 °C), Min:97.5 °F (36.4 °C), Max:98.3 °F (36.8 °C)    Temp:  [97.5 °F (36.4 °C)-98.3 °F (36.8 °C)] 97.5 °F (36.4 °C)  HR:  [51-74] 74  Resp:  [16-33] 22  BP: (136-177)/() 159/74  SpO2:  [93 %-97 %] 93 %  Body mass index is 26.67 kg/m².     Input and Output Summary (last 24 hours):     Intake/Output Summary (Last 24 hours) at 3/15/2024 173  Last data filed at 3/14/2024 2000  Gross per 24 hour   Intake 240 ml   Output --   Net 240 ml       Physical Exam:   Physical Exam  Vitals and nursing note reviewed.   Constitutional:       General: She is not in acute distress.     Appearance: She is underweight.   HENT:      Head: Normocephalic and atraumatic.   Eyes:      Conjunctiva/sclera: Conjunctivae normal.   Cardiovascular:      Rate and Rhythm: Normal rate and regular rhythm.      Heart sounds: No murmur heard.     No friction rub. No gallop.   Pulmonary:      Effort: Pulmonary effort is normal. No respiratory distress.      Breath sounds: Normal breath sounds. No stridor. No wheezing, rhonchi or rales.   Abdominal:      Palpations: Abdomen is soft.      Tenderness: There is no abdominal tenderness. There is no guarding or rebound.   Musculoskeletal:         General: No swelling or tenderness.      Cervical back: Neck supple.      Right lower leg: No edema.      Left lower leg: No edema.   Skin:     General: Skin is warm and dry.      Capillary Refill: Capillary refill takes less than 2 seconds.      Findings: No bruising.   Neurological:      Mental Status: She is alert and oriented to person, place, and time.      Motor: No weakness.   Psychiatric:         Mood and Affect: Mood normal.         Behavior: Behavior normal.          Additional Data:     Labs:  Results from last 7 days   Lab Units 03/15/24  0538 24  0531 24   WBC  Thousand/uL 6.78   < > 6.24   HEMOGLOBIN g/dL 11.7   < > 12.0   HEMATOCRIT % 36.7   < > 36.7   PLATELETS Thousands/uL 226   < > 230   NEUTROS PCT %  --   --  50   LYMPHS PCT %  --   --  33   MONOS PCT %  --   --  10   EOS PCT %  --   --  6    < > = values in this interval not displayed.     Results from last 7 days   Lab Units 03/15/24  0538   SODIUM mmol/L 138   POTASSIUM mmol/L 4.1   CHLORIDE mmol/L 104   CO2 mmol/L 29   BUN mg/dL 21   CREATININE mg/dL 1.35*   ANION GAP mmol/L 5   CALCIUM mg/dL 9.7   GLUCOSE RANDOM mg/dL 89     Results from last 7 days   Lab Units 03/12/24  1812   INR  1.12     Results from last 7 days   Lab Units 03/12/24  1751   POC GLUCOSE mg/dl 97     Results from last 7 days   Lab Units 03/12/24  1813   HEMOGLOBIN A1C % 5.7*           Lines/Drains:  Invasive Devices       Peripheral Intravenous Line  Duration             Peripheral IV 03/12/24 Distal;Right;Upper;Ventral (anterior) Arm 2 days    Peripheral IV 03/12/24 Left Antecubital 2 days                    Imaging: Reviewed radiology reports from this admission including: CT head    Recent Cultures (last 7 days):         Last 24 Hours Medication List:   Current Facility-Administered Medications   Medication Dose Route Frequency Provider Last Rate    amLODIPine  10 mg Oral HS Deanna Spironello V, CRNP      aspirin  81 mg Oral Daily Deanna Spironello V, CRNP      atorvastatin  40 mg Oral Daily With Dinner Deanna Nicholsnello V, CRNP      chlorhexidine  15 mL Mouth/Throat Q12H [facility] Deanna Spironello V, CRNP      clopidogrel  75 mg Oral Daily Deanna Spironello V, CRNP      enoxaparin  30 mg Subcutaneous Q24H [facility] Deanna Spironello V, CRNP      hydrALAZINE  10 mg Intravenous Q6H PRN Deanna Nicholsnello V CRNP      influenza vaccine  0.7 mL Intramuscular Once Deanna Spironello V, CRNP      levothyroxine  75 mcg Oral Early Morning Deanna Spironello V, CRNP      mirtazapine  7.5 mg Oral HS Deanna Spironello V, CRNP      pantoprazole   40 mg Oral Early Morning VARSHA Rudolph      pneumococcal 20-artur conj vacc  0.5 mL Intramuscular Once VARSHA Rudolph      pregabalin  75 mg Oral Daily VARSHA Rudolph          Today, Patient Was Seen By: Marisela Brown MD    **Please Note: This note may have been constructed using a voice recognition system.**

## 2024-03-15 NOTE — PHYSICAL THERAPY NOTE
PT TREATMENT     03/15/24 0999   PT Last Visit   PT Visit Date 03/15/24   Note Type   Note Type Treatment   Pain Assessment   Pain Assessment Tool 0-10   Pain Score No Pain   Restrictions/Precautions   Braces or Orthoses Other (Comment)  (pt wears her own L wrist brace)   Other Precautions Pain;Fall Risk;Chair Alarm;Bed Alarm  (primarily Icelandic speaking)   General   Chart Reviewed Yes   Cognition   Arousal/Participation Cooperative   Following Commands Follows one step commands with increased time or repetition   Subjective   Subjective Pt reports her R side feels weak per pt's daughter who was present to translate.   Bed Mobility   Supine to Sit 3  Moderate assistance   Transfers   Sit to Stand 4  Minimal assistance   Stand to Sit 4  Minimal assistance   Stand pivot 4  Minimal assistance  (with walker)   Toilet transfer 4  Minimal assistance   Additional items   (Pt assisted to wipe due to wires on B hands.)   Ambulation/Elevation   Gait pattern Excessively slow;Step through pattern;Foward flexed  (pt leans on her forearms)   Gait Assistance 4  Minimal assist   Assistive Device 4-wheeled walker   Distance 150 feet   Balance   Static Sitting Fair   Static Standing Fair -   Activity Tolerance   Activity Tolerance Patient tolerated treatment well;Patient limited by fatigue   Assessment   Prognosis Good   Problem List Decreased strength;Decreased range of motion;Impaired balance;Decreased mobility;Decreased endurance   Assessment Pt demonstrates continued improvement in her ability to participate in functional activity and to ambulate s/p TNK for CVA. Pt assisted with aide to toilet and change gown prior to walking which took a significant amount of time.  Pt had not yet eaten breakfast so additional exercises deferred.  Plan to add UE/LE exercises next session as pt reports R hemibody weakness persists.    The patient's AM-PAC Basic Mobility Inpatient Short Form Raw Score is 14. A Raw score of less than or equal  to 16 suggests the patient may benefit from discharge to post-acute rehabilitation services, however recommend pt DC home as pt has adequate support. Please also refer to the recommendation of the Physical Therapist for safe discharge planning.         Plan   Treatment/Interventions ADL retraining;Functional transfer training;LE strengthening/ROM;Therapeutic exercise;Endurance training;Gait training;Bed mobility;Equipment eval/education   Progress Progressing toward goals   PT Frequency 5-7x/wk   Discharge Recommendation   Rehab Resource Intensity Level, PT III (Minimum Resource Intensity)   AM-PAC Basic Mobility Inpatient   Turning in Flat Bed Without Bedrails 3   Lying on Back to Sitting on Edge of Flat Bed Without Bedrails 2   Moving Bed to Chair 2   Standing Up From Chair Using Arms 3   Walk in Room 3   Climb 3-5 Stairs With Railing 1   Basic Mobility Inpatient Raw Score 14   Basic Mobility Standardized Score 35.55   [facility] Highest Level Of Mobility   -HLM Goal 4: Move to chair/commode   -HLM Achieved 7: Walk 25 feet or more   Licensure   [place] License Number  Maryan Conner PT [id number]

## 2024-03-15 NOTE — ASSESSMENT & PLAN NOTE
Patient presents with acute right sided weakness. Stroke alert was called in the ED  CT head revealed no acute abnormality   CTA: revealed 4 intracranial aneurysms are identified unchanged from prior CT angiogram dated 4/17/2022   MRI 3/13: Tiny acute infarct in the posterior left frontal lobe. No acute hemorrhage or mass effect. Chronic microangiopathy. Known 7 mm right MCA aneurysm is grossly stable.  Repeat CTH(24 hours post TNK):No acute intracranial abnormality. See full report for additional findings  [Improving RUE/ RLE strength. Now able to hold both against gravity]  NIH  3; RUE weakness and RUE/RLE limb ataxia   S/P TNK at 1835 3/12  Stroke pathway  3/13 TTE: EF 65%, G2DD. No atrial septal defect by saline contrast. No patent foramen ovale detected.    PT/OT/ST  D/w Neuro recs:  dapt with aspirin 81mg /add plavix 75mg 3/14  Dapt for 3 months >ASA 81mg long term  Cont lipitor 40mg.

## 2024-03-15 NOTE — ASSESSMENT & PLAN NOTE
Lab Results   Component Value Date    EGFR 32 03/15/2024    EGFR 35 03/14/2024    EGFR 34 03/13/2024    CREATININE 1.35 (H) 03/15/2024    CREATININE 1.26 03/14/2024    CREATININE 1.29 03/13/2024     Creatine 1.26 on admission, within baseline   Avoid nephrotoxic's  Gentle IVF

## 2024-03-15 NOTE — QUICK NOTE
Was made aware of patient fall by RN via TT, RN reported patient slid out of bed, denied hitting her head, assess for pain and help place back in the bed.  RN stated that daughter was not made aware yet, received multiple messages from ICU staff regarding patient's daughter being irate and wanting to see physician, made staff aware MD was doing current admissions with unstable patient and will be there soon as possible.      Upon arrival patient's daughter with patient's caretakers at bedside stated she has been at bedside all day the majority of time my mom's been in ICU, aware of frequency of nursing checks since patient has been admitted and stated that today she was not happy with the frequency from nurse and attentiveness.      Further discussion regarding patient's persistence and desire for independence is noted by her and home staff.  Patient's daughter came tearful, MD discussed caregiver burnout and daughter support is limited.  Patient's daughter stated she did not need to lash out and get angry like she did with staff and that is not her normal behavior.  MD encouraged patient's daughter to create some boundaries, and force family support from sisters and continue to talk to external support.    Informed patient's daughter of medical plan that patient would remain in ICU, identified the night nursing staff was a prior caretaker that patient's daughter was pleased with and that x-rays will be obtained.  Informed patient's daughter that due to patient's age not a candidate for chemical restraints but would have to do physical restraints if patient became agitated or try to get out of bed.  Patient's daughter agreed.    PE:  Cardio-RRR   Respiratory-CTA BL  Spine-no point tenderness, no SI tenderness  Butt-no gluteal tenderness    Plan:  Continue fall precautions, x-rays pelvic and spine, physical restraints if needed

## 2024-03-15 NOTE — CASE MANAGEMENT
Case Management Discharge Planning Note    Patient name Svitlana Shahid  Location ICU 12/ICU 12 MRN [id number]  : 1924 Date 3/15/2024       Current Admission Date: 3/12/2024  Current Admission Diagnosis:Stroke-like symptom   Patient Active Problem List    Diagnosis Date Noted    Stroke-like symptom 2024    Depression 2023    History of CVA (cerebrovascular accident) 2023    Acute kidney injury superimposed on CKD  2022    Hypothyroidism 07/15/2019    CAD (coronary artery disease) 07/15/2019    Hypertension 2019    Hyperlipidemia 2019    Osteoarthritis (arthritis due to wear and tear of joints) 2019      LOS (days): 3  Geometric Mean LOS (GMLOS) (days): 3.2  Days to GMLOS:0.4     OBJECTIVE:  Risk of Unplanned Readmission Score: 12.74       Current admission status: Inpatient   Preferred Pharmacy:   [facility]/pharmacy #[id number] - [place] - [address]  [address] [address]  Phone: [phone] Fax: [phone]    [facility]) #[id number] - [place], [place] - [address]  [address] [address]  Phone: [phone] Fax: [phone]    Primary Care Provider: More Wadsworth MD    Primary Insurance: MEDICARE  Secondary Insurance: [facility]    DISCHARGE DETAILS:       Treatment Team Recommendation: Home with Home Health Care  Discharge Destination Plan:: Home with Home Health Care  Transport at Discharge : Family         IMM Given (Date):: 03/15/24  IMM Given to:: Family (IMM reviewed with and signed by daughter.  Copy given to daughter and copy placed in scan bin for chart.)

## 2024-03-15 NOTE — PLAN OF CARE
Plan of care reviewed w/ patient.    Problem: Potential for Falls  Goal: Patient will remain free of falls  Description: INTERVENTIONS:  - Educate patient/family on patient safety including physical limitations  - Instruct patient to call for assistance with activity   - Consult OT/PT to assist with strengthening/mobility   - Keep Call bell within reach  - Keep bed low and locked with side rails adjusted as appropriate  - Keep care items and personal belongings within reach  - Initiate and maintain comfort rounds  - Make Fall Risk Sign visible to staff  - Offer Toileting every 2 Hours, in advance of need  - Initiate/Maintain bed/chair alarm  - Obtain necessary fall risk management equipment: walker  - Apply yellow socks and bracelet for high fall risk patients  - Consider moving patient to room near nurses station  Outcome: Progressing     Problem: Prexisting or High Potential for Compromised Skin Integrity  Goal: Skin integrity is maintained or improved  Description: INTERVENTIONS:  - Identify patients at risk for skin breakdown  - Assess and monitor skin integrity  - Assess and monitor nutrition and hydration status  - Monitor labs   - Assess for incontinence   - Turn and reposition patient  - Assist with mobility/ambulation  - Relieve pressure over bony prominences  - Avoid friction and shearing  - Provide appropriate hygiene as needed including keeping skin clean and dry  - Evaluate need for skin moisturizer/barrier cream  - Collaborate with interdisciplinary team   - Patient/family teaching  - Consider wound care consult   Outcome: Progressing     Problem: NEUROSENSORY - ADULT  Goal: Achieves stable or improved neurological status  Description: INTERVENTIONS  - Monitor and report changes in neurological status  - Monitor vital signs such as temperature, blood pressure, glucose, and any other labs ordered   - Initiate measures to prevent increased intracranial pressure      Outcome: Progressing  Goal: Achieves  maximal functionality and self care  Description: INTERVENTIONS  - Monitor swallowing and airway patency with patient fatigue and changes in neurological status  - Encourage and assist patient to increase activity and self care.   - Encourage visually impaired, hearing impaired and aphasic patients to use assistive/communication devices  Outcome: Progressing     Problem: Neurological Deficit  Goal: Neurological status is stable or improving  Description: Interventions:  - Monitor and assess patient's level of consciousness, motor function, sensory function, and level of assistance needed for ADLs.   - Monitor and report changes from baseline. Collaborate with interdisciplinary team to initiate plan and implement interventions as ordered.   - Provide and maintain a safe environment.  - Consider fall precautions.  - Consider aspiration precautions.  - Consider bleeding precautions.  Outcome: Progressing     Problem: Activity Intolerance/Impaired Mobility  Goal: Mobility/activity is maintained at optimum level for patient  Description: Interventions:  - Assess and monitor patient  barriers to mobility and need for assistive/adaptive devices.  - Assess patient's emotional response to limitations.  - Collaborate with interdisciplinary team and initiate plans and interventions as ordered.  - Encourage independent activity per ability.  - Maintain proper body alignment.  - Perform active/passive rom as tolerated/ordered.  - Plan activities to conserve energy.  - Turn patient as appropriate  Outcome: Progressing     Problem: Communication Impairment  Goal: Ability to express needs and understand communication  Description: Assess patient's communication skills and ability to understand information.  Patient will demonstrate use of effective communication techniques, alternative methods of communication and understanding even if not able to speak.     - Encourage communication and provide alternate methods of communication  as needed.  - Collaborate with case management/ for discharge needs.  - Include patient/family/caregiver in decisions related to communication.  Outcome: Progressing     Problem: Potential for Aspiration  Goal: Non-ventilated patient's risk of aspiration is minimized  Description: Assess and monitor vital signs, respiratory status, and labs (WBC).  Monitor for signs of aspiration (tachypnea, cough, rales, wheezing, cyanosis, fever).    - Assess and monitor patient's ability to swallow.  - Place patient up in chair to eat if possible.  - HOB up at 90 degrees to eat if unable to get patient up into chair.  - Supervise patient during oral intake.   - Instruct patient/ family to take small bites.  - Instruct patient/ family to take small single sips when taking liquids.  - Follow patient-specific strategies generated by speech pathologist.  Outcome: Progressing     Problem: Nutrition  Goal: Nutrition/Hydration status is improving  Description: Monitor and assess patient's nutrition/hydration status for malnutrition (ex- brittle hair, bruises, dry skin, pale skin and conjunctiva, muscle wasting, smooth red tongue, and disorientation). Collaborate with interdisciplinary team and initiate plan and interventions as ordered.  Monitor patient's weight and dietary intake as ordered or per policy. Utilize nutrition screening tool and intervene per policy. Determine patient's food preferences and provide high-protein, high-caloric foods as appropriate.     - Assist patient with eating.  - Allow adequate time for meals.  - Encourage patient to take dietary supplement as ordered.  - Collaborate with clinical nutritionist.  - Include patient/family/caregiver in decisions related to nutrition.  Outcome: Progressing

## 2024-03-16 VITALS
WEIGHT: 117.73 LBS | BODY MASS INDEX: 25.4 KG/M2 | HEIGHT: 57 IN | SYSTOLIC BLOOD PRESSURE: 164 MMHG | DIASTOLIC BLOOD PRESSURE: 77 MMHG | HEART RATE: 67 BPM | OXYGEN SATURATION: 96 % | RESPIRATION RATE: 16 BRPM | TEMPERATURE: 97.6 F

## 2024-03-16 PROBLEM — N17.9 ACUTE KIDNEY INJURY SUPERIMPOSED ON CKD: Status: RESOLVED | Noted: 2022-06-29 | Resolved: 2024-03-16

## 2024-03-16 PROBLEM — N18.9 ACUTE KIDNEY INJURY SUPERIMPOSED ON CKD: Status: RESOLVED | Noted: 2022-06-29 | Resolved: 2024-03-16
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
## 2024-03-16 LAB
ALBUMIN SERPL BCP-MCNC: 3.6 G/DL (ref 3.5–5)
ALP SERPL-CCNC: 97 U/L (ref 34–104)
ALT SERPL W P-5'-P-CCNC: 6 U/L (ref 7–52)
ANION GAP SERPL CALCULATED.3IONS-SCNC: 5 MMOL/L (ref 4–13)
AST SERPL W P-5'-P-CCNC: 15 U/L (ref 13–39)
BASOPHILS # BLD AUTO: 0.03 THOUSANDS/ÂΜL (ref 0–0.1)
BASOPHILS NFR BLD AUTO: 1 % (ref 0–1)
BILIRUB SERPL-MCNC: 1.06 MG/DL (ref 0.2–1)
BUN SERPL-MCNC: 19 MG/DL (ref 5–25)
CALCIUM SERPL-MCNC: 8.8 MG/DL (ref 8.4–10.2)
CHLORIDE SERPL-SCNC: 105 MMOL/L (ref 96–108)
CO2 SERPL-SCNC: 27 MMOL/L (ref 21–32)
CREAT SERPL-MCNC: 1.26 MG/DL (ref 0.6–1.3)
EOSINOPHIL # BLD AUTO: 0.27 THOUSAND/ÂΜL (ref 0–0.61)
EOSINOPHIL NFR BLD AUTO: 5 % (ref 0–6)
ERYTHROCYTE [DISTWIDTH] IN BLOOD BY AUTOMATED COUNT: 17.6 % (ref 11.6–15.1)
GFR SERPL CREATININE-BSD FRML MDRD: 35 ML/MIN/1.73SQ M
GLUCOSE SERPL-MCNC: 74 MG/DL (ref 65–140)
HCT VFR BLD AUTO: 36.1 % (ref 34.8–46.1)
HGB BLD-MCNC: 11.9 G/DL (ref 11.5–15.4)
IMM GRANULOCYTES # BLD AUTO: 0.02 THOUSAND/UL (ref 0–0.2)
IMM GRANULOCYTES NFR BLD AUTO: 0 % (ref 0–2)
LYMPHOCYTES # BLD AUTO: 1.76 THOUSANDS/ÂΜL (ref 0.6–4.47)
LYMPHOCYTES NFR BLD AUTO: 31 % (ref 14–44)
MCH RBC QN AUTO: 25.9 PG (ref 26.8–34.3)
MCHC RBC AUTO-ENTMCNC: 33 G/DL (ref 31.4–37.4)
MCV RBC AUTO: 79 FL (ref 82–98)
MONOCYTES # BLD AUTO: 0.63 THOUSAND/ÂΜL (ref 0.17–1.22)
MONOCYTES NFR BLD AUTO: 11 % (ref 4–12)
NEUTROPHILS # BLD AUTO: 2.94 THOUSANDS/ÂΜL (ref 1.85–7.62)
NEUTS SEG NFR BLD AUTO: 52 % (ref 43–75)
NRBC BLD AUTO-RTO: 0 /100 WBCS
PLATELET # BLD AUTO: 211 THOUSANDS/UL (ref 149–390)
PMV BLD AUTO: 9.7 FL (ref 8.9–12.7)
POTASSIUM SERPL-SCNC: 3.7 MMOL/L (ref 3.5–5.3)
PROT SERPL-MCNC: 6.6 G/DL (ref 6.4–8.4)
RBC # BLD AUTO: 4.6 MILLION/UL (ref 3.81–5.12)
SODIUM SERPL-SCNC: 137 MMOL/L (ref 135–147)
WBC # BLD AUTO: 5.65 THOUSAND/UL (ref 4.31–10.16)

## 2024-03-16 PROCEDURE — 85025 COMPLETE CBC W/AUTO DIFF WBC: CPT | Performed by: STUDENT IN AN ORGANIZED HEALTH CARE EDUCATION/TRAINING PROGRAM

## 2024-03-16 PROCEDURE — 90677 PCV20 VACCINE IM: CPT | Performed by: NURSE PRACTITIONER

## 2024-03-16 PROCEDURE — 80053 COMPREHEN METABOLIC PANEL: CPT | Performed by: STUDENT IN AN ORGANIZED HEALTH CARE EDUCATION/TRAINING PROGRAM

## 2024-03-16 PROCEDURE — G0009 ADMIN PNEUMOCOCCAL VACCINE: HCPCS | Performed by: NURSE PRACTITIONER

## 2024-03-16 PROCEDURE — 99239 HOSP IP/OBS DSCHRG MGMT >30: CPT | Performed by: STUDENT IN AN ORGANIZED HEALTH CARE EDUCATION/TRAINING PROGRAM

## 2024-03-16 RX ORDER — ASPIRIN 81 MG/1
81 TABLET, CHEWABLE ORAL DAILY
Qty: 30 TABLET | Refills: 1 | Status: SHIPPED | OUTPATIENT
Start: 2024-03-17

## 2024-03-16 RX ORDER — ATORVASTATIN CALCIUM 40 MG/1
40 TABLET, FILM COATED ORAL
Qty: 30 TABLET | Refills: 0 | Status: SHIPPED | OUTPATIENT
Start: 2024-03-16

## 2024-03-16 RX ORDER — CLOPIDOGREL BISULFATE 75 MG/1
75 TABLET ORAL DAILY
Qty: 88 TABLET | Refills: 0 | Status: SHIPPED | OUTPATIENT
Start: 2024-03-17 | End: 2024-06-13

## 2024-03-16 RX ORDER — GUAIFENESIN 100 MG/5ML
100 SOLUTION ORAL EVERY 4 HOURS PRN
Qty: 118 ML | Refills: 0 | Status: SHIPPED | OUTPATIENT
Start: 2024-03-16

## 2024-03-16 RX ADMIN — CHLORHEXIDINE GLUCONATE 15 ML: 1.2 SOLUTION ORAL at 09:35

## 2024-03-16 RX ADMIN — LEVOTHYROXINE SODIUM 75 MCG: 75 TABLET ORAL at 06:03

## 2024-03-16 RX ADMIN — ASPIRIN 81 MG 81 MG: 81 TABLET ORAL at 09:35

## 2024-03-16 RX ADMIN — CLOPIDOGREL 75 MG: 75 TABLET ORAL at 09:35

## 2024-03-16 RX ADMIN — ENOXAPARIN SODIUM 30 MG: 30 INJECTION SUBCUTANEOUS at 09:35

## 2024-03-16 RX ADMIN — PNEUMOCOCCAL 20-VALENT CONJUGATE VACCINE 0.5 ML
2.2; 2.2; 2.2; 2.2; 2.2; 2.2; 2.2; 2.2; 2.2; 2.2; 2.2; 2.2; 2.2; 2.2; 2.2; 2.2; 4.4; 2.2; 2.2; 2.2 INJECTION, SUSPENSION INTRAMUSCULAR at 15:16

## 2024-03-16 RX ADMIN — PREGABALIN 75 MG: 75 CAPSULE ORAL at 11:53

## 2024-03-16 RX ADMIN — PANTOPRAZOLE SODIUM 40 MG: 40 TABLET, DELAYED RELEASE ORAL at 06:03

## 2024-03-16 NOTE — ASSESSMENT & PLAN NOTE
Fall 3/15, asymptomatic upon PE and inquiry of patient today    X-ray: Pelvis Age-appropriate degenerative change and mild dextroscoliosis. No acute findings.   Discussed finding with patient's daughter

## 2024-03-16 NOTE — DISCHARGE SUMMARY
[facility]  Discharge- Svitlana Shahid 5/5/1924, 89 y.o. female MRN: [id number]  Unit/Bed#: ICU 12 Encounter: [id number]  Primary Care Provider: More Wadsworth MD   Date and time admitted to hospital: 3/12/2024  5:42 PM    * Stroke-like symptom  Assessment & Plan  Patient presents with acute right sided weakness. Stroke alert was called in the ED  CT head revealed no acute abnormality   CTA: revealed 4 intracranial aneurysms are identified unchanged from prior CT angiogram dated 4/17/2022   MRI 3/13: Tiny acute infarct in the posterior left frontal lobe. No acute hemorrhage or mass effect. Chronic microangiopathy. Known 7 mm right MCA aneurysm is grossly stable.  Repeat CTH(24 hours post TNK):No acute intracranial abnormality. See full report for additional findings  [Improving RUE/ RLE strength. Now able to hold both against gravity]  NIH  3; RUE weakness and RUE/RLE limb ataxia   S/P TNK at 1835 3/12  Stroke pathway  3/13 TTE: EF 65%, G2DD. No atrial septal defect by saline contrast. No patent foramen ovale detected.    PT/OT/ST  D/w Neuro recs:  dapt with aspirin 81mg /add plavix 75mg 3/14  Dapt for 3 months >ASA 81mg long term  Cont lipitor 40mg.     Fall  Assessment & Plan  Fall 3/15, asymptomatic upon PE and inquiry of patient today    X-ray: Pelvis Age-appropriate degenerative change and mild dextroscoliosis. No acute findings.   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   Awaiting outside records    Hypothyroidism  Assessment & Plan  Continue home  levothyroxine 75 mcg    Hyperlipidemia  Assessment & Plan  Continue 40 mg Lipitor    Hypertension  Assessment & Plan  Continue Norvasc 10 mg    Acute  kidney injury superimposed on CKD -resolved as of 3/16/2024  Assessment & Plan  Lab Results   Component Value Date    EGFR 35 03/16/2024    EGFR 32 03/15/2024    EGFR 35 03/14/2024    CREATININE 1.26 03/16/2024    CREATININE 1.35 (H) 03/15/2024    CREATININE 1.26 03/14/2024     Creatine 1.26 on admission, within baseline   Avoid nephrotoxic's  Gentle IVF        Medical Problems       Resolved Problems  Date Reviewed: 3/15/2024            Resolved    Acute kidney injury superimposed on CKD  3/16/2024     Resolved by  Marisela Brown MD        Discharging Physician / Practitioner: Marisela Brown MD  PCP: More Wadsworth MD  Admission Date:   Admission Orders (From admission, onward)       Ordered        03/12/24 1912  INPATIENT ADMISSION  Once                          Discharge Date: 03/16/24      Consultations During Hospital Stay:  Neurology, CCU    Procedures Performed:   TNK    Significant Findings / Test Results:   N/A    Incidental Findings:   MRI 3/13: Tiny acute infarct in the posterior left frontal lobe. No acute hemorrhage or mass effect. Chronic microangiopathy. Known 7 mm right MCA aneurysm is grossly stable.  Managed     Test Results Pending at Discharge (will require follow up):   N/A     Outpatient Tests Requested:  BMP    Complications:  N/A        Hospital Course:   Svitlana Shahid is a 89 y.o. female patient who originally presented to the hospital on 3/12/2024 due to strokelike symptoms.  CTH no acute abnormality, CTA for ICA aneurysms unchanged from 2022, MRI tiny acute infarct in posterior left frontal lobe, status post TNK> repeat CTh no acute intracranial abnormality, patient seen by neurology DC recs DAPT ASA 81 mg-indefinitely/Plavix 75 mg - 3 months, continue Lipitor 40 mg.  Patient noted to have KYREE on CKD which resolved prior to discharge.  Patient reported cough, history of pre-existing reflux medicines will continue, negative C/F/R swab on admission, supportive care follow-up with  "PCP.    Patient has chronic history of hemorrhagic CVA, hypothyroidism, hyperlipidemia, hypertension and will continue home medication inpatient equivalent patient to follow-up with PCP and/or specialist.      Please see above list of diagnoses and related plan for additional information.     Condition at Discharge: fair    Discharge Day Visit / Exam:   Subjective: Patient seen and examined at bedside in chair, reported no headache, no blurry vision, no chest pain, no shortness of breath, no palpitations, no swelling, no bleeding.  Phone call with supervising nurse present to patient's daughter regarding discharge planning and negative x-rays, patient's daughter stated she was taking a break and did not stay overnight at hospital, MD praised patient's daughter for setting boundaries to avoid caregiver burnout any further.  Patient's daughter stated she would be at the hospital within one 1 hour to  patient for discharge.  Confirmed patient would be staying with her for the remainder of the weekend then go home where she has 24-hour care.  Supervising nurse discussed patient's daughter's concerns and follow-up in person conversation when patient's daughter at bedside to pick patient up.    Vitals: Blood Pressure: 164/77 (03/16/24 1200)  Pulse: 67 (03/16/24 1200)  Temperature: 97.6 °F (36.4 °C) (03/16/24 1200)  Temp Source: Tympanic (03/16/24 1200)  Respirations: 16 (03/16/24 1200)  Height: 4' 9\" (144.8 cm) (03/13/24 1000)  Weight - Scale: 53.4 kg (117 lb 11.6 oz) (03/16/24 0600)  SpO2: 96 % (03/16/24 1200)      Physical Exam  Vitals and nursing note reviewed.   Constitutional:       General: She is not in acute distress.     Appearance: She is well-developed.   HENT:      Head: Normocephalic and atraumatic.   Eyes:      Conjunctiva/sclera: Conjunctivae normal.   Cardiovascular:      Rate and Rhythm: Normal rate and regular rhythm.      Heart sounds: No murmur heard.     No friction rub. No gallop.   Pulmonary: "      Effort: Pulmonary effort is normal. No respiratory distress.      Breath sounds: Normal breath sounds. No stridor. No wheezing, rhonchi or rales.   Abdominal:      Palpations: Abdomen is soft.      Tenderness: There is no abdominal tenderness. There is no guarding or rebound.   Musculoskeletal:         General: No swelling or tenderness.      Cervical back: Neck supple.      Right lower leg: No edema.      Left lower leg: No edema.   Skin:     General: Skin is warm and dry.      Capillary Refill: Capillary refill takes less than 2 seconds.      Findings: No bruising.   Neurological:      Mental Status: She is alert and oriented to person, place, and time.      Motor: No weakness.   Psychiatric:         Mood and Affect: Mood normal.         Behavior: Behavior normal.          Discussion with Family: Updated  (daughter) via phone.    Discharge instructions/Information to patient and family:   See after visit summary for information provided to patient and family.      Provisions for Follow-Up Care:  See after visit summary for information related to follow-up care and any pertinent home health orders.      Mobility at time of Discharge:   Basic Mobility Inpatient Raw Score: 13  -HLM Goal: 4: Move to chair/commode  JH-HLM Achieved: 1: Laying in bed  HLM Goal NOT achieved. Continue to encourage mobility in post discharge setting.     Disposition:   Home with VNA Services (Reminder: Complete face to face encounter)    Planned Readmission: No     Discharge Statement:  I spent 45 minutes discharging the patient. This time was spent on the day of discharge. I had direct contact with the patient on the day of discharge. Greater than 50% of the total time was spent examining patient, answering all patient questions, arranging and discussing plan of care with patient as well as directly providing post-discharge instructions.  Additional time then spent on discharge activities.    Discharge  Medications:  See after visit summary for reconciled discharge medications provided to patient and/or family.      **Please Note: This note may have been constructed using a voice recognition system**

## 2024-03-16 NOTE — ASSESSMENT & PLAN NOTE
Lab Results   Component Value Date    EGFR 35 03/16/2024    EGFR 32 03/15/2024    EGFR 35 03/14/2024    CREATININE 1.26 03/16/2024    CREATININE 1.35 (H) 03/15/2024    CREATININE 1.26 03/14/2024     Creatine 1.26 on admission, within baseline   Avoid nephrotoxic's  Gentle IVF

## 2024-03-20 NOTE — CASE MANAGEMENT
Case Management Progress Note    Patient name Svitlana Shahid  Location ICU 12/ICU 12 MRN [id number]  : 1924 Date 3/20/2024       LOS (days): 4  Geometric Mean LOS (GMLOS) (days): 3.2  Days to GMLOS:-0.7        OBJECTIVE:    Current admission status: Inpatient  Preferred Pharmacy:   [id number] [place] - [address]  [address]  [place] [address]  Phone: [phone] Fax: [phone]    [facility] [place] [id number] - [place] - [address]  [address] [address]  Phone: [phone] Fax: [phone]    Primary Care Provider: More Wadsworth MD    Primary Insurance: MEDICARE  Secondary Insurance: [facility]    PROGRESS NOTE:      CM was made aware by hospital supervisor that patient's Daughter Lisa would like to speak with Case Management. CM called Lisa ([phone]) to introduce role and stated to her that CM is aware she requested to speak with CM regarding some concerns about an experience in the ICU.     Lisa stated that the whole week her mother has had great care from all of her nurses and she really doesn't mean to complain, however a few things that have happened since staffing changed for the weekend Iman has found unacceptable. Iman stated she noticed the care for her mother was not the same once nursing staff changed for the weekending including: nursing not checking on her mother for hours at at time, leaving the bed side rails down and no bed alarm on which contributed to her mother being able to fall out of the bed. Lisa stated she should have been contacted immediately when the fall was discovered, she was very upset she didn't learn about it until she came to visit her mother at the hospital. CM provided supportive listening and feedback when possible. CM reassured Lisa her experience and concerns will be documented and made known to the appropriate parties. Lisa stated she appreciated  CM's time and  attention.

## 2024-04-17 ENCOUNTER — TELEPHONE (OUTPATIENT)
Dept: NEUROLOGY | Facility: CLINIC | Age: 89
End: 2024-04-17

## 2024-04-17 NOTE — TELEPHONE ENCOUNTER
Patient is now scheduled with Kartik Schmitz , 6/26/2024, HFU Slot, 2:30 pm, only can do a wed and kartik office per daughter.    HFU/ RAFFI ESCOBAR/ STROKE LIKE SYMPTOMS     DC- 3/16/2024- HOME     Svitlana Shahid will need follow up in 8-10 weeks with general attending or advance practitioner. She will not require outpatient neurological testing.

## 2024-05-01 PROBLEM — R05.1 ACUTE COUGH: Status: RESOLVED | Noted: 2024-03-15 | Resolved: 2024-05-01

## 2024-06-12 ENCOUNTER — TELEPHONE (OUTPATIENT)
Dept: NEUROLOGY | Facility: CLINIC | Age: 89
End: 2024-06-12

## 2025-05-07 ENCOUNTER — TELEPHONE (OUTPATIENT)
Age: OVER 89
End: 2025-05-07

## 2025-05-07 NOTE — TELEPHONE ENCOUNTER
Rec'd call from patients daughter stating that she'd like to schedule NEW for BURNING/ITCHING around eyes.    Seen by PCP. Prescribed steroidal cream. No help.    Offered first available SOC with Dr. Seymour in August.    Daughter states that she's unable to wait that long.    Daughter will take patient to Emergency Room.    Advised her to please call (after ED visit) to see what has been resolved and/or what is still needed.    (Unable to see patient sooner in [place] due to supplemental is medicaid.)